# Patient Record
Sex: FEMALE | Race: WHITE | NOT HISPANIC OR LATINO | Employment: FULL TIME | URBAN - METROPOLITAN AREA
[De-identification: names, ages, dates, MRNs, and addresses within clinical notes are randomized per-mention and may not be internally consistent; named-entity substitution may affect disease eponyms.]

---

## 2017-01-02 ENCOUNTER — TRANSCRIBE ORDERS (OUTPATIENT)
Dept: LAB | Facility: MEDICAL CENTER | Age: 72
End: 2017-01-02

## 2017-01-02 ENCOUNTER — APPOINTMENT (OUTPATIENT)
Dept: LAB | Facility: MEDICAL CENTER | Age: 72
End: 2017-01-02
Payer: COMMERCIAL

## 2017-01-02 DIAGNOSIS — E78.5 HYPERLIPIDEMIA: ICD-10-CM

## 2017-01-02 DIAGNOSIS — E87.6 HYPOKALEMIA: ICD-10-CM

## 2017-01-02 DIAGNOSIS — I10 ESSENTIAL (PRIMARY) HYPERTENSION: ICD-10-CM

## 2017-01-02 LAB
ANION GAP SERPL CALCULATED.3IONS-SCNC: 10 MMOL/L (ref 4–13)
AST SERPL W P-5'-P-CCNC: 12 U/L (ref 5–45)
BUN SERPL-MCNC: 20 MG/DL (ref 5–25)
CALCIUM SERPL-MCNC: 8.7 MG/DL (ref 8.3–10.1)
CHLORIDE SERPL-SCNC: 101 MMOL/L (ref 100–108)
CHOLEST SERPL-MCNC: 154 MG/DL (ref 50–200)
CO2 SERPL-SCNC: 30 MMOL/L (ref 21–32)
CREAT SERPL-MCNC: 0.77 MG/DL (ref 0.6–1.3)
GFR SERPL CREATININE-BSD FRML MDRD: >60 ML/MIN/1.73SQ M
GLUCOSE SERPL-MCNC: 124 MG/DL (ref 65–140)
HDLC SERPL-MCNC: 51 MG/DL (ref 40–60)
LDLC SERPL CALC-MCNC: 72 MG/DL (ref 0–100)
POTASSIUM SERPL-SCNC: 3.3 MMOL/L (ref 3.5–5.3)
SODIUM SERPL-SCNC: 141 MMOL/L (ref 136–145)
TRIGL SERPL-MCNC: 155 MG/DL

## 2017-01-02 PROCEDURE — 80061 LIPID PANEL: CPT

## 2017-01-02 PROCEDURE — 80048 BASIC METABOLIC PNL TOTAL CA: CPT

## 2017-01-02 PROCEDURE — 84450 TRANSFERASE (AST) (SGOT): CPT

## 2017-01-02 PROCEDURE — 36415 COLL VENOUS BLD VENIPUNCTURE: CPT

## 2017-01-03 ENCOUNTER — GENERIC CONVERSION - ENCOUNTER (OUTPATIENT)
Dept: OTHER | Facility: OTHER | Age: 72
End: 2017-01-03

## 2017-01-03 ENCOUNTER — TRANSCRIBE ORDERS (OUTPATIENT)
Dept: SLEEP CENTER | Facility: CLINIC | Age: 72
End: 2017-01-03

## 2017-01-03 DIAGNOSIS — R06.02 WAKING AT NIGHT SHORT OF BREATH: Primary | ICD-10-CM

## 2017-01-12 ENCOUNTER — HOSPITAL ENCOUNTER (OUTPATIENT)
Dept: SLEEP CENTER | Facility: CLINIC | Age: 72
Discharge: HOME | End: 2017-01-12
Payer: COMMERCIAL

## 2017-01-12 DIAGNOSIS — G47.30 SLEEP APNEA, UNSPECIFIED TYPE: ICD-10-CM

## 2017-01-12 PROCEDURE — 95810 POLYSOM 6/> YRS 4/> PARAM: CPT

## 2017-02-10 ENCOUNTER — TRANSCRIBE ORDERS (OUTPATIENT)
Dept: SLEEP CENTER | Facility: CLINIC | Age: 72
End: 2017-02-10

## 2017-02-10 ENCOUNTER — HOSPITAL ENCOUNTER (OUTPATIENT)
Dept: SLEEP CENTER | Facility: CLINIC | Age: 72
Discharge: HOME | End: 2017-02-10
Payer: COMMERCIAL

## 2017-02-10 DIAGNOSIS — R06.02 WAKING AT NIGHT SHORT OF BREATH: ICD-10-CM

## 2017-02-10 DIAGNOSIS — G47.33 OSA (OBSTRUCTIVE SLEEP APNEA): Primary | ICD-10-CM

## 2017-02-17 ENCOUNTER — ALLSCRIPTS OFFICE VISIT (OUTPATIENT)
Dept: OTHER | Facility: OTHER | Age: 72
End: 2017-02-17

## 2017-02-23 ENCOUNTER — HOSPITAL ENCOUNTER (OUTPATIENT)
Dept: SLEEP CENTER | Facility: CLINIC | Age: 72
Discharge: HOME | End: 2017-02-23
Payer: COMMERCIAL

## 2017-02-23 DIAGNOSIS — G47.33 OSA (OBSTRUCTIVE SLEEP APNEA): ICD-10-CM

## 2017-02-23 PROCEDURE — 95811 POLYSOM 6/>YRS CPAP 4/> PARM: CPT

## 2017-03-24 ENCOUNTER — HOSPITAL ENCOUNTER (OUTPATIENT)
Dept: SLEEP CENTER | Facility: CLINIC | Age: 72
Discharge: HOME | End: 2017-03-24
Payer: COMMERCIAL

## 2017-03-24 ENCOUNTER — TRANSCRIBE ORDERS (OUTPATIENT)
Dept: SLEEP CENTER | Facility: CLINIC | Age: 72
End: 2017-03-24

## 2017-03-24 DIAGNOSIS — G47.33 OSA (OBSTRUCTIVE SLEEP APNEA): ICD-10-CM

## 2017-03-24 DIAGNOSIS — G47.33 OBSTRUCTIVE SLEEP APNEA (ADULT) (PEDIATRIC): Primary | ICD-10-CM

## 2017-06-02 ENCOUNTER — HOSPITAL ENCOUNTER (OUTPATIENT)
Dept: SLEEP CENTER | Facility: CLINIC | Age: 72
Discharge: HOME | End: 2017-06-02
Payer: COMMERCIAL

## 2017-06-02 ENCOUNTER — TRANSCRIBE ORDERS (OUTPATIENT)
Dept: SLEEP CENTER | Facility: CLINIC | Age: 72
End: 2017-06-02

## 2017-06-02 DIAGNOSIS — G47.33 OBSTRUCTIVE SLEEP APNEA (ADULT) (PEDIATRIC): Primary | ICD-10-CM

## 2017-06-02 DIAGNOSIS — G47.33 OBSTRUCTIVE SLEEP APNEA (ADULT) (PEDIATRIC): ICD-10-CM

## 2017-07-11 ENCOUNTER — TRANSCRIBE ORDERS (OUTPATIENT)
Dept: ADMINISTRATIVE | Facility: HOSPITAL | Age: 72
End: 2017-07-11

## 2017-07-11 ENCOUNTER — ALLSCRIPTS OFFICE VISIT (OUTPATIENT)
Dept: OTHER | Facility: OTHER | Age: 72
End: 2017-07-11

## 2017-07-11 ENCOUNTER — APPOINTMENT (OUTPATIENT)
Dept: LAB | Facility: MEDICAL CENTER | Age: 72
End: 2017-07-11
Payer: COMMERCIAL

## 2017-07-11 DIAGNOSIS — R06.02 SHORTNESS OF BREATH: ICD-10-CM

## 2017-07-11 DIAGNOSIS — I10 ESSENTIAL (PRIMARY) HYPERTENSION: ICD-10-CM

## 2017-07-11 LAB
ANION GAP SERPL CALCULATED.3IONS-SCNC: 8 MMOL/L (ref 4–13)
BUN SERPL-MCNC: 18 MG/DL (ref 5–25)
CALCIUM SERPL-MCNC: 9.3 MG/DL (ref 8.3–10.1)
CHLORIDE SERPL-SCNC: 100 MMOL/L (ref 100–108)
CO2 SERPL-SCNC: 27 MMOL/L (ref 21–32)
CREAT SERPL-MCNC: 0.74 MG/DL (ref 0.6–1.3)
GFR SERPL CREATININE-BSD FRML MDRD: >60 ML/MIN/1.73SQ M
GLUCOSE P FAST SERPL-MCNC: 94 MG/DL (ref 65–99)
POTASSIUM SERPL-SCNC: 3.7 MMOL/L (ref 3.5–5.3)
SODIUM SERPL-SCNC: 135 MMOL/L (ref 136–145)

## 2017-07-11 PROCEDURE — 36415 COLL VENOUS BLD VENIPUNCTURE: CPT

## 2017-07-11 PROCEDURE — 80048 BASIC METABOLIC PNL TOTAL CA: CPT

## 2017-07-15 ENCOUNTER — GENERIC CONVERSION - ENCOUNTER (OUTPATIENT)
Dept: OTHER | Facility: OTHER | Age: 72
End: 2017-07-15

## 2017-08-17 ENCOUNTER — ALLSCRIPTS OFFICE VISIT (OUTPATIENT)
Dept: OTHER | Facility: OTHER | Age: 72
End: 2017-08-17

## 2017-08-17 DIAGNOSIS — R73.01 IMPAIRED FASTING GLUCOSE: ICD-10-CM

## 2017-08-17 DIAGNOSIS — C73 MALIGNANT NEOPLASM OF THYROID GLAND (HCC): ICD-10-CM

## 2017-08-17 DIAGNOSIS — E89.0 POSTPROCEDURAL HYPOTHYROIDISM: ICD-10-CM

## 2017-12-07 ENCOUNTER — TRANSCRIBE ORDERS (OUTPATIENT)
Dept: ADMINISTRATIVE | Facility: HOSPITAL | Age: 72
End: 2017-12-07

## 2017-12-07 ENCOUNTER — APPOINTMENT (OUTPATIENT)
Dept: LAB | Facility: MEDICAL CENTER | Age: 72
End: 2017-12-07
Payer: COMMERCIAL

## 2017-12-07 DIAGNOSIS — C73 MALIGNANT NEOPLASM OF THYROID GLAND (HCC): ICD-10-CM

## 2017-12-07 DIAGNOSIS — C73 MALIGNANT NEOPLASM OF THYROID GLAND (HCC): Primary | ICD-10-CM

## 2017-12-07 LAB
T3 SERPL-MCNC: 1 NG/ML (ref 0.6–1.8)
T4 FREE SERPL-MCNC: 1.39 NG/DL (ref 0.76–1.46)

## 2017-12-07 PROCEDURE — 84439 ASSAY OF FREE THYROXINE: CPT

## 2017-12-07 PROCEDURE — 84432 ASSAY OF THYROGLOBULIN: CPT

## 2017-12-07 PROCEDURE — 36415 COLL VENOUS BLD VENIPUNCTURE: CPT

## 2017-12-07 PROCEDURE — 86800 THYROGLOBULIN ANTIBODY: CPT

## 2017-12-07 PROCEDURE — 84480 ASSAY TRIIODOTHYRONINE (T3): CPT

## 2017-12-08 LAB
THYROGLOB AB SERPL-ACNC: <1 IU/ML (ref 0–0.9)
THYROGLOB SERPL-MCNC: 6.8 NG/ML (ref 1.5–38.5)

## 2017-12-12 ENCOUNTER — GENERIC CONVERSION - ENCOUNTER (OUTPATIENT)
Dept: OTHER | Facility: OTHER | Age: 72
End: 2017-12-12

## 2017-12-12 ENCOUNTER — HOSPITAL ENCOUNTER (OUTPATIENT)
Dept: ULTRASOUND IMAGING | Facility: HOSPITAL | Age: 72
Discharge: HOME | End: 2017-12-12
Attending: SURGERY
Payer: COMMERCIAL

## 2017-12-12 DIAGNOSIS — C73 MALIGNANT NEOPLASM OF THYROID GLAND (HCC): ICD-10-CM

## 2017-12-12 PROCEDURE — 76536 US EXAM OF HEAD AND NECK: CPT

## 2017-12-15 ENCOUNTER — ALLSCRIPTS OFFICE VISIT (OUTPATIENT)
Dept: OTHER | Facility: OTHER | Age: 72
End: 2017-12-15

## 2017-12-16 NOTE — PROGRESS NOTES
Assessment  1  Thyroid cancer (193) (C73)    Plan  Thyroid cancer    · (1) BASIC METABOLIC PROFILE; Status:Active; Requested DIZ:[id number]; Perform:[facility]; YSI:46KWV1632;MLTXJPR; For:Thyroid cancer; Ordered By:David Rivera;   · (1) THYROGLOBULIN/QUANT W/ANTIBODY PANEL; Status:Active; Requestedfor:30Jql0801; Perform:[facility]; BUO:09PGX7412;OUATHDC; For:Thyroid cancer; Ordered By:David Rivera;   · CT NECK AND CHEST W CONTRAST; Status:Need Information - Financial Authorization; Requested BFB:[id number]; Perform:[facility]; KWZ:08UMC7105;COLPBIY; For:Thyroid cancer; Ordered By:David Rivera;   · Follow-up visit in 6 months Evaluation and Treatment  Follow-up  Status: Hold For -Scheduling  Requested for: 17IRF4732   Ordered; For: Thyroid cancer; Ordered By: Azucena Vasquez Performed:  Due: 77WRS9938    Discussion/Summary  Discussion Summary:   History of thyroid cancer, status post pierre-then completion thyroidectomy  Presently without clinical or radiographic evidence of malignancy  Plan on 6-month follow-up with surveillance blood work and CT scan at that time, given slight elevations in thyroid globulin levels  However, these have been stable  Counseling Documentation With Imm: The patient was counseled regarding diagnostic results,-- prognosis,-- impressions,-- risks and benefits of treatment options,-- importance of compliance with treatment  Goals and Barriers: The patient has the current Goals: Cure and surveillance  Patient's Capacity to Self-Care: Patient is able to Self-Care  Medication SE Review and Pt Understands Tx: The treatment plan was reviewed with the patient/guardian  The patient/guardian understands and agrees with the treatment plan      Chief Complaint  Chief Complaint Free Text Note Form: Pt is here for her 1 year thyroid follow-up no new complaints at this time        History of Present Illness  Diagnosis and Staging: Stage I thyroid cancer   Treatment History: right thyroid lobectomy march 2016, completion left thyroid lobectomy june 2016   Interval History: Mrs Noelle Marmolejo is a 81-year-old woman here for a surveillance visit status post pierre-then completion thyroidectomy in 2016  She is doing well with no issues to report  She follows with Dr Codi Lopez for endocrine management  She had blood work and an ultrasound done in anticipation of today's visit  Review of Systems  Complete Female ROS SurgOnc:  Constitutional: The patient denies new or recent history of general fatigue, no recent weight loss, no change in appetite  Eyes: No complaints of visual problems, no scleral icterus  ENT: no complaints of ear pain, no hoarseness, no difficulty swallowing,-- no tinnitus-- and-- no new masses in head, oral cavity, or neck  Cardiovascular: No complaints of chest pain, no palpitations, no ankle edema  Respiratory: No complaints of shortness of breath, no cough  Gastrointestinal: No complaints of jaundice, no bloody stools, no pale stools  Genitourinary: No complaints of dysuria, no hematuria, no nocturia, no frequent urination, no urethral discharge  Musculoskeletal: No complaints of weakness, paralysis, joint stiffness or arthralgias,  Integumentary: No complaints of rash, no new lesions  Neurological: No complaints of convulsions, no seizures, no dizziness  Hematologic/Lymphatic: No complaints of easy bruising  ROS Reviewed:   ROS reviewed  Active Problems  1  Acute upper respiratory infection (465 9) (J06 9)   2  Ankle pain, unspecified laterality   3  Bilateral leg edema (782 3) (R60 0)   4  Bunion, unspecified laterality   5  Depression (311) (F32 9)   6  GERD without esophagitis (530 81) (K21 9)   7  History of allergy (V15 09) (Z88 9)   8  Hyperglycemia (790 29) (R73 9)   9  Hyperlipidemia (272 4) (E78 5)   10  Hypertension (401 9) (I10)   11  Hypokalemia (276 8) (E87 6)   12   Hypothyroidism, postsurgical (244 0) (E89 0)   13  Impaired fasting glucose (790 21) (R73 01)   14  Insomnia (780 52) (G47 00)   15  Morbid obesity with BMI of 45 0-49 9, adult (278 01,V85 42) (E66 01,Z68 42)   16  Multiple thyroid nodules (241 1) (E04 2)   17  Palpitations (785 1) (R00 2)   18  Preop pulmonary/respiratory exam (V72 82) (Z01 811)   19  Reflex sympathetic dystrophy (337 20) (G90 50)   20  Restrictive lung disease (518 89) (J98 4)   21  Shortness of breath (786 05) (R06 02)   22  Shoulder Pain During Impingement Test Left   23  Thyroid cancer (193) (C73)   24  Thyroid nodule (241 0) (E04 1)    Past Medical History  1  History of Acute maxillary sinusitis, recurrence not specified (461 0) (J01 00)   2  History of Colon cancer screening (V76 51) (Z12 11)   3  History of Fracture Of Carpal Bone(S) (814 00)   4  History of acute bronchitis (V12 69) (Z87 09)   5  History of fracture of arm (V15 51) (Z87 81)   6  History of upper respiratory infection (V12 09) (Z87 09)   7  History of Pain in left foot (729 5) (M79 672)   8  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)   9  History of Screening for breast cancer (V76 10) (Z12 31)    Surgical History  1  History of Hemorrhoidectomy   · DR ALANIS   2  History of Thyroid Surgery Alex-Thyroidectomy Left Lobe   3  History of Thyroid Surgery Alex-Thyroidectomy Right Lobe  Surgical History Reviewed: The surgical history was reviewed and updated today  Family History  Mother    1  Family history of Hypertension (V17 49)  Family History    2  Family history of kidney disease (V18 69) (Z84 1)  Family History Reviewed: The family history was reviewed and updated today  Social History   · Being A Social Drinker   · Never A Smoker   · Occasional caffeine consumption  Social History Reviewed: The social history was reviewed and updated today  The social history was reviewed and is unchanged  Current Meds   1  Bystolic 5 MG Oral Tablet; Take 1 tablet daily;  Therapy: 96VUE3164 to (Evaluate:11Oct2018)  Requested for: 72CPV2287; Last Rx:03Ykn6141 Ordered   2  Furosemide 20 MG Oral Tablet; one daily as directed; Therapy: 17TVU1195 to (Evaluate:28Jbd6348)  Requested for: 88OVV9672; Last Rx:21Wwk4077 Ordered   3  Levothyroxine Sodium 175 MCG Oral Tablet; Take 1 tablet daily; Therapy: 75Pnx1454 to (Evaluate:10Sjz1715)  Requested for: 26Npq2803; Last Rx:07Tmu1014 Ordered   4  Lisinopril-Hydrochlorothiazide 20-12 5 MG Oral Tablet; take 1 tablet twice a day; Therapy: 04DRN1950 to (Evaluate:11Mar2018)  Requested for: 96Rgy1909; Last Rx:63Kwr5622 Ordered   5  Omeprazole 40 MG Oral Capsule Delayed Release; TAKE 1 CAPSULE DAILY BEFORE DINNER; Therapy: 14QYC9633 to (Evaluate:91Xmx8260)  Requested for: 87VJQ8255; Last Rx:28Nov2017 Ordered   6  Osteo Bi-Flex Adv Triple St Oral Tablet; TAKE 1 TABLET DAILY AS DIRECTED; Therapy: 63JBU0174 to Recorded   7  Potassium Chloride ER 20 MEQ Oral Tablet Extended Release; One tablet daily; Therapy: 21LMB8912 to (Evaluate:77Gys3305)  Requested for: 43DCR4586; Last Rx:11Yjh8113 Ordered   8  Pravastatin Sodium 20 MG Oral Tablet; TAKE 1 TABLET AT BEDTIME; Therapy: 17IAE0825 to (Emiliano Ban)  Requested for: 61QJE1962; Last Rx:31Dwh8322 Ordered   9  Venlafaxine HCl  MG Oral Capsule Extended Release 24 Hour; TAKE 1 CAPSULE ONCE DAILY WITH FOOD; Therapy: 34TSE1528 to (Evaluate:95Juk4684)  Requested for: 27ZLB4880; Last Rx:12Bxl6077 Ordered   10  Venlafaxine HCl ER 75 MG Oral Capsule Extended Release 24 Hour; take 1 capsule  daily; Therapy: 53NMZ3201 to (Evaluate:63Xjm1570)  Requested for: 33Vdw1609; Last  Rx:49Kwb6748; Status: ACTIVE - Renewal Denied Ordered   11  Ventolin  (90 Base) MCG/ACT Inhalation Aerosol Solution; INHALE 2 PUFFS  EVERY 4-6 HOURS AS NEEDED; Therapy: 64OPW7748 to (Evaluate:17Jun2017)  Requested for: 30JEA3653; Last  Rx:18Feb2017 Ordered   12  Verapamil HCl  MG Oral Tablet Extended Release; Take 1 tablet daily;   Therapy: 20IZF7313 to (Anne Marie Cheatham)  Requested for: 62Asm1107; Last  Rx:19Aud3395 Ordered  Medication List Reviewed: The medication list was reviewed and updated today  Allergies  1  No Known Drug Allergies  2  No Known Environmental Allergies   3  No Known Food Allergies    Vitals  Vital Signs    Recorded: 80Ecy3632 01:08PM   Temperature 98 2 F   Heart Rate 61   Respiration 20   Systolic 934   Diastolic 90   Height 5 ft 4 in   Weight 254 lb    BMI Calculated 43 6   BSA Calculated 2 17   O2 Saturation 94     Physical Exam   Constitutional: General appearance: The Patient is well-developed, well-nourished female who appears her stated age in no acute distress  She is pleasant and talkative  HEENT: Conjunctiva and lids: No swelling, erythema, or discharge  -- Sclerae are anicteric  -- External inspection of ears and nose: Normal  -- Neck is supple without adenopathy  No JVD  -- well-healed thyroidectomy scar  Chest: There are bilaterally symmetrical breath sounds  -- The lungs are clear to auscultation  Cardiac: Heart is regular rate  Abdomen: Abdomen is soft, nontender without masses  -- There is no evidence of hepatosplenomegaly  Extremities: There is no clubbing or cyanosis  -- Inspection/palpation of joints, bones, and muscles: Normal  -- There is no edema  Neuro: Grossly nonfocal  -- Gait is normal  -- The Cranial Nerves II - XII are grossly intact  -- Sensation is intact to light touch  -- Orientation to person, place and time: Normal  -- Mood and affect: Normal    Lymphatic: no evidence of cervical adenopathy bilaterally  Skin: Warm, anicteric  Results/Data  Diagnostic Studies Reviewed Surg Onc:  X-ray Review US HEAD NECK LYMPH NODE MAPPING FinalNo Documents Attached-------------------------------------------------------------------------------- NECK ULTRASOUND INDICATION: History of thyroidectomy  C73: Malignant neoplasm of thyroid gland   History taken directly from the electronic ordering system  COMPARISON: 12/13/2016  FINDINGS: Ultrasound of the thyroidectomy bed and cervical lymph node chains was performed with a high frequency linear transducer  There is no suspicion of recurrent mass in the thyroidectomy bed  Lymph nodes maintain normal morphologic contour, echogenicity and short axis dimensions of less than 0 7 cm  No evidence for microcalcification or focal nodularity  IMPRESSION: No evidence of recurrent or metastatic disease  Workstation performed: [id number] Signed by: Renuka Demarco MD 12/14/17Ordered by: Wei Lindquist Collected/Examined: 04ZYK2677 02:44PMVerification Required Stage: Final Resulted: 51SKR7939 02:41PM Last Updated: 92RNI7108 02:43PM Accession: [id number]  Lab Review (1) THYROGLOBULIN/QUANT W/ANTIBODY PANEL FinalNo Documents AttachedTW Order Number: [id number] Test Result Flag Reference Goal THYROGLOB AB <1 0 IU/mL 0 0 - 0 9 New Thyroglobulin Antibody measured by [facility] MethodologyPerformed at: [address] [place] [address] [id number] Director: Yash Muniz MD, Phone: [phone] THYROGLOBULIN-BALJIT 6 8 ng/mL 1 5 - 38 5 New According to the [facility] of Clinical Biochemistry, thereference interval for Thyroglobulin (TG) should be related toeuthyroid patients and not for patients who underwent thyroidectomy  TG reference intervals for these patients depend on the residualmass of the thyroid tissue left after surgery  Establishing apost-operative baseline is recommended  The assay limit of quantitation is 0 1 ng/mLThyroglobulin measured by Monica Sarmiento at: [address] [place] [address] [id number] Director: Yash Muniz MD, Phone: [phone] by: Wei Lindquist Collected/Examined: 92FJN4847 10:22AMVerified by: Wei Lindquist 38OVU8067 03:55PMResult Communication: No patient communication needed at this time;Stage: Final Resulted: 99YSF6969 10:22AM Last Updated: 79NGH3087 03:55PM Accession: [id number]   Health Management  History of Colon cancer screening   COLONOSCOPY; every 10 years; Next Due: 78UWV7257; Overdue  History of Screening for breast cancer   Digital Bilateral Screening Mammogram With CAD; every 2 years; Next Due: 38HTX1074; Overdue    Future Appointments    Date/Time Provider Specialty Site   02/22/2018 03:00 PM Sarah Tate, [facility] Endocrinology [facility] ENDOCRINOLOGY     End of Encounter Meds  1  Furosemide 20 MG Oral Tablet; one daily as directed; Therapy: 11AGN2989 to (Evaluate:44Mrq2764)  Requested for: 46LVY0182; Last Rx:24Mqz5259 Ordered  2  Venlafaxine HCl  MG Oral Capsule Extended Release 24 Hour; TAKE 1 CAPSULE ONCE DAILY WITH FOOD; Therapy: 28FPK7157 to (Evaluate:35Geb9417)  Requested for: 08UPY2587; Last Rx:98Dxe2733 Ordered   3  Venlafaxine HCl ER 75 MG Oral Capsule Extended Release 24 Hour; take 1 capsule daily; Therapy: 24FOC4280 to (Evaluate:42Ubt7857)  Requested for: 51Vvi1560; Last Rx:29Jun2017; Status: ACTIVE - Renewal Denied Ordered  4  Omeprazole 40 MG Oral Capsule Delayed Release; TAKE 1 CAPSULE DAILY BEFORE DINNER; Therapy: 13NYC1755 to (Evaluate:45Vju9030)  Requested for: 57MFI9386; Last Rx:64Wfr1865 Ordered  5  Osteo Bi-Flex Adv Triple St Oral Tablet; TAKE 1 TABLET DAILY AS DIRECTED; Therapy: 43KDR6379 to Recorded  6  Pravastatin Sodium 20 MG Oral Tablet; TAKE 1 TABLET AT BEDTIME; Therapy: 09EFR5750 to [phone])  Requested for: 05OVM5380; Last Rx:76Gxo8436 Ordered  7  Bystolic 5 MG Oral Tablet; Take 1 tablet daily; Therapy: 48DDV3503 to (Evaluate:11Oct2018)  Requested for: 82GIK6701; Last Rx:16Oct2017 Ordered   8  Lisinopril-Hydrochlorothiazide 20-12 5 MG Oral Tablet; take 1 tablet twice a day; Therapy: 33RFQ1329 to (Evaluate:11Mar2018)  Requested for: 94Ubo5127; Last Rx:94Mre9089 Ordered   9  Verapamil HCl  MG Oral Tablet Extended Release; Take 1 tablet daily;  Therapy: 01BST3221 to (Evaluate:75Ljs9000)  Requested for: 39CMV9528; Last Rx:39Swz6659 Ordered  10  Potassium Chloride ER 20 MEQ Oral Tablet Extended Release; One tablet daily; Therapy: 08EJA4399 to (Evaluate:13Jpc5820)  Requested for: 57VBE9521; Last  Rx:94Tqj9419 Ordered  11  Levothyroxine Sodium 175 MCG Oral Tablet; Take 1 tablet daily; Therapy: 85Blm3825 to (Evaluate:55Zvq4577)  Requested for: 17Aug2017; Last  Rx:17Aug2017 Ordered  12  Ventolin  (90 Base) MCG/ACT Inhalation Aerosol Solution; INHALE 2 PUFFS  EVERY 4-6 HOURS AS NEEDED;   Therapy: 73QWJ8273 to (Evaluate:17Jun2017)  Requested for: 47KON4098; Last  Rx:18Feb2017 Ordered    Signatures   Electronically signed by : Don Singh MD; Dec 15 2017  1:32PM EST                       (Author)

## 2017-12-26 ENCOUNTER — ALLSCRIPTS OFFICE VISIT (OUTPATIENT)
Dept: OTHER | Facility: OTHER | Age: 72
End: 2017-12-26

## 2018-01-10 NOTE — RESULT NOTES
Verified Results  ECHO COMPLETE WITH CONTRAST IF INDICATED, TTE / TRANSTHORACIC 64UHI6261 02:24PM Norma Gaston     Test Name Result Flag Reference   ECHO COMPLETE WITH CONTRAST IF INDICATED (Report)     [facility]   [facility]  [facility], [address]   [phone]     Transthoracic Echocardiogram   2D, M-mode, Doppler, and Color Doppler     Study date: 15-Mar-2016     Patient: Yumiko Espino   MR number: [id number]   Account number: [de-identified]   : 1945   Age: 78 years   Gender: Female   Status: Outpatient   Location: [facility] [place]   Height: 62 in   Weight: 243 5 lb   BP: 144/ 82 mmHg     Indications: Shortness of breath     Diagnoses: R06 02 - Shortness of breath     Sonographer: BHAVANA Ponce   Primary Physician: Hillary Beavers DO   Referring Physician: Jennifer Yarbrough MD   Group: [facility]   Interpreting Physician: Jennifer Yarbrough MD     SUMMARY     LEFT VENTRICLE:   Systolic function was normal    There were no regional wall motion abnormalities  Wall thickness was mildly to moderately increased  LEFT ATRIUM:   The atrium was mildly dilated  MITRAL VALVE:   There was mild to moderate annular calcification  There was mild regurgitation  TRICUSPID VALVE:   There was mild regurgitation  HISTORY: PRIOR HISTORY: Hypertension, hypercholesterolemia, GERD, obesity,   restrictive lung disease, depression, thyroidectomy     PROCEDURE: The study was performed in the [address] [place] [address] [facility] [place]  This   was a routine study  The transthoracic approach was used  The study included   complete 2D imaging, M-mode, complete spectral Doppler, and color Doppler  The   heart rate was 55 bpm, at the start of the study  Images were obtained from the   parasternal, apical, subcostal, and suprasternal notch acoustic windows     Echocardiographic views were limited due to poor acoustic window availability,   decreased penetration, and lung interference  This was a technically difficult   study  LEFT VENTRICLE: Size was normal  Systolic function was normal  There were no   regional wall motion abnormalities  Wall thickness was mildly to moderately   increased  DOPPLER: There was an increased relative contribution of atrial   contraction to ventricular filling  The deceleration time of the early   transmitral flow velocity was increased  RIGHT VENTRICLE: The size was normal  Systolic function was normal  Wall   thickness was normal      LEFT ATRIUM: The atrium was mildly dilated  RIGHT ATRIUM: Size was at the upper limits of normal      MITRAL VALVE: There was mild to moderate annular calcification  DOPPLER: There   was no evidence for stenosis  There was mild regurgitation  AORTIC VALVE: The valve was trileaflet  Leaflets exhibited mildly increased   thickness  DOPPLER: There was no evidence for stenosis  There was no   regurgitation  TRICUSPID VALVE: The valve structure was normal  There was normal leaflet   separation  DOPPLER: The transtricuspid velocity was within the normal range  There was no evidence for stenosis  There was mild regurgitation  PULMONIC VALVE: Leaflets exhibited normal thickness, no calcification, and   normal cuspal separation  DOPPLER: The transpulmonic velocity was within the   normal range  There was no regurgitation  PERICARDIUM: There was no pericardial effusion  The pericardium was normal in   appearance  AORTA: The root exhibited normal size  SYSTEMIC VEINS: IVC: The inferior vena cava was not well visualized       SYSTEM MEASUREMENT TABLES     2D   AV Diam: 3 1 cm   IVSd: 1 3 cm   LA Diam: 3 9 cm   LVIDd: 4 7 cm   LVIDs: 3 cm   LVPWd: 1 3 cm     PW   E': 0 1 m/s   E/E': 13 1   MV A Shabbir: 1 m/s   MV Dec Cowley: 2 1 m/s2   MV DecT: 396 9 ms   MV E Shabbir: 0 8 m/s   MV E/A Ratio: 0 8     [employer] Accredited Echocardiography Laboratory     Prepared and electronically signed by     Shanda Sanchez MD   Signed 15-Mar-2016 16:40:52

## 2018-01-11 NOTE — PROGRESS NOTES
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) (C73)   2  Hypothyroidism, postsurgical (244 0) (E89 0)   3  Hyperglycemia (790 29) (R73 9)   4  Morbid obesity with BMI of 45 0-49 9, adult (278 01,V85 42) (H55 24,I66 69)    Plan  Thyroid cancer    · Levothyroxine Sodium 175 MCG Oral Tablet; Take 1 tablet daily   Rx By: Rosy Gunn; Dispense: 90 Days ; #:1 Tablet; Refill: 1; For: Thyroid cancer; WINSTON = N; Verified Transmission to [facility]); Last Updated By: System, [employer]; 8/23/2016 10:19:04 AM   · (1) T4, FREE; Status:Active; Requested FCJ:[id number];    Perform:[facility]; NHE:15FKN5492;PQHIWVF; For:Thyroid cancer; Ordered By:Sharon Aponte;   · (1) TSH; Status:Active; Requested QWF:24GZK2412;    Perform:[facility]; GLJ:67CFD3680;KGEKAAD; For:Thyroid cancer; Ordered By:Sharon Aponte;   · Follow-up visit in 3 months Evaluation and Treatment  Follow-up  Status: Complete   Done: 28Qtr0430   Ordered; For: Thyroid cancer; Ordered By: Rosy Gunn Performed:  Due: 08XOW6023; Last Updated By: Ana Kelsey; 8/23/2016 10:21:05 AM    Papillary Thyroid Cancer:  Multifocal Encapsulated Non-Invasive Follicular variant of papillary carcinoma  Completion thyroidectomy showed no malignancy  Risk of recurrence extremely low and will not need radioactive Iodine therapy or TSH suppression  Hypothyroidism: Reduce Levothyroxine to 175mcg daily and check TSH/Free T4 in 6 weeks  Goal TSH ~1      Impaired Fasting Glucose/Obesity: Recommend lifestyle modifications and weight loss  Will refer to pre-diabetes class  F/U in 3 months with Dr Delvin Sosa     Chief Complaint  Chief Complaint Free Text Note Form: Follow Up      History of Present Illness  HPI: 78year old female here for f/u of thyroid CA, hypothyroidism, and Impaired Fasting Glucose  For the Thyroid Cancer, She has Left thyroidectomy in 2/2016 and completion thyroidectomy 6/2016  Pathology report from 2/25/2016 showed three distinct Foci of 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  Completion thyroidectomy showed no evidence of malignancy  Continues with some dysphagia and hoarse voice since surgery  For the Hypothyroidism, she is taking levothyroxine 200mcg daily  Taking daily and properly on an empty stomach  Does have fatigue  Does often feel warm, this is not new since thyroidectomy  For the impaired fasting glucose, not taking any medications  She does have obesity and is being treated medically for HTN and Hyperlipidemia  No FH of Diabetes  Review of Systems  ROS Reviewed:   ROS reviewed  Endo Adult ROS Female Established v2 Update - [facility]:   Constitutional/General: no recent weight gain, no recent weight loss, no poor energy/fatigue, no increased energy level, no insomnia/sleep problems, no fever and no feeling weak  Breasts: no nipple discharge  Heart: no high blood pressure, no chest pain/tightness, no rapid/racing heart rate and no palpitations  Genitourinary - Urinary: no frequent urination, no excess urination and no urinating during the night  Eyes: no blurred vision, no double vision, no bulging eyes, no gritty/scratchy eyes and no excessive tearing  Mouth / Throat: no hoarseness and no difficulty swallowing  Neck: no lumps, no swollen glands, no neck pain, no neck stiffness and no enlarged thyroid  Respiratory: no wheezing, no asthma and no persistent cough  Musculoskeletal: no muscle aches/pain, no joint aches/pain and no muscle weakness  Skin & Hair: no dry skin, no acne, the hair texture was not oily, no hair loss and no excessive hair growth  Gastrointestinal: no constipation, no diarrhea, no waking at night to drink and no stomach ache  Neurological: no blackouts, no weakness and no tremors  Reproductive:  frequency of period is not applicable  duration of period is not applicable   Date of last menstruation is not applicable  regular periods are not applicable  discomfort with periods is not applicable  excessive bleeding during period is not applicable  mood swings are not applicable  Endocrine: no feeling hot frequently, no feeling cold frequently, no shifts between feeling hot and cold, no cold hands or feet, no excessive sweating, no thyroid problems, no blood sugar problems, no excessive thirst, no excessive hunger, no change in shoe size, no nausea or vomiting and no shaky hands  Active Problems    1  Acute upper respiratory infection (465 9) (J06 9)   2  Ankle pain, unspecified laterality   3  Bilateral leg edema (782 3) (R60 0)   4  Bunion, unspecified laterality (727 1) (M20 10)   5  Depression (311) (F32 9)   6  GERD without esophagitis (530 81) (K21 9)   7  History of allergy (V15 09) (Z88 9)   8  Hyperglycemia (790 29) (R73 9)   9  Hyperlipidemia (272 4) (E78 5)   10  Hypertension (401 9) (I10)   11  Hypokalemia (276 8) (E87 6)   12  Hypothyroidism, postsurgical (244 0) (E89 0)   13  Insomnia (780 52) (G47 00)   14  Morbid obesity with BMI of 45 0-49 9, adult (278 01,V85 42) (E66 01,Z68 42)   15  Multiple thyroid nodules (241 1) (E04 2)   16  Palpitations (785 1) (R00 2)   17  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193) (C73)   18  Preop pulmonary/respiratory exam (V72 82) (Z01 811)   19  Reflex sympathetic dystrophy (337 20) (G90 50)   20  Restrictive lung disease (518 89) (J98 4)   21  Shortness of breath (786 05) (R06 02)   22  Shoulder Pain During Impingement Test Left   23  Thyroid cancer (193) (C73)   24  Thyroid nodule (241 0) (E04 1)    Past Medical History    1  History of Acute maxillary sinusitis, recurrence not specified (461 0) (J01 00)   2  History of Colon cancer screening (V76 51) (Z12 11)   3  History of Fracture Of Carpal Bone(S) (814 00)   4  History of acute bronchitis (V12 69) (Z87 09)   5  History of fracture of arm (V15 51) (Z87 81)   6   History of upper respiratory infection (V12 09) (Z87 09)   7  History of Pain in left foot (729 5) (M79 642)   8  History of Screening for breast cancer (V76 10) (Z12 39)  Active Problems And Past Medical History Reviewed: The active problems and past medical history were reviewed and updated today  Surgical History    1  History of Hemorrhoidectomy   2  History of Thyroid Surgery Alex-Thyroidectomy Left Lobe   3  History of Thyroid Surgery Alex-Thyroidectomy Right Lobe  Surgical History Reviewed: The surgical history was reviewed and updated today  Family History  Mother    1  Family history of Hypertension (V17 49)  Family History    2  Family history of kidney disease (V18 69) (Z84 1)  Family History Reviewed: The family history was reviewed and updated today  Social History    · Being A Social Drinker   · Never A Smoker   · Occasional caffeine consumption  Social History Reviewed: The social history was reviewed and updated today  The social history was reviewed and is unchanged  Current Meds   1  Bystolic 5 MG Oral Tablet; Take 1 tablet daily; Therapy: 77OLO6705 to (Hamzah Rendon)  Requested for: 04YAR4670; Last   Rx:31Mar2016 Ordered   2  Furosemide 20 MG Oral Tablet; One tablet every 2  days as needed for edema; Therapy: 99ONX7922 to (Rebeca Zarate)  Requested for: 30SOV1063; Last   Rx:76Fav0504 Ordered   3  Lisinopril-Hydrochlorothiazide 20-12 5 MG Oral Tablet; take 1 tablet twice a day; Therapy: 39ZRP5939 to (Evaluate:87Ysk3971)  Requested for: 05PJK0423; Last   Rx:73Xvt7426 Ordered   4  Omeprazole 40 MG Oral Capsule Delayed Release; TAKE 1 CAPSULE DAILY BEFORE   DINNER; Therapy: 64PHQ5543 to (Hamzah Rendon)  Requested for: 31JSC2907; Last   Rx:82Bal4461 Ordered   5  Osteo Bi-Flex Adv Triple St Oral Tablet; TAKE 1 TABLET DAILY AS DIRECTED; Therapy: 80YQK2444 to Recorded   6  Potassium Chloride ER 20 MEQ Oral Tablet Extended Release;  One tablet twice daily   today then one tablet daily; Therapy: 46YMJ8096 to (Rubén Gregory)  Requested for: 08WMT2061; Last   Rx:32Hud8836 Ordered   7  Pravastatin Sodium 20 MG Oral Tablet; TAKE 1 TABLET AT BEDTIME; Therapy: 42CLP6228 to ([phone])  Requested for: 18OUZ4280; Last   Rx:56Xms1853 Ordered   8  Venlafaxine HCl ER 75 MG Oral Capsule Extended Release 24 Hour; TAKE 2 CAPSULES   DAILY; Therapy: 36HNQ8586 to (Evaluate:29Jun2016)  Requested for: 67HHC7217 Recorded   9  Ventolin  (90 Base) MCG/ACT Inhalation Aerosol Solution; INHALE 2 PUFFS   EVERY 4-6 HOURS AS NEEDED; Therapy: 03SKE0558 to (Evaluate:17Ryl8621); Last Rx:73Wdq3496 Ordered   10  Verapamil HCl  MG Oral Tablet Extended Release; Take 1 tablet daily; Therapy: 88JER6629 to (Evaluate:71Ovl2285)  Requested for: 94URD3068; Last    Rx:85Rmg6310 Ordered  Medication List Reviewed: The medication list was reviewed and updated today  Allergies    1  No Known Drug Allergies    2  No Known Environmental Allergies   3  No Known Food Allergies    Vitals  Vital Signs    Recorded: 14ELO7372 27:02FQ   Systolic 266   Diastolic 624   Heart Rate 74   Height 5 ft 4 in   Weight 248 lb 0 64 oz   BMI Calculated 42 58   BSA Calculated 2 15     Physical Exam    Constitutional   General appearance: No acute distress, well appearing and well nourished  Eyes   Conjunctiva and lids: No swelling, erythema, or discharge  Pupils: Equal, round and reactive to light  The sclera are anicteric  Extraocular movements are intact  Ears, Nose, Mouth, and Throat   External inspection of ears, nose and lips: Normal     Oropharynx: Normal with no erythema, edema, exudate or lesions  Exam of Head: The head is atraumatic and normocephalic  Neck: The neck is supple  The thyroid is normal in size with no palpable nodules  Pulmonary   Auscultation of lungs: Clear to auscultation bilaterally with normal chest expansion      Cardiovascular   Auscultation of heart: Normal rate and rhythm with no murmurs, gallops or rubs  Examination of pulses: Dorsalis pedal pulses are +2 and equal bilaterally  Examination of carotids: No bruit    Abdomen   Abdomen: Abdomen is soft, non-tender with normal bowel sounds  Lymphatic   Palpation of lymph nodes: No supraclavicular or suboccipital lymphadenopathy  Musculoskeletal   Inspection/palpation of joints, bones, and muscles: Muscle bulk and tone is normal     Skin   Skin and subcutaneous tissue: Normal skin temperature and color  Neurologic   Reflexes: 2+ and symmetric  Motor Strength: Strength is 5/5 bilaterally  Psychiatric   Orientation to person, place and time: Normal     Mood and affect: Affect and attention span are normal     Diabetic Foot Exam   Patitent was examined with shoes and socks off today  Results/Data  Diagnostic Studies Reviewed:   Diagnostic Review Reviewed pathology reports and clarified with pathologist  (1) T3 TOTAL 12Aug2016 12:31P Wallace [place] Order Number: [id number]     Test Name Result Flag Reference   T3 1 3 ng/mL  0 6-1 8     (1) T4, FREE 02Soa1508 12:31PM Asher Baires    Order Number: [id number]     Test Name Result Flag Reference   T4,FREE 1 48 ng/dL H 0 76-1 46     (1) TSH 12Aug2016 12:31P Wallace [place] Order Number: [id number]     Test Name Result Flag Reference   TSH 0 070 uIU/mL L 0 358-3 740   Patients undergoing fluorescein dye angiography may retain small amounts of fluorescein in the body for 48-72 hours post procedure  Samples containing fluorescein can produce falsely depressed TSH values  If the patient had this procedure,a specimen should be resubmitted post fluorescein clearance            The recommended reference ranges for TSH during pregnancy are as follows:  First trimester 0 1 to 2 5 uIU/mL  Second trimester  0 2 to 3 0 uIU/mL  Third trimester 0 3 to 3 0 uIU/m     (1) THYROGLOBULIN/QUANT W/ANTIBODY PANEL 54Mhc3720 12:31PM Leo Blake Order Number: [id number]     Test Name Result Flag Reference   THYROGLOB AB <1 0 IU/mL  0 0 - 0 9   Thyroglobulin Antibody measured by [facility] Methodology    Performed at:  [facility] [employer] [address]  [id number]  : Sharif Grant MD, Phone:  [phone]   THYROGLOBULIN-BALJIT 2 5 ng/mL  1 5 - 38 5   According to the [facility] of Clinical Biochemistry, the  reference interval for Thyroglobulin (TG) should be related to  euthyroid patients and not for patients who underwent thyroidectomy  TG reference intervals for these patients depend on the residual  mass of the thyroid tissue left after surgery  Establishing a  post-operative baseline is recommended  The assay limit of quantitation is 0 1 ng/mL  Thyroglobulin measured by [facility] [facility] Immunometric Assay    Performed at:  [address]  [id number]  : Sharif Grant MD, Phone:  [phone]     (1) GLUCOSE,  FASTING 38Xyf7300 12:31PM Ida Mateus     Test Name Result Flag Reference   GLUCOSE FASTING 117 mg/dL H 65-99     (1) HEMOGLOBIN A1C 43Zzy9442 12:31PM Ida Mateus     Test Name Result Flag Reference   HEMOGLOBIN A1C 6 1 %  4 2-6 3   EST  AVG   GLUCOSE 128 mg/dl       (1) LDL,DIRECT 34YKH7366 10:45AM tic Order Number: [id number]  TW Order Number: [id number] Order Number: [id number] Order Number: [id number]     Test Name Result Flag Reference   LDL, DIRECT 111 mg/dl H 0-100   LDL Cholesterol:        Optimal          <100 mg/dl        Near Optimal     100-129 mg/dl        Above Optimal          Borderline High   130-159 mg/dl          High              160-189 mg/dl          Very High        >189 mg/dl     (1) BASIC METABOLIC PROFILE 93GRQ3802 10:45AM tic Order Number: [id number]  TW Order Number: [id number] Order Number: [id number] Order Number: [id number] Test Name Result Flag Reference   GLUCOSE,RANDM 125 mg/dL     If the patient is fasting, the ADA then defines impaired fasting glucose as > 100 mg/dL and diabetes as > or equal to 123 mg/dL  SODIUM 141 mmol/L  136-145   POTASSIUM 3 6 mmol/L  3 5-5 3   CHLORIDE 103 mmol/L  100-108   CARBON DIOXIDE 29 mmol/L  21-32   ANION GAP (CALC) 9 mmol/L  4-13   BLOOD UREA NITROGEN 16 mg/dL  5-25   CREATININE 0 82 mg/dL  0 60-1 30   Standardized to IDMS reference method   CALCIUM 8 8 mg/dL  8 3-10 1   eGFR Non-African American      >60 0 ml/min/1 73sq [facility] Disease Education Program recommendations are as follows:  GFR calculation is accurate only with a steady state creatinine  Chronic Kidney disease less than 60 ml/min/1 73 sq  meters  Kidney failure less than 15 ml/min/1 73 sq  meters  Health Management  History of Colon cancer screening   COLONOSCOPY; every 10 years; Next Due: 95IKO5700; Overdue  History of Screening for breast cancer   Digital Bilateral Screening Mammogram With CAD; every 2 years; Next Due: 75XEK0881; Overdue    Future Appointments    Date/Time Provider Specialty Site   10/04/2016 08:00 AM KARL Castellanos  Cardiology  CARDIOLOGY  [facility]   12/01/2016 09:20 AM KARL Neff  Endocrinology [facility] ENDOCRINOLOGY   12/16/2016 01:15 PM Roberta Rodriguez MD Surgical Oncology [facility]     Signatures   Electronically signed by : KRYSTAL Ro;  Aug 24 2016  1:04PM EST                       (Author)    Electronically signed by : KARL Mcnulty ; Aug 24 2016  5:19PM EST                       (Author)

## 2018-01-11 NOTE — RESULT NOTES
Verified Results  (1) AST (SGOT) 61TUP2362 10:45AM [employer] Order Number: [id number]  TW Order Number: [id number] Order Number: [id number] Order Number: [id number]     Test Name Result Flag Reference   AST(SGOT) 15 U/L  5-45     (1) LDL,DIRECT 91VFP0010 10:45AM [employer] Order Number: [id number]  TW Order Number: [id number] Order Number: [id number] Order Number: [id number]     Test Name Result Flag Reference   LDL, DIRECT 111 mg/dl H 0-100   LDL Cholesterol:        Optimal          <100 mg/dl        Near Optimal     100-129 mg/dl        Above Optimal          Borderline High   130-159 mg/dl          High              160-189 mg/dl          Very High        >189 mg/dl     (1) BASIC METABOLIC PROFILE 11HHC8060 10:45AM [employer] Order Number: [id number]  TW Order Number: [id number] Order Number: [id number] Order Number: [id number]     Test Name Result Flag Reference   GLUCOSE,RANDM 125 mg/dL     If the patient is fasting, the ADA then defines impaired fasting glucose as > 100 mg/dL and diabetes as > or equal to 123 mg/dL  SODIUM 141 mmol/L  136-145   POTASSIUM 3 6 mmol/L  3 5-5 3   CHLORIDE 103 mmol/L  100-108   CARBON DIOXIDE 29 mmol/L  21-32   ANION GAP (CALC) 9 mmol/L  4-13   BLOOD UREA NITROGEN 16 mg/dL  5-25   CREATININE 0 82 mg/dL  0 60-1 30   Standardized to IDMS reference method   CALCIUM 8 8 mg/dL  8 3-10 1   eGFR Non-African American      >60 0 ml/min/1 73sq m   [facility] Disease Education Program recommendations are as follows:  GFR calculation is accurate only with a steady state creatinine  Chronic Kidney disease less than 60 ml/min/1 73 sq  meters  Kidney failure less than 15 ml/min/1 73 sq  meters         Plan  Bilateral leg edema    · From  Furosemide 20 MG Oral Tablet One tablet every 3 days as needed for  edema To Furosemide 20 MG Oral Tablet One tablet every 2  days as needed for edema

## 2018-01-12 VITALS
TEMPERATURE: 99.8 F | HEIGHT: 64 IN | SYSTOLIC BLOOD PRESSURE: 122 MMHG | WEIGHT: 240.25 LBS | DIASTOLIC BLOOD PRESSURE: 82 MMHG | BODY MASS INDEX: 41.02 KG/M2

## 2018-01-12 NOTE — RESULT NOTES
Verified Results  (1) BASIC METABOLIC PROFILE 28TYX9335 12:05PM Santosh Alvarez Order Number: [id number]     Test Name Result Flag Reference   GLUCOSE,RANDM 124 mg/dL     If the patient is fasting, the ADA then defines impaired fasting glucose as > 100 mg/dL and diabetes as > or equal to 123 mg/dL  SODIUM 141 mmol/L  136-145   POTASSIUM 3 3 mmol/L L 3 5-5 3   CHLORIDE 101 mmol/L  100-108   CARBON DIOXIDE 30 mmol/L  21-32   ANION GAP (CALC) 10 mmol/L  4-13   BLOOD UREA NITROGEN 20 mg/dL  5-25   CREATININE 0 77 mg/dL  0 60-1 30   Standardized to IDMS reference method   CALCIUM 8 7 mg/dL  8 3-10 1   eGFR Non-African American      >60 0 ml/min/1 73sq m   - Patient Instructions: This is a fasting blood test  Water, black tea or black coffee only after 9:00pm the night before test Drink 2 glasses of water the morning of test   National Kidney Disease Education Program recommendations are as follows:  GFR calculation is accurate only with a steady state creatinine  Chronic Kidney disease less than 60 ml/min/1 73 sq  meters  Kidney failure less than 15 ml/min/1 73 sq  meters  (1) AST (SGOT) 64JJX1921 12:05PM Santosh Alvarez Order Number: [id number]     Test Name Result Flag Reference   AST(SGOT) 12 U/L  5-45   - Patient Instructions: This is a fasting blood test  Water, black tea or black coffee only after 9:00pm the night before test Drink 2 glasses of water the morning of test      (1) LIPID PANEL FASTING W DIRECT LDL REFLEX 02Jan2017 12:05PM Santosh Alvarez Order Number: [id number]     Test Name Result Flag Reference   CHOLESTEROL 154 mg/dL     LDL CHOLESTEROL CALCULATED 72 mg/dL  0-100   - Patient Instructions: This is a fasting blood test  Water, black tea or black coffee only after 9:00pm the night before test   Drink 2 glasses of water the morning of test     - Patient Instructions:  This is a fasting blood test  Water, black tea or black coffee only after 9:00pm the night before test Drink 2 glasses of water the morning of test   Triglyceride:         Normal              <150 mg/dl       Borderline High    150-199 mg/dl       High               200-499 mg/dl       Very High          >499 mg/dl  Cholesterol:         Desirable        <200 mg/dl      Borderline High  200-239 mg/dl      High             >239 mg/dl  HDL Cholesterol:        High    >59 mg/dL      Low     <41 mg/dL  LDL Cholesterol:        Optimal          <100 mg/dl        Near Optimal     100-129 mg/dl        Above Optimal          Borderline High   130-159 mg/dl          High              160-189 mg/dl          Very High        >189 mg/dl  LDL CALCULATED:    This screening LDL is a calculated result  It does not have the accuracy of the Direct Measured LDL in the monitoring of patients with hyperlipidemia and/or statin therapy  Direct Measure LDL (AQF404) must be ordered separately in these patients  TRIGLYCERIDES 155 mg/dL H <=150   Specimen collection should occur prior to N-Acetylcysteine or Metamizole administration due to the potential for falsely depressed results  HDL,DIRECT 51 mg/dL  40-60   Specimen collection should occur prior to Metamizole administration due to the potential for falsely depressed results

## 2018-01-13 VITALS
SYSTOLIC BLOOD PRESSURE: 140 MMHG | HEIGHT: 64 IN | BODY MASS INDEX: 41.83 KG/M2 | WEIGHT: 245 LBS | HEART RATE: 76 BPM | DIASTOLIC BLOOD PRESSURE: 82 MMHG | RESPIRATION RATE: 18 BRPM

## 2018-01-13 NOTE — RESULT NOTES
Message   Spoke with patient  Results given  3 things need to be done  #1 set up appointment with interventional radiology for biopsy/fine needle aspiration of right thyroid nodules  #2 fill out driving placard for shortness of breath  #3 set up pulmonary function tests with diffusion capacity at the hospital   Thank you  Call patient when it is completed     Verified Results  (1) CBC/PLT/DIFF 22Jan2016 11:55AM Francisca Siemens Order Number: [id number]     Order Number: [id number]     Test Name Result Flag Reference   WBC COUNT 7 69 Thousand/uL  4 31-10 16   RBC COUNT 4 87 Million/uL  3 81-5 12   HEMOGLOBIN 15 5 g/dL H 11 5-15 4   HEMATOCRIT 44 1 %  34 8-46  1   MCV 90 6 fL  82 0-98 0   MCH 31 8 pg  26 8-34 3   MCHC 35 1 g/dL  31 4-37 4   RDW 13 5 %  11 6-15 1   MPV 10 2 fL  8 9-12 7   PLATELET COUNT 160 Thousands/uL  149-390   nRBC AUTOMATED 0 /100 WBCs     NEUTROPHILS RELATIVE PERCENT 63 %  43-75   LYMPHOCYTES RELATIVE PERCENT 27 %  14-44   MONOCYTES RELATIVE PERCENT 8 %  4-12   EOSINOPHILS RELATIVE PERCENT 2 %  0-6   BASOPHILS RELATIVE PERCENT 0 %  0-1   NEUTROPHILS ABSOLUTE COUNT 4 79 Thousands/µL  1 85-7 62   LYMPHOCYTES ABSOLUTE COUNT 2 11 Thousands/µL  0 60-4 47   MONOCYTES ABSOLUTE COUNT 0 59 Thousand/µL  0 17-1 22   EOSINOPHILS ABSOLUTE COUNT 0 13 Thousand/µL  0 00-0 61   BASOPHILS ABSOLUTE COUNT 0 03 Thousands/µL  0 00-0 10     (1) COMPREHENSIVE METABOLIC PANEL 85MZP6170 82:43II Andrea Garayy    Order Number: [id number]      National Kidney Disease Education Program recommendations are as follows:  GFR calculation is accurate only with a steady state creatinine  Chronic Kidney disease less than 60 ml/min/1 73 sq  meters  Kidney failure less than 15 ml/min/1 73 sq  meters       Test Name Result Flag Reference   GLUCOSE,RANDM 102 mg/dL     SODIUM 140 mmol/L  136-145   POTASSIUM 3 7 mmol/L  3 5-5 3   CHLORIDE 100 mmol/L  100-108   CARBON DIOXIDE 29 mmol/L  21-32   ANION GAP (CALC) 11 mmol/L  4-13   BLOOD UREA NITROGEN 15 mg/dL  5-25   CREATININE 0 70 mg/dL  0 60-1 30   Standardized to IDMS reference method   CALCIUM 8 7 mg/dL  8 3-10 1   BILI, TOTAL 0 81 mg/dL  0 20-1 00   ALK PHOSPHATAS 70 U/L     ALT (SGPT) 22 U/L  12-78   AST(SGOT) 16 U/L  5-45   ALBUMIN 4 2 g/dL  3 5-5 0   TOTAL PROTEIN 7 5 g/dL  6 4-8 2   eGFR Non-African American      >60 0 ml/min/1 73sq m     (1) LIPID PANEL FASTING W DIRECT LDL REFLEX 22Jan2016 11:55AM Mahala Keen Order Number: [id number]      Triglyceride:         Normal              <150 mg/dl       Borderline High    150-199 mg/dl       High               200-499 mg/dl       Very High          >499 mg/dl  Cholesterol:         Desirable        <200 mg/dl      Borderline High  200-239 mg/dl      High             >239 mg/dl  HDL Cholesterol:        High    >59 mg/dL      Low     <41 mg/dL  LDL Cholesterol:        Optimal          <100 mg/dl         Near Optimal     100-129 mg/dl        Above Optimal          Borderline High   130-159 mg/dl          High              160-189 mg/dl          Very High        >189 mg/dl  LDL CALCULATED:    This screening LDL is a calculated result  It does not have the accuracy of the Direct Measured LDL in the monitoring of patients with hyperlipidemia and/or statin therapy  Direct Measure LDL (WMY748) must be ordered separately in these patients  Test Name Result Flag Reference   CHOLESTEROL 223 mg/dL H    LDL CHOLESTEROL CALCULATED 126 mg/dL H 0-100   TRIGLYCERIDES 236 mg/dL H <=150   HDL,DIRECT 50 mg/dL  40-60     (1) TSH WITH FT4 REFLEX 22Jan2016 11:55AM Mahala Keen Order Number: [id number]    Patients undergoing fluorescein dye angiography may retain small amounts of fluorescein in the body for 48-72 hours post procedure  Samples containing fluorescein can produce falsely depressed TSH values  If the patient had this procedure,a specimen should be resubmitted post fluorescein clearance          The recommended reference ranges for TSH during pregnancy are as follows:  First trimester 0 1 to 2 5 uIU/mL  Second trimester  0 2 to 3 0 uIU/mL  Third trimester 0 3 to 3 0 uIU/m     Test Name Result Flag Reference   TSH 1 750 uIU/mL  0 358-3 740     US THYROID 22Jan2016 11:33AM Emma Safer     Test Name Result Flag Reference   US THYROID (Report)     THYROID ULTRASOUND     INDICATION: Thyroid nodules on prior CT     COMPARISON: CT chest dated January 13, 2016     TECHNIQUE:  Ultrasound of the thyroid was performed with a high frequency linear transducer in transverse and sagittal planes including volumetric imaging sweeps as well as traditional still imaging technique  FINDINGS:   Right gland: 5 5 x 2 5 x 2 7 cm  Volume = 18 mL   Parenchymal echotexture is diffusely heterogeneous  Upper pole heterogeneous peripherally vascular isoechoic nodule measuring 2 3 x 2 1 x 1 2 cm  Interpolar heterogeneous isoechoic nodule with hypoechoic vascular rim measuring 1 7 x 1 6 x 1 3 cm  Interpolar heterogeneous hypoechoic peripherally vascular nodule posteriorly measuring 1 1 x 0 9 x 0 7 cm  Lower pole heterogeneous isoechoic peripherally vascular nodule measuring 1 4 x 1 4 x 1 4 cm  Left gland: Diminutive measuring only 2 2 x 1 0 x 1 0 cm  Parenchymal echotexture is heterogeneous  No dominant nodules  Isthmus: 0 4 cm in AP dimension  No dominant nodules  Ovoid hypoechoic structure located caudal to the left thyroid lobe measuring 1 9 x 1 0 x 0 7 cm appears to be a lymph node with a partially fatty hilum  IMPRESSION:   Enlarged heterogeneous right thyroid lobe with at least 4 nodules as detailed above  The right upper pole 2 3 cm nodule and right interpolar 1 7 cm nodule meet published criteria* for biopsy attempt which is therefore recommended  Diminutive left thyroid lobe with no discrete nodules seen       Ovoid hypoechoic reniform structure caudal to the left thyroid lobe may be a prominent but normal-sized lymph node  *(According to the society of Radiologists In [address] published in Radiology in December 2005 [phone]) ultrasound guided FNA should be considered for nodules with microcalcifications greater than or equal to 1 cm,    predominantly or completely solid nodules or nodules with coarse calcifications greater than or equal to 1 5 cm, and mixed cystic and solid nodules greater than or equal to 2 cm  Substantial growth from the prior ultrasound may warrant ultrasound guided    FNA as well )     Signed by:    Oscar Scott DO   1/22/16

## 2018-01-13 NOTE — MISCELLANEOUS
Message  Post op - no new complaints  Confirmed post op appointment  Active Problems    1  Ankle pain, unspecified laterality   2  Bilateral leg edema (782 3) (R60 0)   3  Bunion, unspecified laterality (727 1) (M20 10)   4  Depression (311) (F32 9)   5  GERD without esophagitis (530 81) (K21 9)   6  History of allergy (V15 09) (Z88 9)   7  Hyperlipidemia (272 4) (E78 5)   8  Hypertension (401 9) (I10)   9  Hypokalemia (276 8) (E87 6)   10  Insomnia (780 52) (G47 00)   11  Morbid obesity with BMI of 45 0-49 9, adult (V85 42) (Z68 42)   12  Multiple thyroid nodules (241 1) (E04 2)   13  Palpitations (785 1) (R00 2)   14  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193) (C73)   15  Preop pulmonary/respiratory exam (V72 82) (Z01 811)   16  Reflex sympathetic dystrophy (337 20) (G90 50)   17  Restrictive lung disease (518 89) (J98 4)   18  Shortness of breath (786 05) (R06 02)   19  Shoulder Pain During Impingement Test Left   20  Thyroid cancer (193) (C73)   21  Thyroid nodule (241 0) (E04 1)    Current Meds   1  Bystolic 5 MG Oral Tablet; Take 1 tablet daily; Therapy: 64KCI1883 to (Evangelista Garay)  Requested for: 13DXM8160; Last   Rx:31Mar2016 Ordered   2  Furosemide 20 MG Oral Tablet; One tablet every 2  days as needed for edema; Therapy: 66EEB5323 to (Particia Nails)  Requested for: 29MPX6820; Last   Rx:51Yvt2967 Ordered   3  Levothyroxine Sodium 200 MCG Oral Tablet (Synthroid); TAKE 1 TABLET DAILY AS   DIRECTED; Therapy: 37TWC5599 to (Evaluate:16Ial9695); Last Rx:05Wix1636 Ordered   4  Lisinopril-Hydrochlorothiazide 20-12 5 MG Oral Tablet; take 1 tablet twice a day; Therapy: 19KOB5688 to (Evaluate:10Keh6080)  Requested for: 72PDS7172; Last   Rx:37Xop0094 Ordered   5  Omeprazole 40 MG Oral Capsule Delayed Release; TAKE 1 CAPSULE DAILY BEFORE   DINNER; Therapy: 38QIE0639 to (Ihsan Overton)  Requested for: 37GVL5464; Last   Rx:75Bcd0223 Ordered   6   Osteo Bi-Flex Adv Triple St Oral Tablet; TAKE 1 TABLET DAILY AS DIRECTED; Therapy: 36HRP6311 to Recorded   7  Potassium Chloride ER 20 MEQ Oral Tablet Extended Release; One tablet twice daily   today then one tablet daily; Therapy: 20HKS7381 to (Adriana Martinez)  Requested for: 20BBR6393; Last   Rx:01End5407 Ordered   8  Pravastatin Sodium 20 MG Oral Tablet; TAKE 1 TABLET AT BEDTIME; Therapy: 53KTT2265 to ([phone])  Requested for: 28DTE3048; Last   Rx:94Bjn2209 Ordered   9  Venlafaxine HCl ER 75 MG Oral Capsule Extended Release 24 Hour; TAKE 2   CAPSULES DAILY; Therapy: 00QDQ1800 to (Evaluate:29Jun2016)  Requested for: 74MXG9510 Recorded   10  Ventolin  (90 Base) MCG/ACT Inhalation Aerosol Solution; INHALE 2 PUFFS    EVERY 4-6 HOURS AS NEEDED; Therapy: 43JMS6769 to (Evaluate:03Jze6256); Last Rx:07Zqm6261 Ordered   11  Verapamil HCl  MG Oral Tablet Extended Release; Take 1 tablet daily; Therapy: 79NJU6395 to (Evaluate:34Wqu1971)  Requested for: 32CAE1470; Last    Rx:36Bde0345 Ordered    Allergies    1  No Known Drug Allergies    2  No Known Environmental Allergies   3   No Known Food Allergies    Signatures   Electronically signed by : Ulla Nageotte, ; [place] 10 2016  2:34PM EST                       (Author)

## 2018-01-14 NOTE — RESULT NOTES
Verified Results  (1) BASIC METABOLIC PROFILE 14OZT9728 02:11PM Emmie Weathers Order Number: [id number]     Test Name Result Flag Reference   SODIUM 135 mmol/L L 136-145   POTASSIUM 3 7 mmol/L  3 5-5 3   CHLORIDE 100 mmol/L  100-108   CARBON DIOXIDE 27 mmol/L  21-32   ANION GAP (CALC) 8 mmol/L  4-13   BLOOD UREA NITROGEN 18 mg/dL  5-25   CREATININE 0 74 mg/dL  0 60-1 30   Standardized to IDMS reference method   CALCIUM 9 3 mg/dL  8 3-10 1   eGFR Non-African American      >60 0 ml/min/1 73sq m   [facility] [employer] Disease Education Program recommendations are as follows:  GFR calculation is accurate only with a steady state creatinine  Chronic Kidney disease less than 60 ml/min/1 73 sq  meters  Kidney failure less than 15 ml/min/1 73 sq  meters     GLUCOSE FASTING 94 mg/dL  65-99

## 2018-01-15 NOTE — RESULT NOTES
Message   Call patient  Thyroid biopsies show atypical cells of undetermined significance  Referral to Dr Bruce Wallis surgical oncology for further evaluation and treatment     Verified Results  (1) FINE NEEDLE ASPIRATION 05XZC5167 11:25AM Tereso zapien     Test Name Result Flag Reference   LAB AP CASE REPORT (Report)     Non-gynecologic Cytology             Case: [id number]                  Authorizing Provider: Justin Rivera DO       Collected:      02/05/2016 1125        Ordering Location:   [facility]    Received:      02/05/2016 [address] [place] [address] Ultrasound                              Pathologist:      Jamaica Marie                              Specimens:  A) - Thyroid, Right, upper pole                                    B) - Thyroid, Right, upper pole                                    C) - Thyroid, Right, mid pole                                     D) - Thyroid, Right, mid pole                                     E) - Thyroid, Right, lower pole                                    F) - Thyroid, Right, lower pole   LAB AP CYTO FINAL DIAGNOSIS (Report)     A - B  Thyroid, right, upper pole (ThinPrep and smear preparations): Atypia of undetermined significance (Mary D Category III) -   See comment  Satisfactory for evaluation  Comment:  The fine needle aspiration biopsies obtained from the three targeted   nodules demonstrate a spectrum of cellularity,  from lower cellularity for the right lower pole nodule (E - F) to   hypercellular aspirates for right mid pole nodule (C - D)  Colloid is scant in sampling from the right upper pole nodule (A - B) and   right lower pole nodule, and ample in the right  mid pole aspirate   Aspirates from each of the nodules demonstrate   follicular cells groups, few with hurthle (oncocytic)  cell changes, in mixed micro-macrofollicular arrangements, but also show   scattered follicular cell groups with atypia   in the form of increased crowding, with nuclear overlapping, fine   chromatin pattern with eccentric punctate nucleoli and  nuclear grooves  Nuclear pseudoinclusions are not identified  In total,   the best diagnostic category for biopsies from each  of these nodules is atypia of undetermined significance  As reported in   the [facility] for Reporting Thyroid  Cytopathology*, this diagnostic category has demonstrated anywhere from 5%   - 15% risk of malignancy being found in  subsequent resections  The manual reports that the usual management   following this diagnosis is repeating the FNA,  no sooner than 3 months time (sooner if sudden change in size occurs)  Ultimately, clinical/imaging correlation for this  patient is needed in arriving at the actual management plan  *The [place] System for Reporting Thyroid Cytopathology, Cate Cheung ;   Pamella Carballo [facility] ) 2010  (1st Ed )  C - D  Thyroid, right, mid pole (ThinPrep and smear preparations): Atypia of undetermined significance ([place] Category III) -   See comment for A  Satisfactory for evaluation  E - F  Thyroid, right, lower pole (ThinPrep and smear preparations): Atypia of undetermined significance ([place] Category III) -   See comment for A  Satisfactory for evaluation  LAB AP INTRAOPERATIVE CONSULTATION      Thyroid, right upper pole, FNAB on-site evaluation: Adequate    Thyroid, right mid pole, FNAB on-site evaluation: Adequate    Thyroid, right lower pole, FNAB on-site evaluation: Adequate  Dr Maryse Solis  LAB AP GROSS DESCRIPTION (Report)     A  Thyroid, Right, upper pole: 20ml  Slightly bloody, received in CytoLyt    B  Thyroid, Right, upper pole: Received 4 slides ( 2 diff quik / 2 alcohol   fixed )    C  Thyroid, Right, mid pole: 20ml  Slightly bloody, received in CytoLyt    D  Thyroid, Right, mid pole: 4 slides received ( 2 diff quik / 2 alcohol   fixed )    E  Thyroid, Right, lower pole: 20ml  Bloody, received in CytoLyt    F  Thyroid, Right, lower pole: Received 8 slides ( 4 diff quik / 4 alcohol   fixed )   LAB AP NONGYN ADDITIONAL INFORMATION (Report)     Hologic's FDA approved ,  and ThinPrep Imaging System are   utilized with strict adherence to the 's instruction manual to   prepare gynecologic and non-gynecologic cytology specimens for the   production of ThinPrep slides as well as for gynecologic ThinPrep imaging  These processes have been validated by our laboratory and/or by the     These tests were developed and their performance characteristics   determined by [facility] [address] [place] [address] or [facility]  They may not be cleared or approved by the [place]  Food and   Drug Administration  The FDA has determined that such clearance or   approval is not necessary  These tests are used for clinical purposes  They should not be regarded as investigational or for research  This   laboratory has been approved by [facility], designated as a high-complexity   laboratory and is qualified to perform these tests  LAB AP CLINICAL INFORMATION (Report)     Size: Right upper pole, 2 3X2 1X1 2CM  Margins: SMOOTH Echogenicity: SOLID Microcalcs: ABSENT Flow: INTRANODULAR/PERIPHERAL Size change: NOT GIVEN Suspicion level: INTERMEDIATE Hx of Hashimoto's Thyroiditis: NO  Size: Right mid pole, 1 7X 6X1 3CM  Margins: SMOOTH Echogenicity: SOLID Microcalcs: ABSENT Flow: INTRANODULAR/PERIPHERAL Size change: NOT GIVEN Suspicion level: INTERMEDIATE Hx of Hashimoto's Thyroiditis: NO  Size: Right lower pole, 1 5X1  9X1 9CM   Margins: SMOOTH Echogenicity: SOLID Microcalcs: ABSENT Flow: INTRANODULAR/PERIPHERAL Size change: NOT GIVEN Suspicion level: INTERMEDIATE Hx of Hashimoto's Thyroiditis: NO

## 2018-01-15 NOTE — RESULT NOTES
Message   tsh ok, continue levothyroxine at current dose , fasting glucose higher - 131 if next one is above 125 then that would meet criteria for diabetes - need to focus on dietary and lifetyle modifications and weight loss- does she want to see nutritionist      Verified Results  (1) THYROGLOBULIN/QUANT W/ANTIBODY PANEL [id number] 10:19AM [employer]     Test Name Result Flag Reference   THYROGLOB AB <1 0 IU/mL  0 0 - 0 9   Thyroglobulin Antibody measured by [facility] Methodology  Performed at:  [address] [place] [address]  [id number]  : Florina Fox MD, Phone:  [phone]   THYROGLOBULIN-BALJIT 6 7 ng/mL  1 5 - 38 5   According to the [facility] of Clinical Biochemistry, the  reference interval for Thyroglobulin (TG) should be related to  euthyroid patients and not for patients who underwent thyroidectomy  TG reference intervals for these patients depend on the residual  mass of the thyroid tissue left after surgery  Establishing a  post-operative baseline is recommended  The assay limit of quantitation is 0 1 ng/mL  Thyroglobulin measured by [facility] Immunometric Assay  Performed at:  [address]  [id number]  : Florina Fox MD, Phone:  [phone]     (1) [place],  FASTING 29HKN8412 10:15AM Bárbara Fields Order Number: [id number]     Test Name Result Flag Reference   GLUCOSE FASTING 131 mg/dL H 65-99   - Patient Instructions: This is fasting bloodwork, only water, black tea, black coffee after 9pm the night before test   Please drink two glasses of water morning of bloodwork  - Patient Instructions: This is fasting bloodwork, only water, black tea, black coffee after 9pm the night before test Please drink two glasses of water morning of bloodwork       (1) HEMOGLOBIN A1C 18RNI2717 10:15AM [employer]    Order Number: [id number]     Test Name Result Flag Reference HEMOGLOBIN A1C 6 1 %  4 2-6 3   EST  AVG   GLUCOSE 128 mg/dl

## 2018-01-16 NOTE — PROCEDURES
Procedures by Veronica Marmolejo MD at 5/4/2016  12:21 PM      Author:  Veronica Marmolejo MD Service:  Interventional Radiology  Author Type:  Physician     Filed:  5/4/2016 12:23 PM Date of Service:  5/4/2016 12:21 PM Status:  Signed     :  Veronica Marmolejo MD (Physician)            Procedures  INTERVENTIONAL RADIOLOGY PROCEDURE NOTE    Date: 05/04/16    Procedures:     1  US guided left cervical chain lymph node biopsy     Preoperative diagnosis: Thyroid cancer post right thyroidectomy  Postoperative diagnosis: Same    Surgeon: Veronica Marmolejo MD     Assistant: MARTHA Palm    Blood loss: Minimal    Specimens: 6 x 25 gauge FNA from the lymph node    Findings: follicular thyroid tissue only  Complications: None    Anesthesia: 1% local lidocaine     Plan:  Pending final pathology evaluation                  Received for:Provider  EPIC   May  4 2016 12:23PM [facility] Standard Time

## 2018-01-16 NOTE — PROGRESS NOTES
Assessment    1  Thyroid cancer (193) (C73)   2  Hypothyroidism, postsurgical (244 0) (E89 0)   3  Impaired fasting glucose (790 21) (R73 01)    Plan  GERD without esophagitis    · Omeprazole 40 MG Oral Capsule Delayed Release; TAKE 1 CAPSULE DAILY  BEFORE DINNER   Rx By: Jessy Che; Dispense: 90 Days ; #:90 Capsule Delayed Release; Refill: 1; For: GERD without esophagitis; WINSTON = N; Verified Transmission to [facility]); Last Updated By: System, [employer]; 8/17/2017 3:44:19 PM  Hypothyroidism, postsurgical    · Levothyroxine Sodium 175 MCG Oral Tablet; Take 1 tablet daily   Rx By: Jessy Che; Dispense: 90 Days ; #:1 X 90 Tablet Bottle; Refill: 3; For: Hypothyroidism, postsurgical; WINSTON = N; Verified Transmission to [facility]); Last Updated By: System, [employer]; 8/17/2017 3:44:19 PM   · (1) T4, FREE; Status:Active; Requested for:17Aug2017;    Perform:[facility]; Due:17Aug2018; Ordered;  For:Hypothyroidism, postsurgical; Ordered By:Sharon Aponte;   · (1) TSH; Status:Active; Requested for:68Xby3973;    Perform:[facility]; Due:17Aug2018; Ordered;  For:Hypothyroidism, postsurgical; Ordered By:Sharon Aponte;  Impaired fasting glucose    · (1) GLUCOSE,  FASTING; Status:Active; Requested for:17Aug2017;    Perform:[facility]; Due:49Gyx1518; Ordered; For:Impaired fasting glucose; Ordered By:Sharon Aponte;   · (1) HEMOGLOBIN A1C; Status:Active; Requested for:17Aug2017;    Perform:[facility]; Due:02Nae1016; Ordered; For:Impaired fasting glucose; Ordered By:Sharon Aponte;   · Follow-up visit in 6 months Evaluation and Treatment  Follow-up  Status: Complete   Done: 13IQJ1643   Ordered;  For: Impaired fasting glucose; Ordered By: Jessy Che Performed:  Due: 89Jsb1347; Last Updated By: Raisa Aranda; 8/17/2017 3:45:20 PM  Thyroid cancer    · (1) THYROGLOBULIN/QUANT W/ANTIBODY PANEL; Status:Active; Requested  for:17Aug2017;    Perform:[facility]; Due:17Aug2018; Ordered; For:Thyroid cancer; Ordered By:Sharon Aponte; Thyroid Cancer: Monitor thyroglobulin Panel  She has order for ultrasound with lymph node mapping to be done in december through surgeon  Hypothyroidism: Continue levothyroxine  Check TSH/Free T4  Impaired Fasting Glucose/Obesity: Suggest lifestyle modification and weight loss  Insurance will not cover medical nutrition therapy but she will look into pre-diabetes class if it will fit into her work scheduled  Follow up in 6 months     Chief Complaint  Chief Complaint Free Text Note Form: follow up      History of Present Illness  HPI: 73year old female here for f/u of thyroid CA, hypothyroidism, and Impaired Fasting Glucose  For the Thyroid Cancer, She has Left thyroidectomy in 2/2016 and completion thyroidectomy 6/2016  Pathology report from 2/25/2016 showed three distinct Foci of 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  Completion thyroidectomy showed no evidence of malignancy  She was not treated with radioactive iodine therapy  For the Hypothyroidism, she is taking levothyroxine 175 mcg daily  Taking daily and properly on an empty stomach  Does have fatigue  Does often feel warm, this is not new since thyroidectomy  For the impaired fasting glucose, not taking any medications  She does have obesity and is being treated medically for HTN and Hyperlipidemia  No FH of Diabetes  Review of Systems  ROS Reviewed:   ROS reviewed  Endo Adult ROS Female Established v2 Update - [facility]:   Constitutional/General: no recent weight gain, no recent weight loss, no poor energy/fatigue, no increased energy level, no insomnia/sleep problems, no fever and no feeling weak  Breasts: no nipple discharge  Heart: high blood pressure, but no chest pain/tightness, no rapid/racing heart rate and no palpitations     Genitourinary - Urinary: no frequent urination, no excess urination and no urinating during the night  Eyes: no blurred vision, no double vision, no bulging eyes, no gritty/scratchy eyes and no excessive tearing  Mouth / Throat: no hoarseness and no difficulty swallowing  Neck: no lumps, no swollen glands, no neck pain, no neck stiffness and no enlarged thyroid  Respiratory: wheezing, asthma and no persistent cough  Musculoskeletal: no muscle aches/pain, no joint aches/pain and no muscle weakness  Skin & Hair: no dry skin, no acne, the hair texture was not oily, no hair loss and no excessive hair growth  Gastrointestinal: no constipation, no diarrhea, no waking at night to drink and no stomach ache  Neurological: no blackouts, no weakness and no tremors  Reproductive: frequency of period is unknown, duration of period is unknown, date of last menstruation is unknown, periods are not regular, no discomfort with periods, no excessive bleeding with periods and no mood swings  Endocrine: no feeling hot frequently, no feeling cold frequently, no shifts between feeling hot and cold, no cold hands or feet, no excessive sweating, no thyroid problems, no blood sugar problems, no excessive thirst, no excessive hunger, no change in shoe size, no nausea or vomiting and no shaky hands  Active Problems    1  Acute upper respiratory infection (465 9) (J06 9)   2  Ankle pain, unspecified laterality   3  Bilateral leg edema (782 3) (R60 0)   4  Bunion, unspecified laterality   5  Depression (311) (F32 9)   6  GERD without esophagitis (530 81) (K21 9)   7  History of allergy (V15 09) (Z88 9)   8  Hyperglycemia (790 29) (R73 9)   9  Hyperlipidemia (272 4) (E78 5)   10  Hypertension (401 9) (I10)   11  Hypokalemia (276 8) (E87 6)   12  Hypothyroidism, postsurgical (244 0) (E89 0)   13  Impaired fasting glucose (790 21) (R73 01)   14  Insomnia (780 52) (G47 00)   15   Morbid obesity with BMI of 45 0-49 9, adult (278 01,V85 42) (W66 94,X72 86)   16  Multiple thyroid nodules (241 1) (E04 2)   17  Palpitations (785 1) (R00 2)   18  Preop pulmonary/respiratory exam (V72 82) (Z01 811)   19  Reflex sympathetic dystrophy (337 20) (G90 50)   20  Restrictive lung disease (518 89) (J98 4)   21  Shortness of breath (786 05) (R06 02)   22  Shoulder Pain During Impingement Test Left   23  Thyroid cancer (193) (C73)   24  Thyroid nodule (241 0) (E04 1)    Past Medical History    1  History of Acute maxillary sinusitis, recurrence not specified (461 0) (J01 00)   2  History of Colon cancer screening (V76 51) (Z12 11)   3  History of Fracture Of Carpal Bone(S) (814 00)   4  History of acute bronchitis (V12 69) (Z87 09)   5  History of fracture of arm (V15 51) (Z87 81)   6  History of upper respiratory infection (V12 09) (Z87 09)   7  History of Pain in left foot (729 5) (M79 672)   8  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)   9  History of Screening for breast cancer (V76 10) (Z12 31)  Active Problems And Past Medical History Reviewed: The active problems and past medical history were reviewed and updated today  Surgical History    1  History of Hemorrhoidectomy   2  History of Thyroid Surgery Alex-Thyroidectomy Left Lobe   3  History of Thyroid Surgery Alex-Thyroidectomy Right Lobe  Surgical History Reviewed: The surgical history was reviewed and updated today  Family History  Mother    1  Family history of Hypertension (V17 49)  Family History    2  Family history of kidney disease (V18 69) (Z84 1)  Family History Reviewed: The family history was reviewed and updated today  Social History    · Being A Social Drinker   · Never A Smoker   · Occasional caffeine consumption    Current Meds   1  Bystolic 5 MG Oral Tablet; TAKE 1 TABLET DAILY; Therapy: 72INF4099 to (Evaluate:19Rnd3851)  Requested for: 73TMS0385; Last   Rx:32Lmq2086 Ordered   2  Furosemide 20 MG Oral Tablet; one daily as directed;    Therapy: 20DZE5644 to (Evaluate:00Cid9401)  Requested for: 47YYD2745; Last   Rx:75Faw4419 Ordered   3  Levothyroxine Sodium 175 MCG Oral Tablet; Take 1 tablet daily; Therapy: 41Iux5468 to (Evaluate:15Bua5632)  Requested for: 24MMF5473; Last   Rx:25Lvc3243 Ordered   4  Lisinopril-Hydrochlorothiazide 20-12 5 MG Oral Tablet; take 1 tablet twice a day; Therapy: 68TYG8893 to (Lucía Santamaria)  Requested for: 52OGQ8583; Last   Rx:15Mkc1362 Ordered   5  Omeprazole 40 MG Oral Capsule Delayed Release; TAKE 1 CAPSULE DAILY BEFORE   DINNER; Therapy: 08BJI4821 to (Evaluate:12Feb2018)  Requested for: 29MCP3241; Last   Rx:18Feb2017 Ordered   6  Osteo Bi-Flex Adv Triple St Oral Tablet; TAKE 1 TABLET DAILY AS DIRECTED; Therapy: 00GXT9159 to Recorded   7  Potassium Chloride ER 20 MEQ Oral Tablet Extended Release; One tablet daily; Therapy: 01ENT7741 to (Evaluate:84Iya2758)  Requested for: 20OLG2060; Last   Rx:22Onq2888 Ordered   8  Pravastatin Sodium 20 MG Oral Tablet; TAKE 1 TABLET AT BEDTIME; Therapy: 58YGJ5321 to (Lucía Santamaria)  Requested for: 97HWW8688; Last   Rx:26Etp8716 Ordered   9  Venlafaxine HCl  MG Oral Capsule Extended Release 24 Hour; TAKE 1 CAPSULE   ONCE DAILY WITH FOOD; Therapy: 36EFA0150 to (Evaluate:37Twx8743)  Requested for: 39AGG7076; Last   Rx:06Gqv3723 Ordered   10  Venlafaxine HCl ER 75 MG Oral Capsule Extended Release 24 Hour; take 1 capsule    daily; Therapy: 93PMS6111 to (Evaluate:74Zgt1996)  Requested for: 44COM8114; Last    Rx:29Jun2017 Ordered   11  Ventolin  (90 Base) MCG/ACT Inhalation Aerosol Solution; INHALE 2 PUFFS    EVERY 4-6 HOURS AS NEEDED; Therapy: 27BVQ2010 to (Evaluate:17Jun2017)  Requested for: 81IIH3050; Last    Rx:18Feb2017 Ordered   12  Verapamil HCl  MG Oral Tablet Extended Release; Take 1 tablet daily; Therapy: 63IFQ0834 to (Evaluate:17Tzp6275)  Requested for: 43VTT2299; Last    Rx:43Wen9354 Ordered  Medication List Reviewed:    The medication list was reviewed and updated today  Allergies    1  No Known Drug Allergies    2  No Known Environmental Allergies   3  No Known Food Allergies    Vitals  Vital Signs    Recorded: 41Hio8714 02:54PM   Heart Rate 52   Systolic 152   Diastolic 90   Height 5 ft 4 in   Weight 245 lb 3 04 oz   BMI Calculated 42 09   BSA Calculated 2 15     Physical Exam    Constitutional   General appearance: No acute distress, well appearing and well nourished  Eyes   Conjunctiva and lids: No swelling, erythema, or discharge  Pupils: Equal, round and reactive to light  The sclera are anicteric  Extraocular movements are intact  Ears, Nose, Mouth, and Throat   External inspection of ears, nose and lips: Normal     Oropharynx: Normal with no erythema, edema, exudate or lesions  Exam of Head: The head is atraumatic and normocephalic  Neck: The neck is supple  The thyroid is normal in size with no palpable nodules  Pulmonary   Auscultation of lungs: Clear to auscultation bilaterally with normal chest expansion  Cardiovascular   Auscultation of heart: Normal rate and rhythm with no murmurs, gallops or rubs  Examination of pulses: Dorsalis pedal pulses are +2 and equal bilaterally  Examination of carotids: No bruit    Abdomen   Abdomen: Abdomen is soft, non-tender with normal bowel sounds  Lymphatic   Palpation of lymph nodes: No supraclavicular or suboccipital lymphadenopathy  Musculoskeletal   Inspection/palpation of joints, bones, and muscles: Muscle bulk and tone is normal     Skin   Skin and subcutaneous tissue: Normal skin temperature and color  Neurologic   Reflexes: 2+ and symmetric  Motor Strength: Strength is 5/5 bilaterally      Psychiatric   Orientation to person, place and time: Normal     Mood and affect: Affect and attention span are normal        Results/Data  (1) BASIC METABOLIC PROFILE 84KZZ1152 02:11PM Ashley Ibanez Order Number: [id number]     Test Name Result Flag Reference   SODIUM 135 mmol/L L 136-145   POTASSIUM 3 7 mmol/L  3 5-5 3   CHLORIDE 100 mmol/L  100-108   CARBON DIOXIDE 27 mmol/L  21-32   ANION GAP (CALC) 8 mmol/L  4-13   BLOOD UREA NITROGEN 18 mg/dL  5-25   CREATININE 0 74 mg/dL  0 60-1 30   Standardized to IDMS reference method   CALCIUM 9 3 mg/dL  8 3-10 1   eGFR Non-African American      >60 0 ml/min/1 73sq m   [facility] Disease Education Program recommendations are as follows:  GFR calculation is accurate only with a steady state creatinine  Chronic Kidney disease less than 60 ml/min/1 73 sq  meters  Kidney failure less than 15 ml/min/1 73 sq  meters  GLUCOSE FASTING 94 mg/dL  65-99     (1) BASIC METABOLIC PROFILE 26FUG9091 12:05PM Ruth Mustard [employer] Order Number: [id number]     Test Name Result Flag Reference   GLUCOSE,RANDM 124 mg/dL     If the patient is fasting, the ADA then defines impaired fasting glucose as > 100 mg/dL and diabetes as > or equal to 123 mg/dL  SODIUM 141 mmol/L  136-145   POTASSIUM 3 3 mmol/L L 3 5-5 3   CHLORIDE 101 mmol/L  100-108   CARBON DIOXIDE 30 mmol/L  21-32   ANION GAP (CALC) 10 mmol/L  4-13   BLOOD UREA NITROGEN 20 mg/dL  5-25   CREATININE 0 77 mg/dL  0 60-1 30   Standardized to IDMS reference method   CALCIUM 8 7 mg/dL  8 3-10 1   eGFR Non-African American      >60 0 ml/min/1 73sq m   - Patient Instructions: This is a fasting blood test  Water, black tea or black coffee only after 9:00pm the night before test Drink 2 glasses of water the morning of test   National Kidney Disease Education Program recommendations are as follows:  GFR calculation is accurate only with a steady state creatinine  Chronic Kidney disease less than 60 ml/min/1 73 sq  meters  Kidney failure less than 15 ml/min/1 73 sq  meters  (1) AST (SGOT) 12CBY6062 12:05PM Ruth Mustard [employer] Order Number: [id number]     Test Name Result Flag Reference   AST(SGOT) 12 U/L  5-45   - Patient Instructions:  This is a fasting blood test  Water, black tea or black coffee only after 9:00pm the night before test Drink 2 glasses of water the morning of test      (1) LIPID PANEL FASTING W DIRECT LDL REFLEX 02Jan2017 12:05PM Aidee Reyes Order Number: [id number]     Test Name Result Flag Reference   CHOLESTEROL 154 mg/dL     LDL CHOLESTEROL CALCULATED 72 mg/dL  0-100   - Patient Instructions: This is a fasting blood test  Water, black tea or black coffee only after 9:00pm the night before test   Drink 2 glasses of water the morning of test     - Patient Instructions: This is a fasting blood test  Water, black tea or black coffee only after 9:00pm the night before test Drink 2 glasses of water the morning of test   Triglyceride:         Normal              <150 mg/dl       Borderline High    150-199 mg/dl       High               200-499 mg/dl       Very High          >499 mg/dl  Cholesterol:         Desirable        <200 mg/dl      Borderline High  200-239 mg/dl      High             >239 mg/dl  HDL Cholesterol:        High    >59 mg/dL      Low     <41 mg/dL  LDL Cholesterol:        Optimal          <100 mg/dl        Near Optimal     100-129 mg/dl        Above Optimal          Borderline High   130-159 mg/dl          High              160-189 mg/dl          Very High        >189 mg/dl  LDL CALCULATED:    This screening LDL is a calculated result  It does not have the accuracy of the Direct Measured LDL in the monitoring of patients with hyperlipidemia and/or statin therapy  Direct Measure LDL (SAW582) must be ordered separately in these patients  TRIGLYCERIDES 155 mg/dL H <=150   Specimen collection should occur prior to N-Acetylcysteine or Metamizole administration due to the potential for falsely depressed results  HDL,DIRECT 51 mg/dL  40-60   Specimen collection should occur prior to Metamizole administration due to the potential for falsely depressed results       US HEAD NECK LYMPH NODE MAPPING 89WIV8225 12:31PM Vero John Order Number: [id number]   Performing Comments: EVALUATE THYROID BED AND BILATERAL CERVICAL CHAINS FOR CANCER SURVEILLANCE   Laverne Oconnell   - Patient Instructions: To schedule this appointment, please contact Central Scheduling at [phone]  Test Name Result Flag Reference   US HEAD NECK LYMPH NODE [address] (Report)     NECK ULTRASOUND     INDICATION:  History of papillary carcinoma  Left-sided inferior lymph node biopsy of 5/4/2016 was benign  COMPARISON: 4/26/2016     FINDINGS:     Ultrasound of the thyroidectomy bed and cervical lymph node chains was performed with a high frequency linear transducer  There is no suspicion of recurrent mass in the thyroidectomy bed  Lymph nodes maintain normal morphologic contour, echogenicity and short axis dimensions of less than 0 7 cm  No evidence for microcalcification or focal nodularity  IMPRESSION:     No evidence of recurrent or metastatic disease  Workstation performed: [id number]     Signed by: Sayra Mcpherson MD   12/13/16     (1) [facility] PANEL 51MWR0145 10:19AM Quentin Solorio     Test Name Result Flag Reference   THYROGLOB AB <1 0 IU/mL  0 0 - 0 9   Thyroglobulin Antibody measured by [facility] Methodology  Performed at:  [address]  [id number]  : Loni Ashton MD, Phone:  [phone]   THYROGLOBULIN-BALJIT 6 7 ng/mL  1 5 - 38 5   According to the [facility] of Clinical Biochemistry, the  reference interval for Thyroglobulin (TG) should be related to  euthyroid patients and not for patients who underwent thyroidectomy  TG reference intervals for these patients depend on the residual  mass of the thyroid tissue left after surgery  Establishing a  post-operative baseline is recommended    The assay limit of quantitation is 0 1 ng/mL  Thyroglobulin measured by Gregor Lisa Immunometric Assay  Performed at:  [address] [place] [address] [address], [place]  [id number]  : Jessica Daniel MD, Phone:  [phone]     (1) T3 TOTAL 32UIW4999 10:15AM [employer] Order Number: [id number]     Test Name Result Flag Reference   T3 1 2 ng/mL  0 6-1 8     (1) T4, FREE 38CIV9612 10:15AM [employer] Order Number: [id number]     Test Name Result Flag Reference   T4,FREE 1 33 ng/dL  0 76-1 46   - Patient Instructions: This is fasting bloodwork, only water, black tea, black coffee after 9pm the night before test Please drink two glasses of water morning of bloodwork  (1) TSH 26UCZ6533 10:15AM [employer] Order Number: [id number]     Test Name Result Flag Reference   TSH 1 460 uIU/mL  0 358-3 740   - Patient Instructions: This is fasting bloodwork, only water, black tea, black coffee after 9pm the night before test Please drink two glasses of water morning of bloodwork  Patients undergoing fluorescein dye angiography may retain small amounts of fluorescein in the body for 48-72 hours post procedure  Samples containing fluorescein can produce falsely depressed TSH values  If the patient had this procedure,a specimen should be resubmitted post fluorescein clearance  The recommended reference ranges for TSH during pregnancy are as follows:  First trimester 0 1 to 2 5 uIU/mL  Second trimester  0 2 to 3 0 uIU/mL  Third trimester 0 3 to 3 0 uIU/m     (1) GLUCOSE,  FASTING 47XBH0405 10:15AM Janice Gillette Order Number: [id number]     Test Name Result Flag Reference   GLUCOSE FASTING 131 mg/dL H 65-99   - Patient Instructions: This is fasting bloodwork, only water, black tea, black coffee after 9pm the night before test   Please drink two glasses of water morning of bloodwork  - Patient Instructions: This is fasting bloodwork, only water, black tea, black coffee after 9pm the night before test Please drink two glasses of water morning of bloodwork       (1) HEMOGLOBIN A1C 63IDP2557 10: 15AM Desirae Andrade Order Number: [id number]     Test Name Result Flag Reference   HEMOGLOBIN A1C 6 1 %  4 2-6 3   EST  AVG  GLUCOSE 128 mg/dl       Health Management  History of Colon cancer screening   COLONOSCOPY; every 10 years; Next Due: 33ADB3985; Overdue  History of Screening for breast cancer   Digital Bilateral Screening Mammogram With CAD; every 2 years; Next Due: 13BSN2405; Overdue    Future Appointments    Date/Time Provider Specialty Site   12/15/2017 01:00 PM Ab Velez MD Surgical Oncology [facility]   02/22/2018 02:40 PM KARL Molina  Endocrinology [facility] ENDOCRINOLOGY     Signatures   Electronically signed by : Estuardo Jaime [facility];  Aug 22 2017  9:15PM EST                       (Author)    Electronically signed by : KARL Robledo ; Aug 23 2017  5:28PM EST

## 2018-01-16 NOTE — MISCELLANEOUS
Message  Cardiac clearance obtained for surgery 2/25/16  No medical clearance necessary  Active Problems    1  Ankle pain, unspecified laterality   2  Bunion, unspecified laterality (727 1) (M20 10)   3  Depression (311) (F32 9)   4  Esophageal reflux (530 81) (K21 9)   5  History of allergy (V15 09) (Z88 9)   6  Hyperlipidemia (272 4) (E78 5)   7  Hypertension (401 9) (I10)   8  Insomnia (780 52) (G47 00)   9  Morbid obesity with BMI of 45 0-49 9, adult (278 01,V85 42) (E66 01,Z68 42)   10  Multiple thyroid nodules (241 1) (E04 2)   11  Palpitations (785 1) (R00 2)   12  Preop pulmonary/respiratory exam (V72 82) (Z01 811)   13  Reflex sympathetic dystrophy (337 20) (G90 50)   14  Restrictive lung disease (518 89) (J98 4)   15  Shortness of breath (786 05) (R06 02)   16  Shoulder Pain During Impingement Test Left   17  Thyroid nodule (241 0) (E04 1)    Current Meds   1  Atenolol 50 MG Oral Tablet; TAKE 1 TABLET DAILY; Therapy: 45RZU2825 to (Evaluate:94Jde4935) Recorded   2  Lisinopril-Hydrochlorothiazide 20-12 5 MG Oral Tablet; take 1 tablet twice a day; Therapy: 17RLR0773 to (Evaluate:41Nlr1913)  Requested for: 97UOQ4392; Last   Rx:17Tcj3769 Ordered   3  Metoprolol Succinate ER 50 MG Oral Tablet Extended Release 24 Hour; TAKE 1 TABLET   ONCE DAILY; Therapy: 35GGD5146 to (Evaluate:86Lpi1954)  Requested for: 85XUG3063; Last   Rx:54Mxi5339 Ordered   4  Omeprazole 40 MG Oral Capsule Delayed Release; TAKE 1 CAPSULE DAILY BEFORE   DINNER; Therapy: 75ZNF3720 to (Evaluate:50Ekk5856); Last Rx:79Nen1381 Ordered   5  Osteo Bi-Flex Adv Triple St Oral Tablet; TAKE 1 TABLET DAILY AS DIRECTED; Therapy: 30DJI6114 to Recorded   6  Venlafaxine HCl ER 75 MG Oral Capsule Extended Release 24 Hour; TAKE 3   CAPSULES DAILY; Therapy: 14IXX5486 to (Evaluate:19Ptm6504)  Requested for: 17BFG0197; Last   Rx:07Ixk5000 Ordered   7   Ventolin  (90 Base) MCG/ACT Inhalation Aerosol Solution; INHALE 2 PUFFS   EVERY 4 HOURS AS NEEDED; Therapy: 06BVS1678 to (Last [place] Jose)  Requested for: 48Bgo8850 Ordered   8  Verapamil HCl  MG Oral Tablet Extended Release; Take 1 tablet daily; Therapy: 11QQJ1654 to (Evaluate:02Jrt5356)  Requested for: 89YLA1708; Last   Rx:20Ajb6228 Ordered    Allergies    1  No Known Drug Allergies    2  No Known Environmental Allergies   3   No Known Food Allergies    Signatures   Electronically signed by : Steven Gay, ; Feb 24 2016 11:03AM EST                       (Author)

## 2018-01-17 NOTE — RESULT NOTES
Message   Thyroid labs are normal  Continue current dose of thyroid medication     Verified Results  (1) TSH 04Oct2016 11:01AM [place] Lace Order Number: [id number]     Test Name Result Flag Reference   TSH 0 520 uIU/mL  0 358-3 740   - Patient Instructions: This bloodwork is non-fasting  Please drink two glasses of water morning of bloodwork  - Patient Instructions: This bloodwork is non-fasting  Please drink two glasses of water morning of bloodwork  Patients undergoing fluorescein dye angiography may retain small amounts of fluorescein in the body for 48-72 hours post procedure  Samples containing fluorescein can produce falsely depressed TSH values  If the patient had this procedure,a specimen should be resubmitted post fluorescein clearance  The recommended reference ranges for TSH during pregnancy are as follows:  First trimester 0 1 to 2 5 uIU/mL  Second trimester  0 2 to 3 0 uIU/mL  Third trimester 0 3 to 3 0 uIU/m     (1) T4, FREE 04Oct2016 11:01AM [place] Lace Order Number: [id number]     Test Name Result Flag Reference   T4,FREE 1 39 ng/dL  0 76-1 46   - Patient Instructions: This bloodwork is non-fasting  Please drink two glasses of water morning of bloodwork

## 2018-01-17 NOTE — MISCELLANEOUS
Message   Recorded as Task   Date: 05/10/2016 02:51 PM, Created By: Jessika Wheat   Task Name: Medical Complaint Callback   Assigned To: Amy Landeros   Regarding Patient: Texie Baumgarten, Status: Active   Comment:    Amy Landeros - 10 May 2016 2:51 PM     TASK CREATED  Caller: Self; Medical Complaint; [phone] (Home)  Patient is following up regarding addition of pravastatin 20mg pm at last ov 3/31/16  She has no complaints of leg cramping but has noticed in past 2 weeks swelling in feet and ankles up legs with pain  She states she has sob on exertion which is no different than before  No palpitations or chest pain  BP today with Dr Vikram Robin 136/82  She has had recent bw by Dr Jamie Xie BMP and CBC in preparation for thyroid surgery June 6  She is due for fasting AST and LDL, she was planning on going Mon  She is concerned about the swelling and pending surgery  Please advise   Lucas Carmen - 10 May 2016 5:27 PM     TASK EDITED  called patient   not due to pravastatin  Dedra Naik started lasix 20 mg q3 days prn   also added kcl 20 bid today then 20 daily due to low k yday  Dedra Naik also ordered BMP for 2 weeks  Dedra Naik PLEASE PRINT SLIP FOR BMP and SEND TO PT    Lab slip mailed      Active Problems    1  Ankle pain, unspecified laterality   2  Bilateral leg edema (782 3) (R60 0)   3  Bunion, unspecified laterality (727 1) (M20 10)   4  Depression (311) (F32 9)   5  GERD without esophagitis (530 81) (K21 9)   6  History of allergy (V15 09) (Z88 9)   7  Hyperlipidemia (272 4) (E78 5)   8  Hypertension (401 9) (I10)   9  Hypokalemia (276 8) (E87 6)   10  Insomnia (780 52) (G47 00)   11  Morbid obesity with BMI of 45 0-49 9, adult (V85 42) (Z68 42)   12  Multiple thyroid nodules (241 1) (E04 2)   13  Palpitations (785 1) (R00 2)   14  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193) (C73)   15  Preop pulmonary/respiratory exam (V72 82) (Z01 811)   16  Reflex sympathetic dystrophy (337 20) (G90 50)   17   Restrictive lung disease (717 49) (J98 4)   18  Shortness of breath (786 05) (R06 02)   19  Shoulder Pain During Impingement Test Left   20  Thyroid nodule (241 0) (E04 1)    Current Meds   1  Bystolic 5 MG Oral Tablet; Take 1 tablet daily; Therapy: 91BRJ7241 to (Karyle Setter)  Requested for: 50PAC5707; Last   Rx:31Mar2016 Ordered   2  Furosemide 20 MG Oral Tablet; One tablet every 3 days as needed for edema; Therapy: 50YGB8556 to (Gemma Catherine)  Requested for: 99QGG6651; Last   Rx:10May2016 Ordered   3  Lisinopril-Hydrochlorothiazide 20-12 5 MG Oral Tablet; take 1 tablet twice a day; Therapy: 09ZWD6510 to (Evaluate:88Evi4155)  Requested for: 23OPW0809; Last   Rx:74Qky8009 Ordered   4  Omeprazole 40 MG Oral Capsule Delayed Release; TAKE 1 CAPSULE DAILY BEFORE   DINNER; Therapy: 46HBU0571 to [phone])  Requested for: 39CGE0744; Last   Rx:10May2016 Ordered   5  Osteo Bi-Flex Adv Triple St Oral Tablet; TAKE 1 TABLET DAILY AS DIRECTED; Therapy: 23ASF1934 to Recorded   6  Potassium Chloride ER 20 MEQ Oral Tablet Extended Release; One tablet twice daily   today then one tablet daily; Therapy: 23ZIR7842 to (Gemma Parlin)  Requested for: 80CMB0915; Last   Rx:10May2016 Ordered   7  Pravastatin Sodium 20 MG Oral Tablet; TAKE 1 TABLET AT BEDTIME; [facility]9446 [facility]  Requested for: 45MLL0013; Last   Rx:31Mar2016 Ordered   8  Venlafaxine HCl ER 75 MG Oral Capsule Extended Release 24 Hour; TAKE 2   CAPSULES DAILY; Therapy: 86GBX3662 to (Evaluate:29Jun2016)  Requested for: 21FWD8655 Recorded   9  Ventolin  (90 Base) MCG/ACT Inhalation Aerosol Solution; INHALE 2 PUFFS   EVERY 4-6 HOURS AS NEEDED; Therapy: 89JLP4092 to (Evaluate:20Qdm9912); Last Rx:10May2016 Ordered   10  Verapamil HCl  MG Oral Tablet Extended Release; Take 1 tablet daily; Therapy: 81CYA4563 to (Evaluate:63Fry7598)  Requested for: 62SDO0493; Last    Rx:39Szl2714 Ordered    Allergies    1   No Known Drug Allergies 2  No Known Environmental Allergies   3   No Known Food Allergies    Signatures   Electronically signed by : Fanny Mayer, ; May 11 2016  9:24AM EST                       (Administrative)

## 2018-01-18 NOTE — RESULT NOTES
Message   Call patient  CAT scan of the chest normal     Verified Results  CT CHEST W CONTRAST [id number] 09:06AM Rafael Pan     Test Name Result Flag Reference   CT CHEST W CONTRAST (Report)     Nodules in the right lobe of the thyroid gland for which thyroid ultrasound is recommended  ##sigslh##sigslh       CT CHEST WITH IV CONTRAST     INDICATION: Shortness of breath     COMPARISON: Chest x-ray on 12/3/2015     TECHNIQUE: CT examination of the chest was performed  85 mL of Omnipaque 350 was injected intravenously  Axial, sagittal and coronal reformatted images were submitted for interpretation  Coronal thick section MIP (maximal intensity projection) images    were also created  Examination was performed utilizing techniques to minimize radiation dose, including the use of dose reduction software  FINDINGS:     LUNGS:  There is no tracheal or endobronchial lesion  Scarring or atelectasis in the lingula  PLEURA: Unremarkable  HEART/GREAT VESSELS: Unremarkable for patient's age  Normal caliber thoracic aorta with mild atherosclerotic changes  No obvious filling defects within the pulmonary vessels  MEDIASTINUM AND BECCA: Unremarkable  CHEST WALL AND LOWER NECK: Nodules in the right lobe of the thyroid gland which appears enlarged  The left lobe is small or absent  VISUALIZED STRUCTURES IN THE UPPER ABDOMEN: Small hiatal hernia  OSSEOUS STRUCTURES: No acute fracture  No destructive osseous lesion  1  Scarring or atelectasis in the lingula, otherwise unremarkable study

## 2018-01-22 VITALS
HEART RATE: 61 BPM | SYSTOLIC BLOOD PRESSURE: 138 MMHG | BODY MASS INDEX: 43.36 KG/M2 | RESPIRATION RATE: 20 BRPM | HEIGHT: 64 IN | OXYGEN SATURATION: 94 % | WEIGHT: 254 LBS | DIASTOLIC BLOOD PRESSURE: 90 MMHG | TEMPERATURE: 98.2 F

## 2018-01-22 VITALS
HEART RATE: 52 BPM | DIASTOLIC BLOOD PRESSURE: 90 MMHG | SYSTOLIC BLOOD PRESSURE: 132 MMHG | WEIGHT: 245.19 LBS | HEIGHT: 64 IN | BODY MASS INDEX: 41.86 KG/M2

## 2018-01-23 VITALS
TEMPERATURE: 98.7 F | HEIGHT: 64 IN | WEIGHT: 249 LBS | DIASTOLIC BLOOD PRESSURE: 86 MMHG | SYSTOLIC BLOOD PRESSURE: 124 MMHG | BODY MASS INDEX: 42.51 KG/M2

## 2018-01-23 NOTE — MISCELLANEOUS
Message  Return to work or school:   Bairon Avelar is under my professional care   She was seen in my office on 12/26/17   She is able to return to work on  1/2/18            Signatures   Electronically signed by : KARL Hare ; Dec 26 2017  3:32PM EST                       (Author)    Electronically signed by : Ash Boles, ; Dec 26 2017  3:37PM EST                       (Author)

## 2018-02-14 ENCOUNTER — OFFICE VISIT (OUTPATIENT)
Dept: FAMILY MEDICINE CLINIC | Facility: CLINIC | Age: 73
End: 2018-02-14
Payer: COMMERCIAL

## 2018-02-14 VITALS
HEIGHT: 62 IN | WEIGHT: 244 LBS | SYSTOLIC BLOOD PRESSURE: 142 MMHG | DIASTOLIC BLOOD PRESSURE: 82 MMHG | TEMPERATURE: 99.4 F | RESPIRATION RATE: 20 BRPM | BODY MASS INDEX: 44.9 KG/M2 | HEART RATE: 60 BPM

## 2018-02-14 DIAGNOSIS — J01.10 ACUTE NON-RECURRENT FRONTAL SINUSITIS: Primary | ICD-10-CM

## 2018-02-14 PROCEDURE — 99213 OFFICE O/P EST LOW 20 MIN: CPT | Performed by: FAMILY MEDICINE

## 2018-02-14 RX ORDER — LEVOTHYROXINE SODIUM 175 UG/1
1 TABLET ORAL DAILY
COMMUNITY
Start: 2016-08-23 | End: 2018-07-19 | Stop reason: SDUPTHER

## 2018-02-14 RX ORDER — POTASSIUM CHLORIDE 1500 MG/1
1 TABLET, FILM COATED, EXTENDED RELEASE ORAL DAILY
COMMUNITY
Start: 2016-05-10 | End: 2018-06-15

## 2018-02-14 RX ORDER — GUAIFENESIN AND CODEINE PHOSPHATE 100; 10 MG/5ML; MG/5ML
5 SOLUTION ORAL 3 TIMES DAILY PRN
Qty: 120 ML | Refills: 0 | Status: SHIPPED | OUTPATIENT
Start: 2018-02-14 | End: 2018-06-15

## 2018-02-14 RX ORDER — ALBUTEROL SULFATE 90 UG/1
2 AEROSOL, METERED RESPIRATORY (INHALATION)
COMMUNITY
Start: 2016-05-10 | End: 2018-08-20

## 2018-02-14 RX ORDER — LEVOFLOXACIN 500 MG/1
500 TABLET, FILM COATED ORAL EVERY 24 HOURS
Qty: 10 TABLET | Refills: 0 | Status: SHIPPED | OUTPATIENT
Start: 2018-02-14 | End: 2018-02-24

## 2018-02-14 NOTE — PROGRESS NOTES
Assessment/Plan:    No problem-specific Assessment & Plan notes found for this encounter  Diagnoses and all orders for this visit:    Acute non-recurrent frontal sinusitis  -     levofloxacin (LEVAQUIN) 500 mg tablet; Take 1 tablet (500 mg total) by mouth every 24 hours for 10 days  -     guaifenesin-codeine (GUAIFENESIN AC) 100-10 MG/5ML liquid; Take 5 mL by mouth 3 (three) times a day as needed for cough    Other orders  -     albuterol (VENTOLIN HFA) 90 mcg/act inhaler; Inhale 2 puffs  -     levothyroxine 175 mcg tablet; Take 1 tablet by mouth daily  -     Misc Natural Products (OSTEO BI-FLEX ADV TRIPLE ST PO); Take 1 tablet by mouth daily  -     Potassium Chloride ER 20 MEQ TBCR; Take 1 tablet by mouth daily          Subjective:      Patient ID: Radhika Benavides is a 81 y o  female  Patient with postnasal drip and frontal sinus pain and sore throat and some chest discomfort associated with this over the past 4 days  Patient also with cough with occasional sputum production  The patient with occasional rhinorrhea  The no vomiting or diarrhea  Appetite is decreased  Patient is having sweats  No other significant body aches or fatigue  Patient is use Robitussin DM without significant improvement      Cough   Associated symptoms include postnasal drip, rhinorrhea and a sore throat  Sore Throat    Associated symptoms include congestion and coughing  The following portions of the patient's history were reviewed and updated as appropriate: allergies, current medications, past family history, past medical history, past social history, past surgical history and problem list     Review of Systems   Constitutional: Negative  HENT: Positive for congestion, postnasal drip, rhinorrhea, sinus pain, sinus pressure and sore throat  Eyes: Negative  Respiratory: Positive for cough  Cardiovascular: Negative  Gastrointestinal: Negative  Endocrine: Negative  Genitourinary: Negative  Musculoskeletal: Negative  Skin: Negative  Allergic/Immunologic: Negative  Neurological: Negative  Hematological: Negative  Psychiatric/Behavioral: Negative  Objective:    Vitals:    02/14/18 1457   BP: 142/82   Pulse: 60   Resp: 20   Temp: 99 4 °F (37 4 °C)        Physical Exam   Constitutional: She is oriented to person, place, and time  She appears well-developed and well-nourished  No distress  HENT:   Head: Normocephalic  Right Ear: External ear normal    Left Ear: External ear normal    Mouth/Throat: Oropharyngeal exudate present  Eyes: EOM are normal  Pupils are equal, round, and reactive to light  Right eye exhibits no discharge  Left eye exhibits no discharge  No scleral icterus  Neck: Normal range of motion  Neck supple  No thyromegaly present  Cardiovascular: Normal rate, regular rhythm, normal heart sounds and intact distal pulses  Exam reveals no gallop and no friction rub  No murmur heard  Pulmonary/Chest: Effort normal  No respiratory distress  She has wheezes  She has no rales  She exhibits no tenderness  Abdominal: Soft  Bowel sounds are normal  She exhibits no distension  There is no tenderness  There is no rebound and no guarding  Musculoskeletal: Normal range of motion  She exhibits no edema or tenderness  Lymphadenopathy:     She has no cervical adenopathy  Neurological: She is oriented to person, place, and time  No cranial nerve deficit  She exhibits normal muscle tone  Coordination normal    Skin: Skin is warm and dry  No rash noted  She is not diaphoretic  No erythema  No pallor  Psychiatric: She has a normal mood and affect  Her behavior is normal  Judgment and thought content normal    Nursing note and vitals reviewed

## 2018-02-14 NOTE — LETTER
February 14, 2018     Patient: Leta Gutierrez   YOB: 1945   Date of Visit: 2/14/2018       To Whom it May Concern:    Cele Odonnell is under my professional care  She was seen in my office on 2/14/2018  She may return to work on 02/19/2018  If you have any questions or concerns, please don't hesitate to call           Sincerely,          Rosy Amin,         CC: No Recipients

## 2018-02-28 DIAGNOSIS — I10 ESSENTIAL HYPERTENSION: Primary | ICD-10-CM

## 2018-02-28 RX ORDER — LISINOPRIL AND HYDROCHLOROTHIAZIDE 20; 12.5 MG/1; MG/1
TABLET ORAL
Qty: 180 TABLET | Refills: 0 | Status: SHIPPED | OUTPATIENT
Start: 2018-02-28 | End: 2018-05-18 | Stop reason: SDUPTHER

## 2018-03-19 DIAGNOSIS — E78.49 OTHER HYPERLIPIDEMIA: Primary | ICD-10-CM

## 2018-03-21 RX ORDER — PRAVASTATIN SODIUM 20 MG
20 TABLET ORAL DAILY
Qty: 90 TABLET | Refills: 2 | Status: SHIPPED | OUTPATIENT
Start: 2018-03-21 | End: 2018-07-12 | Stop reason: SDUPTHER

## 2018-04-11 DIAGNOSIS — I10 ESSENTIAL HYPERTENSION: Primary | ICD-10-CM

## 2018-04-11 RX ORDER — VERAPAMIL HYDROCHLORIDE 240 MG/1
TABLET, FILM COATED, EXTENDED RELEASE ORAL
Qty: 90 TABLET | Refills: 0 | Status: SHIPPED | OUTPATIENT
Start: 2018-04-11 | End: 2018-06-29 | Stop reason: SDUPTHER

## 2018-05-04 DIAGNOSIS — R60.0 LOCALIZED EDEMA: ICD-10-CM

## 2018-05-04 DIAGNOSIS — R60.9 EDEMA, UNSPECIFIED TYPE: Primary | ICD-10-CM

## 2018-05-04 DIAGNOSIS — F32.A DEPRESSION, UNSPECIFIED DEPRESSION TYPE: Primary | ICD-10-CM

## 2018-05-04 RX ORDER — VENLAFAXINE 75 MG/1
75 TABLET ORAL DAILY
Qty: 90 TABLET | Refills: 0 | Status: SHIPPED | OUTPATIENT
Start: 2018-05-04 | End: 2018-05-09 | Stop reason: SDUPTHER

## 2018-05-05 RX ORDER — FUROSEMIDE 20 MG/1
20 TABLET ORAL EVERY OTHER DAY
Qty: 45 TABLET | Refills: 1 | Status: SHIPPED | OUTPATIENT
Start: 2018-05-05 | End: 2018-07-12 | Stop reason: SDUPTHER

## 2018-05-08 NOTE — TELEPHONE ENCOUNTER
GOT A CALL FROM [facility]  [phone]  THEY ARE QUESTIONING THE DIRECTIONS ON PT'S Adrián Powers  QUANTITY IS #90, BUT IT IS FOR 2/DAY?   REFERENCE #  [id number]

## 2018-05-09 DIAGNOSIS — F32.A DEPRESSION, UNSPECIFIED DEPRESSION TYPE: ICD-10-CM

## 2018-05-09 RX ORDER — VENLAFAXINE 75 MG/1
75 TABLET ORAL DAILY
Qty: 90 TABLET | Refills: 0 | Status: SHIPPED | OUTPATIENT
Start: 2018-05-09 | End: 2018-07-23 | Stop reason: SDUPTHER

## 2018-05-18 DIAGNOSIS — I10 ESSENTIAL HYPERTENSION: ICD-10-CM

## 2018-05-18 RX ORDER — LISINOPRIL AND HYDROCHLOROTHIAZIDE 20; 12.5 MG/1; MG/1
TABLET ORAL
Qty: 180 TABLET | Refills: 0 | Status: SHIPPED | OUTPATIENT
Start: 2018-05-18 | End: 2018-07-12 | Stop reason: SDUPTHER

## 2018-06-01 ENCOUNTER — APPOINTMENT (OUTPATIENT)
Dept: LAB | Facility: MEDICAL CENTER | Age: 73
End: 2018-06-01
Payer: COMMERCIAL

## 2018-06-01 DIAGNOSIS — C73 MALIGNANT NEOPLASM OF THYROID GLAND (HCC): ICD-10-CM

## 2018-06-01 LAB
ANION GAP SERPL CALCULATED.3IONS-SCNC: 8 MMOL/L (ref 4–13)
BUN SERPL-MCNC: 14 MG/DL (ref 5–25)
CALCIUM SERPL-MCNC: 9.5 MG/DL (ref 8.3–10.1)
CHLORIDE SERPL-SCNC: 102 MMOL/L (ref 100–108)
CO2 SERPL-SCNC: 30 MMOL/L (ref 21–32)
CREAT SERPL-MCNC: 0.82 MG/DL (ref 0.6–1.3)
GFR SERPL CREATININE-BSD FRML MDRD: 73 ML/MIN/1.73SQ M
GLUCOSE P FAST SERPL-MCNC: 133 MG/DL (ref 65–99)
POTASSIUM SERPL-SCNC: 4 MMOL/L (ref 3.5–5.3)
SODIUM SERPL-SCNC: 140 MMOL/L (ref 136–145)

## 2018-06-01 PROCEDURE — 80048 BASIC METABOLIC PNL TOTAL CA: CPT

## 2018-06-01 PROCEDURE — 86800 THYROGLOBULIN ANTIBODY: CPT

## 2018-06-01 PROCEDURE — 84432 ASSAY OF THYROGLOBULIN: CPT

## 2018-06-01 PROCEDURE — 36415 COLL VENOUS BLD VENIPUNCTURE: CPT

## 2018-06-02 LAB
THYROGLOB AB SERPL-ACNC: <1 IU/ML (ref 0–0.9)
THYROGLOB SERPL-MCNC: 11.5 NG/ML (ref 1.5–38.5)

## 2018-06-07 ENCOUNTER — HOSPITAL ENCOUNTER (OUTPATIENT)
Dept: CT IMAGING | Facility: HOSPITAL | Age: 73
Discharge: HOME | End: 2018-06-07
Attending: SURGERY
Payer: COMMERCIAL

## 2018-06-07 DIAGNOSIS — C73 MALIGNANT NEOPLASM OF THYROID GLAND (HCC): ICD-10-CM

## 2018-06-07 PROCEDURE — 70491 CT SOFT TISSUE NECK W/DYE: CPT

## 2018-06-07 PROCEDURE — 71260 CT THORAX DX C+: CPT

## 2018-06-07 RX ADMIN — IOHEXOL 100 ML: 350 INJECTION, SOLUTION INTRAVENOUS at 13:30

## 2018-06-15 ENCOUNTER — OFFICE VISIT (OUTPATIENT)
Dept: SLEEP CENTER | Facility: CLINIC | Age: 73
End: 2018-06-15
Payer: COMMERCIAL

## 2018-06-15 ENCOUNTER — OFFICE VISIT (OUTPATIENT)
Dept: SURGICAL ONCOLOGY | Facility: CLINIC | Age: 73
End: 2018-06-15
Payer: COMMERCIAL

## 2018-06-15 VITALS
BODY MASS INDEX: 46.22 KG/M2 | HEIGHT: 62 IN | HEART RATE: 72 BPM | WEIGHT: 251.2 LBS | DIASTOLIC BLOOD PRESSURE: 82 MMHG | SYSTOLIC BLOOD PRESSURE: 140 MMHG

## 2018-06-15 VITALS
RESPIRATION RATE: 18 BRPM | WEIGHT: 252 LBS | HEART RATE: 60 BPM | TEMPERATURE: 98.6 F | HEIGHT: 62 IN | SYSTOLIC BLOOD PRESSURE: 130 MMHG | DIASTOLIC BLOOD PRESSURE: 70 MMHG | BODY MASS INDEX: 46.38 KG/M2

## 2018-06-15 DIAGNOSIS — G47.10 HYPERSOMNIA: ICD-10-CM

## 2018-06-15 DIAGNOSIS — I10 ESSENTIAL HYPERTENSION: ICD-10-CM

## 2018-06-15 DIAGNOSIS — J98.4 RESTRICTIVE LUNG DISEASE: ICD-10-CM

## 2018-06-15 DIAGNOSIS — E66.01 MORBID OBESITY WITH BMI OF 40.0-44.9, ADULT (HCC): ICD-10-CM

## 2018-06-15 DIAGNOSIS — R93.89 ABNORMAL CT SCAN, NECK: ICD-10-CM

## 2018-06-15 DIAGNOSIS — R93.89 ABNORMAL CT SCAN, CHEST: ICD-10-CM

## 2018-06-15 DIAGNOSIS — E89.0 HYPOTHYROIDISM, POSTSURGICAL: ICD-10-CM

## 2018-06-15 DIAGNOSIS — G47.33 OBSTRUCTIVE SLEEP APNEA: Primary | ICD-10-CM

## 2018-06-15 DIAGNOSIS — C73 MALIGNANT NEOPLASM OF THYROID GLAND (HCC): ICD-10-CM

## 2018-06-15 DIAGNOSIS — C73 PAPILLARY THYROID CARCINOMA (HCC): Primary | ICD-10-CM

## 2018-06-15 PROCEDURE — 99214 OFFICE O/P EST MOD 30 MIN: CPT | Performed by: INTERNAL MEDICINE

## 2018-06-15 PROCEDURE — 99213 OFFICE O/P EST LOW 20 MIN: CPT | Performed by: NURSE PRACTITIONER

## 2018-06-15 NOTE — PROGRESS NOTES
Follow-Up Note - [facility]   Lawyer Apolonia Collazo  81 y o  female  DAKOTA:[birth year]  MIS:[id number]    CC: I saw this patient for follow-up in clinic today for her Sleep Disordered Breathing, Coexisting Sleep and Medical Problems  PFSH, Problem List, Medications & Allergies were reviewed in EMR  Interval changes: [none reported ]   She  has a past medical history of Asthma; Breathing difficulty; Bronchitis; Depression; Hyperlipidemia; Hypertension; Shortness of breath; Thyroid nodule; Thyroid nodule; and Thyroid nodule  She has a current medication list which includes the following prescription(s): albuterol, albuterol, cetirizine, furosemide, levothyroxine, lisinopril-hydrochlorothiazide, misc natural products, nebivolol, ondansetron, potassium chloride, pravastatin, tramadol, venlafaxine, and verapamil  ROS: Reviewed (see attached)  She feels her asthma is controlled and is no longer experiencing shortness of breath  She reports occasional wheezing  She feels mood is stable on current medication  She is suspects weight gain due to her thyroid status  HPI:  With respect to sleep disordered breathing, compliance data download shows:  using PAP > 4 hours per night 100% of the time  THO (estimated) 9 4/hourand at [90th percentile] pressure of 8cm H2O  and  is benefitting from use  Sotero Hester reports  · sleeping better   · no  difficulty tolerating PAP;   · some adverse effects; dry mouth she feels this is caused by her jaw or falling forward due to TMJ dysfunction  Sleep Routine:  She reports getting 8-10 hours sleep ; she  has no difficulty initiating or maintaining sleep   She awakens spontaneously feeling refreshed  She reports occasional excessive drowsiness and regularly lay down for 20 min in the afternoons  Kathy Lazaro rated herself at Total score: 6 /24 on the [place] sleepiness scale ]      EXAM: Patient is alert, orientated, cooperative [and in no distress]    Mental state [appears normal]  There are [no] facial pressure marks or rashes  /82   Pulse 72   Ht 5' 2" (1 575 m)   Wt 114 kg (251 lb 3 2 oz)   LMP  (LMP Unknown)   BMI 45 95 kg/m²   Neck Circumference: 37 5 (cm) cm  Wt Readings from Last 3 Encounters:   06/15/18 114 kg (251 lb 3 2 oz)   06/15/18 114 kg (252 lb)   02/14/18 111 kg (244 lb)     Apart from weight gain, Physical findings are essentially unchanged as documented in the initial encounter  IMPRESSION:   1  Obstructive sleep apnea  Sleep F/U  - established patient   2  Hypersomnia     3  Morbid obesity with BMI of 40 0-44 9, adult ([facility] [address] )     4  Hypothyroidism, postsurgical     5  Essential hypertension     6  Restrictive lung disease         PLAN:  1  Treatment with  PAP is medically necessary and Latoya Points is agreable to continue use  2  Pressure setting:  Continue at 9-12 cm H2O     3  Instruction on care of equipment and strategies to improve comfort with use of PAP were discussed [:controlling mucosal dryness, nasal symptoms and Mask leaks]  4  Care coordinated with DME provider and prescription to replace supplies as needed was provided  5  Need for compliance with therapy and weight reduction were emphasized  6  With your consent, follow-up is advised in [1 Derinda Rolls [or sooner if needed] [to monitor progress, compliance and to adjust therapy]  Thank you for allowing me to participate in the care of this patient      Sincerely,    Authenticated electronically by Murtaza Lance MD on 53/00/03   Board Certified Specialist

## 2018-06-15 NOTE — PROGRESS NOTES
Surgical Oncology Follow Up       [facility] SURGICAL ONCOLOGY [place]  [address]  1945  [id number]  [facility] SURGICAL ONCOLOGY [place]  [address] [place] [address]    Chief Complaint   Patient presents with    Thyroid Problem     Patient is here for a 6 month follow up       Assessment/Plan:  1  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[address] )  - US thyroid; Future  - T4, free; Future  - T3; Future  - TSH, 3rd generation; Future  - Ambulatory referral to Endocrinology; Future  - 6 mo f/u visit, this may be altered after imaging complete    2  Abnormal CT scan, neck  - US thyroid; Future    3  Abnormal CT scan, chest  - XR humerus left; Future    Discussion/Summary:  Patient is a 81-year-old female who presents today for a six-month follow-up visit for papillary thyroid cancer diagnosed in 2016  She ultimately underwent a completion thyroidectomy by Dr Jian Bynum  She did not receive JOYNER  She has had a slowly rising thyroglobulin level with an normal neck u/s  Therefore, Dr Jian Bynum ordered a CT neck and CT chest prior to today's visit  Her most recently thyroglobulin level from 6/2018 is 11 5, increased from 6 8 in December 2017  Noted on CT of the neck is a small amount of thyroid tissue present in the thyroid bed (no mass or adenopathy noted)  CT chest revealed no evidence of metastatic disease  Noted was a sclerotic lesion of the left humeral neck and a x-ray was recommended  The patient states that she did fall approximately 5 years ago on her left arm  I will order x-rays to be completed to follow-up on this finding  Pt with no arm complaints  I will also order an ultrasound of the neck to see if any thyroid tissue is identified on ultrasound  We discussed observation versus biopsy if tissue was noted on ultrasound    I will call the patient with the results and we will finalize plans at that time   She has not had a recent TSH level checked therefore I will also order thyroid labs today  She has not recently followed up with Endocrinology and we will refer her back to reestablish care  We will tentatively plan to see her back in 6 months however I will call her with the results of her imaging studies and we will finalize plans at that time  She is in agreement with this plan  All of her questions were answered  History of Present Illness:        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[facility] [address] )    2/5/2016 Biopsy     Right thyroid biopsy    [place] [facility]           2/25/2016 Surgery     Right pierre thyroidectomy (Dr Chantal Vivas)    Thyroid, right lobe (11 gram), lobectomy:  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, 3 distinct foci, measuring 0 5, 1 5 and 1 7 in greatest dimensions  All confined to the thyroid    Stage I - T1b pNX         5/4/2016 Biopsy     Left anterior lymph node biopsy    A -B -C   Lymph Node, left anterior jugular chain inferior (Thin-prep, smears and cell block preparations):  Specimen consists of unremarkable-appearing follicular cells in small groups and flat sheets associated with scant colloid  6/7/2016 Surgery     Completion thyroidectomy     Left thyroid remnant, left completion thyroidectomy:             - Follicular adenoma with oncocytic features   - No dysplasia or malignancy is identified              -Interval History:  Patient presents today for six-month follow-up visit for papillary thyroid cancer diagnosed in 2016  Her thyroglobulin level is 11 5  She had a CT of the neck and chest performed prior to today's visit  There is a small amount of thyroid tissue noted in the thyroid bed and there is also a sclerotic lesion of the left humerus noted  She has no new complaints today  She denies difficulty swallowing or voice changes  She denies left arm pain, stating that she did fall on that arm approximately 5 years ago    She denies headaches, back pain, bone pain, cough, shortness breath, abdominal pain  She has not followed up with Endocrinology recently  Review of Systems:  Review of Systems   Constitutional: Negative for activity change, appetite change, chills, fatigue, fever and unexpected weight change  HENT: Negative for trouble swallowing  Eyes: Negative for pain, redness and visual disturbance  Respiratory: Negative for cough, shortness of breath and wheezing  Cardiovascular: Negative for chest pain, palpitations and leg swelling  Gastrointestinal: Negative for abdominal pain, constipation, diarrhea, nausea and vomiting  Endocrine: Negative for cold intolerance and heat intolerance  Musculoskeletal: Negative for arthralgias, back pain, gait problem and myalgias  Skin: Negative for color change and rash  Neurological: Negative for dizziness, syncope, light-headedness, numbness and headaches  Hematological: Negative for adenopathy  Psychiatric/Behavioral: Negative for agitation and confusion  All other systems reviewed and are negative        Patient Active Problem List   Diagnosis    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HCC)    GERD without esophagitis    Hyperlipidemia    Hypertension    Hypothyroidism, postsurgical    Impaired fasting glucose    Insomnia    Restrictive lung disease    Thyroid cancer ([facility] [address] )    Acute non-recurrent frontal sinusitis     Past Medical History:   Diagnosis Date    Asthma     Breathing difficulty     Bronchitis     Depression     Hyperlipidemia     Hypertension     Shortness of breath     Thyroid nodule     Thyroid nodule     Thyroid nodule      Past Surgical History:   Procedure Laterality Date    HEMORRHOID SURGERY      NJ THYROID LOBECTOMY,UNILAT Right 2/25/2016    Procedure: HEMITHYROIDECTOMY;  Surgeon: Sole Kaiser MD;  Location: [facility];  Service: Surgical Oncology    NJ THYROIDECTOMY POST PREV THYR SURG Left 6/6/2016    Procedure:  Claudette Kunz THYROIDECTOMY, FLEXIBLE LARYNGOSCOPY;  Surgeon: David Carrero MD;  Location: [facility];  Service: Surgical Oncology    THYROIDECTOMY, PARTIAL Left      Family History   Problem Relation Age of Onset    Adopted: Yes     Social History     Social History    Marital status: /Civil Union     Spouse name: N/A    Number of children: N/A    Years of education: N/A     Occupational History    Not on file  Social History Main Topics    Smoking status: Former Smoker    Smokeless tobacco: Not on file      Comment: quit 40 yrs ago    Alcohol use No      Comment: occasional glass of wine    Drug use: No    Sexual activity: Not on file     Other Topics Concern    Not on file     Social History Narrative    No narrative on file       Current Outpatient Prescriptions:     albuterol (PROVENTIL HFA,VENTOLIN HFA) 90 mcg/act inhaler, Inhale 2 puffs every 6 (six) hours as needed for wheezing , Disp: , Rfl:     albuterol (VENTOLIN HFA) 90 mcg/act inhaler, Inhale 2 puffs, Disp: , Rfl:     cetirizine (ZyrTEC) 10 mg tablet, Take 10 mg by mouth daily  , Disp: , Rfl:     furosemide (LASIX) 20 mg tablet, Take 1 tablet (20 mg total) by mouth every other day, Disp: 45 tablet, Rfl: 1    levothyroxine 175 mcg tablet, Take 1 tablet by mouth daily, Disp: , Rfl:     lisinopril-hydrochlorothiazide (PRINZIDE,ZESTORETIC) 20-12 5 MG per tablet, TAKE 1 TABLET TWICE A DAY, Disp: 180 tablet, Rfl: 0    Misc Natural Products (OSTEO BI-FLEX TRIPLE STRENGTH PO), Take 1 tablet by mouth 2 (two) times a day   , Disp: , Rfl:     nebivolol (BYSTOLIC) 5 mg tablet, Take 5 mg by mouth daily  , Disp: , Rfl:     ondansetron (ZOFRAN) 4 mg tablet, Take 1 tablet (4 mg total) by mouth every 8 (eight) hours as needed for nausea or vomiting , Disp: 10 tablet, Rfl: 0    potassium chloride (KLOR-CON) 20 mEq packet, Take 20 mEq by mouth daily  , Disp: , Rfl:     pravastatin (PRAVACHOL) 20 mg tablet, Take 1 tablet (20 mg total) by mouth daily, Disp: 90 tablet, Rfl: 2    traMADol (ULTRAM) 50 mg tablet, Take 1 tablet (50 mg total) by mouth every 6 (six) hours as needed for moderate pain , Disp: 15 tablet, Rfl: 0    venlafaxine (EFFEXOR) 75 mg tablet, Take 1 tablet (75 mg total) by mouth daily, Disp: 90 tablet, Rfl: 0    verapamil (CALAN-SR) 240 mg CR tablet, TAKE 1 TABLET DAILY, Disp: 90 tablet, Rfl: 0  Allergies   Allergen Reactions    Other Rash     Adhesive tape     Vitals:    06/15/18 1115   BP: 130/70   Pulse: 60   Resp: 18   Temp: 98 6 °F (37 °C)       Physical Exam   Constitutional: She is oriented to person, place, and time  Vital signs are normal  She appears well-developed and well-nourished  No distress  HENT:   Head: Normocephalic and atraumatic  Neck: Normal range of motion  Well-healed neck incision  No palpable nodularities  No cervical lymphadenopathy noted  Cardiovascular: Normal rate, regular rhythm and normal heart sounds  Pulmonary/Chest: Effort normal and breath sounds normal    Abdominal: Soft  Normal appearance  She exhibits no mass  There is no hepatosplenomegaly  There is no tenderness  Musculoskeletal: Normal range of motion  Lymphadenopathy:     She has no axillary adenopathy  Right: No supraclavicular adenopathy present  Left: No supraclavicular adenopathy present  Neurological: She is alert and oriented to person, place, and time  Skin: Skin is warm, dry and intact  No rash noted  She is not diaphoretic  Psychiatric: She has a normal mood and affect  Her speech is normal    Vitals reviewed  Results:    Labs    Component      Latest Ref Rng & Units 8/12/2016 12/9/2016 12/7/2017 6/1/2018   Thyroglobulin-BALJIT      1 5 - 38 5 ng/mL 2 5 6 7 6 8 11 5         Imaging  Ct Soft Tissue Neck W Contrast    Result Date: 6/8/2018  Narrative: CT NECK WITH CONTRAST INDICATION:   C73: Malignant neoplasm of thyroid gland  COMPARISON:  Previous ultrasound imaging  No previous CT   TECHNIQUE:  Contiguous 2 5 mm images were obtained through the neck after administration of intravenous contrast  Radiation dose length product (DLP) for this visit:  278 mGy-cm   This examination, like all CT scans performed in the [facility], was performed utilizing techniques to minimize radiation dose exposure, including the use of iterative reconstruction and automated exposure control  IV Contrast:  100 mL of iohexol (OMNIPAQUE) IMAGE QUALITY:  Diagnostic  FINDINGS: VISUALIZED BRAIN PARENCHYMA:  No acute intracranial pathology of the visualized brain parenchyma  VISUALIZED ORBITS AND PARANASAL SINUSES:  Normal  NASAL CAVITY AND NASOPHARYNX:  Normal  SUPRAHYOID NECK:  Normal oral cavity, tongue base, tonsillar fossa and epiglottis  INFRAHYOID NECK:  Aryepiglottic folds and piriform sinuses are normal   Normal glottis and subglottic airway  THYROID GLAND:  There is only a small amount of thyroid tissue present within the thyroid bed  No mass  PAROTID AND SUBMANDIBULAR GLANDS:  Normal  LYMPH NODES:  There are small scattered nodes with no enlarged adenopathy by CT criteria  VASCULAR STRUCTURES:  Normal enhancement of the cervical vasculature  THORACIC INLET:  Lung apices and upper mediastinum are unremarkable  BONY STRUCTURES: No acute osseous abnormality  Mild facet degenerative change  Impression: There is little thyroid tissue present within the thyroid bed  No discrete mass or surrounding adenopathy  Small scattered nodes, not enlarged by CT criteria  Workstation performed: [id number]     Ct Chest W Contrast    Result Date: 6/11/2018  Narrative: CT CHEST WITH IV CONTRAST INDICATION:   C73: Malignant neoplasm of thyroid gland  68-year-old woman with history of thyroid carcinoma status post complete thyroidectomy June 2016  Thyroglobulin level 11 5 COMPARISON:    Chest CT 1/13/2016 TECHNIQUE: CT examination of the chest was performed   Axial, sagittal, and coronal 2D reformatted images were created from the source data and submitted for interpretation  Radiation dose length product (DLP) for this visit:  850 mGy-cm   This examination, like all CT scans performed in the [facility], was performed utilizing techniques to minimize radiation dose exposure, including the use of iterative reconstruction and automated exposure control  IV Contrast:  100 mL of iohexol (OMNIPAQUE) FINDINGS: LUNGS:  Scattered pulmonary micronodules measuring up to 3 mm in the left upper lobe are unchanged (series 604 image 111)  No new or suspicious pulmonary nodules  Minimal left lower lobe linear atelectasis/scarring is unchanged  There is no tracheal or endobronchial lesion  PLEURA:  Unremarkable  HEART/GREAT VESSELS:  Normal heart size  Coronary artery disease is present  Mitral valvular calcifications  Aortic atherosclerosis without aneurysm    MEDIASTINUM AND BECCA:  Unremarkable  CHEST WALL AND LOWER NECK:   Thyroidectomy bed is incompletely evaluated on this chest CT  Please see separately reported neck CT  Corky Neftali STRUCTURES IN THE UPPER ABDOMEN:  Hepatic steatosis is moderate    OSSEOUS STRUCTURES:  Predominantly sclerotic lesion left humeral neck is incompletely evaluated on the current study  Impression: 1  No evidence of metastatic disease within the chest  2   Moderate hepatic steatosis  3   Predominantly sclerotic lesion left humeral neck is suboptimally evaluated on this CT  Recommend dedicated left humeral radiographs  Workstation performed: [id number]       I reviewed the above imaging data  Advance Care Planning/Advance Directives:  Discussed disease status, cancer treatment plans and/or cancer treatment goals with the patient

## 2018-06-15 NOTE — PROGRESS NOTES
Review of Systems      Genitourinary none   Cardiology none   Gastrointestinal none   Neurology none   Constitutional none   Integumentary none   Psychiatry none   Musculoskeletal none   Pulmonary wheezing   ENT none   Endocrine none   Hematological none

## 2018-06-19 ENCOUNTER — HOSPITAL ENCOUNTER (OUTPATIENT)
Dept: ULTRASOUND IMAGING | Facility: HOSPITAL | Age: 73
Discharge: HOME | End: 2018-06-19
Payer: COMMERCIAL

## 2018-06-19 ENCOUNTER — HOSPITAL ENCOUNTER (OUTPATIENT)
Dept: RADIOLOGY | Facility: HOSPITAL | Age: 73
Discharge: HOME | End: 2018-06-19
Payer: COMMERCIAL

## 2018-06-19 ENCOUNTER — APPOINTMENT (OUTPATIENT)
Dept: LAB | Facility: HOSPITAL | Age: 73
End: 2018-06-19
Payer: COMMERCIAL

## 2018-06-19 DIAGNOSIS — C73 PAPILLARY THYROID CARCINOMA (HCC): ICD-10-CM

## 2018-06-19 DIAGNOSIS — R93.89 ABNORMAL CT SCAN, CHEST: ICD-10-CM

## 2018-06-19 DIAGNOSIS — R93.89 ABNORMAL CT SCAN, NECK: ICD-10-CM

## 2018-06-19 LAB
T3 SERPL-MCNC: 1 NG/ML (ref 0.6–1.8)
T4 FREE SERPL-MCNC: 1.25 NG/DL (ref 0.76–1.46)
TSH SERPL DL<=0.05 MIU/L-ACNC: 1.01 UIU/ML (ref 0.36–3.74)

## 2018-06-19 PROCEDURE — 76536 US EXAM OF HEAD AND NECK: CPT

## 2018-06-19 PROCEDURE — 84443 ASSAY THYROID STIM HORMONE: CPT

## 2018-06-19 PROCEDURE — 73060 X-RAY EXAM OF HUMERUS: CPT

## 2018-06-19 PROCEDURE — 84439 ASSAY OF FREE THYROXINE: CPT

## 2018-06-19 PROCEDURE — 36415 COLL VENOUS BLD VENIPUNCTURE: CPT

## 2018-06-19 PROCEDURE — 84480 ASSAY TRIIODOTHYRONINE (T3): CPT

## 2018-06-25 ENCOUNTER — TELEPHONE (OUTPATIENT)
Dept: SURGICAL ONCOLOGY | Facility: CLINIC | Age: 73
End: 2018-06-25

## 2018-06-25 DIAGNOSIS — M87.9 BONE INFARCT (HCC): Primary | ICD-10-CM

## 2018-06-25 NOTE — TELEPHONE ENCOUNTER
Spoke with patient to review recent imaging and labs  Labs WNL, thyroid u/s: suspected residual soft tissue on left  Also reviewed patient's case with Dr Harjinder Quinones  Will await Endocrinology input- pt has appt 7/19   ? JOYNER treatment  Patient does not wish to undergo further thyroid biopsies at this time  I will call patient in one month to discuss plan at that time  I will also refer pt to ortho- bone infarct proximal left humerus  She does have a history of trauma to left arm  Our office will contact her to schedule appt

## 2018-06-26 ENCOUNTER — TELEPHONE (OUTPATIENT)
Dept: SURGICAL ONCOLOGY | Facility: CLINIC | Age: 73
End: 2018-06-26

## 2018-06-29 DIAGNOSIS — I10 ESSENTIAL HYPERTENSION: ICD-10-CM

## 2018-06-29 RX ORDER — VERAPAMIL HYDROCHLORIDE 240 MG/1
TABLET, FILM COATED, EXTENDED RELEASE ORAL
Qty: 90 TABLET | Refills: 0 | Status: SHIPPED | OUTPATIENT
Start: 2018-06-29 | End: 2018-07-12 | Stop reason: SDUPTHER

## 2018-07-03 ENCOUNTER — APPOINTMENT (OUTPATIENT)
Dept: RADIOLOGY | Facility: CLINIC | Age: 73
End: 2018-07-03
Payer: COMMERCIAL

## 2018-07-03 ENCOUNTER — OFFICE VISIT (OUTPATIENT)
Dept: OBGYN CLINIC | Facility: MEDICAL CENTER | Age: 73
End: 2018-07-03
Payer: COMMERCIAL

## 2018-07-03 VITALS
BODY MASS INDEX: 45.08 KG/M2 | WEIGHT: 245 LBS | DIASTOLIC BLOOD PRESSURE: 112 MMHG | SYSTOLIC BLOOD PRESSURE: 176 MMHG | HEART RATE: 54 BPM | HEIGHT: 62 IN

## 2018-07-03 DIAGNOSIS — M87.9 BONE INFARCT (HCC): ICD-10-CM

## 2018-07-03 DIAGNOSIS — M75.51 ACUTE SHOULDER BURSITIS, RIGHT: ICD-10-CM

## 2018-07-03 DIAGNOSIS — M89.9 HUMERUS LESION, LEFT: Primary | ICD-10-CM

## 2018-07-03 PROCEDURE — 99204 OFFICE O/P NEW MOD 45 MIN: CPT | Performed by: ORTHOPAEDIC SURGERY

## 2018-07-03 PROCEDURE — 73030 X-RAY EXAM OF SHOULDER: CPT

## 2018-07-03 NOTE — PROGRESS NOTES
Orthopaedic Surgery - Office Note  Alvarado Ignacio (28 y o  female)   : 1945   MRN: [id number]  Encounter Date: 7/3/2018    Chief Complaint   Patient presents with    Left Shoulder - Pain       Assessment / Plan  Left proximal humeral lesion,   Right shoulder bursitis    · For her right shoulder will start physical therapy for range of motion, strengthening and modalities  We did discuss possible injection in the future if no progression with physical therapy or possibly MRI in the future  · Continue with ice and analgesics as needed for the right shoulder  · For the left proximal humeral lesion we will obtain an MRI study with and without contrast for further evaluation  · Serum creatinine was ordered due to IV contrast   · Will also obtain at whole-body bone scan due to possibility metastasis from the thyroid  Return for Follow-up following MRI of the left shoulder and bone scan studies       History of Present Illness  Alvarado Ignacio is a 81 y o  female who presents for evaluation of left shoulder for lesion found incidentally on CT of the chest while looking for metastasis to lymph nodes from thyroid cancer  Patient at this time is denying any pain in the left shoulder at this time  She does have right shoulder pain that is been ongoing issue for years when asked where the pain is she points to the lateral aspect of the subacromial space and says that radiates down the deltoid along with some pain in the area of the biceps tendon  She states that she has had at injection in her right shoulder in the past which helped with the discomfort  She is treated for thyroid cancer in 2016 she had thyroidectomy and recently her blood work shows that the cancer markers have been going up so she had further evaluation to look at her neck and chest with since the only showed this lesion in her shoulder    The CT scans were negative for he lesions in the neck chest but she has not had a bone scan or any further workup to evaluate for metastasis anywhere else in her body  Review of Systems  Pertinent items are noted in HPI  All other systems were reviewed and are negative  Physical Exam  BP (!) 176/112   Pulse (!) 54   Ht 5' 2" (1 575 m)   Wt 111 kg (245 lb)   LMP  (LMP Unknown)   BMI 44 81 kg/m²   Cons: Appears well  No apparent distress  Psych: Alert  Oriented x3  Mood and affect normal   Eyes: PERRLA, EOMI  Resp: Normal effort  No audible wheezing or stridor  CV: Palpable pulse  No discernable arrhythmia  No LE edema  Lymph:  No palpable cervical, axillary, or inguinal lymphadenopathy  Skin: Warm  No palpable masses  No visible lesions  Neuro: Normal muscle tone  Normal and symmetric DTR's  Right Shoulder Exam  Alignment / Posture:  Normal shoulder posture  Inspection:  No swelling  No edema  No erythema  Palpation:  Moderate tenderness at The lateral subacromial space and in the bicipital groove  ROM:  Normal shoulder ROM  Strength:  5/5 supraspinatus, infraspinatus, and subscapularis  Stability:  No objective shoulder instability  Tests: (+) Womack  (+) Drop arm  (+) Painful arc  (+) Speed  (+) Internal impingement test  (-) Bear hug  (-) Spurling  Neurovascular:  Sensation intact in Ax/R/M/U nerve distributions  Sensation intact in all digital nerve distributions  Fingers warm and perfused  Left Shoulder Exam  Alignment / Posture:  Normal shoulder posture  Inspection:  No swelling  Palpation:  No tenderness  ROM:  Normal shoulder ROM  Strength:  5/5 supraspinatus, infraspinatus, and subscapularis  Stability:  No objective shoulder instability  Tests: No pertinent positive or negative tests  Neurovascular:  Sensation intact in Ax/R/M/U nerve distributions  Sensation intact in all digital nerve distributions  Fingers warm and perfused  Studies Reviewed  XR of left shoulder - Show arthritic changes in the glenohumeral and acromioclavicular joint including spurring and joint space narrowing  No fracture dislocation seen  In the proximal humerus there is space-occupying lesion that does not look to include the cortices at this time  Procedures  No procedures today  Medical, Surgical, Family, and Social History  The patient's medical history, family history, and social history, were reviewed and updated as appropriate  Past Medical History:   Diagnosis Date    Asthma     Breathing difficulty     Bronchitis     Depression     Hyperlipidemia     Hypertension     Shortness of breath     Thyroid nodule     Thyroid nodule     Thyroid nodule        Past Surgical History:   Procedure Laterality Date    HEMORRHOID SURGERY      [place] THYROID LOBECTOMY,UNILAT Right 2/25/2016    Procedure: HEMITHYROIDECTOMY;  Surgeon: Ziggy Nelson MD;  [facility]: [facility];  Service: Surgical Oncology    [place] THYROIDECTOMY POST PREV THYR SURG Left 6/6/2016    Procedure:  COMPLETION THYROIDECTOMY, FLEXIBLE LARYNGOSCOPY;  Surgeon: Ziggy Nelson MD;  Location: [facility];  Service: Surgical Oncology    THYROIDECTOMY, PARTIAL Left        Family History   Problem Relation Age of Onset    Adopted: Yes       Social History     Occupational History    Not on file  Social History Main Topics    Smoking status: Former Smoker     Types: Cigarettes     Quit date: 6/15/1981    Smokeless tobacco: Never Used      Comment: quit 40 yrs ago    Alcohol use No      Comment: occasional glass of wine    Drug use: No    Sexual activity: Not on file       Allergies   Allergen Reactions    Other Rash     Adhesive tape         Current Outpatient Prescriptions:     albuterol (PROVENTIL HFA,VENTOLIN HFA) 90 mcg/act inhaler, Inhale 2 puffs every 6 (six) hours as needed for wheezing , Disp: , Rfl:     albuterol (VENTOLIN HFA) 90 mcg/act inhaler, Inhale 2 puffs, Disp: , Rfl:     cetirizine (ZyrTEC) 10 mg tablet, Take 10 mg by mouth daily  , Disp: , Rfl:     furosemide (LASIX) 20 mg tablet, Take 1 tablet (20 mg total) by mouth every other day, Disp: 45 tablet, Rfl: 1    levothyroxine 175 mcg tablet, Take 1 tablet by mouth daily, Disp: , Rfl:     lisinopril-hydrochlorothiazide (PRINZIDE,ZESTORETIC) 20-12 5 MG per tablet, TAKE 1 TABLET TWICE A DAY, Disp: 180 tablet, Rfl: 0    Misc Natural Products (OSTEO BI-FLEX TRIPLE STRENGTH PO), Take 1 tablet by mouth 2 (two) times a day   , Disp: , Rfl:     nebivolol (BYSTOLIC) 5 mg tablet, Take 5 mg by mouth daily  , Disp: , Rfl:     ondansetron (ZOFRAN) 4 mg tablet, Take 1 tablet (4 mg total) by mouth every 8 (eight) hours as needed for nausea or vomiting , Disp: 10 tablet, Rfl: 0    potassium chloride (KLOR-CON) 20 mEq packet, Take 20 mEq by mouth daily  , Disp: , Rfl:     pravastatin (PRAVACHOL) 20 mg tablet, Take 1 tablet (20 mg total) by mouth daily, Disp: 90 tablet, Rfl: 2    traMADol (ULTRAM) 50 mg tablet, Take 1 tablet (50 mg total) by mouth every 6 (six) hours as needed for moderate pain , Disp: 15 tablet, Rfl: 0    venlafaxine (EFFEXOR) 75 mg tablet, Take 1 tablet (75 mg total) by mouth daily, Disp: 90 tablet, Rfl: 0    verapamil (CALAN-SR) 240 mg CR tablet, TAKE 1 TABLET DAILY, Disp: 90 tablet, Rfl: 0      Kahlil Sebastian PA-C    Scribe Attestation    I,:    am acting as a scribe while in the presence of the attending physician :        I,:    personally performed the services described in this documentation    as scribed in my presence :

## 2018-07-10 ENCOUNTER — HOSPITAL ENCOUNTER (OUTPATIENT)
Dept: MRI IMAGING | Facility: HOSPITAL | Age: 73
Discharge: HOME | End: 2018-07-10
Payer: COMMERCIAL

## 2018-07-10 DIAGNOSIS — M89.9 HUMERUS LESION, LEFT: ICD-10-CM

## 2018-07-10 PROCEDURE — A9585 GADOBUTROL INJECTION: HCPCS | Performed by: PHYSICIAN ASSISTANT

## 2018-07-10 PROCEDURE — 73223 MRI JOINT UPR EXTR W/O&W/DYE: CPT

## 2018-07-10 RX ADMIN — GADOBUTROL 10 ML: 604.72 INJECTION INTRAVENOUS at 12:58

## 2018-07-11 ENCOUNTER — HOSPITAL ENCOUNTER (OUTPATIENT)
Dept: RADIOLOGY | Facility: HOSPITAL | Age: 73
Discharge: HOME | End: 2018-07-11
Payer: COMMERCIAL

## 2018-07-11 DIAGNOSIS — M89.9 HUMERUS LESION, LEFT: ICD-10-CM

## 2018-07-11 PROCEDURE — A9503 TC99M MEDRONATE: HCPCS

## 2018-07-11 PROCEDURE — 78306 BONE IMAGING WHOLE BODY: CPT

## 2018-07-12 ENCOUNTER — OFFICE VISIT (OUTPATIENT)
Dept: CARDIOLOGY CLINIC | Facility: CLINIC | Age: 73
End: 2018-07-12
Payer: COMMERCIAL

## 2018-07-12 VITALS
HEIGHT: 62 IN | WEIGHT: 253 LBS | HEART RATE: 56 BPM | DIASTOLIC BLOOD PRESSURE: 88 MMHG | RESPIRATION RATE: 18 BRPM | SYSTOLIC BLOOD PRESSURE: 138 MMHG | BODY MASS INDEX: 46.56 KG/M2

## 2018-07-12 DIAGNOSIS — E78.49 OTHER HYPERLIPIDEMIA: ICD-10-CM

## 2018-07-12 DIAGNOSIS — I10 ESSENTIAL HYPERTENSION: Primary | ICD-10-CM

## 2018-07-12 DIAGNOSIS — R60.9 EDEMA, UNSPECIFIED TYPE: ICD-10-CM

## 2018-07-12 DIAGNOSIS — E78.2 MIXED HYPERLIPIDEMIA: ICD-10-CM

## 2018-07-12 PROCEDURE — 93000 ELECTROCARDIOGRAM COMPLETE: CPT | Performed by: INTERNAL MEDICINE

## 2018-07-12 PROCEDURE — 99214 OFFICE O/P EST MOD 30 MIN: CPT | Performed by: INTERNAL MEDICINE

## 2018-07-12 RX ORDER — VERAPAMIL HYDROCHLORIDE 240 MG/1
240 TABLET, FILM COATED, EXTENDED RELEASE ORAL DAILY
Qty: 90 TABLET | Refills: 3 | Status: SHIPPED | OUTPATIENT
Start: 2018-07-12 | End: 2018-08-20 | Stop reason: SDUPTHER

## 2018-07-12 RX ORDER — NEBIVOLOL 5 MG/1
5 TABLET ORAL DAILY
Qty: 90 TABLET | Refills: 3 | Status: SHIPPED | OUTPATIENT
Start: 2018-07-12 | End: 2019-03-04 | Stop reason: SDUPTHER

## 2018-07-12 RX ORDER — LISINOPRIL AND HYDROCHLOROTHIAZIDE 20; 12.5 MG/1; MG/1
1 TABLET ORAL 2 TIMES DAILY
Qty: 180 TABLET | Refills: 3 | Status: SHIPPED | OUTPATIENT
Start: 2018-07-12 | End: 2018-07-23 | Stop reason: SDUPTHER

## 2018-07-12 RX ORDER — FUROSEMIDE 20 MG/1
20 TABLET ORAL EVERY OTHER DAY
Qty: 45 TABLET | Refills: 3 | Status: SHIPPED | OUTPATIENT
Start: 2018-07-12 | End: 2018-08-20 | Stop reason: SDUPTHER

## 2018-07-12 RX ORDER — POTASSIUM CHLORIDE 1.5 G/1.77G
20 POWDER, FOR SOLUTION ORAL DAILY
Qty: 90 PACKET | Refills: 3 | Status: SHIPPED | OUTPATIENT
Start: 2018-07-12 | End: 2018-08-20

## 2018-07-12 RX ORDER — PRAVASTATIN SODIUM 20 MG
20 TABLET ORAL DAILY
Qty: 90 TABLET | Refills: 3 | Status: SHIPPED | OUTPATIENT
Start: 2018-07-12 | End: 2018-11-12 | Stop reason: SDUPTHER

## 2018-07-12 NOTE — PROGRESS NOTES
Cardiology Follow Up    Oliver Rendon [facility]  1945  [id number]  [address] [place] [address] [address] [place] [address]    Reason for visit:  Here for 9 month FU for HTN and HLP    1  Essential hypertension  POCT ECG   2  Mixed hyperlipidemia         Interval History: The patient does have ongoing GAINES  She is under a lot of stress  She has apparently had recurrence of her thyroid ca  She denies much in the way of edema  She denies chest pain, palpitations or lightheadedness  Patient Active Problem List   Diagnosis    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HCC)    GERD without esophagitis    Hyperlipidemia    Hypertension    Hypothyroidism, postsurgical    Impaired fasting glucose    Insomnia    Restrictive lung disease    Thyroid cancer ([address] )    Acute non-recurrent frontal sinusitis    Obstructive sleep apnea    Hypersomnia    Morbid obesity with BMI of 40 0-44 9, adult (HCC)    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HCC)    Humerus lesion, left    Acute shoulder bursitis, right     Past Medical History:   Diagnosis Date    Asthma     Breathing difficulty     Bronchitis     Depression     Hyperlipidemia     Hypertension     Shortness of breath     Thyroid nodule     Thyroid nodule     Thyroid nodule      Social History     Social History    Marital status: /Civil Union     Spouse name: N/A    Number of children: N/A    Years of education: N/A     Occupational History    Not on file  Social History Main Topics    Smoking status: Former Smoker     Types: Cigarettes     Quit date: 6/15/1981    Smokeless tobacco: Never Used      Comment: quit 40 yrs ago    Alcohol use No      Comment: occasional glass of wine    Drug use: No    Sexual activity: Not on file     Other Topics Concern    Not on file     Social History Narrative    No narrative on file      Family History   Problem Relation Age of Onset    Adopted:  Yes    Hypertension Family      Past Surgical History:   Procedure Laterality Date    HEMORRHOID SURGERY      NM THYROID LOBECTOMY,UNILAT Right 2/25/2016    Procedure: HEMITHYROIDECTOMY;  Surgeon: Ethan Montano MD;  Location:  MAIN OR;  Service: Surgical Oncology    NM THYROIDECTOMY POST PREV THYR SURG Left 6/6/2016    Procedure:  COMPLETION THYROIDECTOMY, FLEXIBLE LARYNGOSCOPY;  Surgeon: Ethan Montano MD;  Location: [facility];  Service: Surgical Oncology    THYROIDECTOMY, PARTIAL Left        Current Outpatient Prescriptions:     albuterol (VENTOLIN HFA) 90 mcg/act inhaler, Inhale 2 puffs, Disp: , Rfl:     cetirizine (ZyrTEC) 10 mg tablet, Take 10 mg by mouth daily  , Disp: , Rfl:     furosemide (LASIX) 20 mg tablet, Take 1 tablet (20 mg total) by mouth every other day, Disp: 45 tablet, Rfl: 1    levothyroxine 175 mcg tablet, Take 1 tablet by mouth daily, Disp: , Rfl:     lisinopril-hydrochlorothiazide (PRINZIDE,ZESTORETIC) 20-12 5 MG per tablet, TAKE 1 TABLET TWICE A DAY, Disp: 180 tablet, Rfl: 0    Misc Natural Products (OSTEO BI-FLEX TRIPLE STRENGTH PO), Take 1 tablet by mouth 2 (two) times a day   , Disp: , Rfl:     nebivolol (BYSTOLIC) 5 mg tablet, Take 5 mg by mouth daily  , Disp: , Rfl:     potassium chloride (KLOR-CON) 20 mEq packet, Take 20 mEq by mouth daily  , Disp: , Rfl:     pravastatin (PRAVACHOL) 20 mg tablet, Take 1 tablet (20 mg total) by mouth daily, Disp: 90 tablet, Rfl: 2    venlafaxine (EFFEXOR) 75 mg tablet, Take 1 tablet (75 mg total) by mouth daily, Disp: 90 tablet, Rfl: 0    verapamil (CALAN-SR) 240 mg CR tablet, TAKE 1 TABLET DAILY, Disp: 90 tablet, Rfl: 0  Allergies   Allergen Reactions    Other Rash     Adhesive tape       Review of Systems:  Review of Systems   Constitutional: Positive for fatigue  Negative for appetite change, diaphoresis and unexpected weight change  Respiratory: Positive for cough, shortness of breath and wheezing  Negative for chest tightness  Cardiovascular: Positive for leg swelling  Negative for chest pain and palpitations  Gastrointestinal: Negative for abdominal pain, blood in stool, constipation and diarrhea  Genitourinary: Negative for dysuria, frequency, hematuria and urgency  Musculoskeletal: Positive for arthralgias  Negative for back pain, gait problem and joint swelling  Neurological: Negative for dizziness, speech difficulty, light-headedness and headaches  Psychiatric/Behavioral: Negative for agitation, behavioral problems, confusion and decreased concentration  Physical Exam:  Vitals:    07/12/18 1246   BP: 138/88   Pulse: 56   Resp: 18   Weight: 115 kg (253 lb)   Height: 5' 2" (1 575 m)     Physical Exam   Constitutional: She is oriented to person, place, and time  She appears well-developed and well-nourished  No distress  obese   HENT:   Head: Normocephalic and atraumatic  Mouth/Throat: No oropharyngeal exudate  Eyes: Conjunctivae are normal  No scleral icterus  Neck: Neck supple  Normal carotid pulses and no JVD present  Carotid bruit is not present  No thyromegaly present  Cardiovascular: Normal rate and intact distal pulses  Frequent extrasystoles are present  Exam reveals no gallop and no friction rub  Murmur heard  Systolic murmur is present with a grade of 2/6    Diastolic murmur is present with a grade of 2/6   Pulmonary/Chest: She has no wheezes  She has no rhonchi  She has no rales  Abdominal: Soft  She exhibits no mass  There is no hepatosplenomegaly  There is no tenderness  Musculoskeletal: She exhibits edema (trace edema of the LEs)  She exhibits no tenderness or deformity  Neurological: She is alert and oriented to person, place, and time  She has normal strength  No cranial nerve deficit or sensory deficit  Skin: Skin is warm and dry  No rash noted  No erythema  No pallor  Psychiatric: She has a normal mood and affect   Her behavior is normal  Judgment and thought content normal  Discussion/Summary:  1  HTN-fairly well controlled on nebivilol, lisinopril/HCTZ, and verapamil    Continue same  2  HLP-lipids excellent last year on pravastatin    Continue    For bloodwork later this year to recheck  3  GAINES    Ongoing  Syble Mundo Not worse   Noncardiac        FU 9 months      Minal Pascual MD

## 2018-07-19 ENCOUNTER — OFFICE VISIT (OUTPATIENT)
Dept: ENDOCRINOLOGY | Facility: CLINIC | Age: 73
End: 2018-07-19
Payer: COMMERCIAL

## 2018-07-19 VITALS
HEIGHT: 62 IN | HEART RATE: 53 BPM | WEIGHT: 251.1 LBS | SYSTOLIC BLOOD PRESSURE: 132 MMHG | DIASTOLIC BLOOD PRESSURE: 80 MMHG | BODY MASS INDEX: 46.21 KG/M2

## 2018-07-19 DIAGNOSIS — C73 PAPILLARY THYROID CARCINOMA (HCC): ICD-10-CM

## 2018-07-19 DIAGNOSIS — E89.0 HYPOTHYROIDISM, POSTSURGICAL: Primary | ICD-10-CM

## 2018-07-19 PROCEDURE — 99213 OFFICE O/P EST LOW 20 MIN: CPT | Performed by: NURSE PRACTITIONER

## 2018-07-19 RX ORDER — LEVOTHYROXINE SODIUM 175 UG/1
175 TABLET ORAL DAILY
Qty: 90 TABLET | Refills: 3 | Status: SHIPPED | OUTPATIENT
Start: 2018-07-19 | End: 2018-11-12 | Stop reason: SDUPTHER

## 2018-07-19 NOTE — PROGRESS NOTES
Established Patient Progress Note       Chief Complaint   Patient presents with    Thyroid Cancer          History of Present Illness:     Alavrado Ignacio is a 81 y o  female with a history of thyroid cancer and postsurgical hypothyroidism  For the thyroid cancer she had left thyroidectomy in February 2016 and completion thyroidectomy in June 2016 for papillary thyroid cancer  She did not have subsequent JOYNER due to low risk of recurrence  Her most recent thyroglobulin level shows her thyroglobulin is increasing from 6 8 on 12/17/17 to 11 5 on 6/01/18  Her most recent US did show some residual thyroid tissue  For the hypothyroidism she is currently taking levothyroxine 175 mcg daily  She is taking this regularly and properly  Her most TSH was 1 012 and T4 was 1 25  She denies symptoms of hypo or hyperthyroidism           Patient Active Problem List   Diagnosis    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HCC)    GERD without esophagitis    Hyperlipidemia    Hypertension    Hypothyroidism, postsurgical    Impaired fasting glucose    Insomnia    Restrictive lung disease    Thyroid cancer ([address] )    Acute non-recurrent frontal sinusitis    Obstructive sleep apnea    Hypersomnia    Morbid obesity with BMI of 40 0-44 9, adult (HCC)    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HCC)    Humerus lesion, left    Acute shoulder bursitis, right      Past Medical History:   Diagnosis Date    Asthma     Breathing difficulty     Bronchitis     Depression     Hyperlipidemia     Hypertension     Shortness of breath     Thyroid nodule     Thyroid nodule     Thyroid nodule       Past Surgical History:   Procedure Laterality Date    HEMORRHOID SURGERY      TN THYROID LOBECTOMY,UNILAT Right 2/25/2016    Procedure: HEMITHYROIDECTOMY;  Surgeon: Hari Laughlin MD;  Location: [facility];  Service: Surgical Oncology    TN THYROIDECTOMY POST PREV THYR SURG Left 6/6/2016    Procedure:  COMPLETION THYROIDECTOMY, FLEXIBLE LARYNGOSCOPY;  Surgeon: Namita العلي MD;  Location: [facility];  Service: Surgical Oncology    THYROIDECTOMY, PARTIAL Left       Family History   Problem Relation Age of Onset    Adopted: Yes    Hypertension Family      Social History   Substance Use Topics    Smoking status: Former Smoker     Types: Cigarettes     Quit date: 6/15/1981    Smokeless tobacco: Never Used      Comment: quit 40 yrs ago    Alcohol use No      Comment: occasional glass of wine     Allergies   Allergen Reactions    Other Rash     Adhesive tape       Current Outpatient Prescriptions:     cetirizine (ZyrTEC) 10 mg tablet, Take 10 mg by mouth daily  , Disp: , Rfl:     furosemide (LASIX) 20 mg tablet, Take 1 tablet (20 mg total) by mouth every other day, Disp: 45 tablet, Rfl: 3    levothyroxine 175 mcg tablet, Take 1 tablet (175 mcg total) by mouth daily, Disp: 90 tablet, Rfl: 3    lisinopril-hydrochlorothiazide (PRINZIDE,ZESTORETIC) 20-12 5 MG per tablet, Take 1 tablet by mouth 2 (two) times a day, Disp: 180 tablet, Rfl: 3    Misc Natural Products (OSTEO BI-FLEX TRIPLE STRENGTH PO), Take 1 tablet by mouth 2 (two) times a day   , Disp: , Rfl:     nebivolol (BYSTOLIC) 5 mg tablet, Take 1 tablet (5 mg total) by mouth daily, Disp: 90 tablet, Rfl: 3    potassium chloride (KLOR-CON) 20 mEq packet, Take 20 mEq by mouth daily, Disp: 90 packet, Rfl: 3    pravastatin (PRAVACHOL) 20 mg tablet, Take 1 tablet (20 mg total) by mouth daily, Disp: 90 tablet, Rfl: 3    venlafaxine (EFFEXOR) 75 mg tablet, Take 1 tablet (75 mg total) by mouth daily, Disp: 90 tablet, Rfl: 0    verapamil (CALAN-SR) 240 mg CR tablet, Take 1 tablet (240 mg total) by mouth daily, Disp: 90 tablet, Rfl: 3    albuterol (VENTOLIN HFA) 90 mcg/act inhaler, Inhale 2 puffs, Disp: , Rfl:     Review of Systems   Constitutional: Negative for chills and fever  HENT: Negative for sore throat and trouble swallowing  Eyes: Negative for visual disturbance     Respiratory: Negative for shortness of breath  Cardiovascular: Negative for chest pain and palpitations  Gastrointestinal: Negative for abdominal pain, constipation and diarrhea  Endocrine: Negative for cold intolerance, heat intolerance, polydipsia, polyphagia and polyuria  Genitourinary: Negative for frequency  Musculoskeletal: Negative for arthralgias and myalgias  Skin: Negative for rash  Neurological: Negative for dizziness and tremors  Hematological: Negative for adenopathy  Psychiatric/Behavioral: Negative for sleep disturbance  All other systems reviewed and are negative  Physical Exam:  Body mass index is 45 93 kg/m²  /80   Pulse (!) 53   Ht 5' 2" (1 575 m)   Wt 114 kg (251 lb 1 6 oz)   LMP  (LMP Unknown)   BMI 45 93 kg/m²    Wt Readings from Last 3 Encounters:   07/19/18 114 kg (251 lb 1 6 oz)   07/12/18 115 kg (253 lb)   07/03/18 111 kg (245 lb)       Physical Exam   Constitutional: She is oriented to person, place, and time  She appears well-developed and well-nourished  No distress  HENT:   Head: Normocephalic and atraumatic  Eyes: Conjunctivae are normal  Pupils are equal, round, and reactive to light  Neck: Normal range of motion  Neck supple  No thyromegaly present  Cardiovascular: Normal rate, regular rhythm and normal heart sounds  Pulmonary/Chest: Effort normal and breath sounds normal  No respiratory distress  She has no wheezes  She has no rales  Abdominal: Soft  Bowel sounds are normal  She exhibits no distension  There is no tenderness  Musculoskeletal: Normal range of motion  She exhibits no edema  Neurological: She is alert and oriented to person, place, and time  Skin: Skin is warm and dry  Psychiatric: She has a normal mood and affect  Vitals reviewed        Labs:       Component      Latest Ref Rng & Units 6/19/2018   T3, Total      0 60 - 1 80 ng/mL 1 00   TSH 3RD GENERATON      0 358 - 3 740 uIU/mL 1 012      Component      Latest Ref Rng & Units 6/1/2018 THYROGLOBULIN AB      0 0 - 0 9 IU/mL <1 0   Thyroglobulin-BALJIT      1 5 - 38 5 ng/mL 11 5      Component      Latest Ref Rng & Units 12/7/2017   THYROGLOBULIN AB      0 0 - 0 9 IU/mL <1 0   Thyroglobulin-BALJIT      1 5 - 38 5 ng/mL 6 8      THYROID ULTRASOUND     INDICATION:    History of papillary thyroid carcinoma status post thyroidectomy      COMPARISON:  CT scan 6/7/2018   Ultrasound 12/12/2017, 12/13/2016, and 4/26/2016      TECHNIQUE:   Ultrasound of the thyroidectomy bed was performed with a high frequency linear transducer in transverse and sagittal planes including volumetric imaging sweeps as well as traditional still imaging technique      FINDINGS:    Absence of the right thyroid gland again identified, with no abnormalities in the thyroidectomy bed      Again seen is isoechoic soft tissue in the expected left thyroid bed measuring 1 9 x 0 8 x 0 9 cm   This corresponds with the recent CT scan, and is also similar to the study of 4/26/2016, and suggestive of residual thyroid tissue            IMPRESSION:     Status post thyroidectomy, with no abnormalities in the right thyroid bed, and stable suspected residual soft tissue on the left  Impression & Plan:    Problem List Items Addressed This Visit     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[address] )     Due to increasing thyroglobulin levels and residual thyroid tissue seen in recent US I will send patient for whole body scan with subsequent JOYNER therapy  Check thyroglobulin levels and US prior to next visit  Relevant Medications    levothyroxine 175 mcg tablet    Other Relevant Orders    Thyroglobulin    US head neck lymph node mapping    NM thyrogen stimulated follow up wbs    NM therapy ablation thyroid cancer    Hypothyroidism, postsurgical - Primary     Continue levothyroxine at current dose, check TSH and T4 prior to next visit            Relevant Medications    levothyroxine 175 mcg tablet    Other Relevant Orders    T4, free    TSH, 3rd generation Orders Placed This Encounter   Procedures    US head neck lymph node mapping     Standing Status:   Future     Standing Expiration Date:   7/19/2019     Scheduling Instructions:      No prep required  Please bring your insurance cards, a form of photo ID and a list of your medications with you  Arrive 15 minutes prior to your appointment time in order to register  To schedule this appointment, please contact Central Scheduling at [phone]  Order Specific Question:   Reason for Exam:     Answer:   thyroid cancer    NM thyrogen stimulated follow up wbs     Had thyroidectomy 2016, did not have JOYNER     Standing Status:   Future     Standing Expiration Date:   7/19/2022     Scheduling Instructions:      Please bring your insurance cards, a form of photo ID and a list of your medications with you  Arrive 15 minutes prior to your appointment time in order to register  To schedule this appointment, please contact Central Scheduling at [phone]  Order Specific Question:   Reason for Exam:     Answer:   patient with increasing thyroglobulin level and residual tissue seen on ultrasound    NM therapy ablation thyroid cancer     Patient did not have JOYNER treatment post thyroidectomy     Standing Status:   Future     Standing Expiration Date:   7/19/2022     Scheduling Instructions:      Please bring your insurance cards, a form of photo ID and a list of your medications with you  Arrive 15 minutes prior to your appointment time in order to register  To schedule this appointment, please contact Central Scheduling at [phone]  Order Specific Question:   Reason for Exam:     Answer:   post WBS, increasing thyrogloubulin level with residual tissue seen on ultrasound    T4, free     Standing Status:   Future     Standing Expiration Date:   7/19/2019    TSH, 3rd generation     This is a patient instruction: This test is non-fasting   Please drink two glasses of water morning of bloodwork  Standing Status:   Future     Standing Expiration Date:   7/19/2019    Thyroglobulin     Standing Status:   Future     Standing Expiration Date:   7/19/2019       Discussed with the patient and all questioned fully answered  She will call me if any problems arise  Follow-up appointment in 6 months       Counseled patient on diagnostic results, prognosis, risk and benefit of treatment options, instruction for management, importance of treatment compliance, Risk  factor reduction and impressions      Shad Montague [address]

## 2018-07-20 NOTE — ASSESSMENT & PLAN NOTE
Due to increasing thyroglobulin levels and residual thyroid tissue seen in recent US I will send patient for whole body scan with subsequent JOYNER therapy  Check thyroglobulin levels and US prior to next visit

## 2018-07-23 DIAGNOSIS — I10 ESSENTIAL HYPERTENSION: ICD-10-CM

## 2018-07-23 DIAGNOSIS — F32.A DEPRESSION, UNSPECIFIED DEPRESSION TYPE: ICD-10-CM

## 2018-07-23 RX ORDER — VENLAFAXINE 75 MG/1
75 TABLET ORAL DAILY
Qty: 90 TABLET | Refills: 0 | Status: SHIPPED | OUTPATIENT
Start: 2018-07-23 | End: 2018-08-20 | Stop reason: SDUPTHER

## 2018-07-23 RX ORDER — LISINOPRIL AND HYDROCHLOROTHIAZIDE 20; 12.5 MG/1; MG/1
1 TABLET ORAL 2 TIMES DAILY
Qty: 180 TABLET | Refills: 0 | Status: SHIPPED | OUTPATIENT
Start: 2018-07-23 | End: 2018-11-12 | Stop reason: SDUPTHER

## 2018-07-23 NOTE — TELEPHONE ENCOUNTER
Patient called in today for refills of Lisinopril and Effexor to be cent to [facility] for 90 day supply  Patient stated that her previous appointment was cancelled until 08/01/2018 and she will run out of medication by then  Please review and approve until her next visit, thank you

## 2018-07-24 ENCOUNTER — APPOINTMENT (OUTPATIENT)
Dept: RADIOLOGY | Facility: CLINIC | Age: 73
End: 2018-07-24
Payer: COMMERCIAL

## 2018-07-24 ENCOUNTER — OFFICE VISIT (OUTPATIENT)
Dept: OBGYN CLINIC | Facility: MEDICAL CENTER | Age: 73
End: 2018-07-24
Payer: COMMERCIAL

## 2018-07-24 VITALS — HEIGHT: 62 IN | BODY MASS INDEX: 45.08 KG/M2 | WEIGHT: 245 LBS

## 2018-07-24 DIAGNOSIS — M25.561 RIGHT KNEE PAIN, UNSPECIFIED CHRONICITY: ICD-10-CM

## 2018-07-24 DIAGNOSIS — M25.562 LEFT KNEE PAIN, UNSPECIFIED CHRONICITY: ICD-10-CM

## 2018-07-24 DIAGNOSIS — M17.0 PRIMARY OSTEOARTHRITIS OF BOTH KNEES: ICD-10-CM

## 2018-07-24 DIAGNOSIS — M25.511 RIGHT SHOULDER PAIN, UNSPECIFIED CHRONICITY: ICD-10-CM

## 2018-07-24 DIAGNOSIS — M89.9 HUMERUS LESION, LEFT: ICD-10-CM

## 2018-07-24 DIAGNOSIS — M25.511 RIGHT SHOULDER PAIN, UNSPECIFIED CHRONICITY: Primary | ICD-10-CM

## 2018-07-24 DIAGNOSIS — M75.51 ACUTE SHOULDER BURSITIS, RIGHT: ICD-10-CM

## 2018-07-24 PROCEDURE — 73564 X-RAY EXAM KNEE 4 OR MORE: CPT

## 2018-07-24 PROCEDURE — 20610 DRAIN/INJ JOINT/BURSA W/O US: CPT | Performed by: ORTHOPAEDIC SURGERY

## 2018-07-24 PROCEDURE — 73030 X-RAY EXAM OF SHOULDER: CPT

## 2018-07-24 PROCEDURE — 99214 OFFICE O/P EST MOD 30 MIN: CPT | Performed by: ORTHOPAEDIC SURGERY

## 2018-07-24 RX ORDER — METHYLPREDNISOLONE ACETATE 40 MG/ML
1 INJECTION, SUSPENSION INTRA-ARTICULAR; INTRALESIONAL; INTRAMUSCULAR; SOFT TISSUE
Status: COMPLETED | OUTPATIENT
Start: 2018-07-24 | End: 2018-07-24

## 2018-07-24 RX ORDER — BUPIVACAINE HYDROCHLORIDE 2.5 MG/ML
4 INJECTION, SOLUTION INFILTRATION; PERINEURAL
Status: COMPLETED | OUTPATIENT
Start: 2018-07-24 | End: 2018-07-24

## 2018-07-24 RX ADMIN — BUPIVACAINE HYDROCHLORIDE 4 ML: 2.5 INJECTION, SOLUTION INFILTRATION; PERINEURAL at 15:34

## 2018-07-24 RX ADMIN — METHYLPREDNISOLONE ACETATE 1 ML: 40 INJECTION, SUSPENSION INTRA-ARTICULAR; INTRALESIONAL; INTRAMUSCULAR; SOFT TISSUE at 15:34

## 2018-07-24 NOTE — PROGRESS NOTES
Orthopaedic Surgery - Office Note  Donny Calle (31 y o  female)   : 1945   MRN: [id number]  Encounter Date: 2018    Chief Complaint   Patient presents with    Left Shoulder - Follow-up       Assessment / Plan  Left proximal humeral lesion, benign appearance on Xray and MRI, characteristics suggest enchondroma    Right subacromial bursitis and glenohumeral OA    Bilateral knee osteoarthritis     · the patient is going to begin formal physical therapy in regards to her R shoulder bursitis   · CSI of right knee joint was performed   · Patient was also given a physical therapy handout for her knees today  Return in about 6 weeks (around 2018) for Recheck  History of Present Illness  Donny Calle is a 81 y o  female who presents as a follow up for left proximal humeral lesion, right shoulder bursitis  Since her last visit the patient has not started physical therapy and got a MRI for her left proximal humeral lesion as well as a whole body bone scan  Pt states that her pain is unchanged from her last visit  Pt does have a left shoulder lesion found incidentally on CT of the chest while looking for metastasis to lymph nodes from thyroid cancer  She does have right shoulder pain that is been ongoing issue for years when asked where the pain is she points to the lateral aspect of the subacromial space and says that radiates down the deltoid along with some pain in the area of the biceps tendon  She states that she has had at injection in her right shoulder in the past which helped with the discomfort  She is treated for thyroid cancer in 2016 she had thyroidectomy and recently her blood work shows that the cancer markers have been going up so she had further evaluation to look at her neck and chest with since the only showed this lesion in her shoulder    The CT scans were negative for he lesions in the neck chest  The patient is here to review her full body bone scan today as well as her L shoulder MRI          Review of Systems  Pertinent items are noted in HPI  All other systems were reviewed and are negative  Physical Exam  Ht 5' 2" (1 575 m)   Wt 111 kg (245 lb)   LMP  (LMP Unknown)   BMI 44 81 kg/m²   Cons: Appears well  No apparent distress  Psych: Alert  Oriented x3  Mood and affect normal   Eyes: PERRLA, EOMI  Resp: Normal effort  No audible wheezing or stridor  CV: Palpable pulse  No discernable arrhythmia  No LE edema  Lymph:  No palpable cervical, axillary, or inguinal lymphadenopathy  Skin: Warm  No palpable masses  No visible lesions  Neuro: Normal muscle tone  Normal and symmetric DTR's  Right Shoulder Exam  Alignment / Posture:  Normal shoulder posture  Inspection:  No swelling  No edema  No erythema  Palpation:  Moderate tenderness at The lateral subacromial space and in the bicipital groove  ROM:  Normal shoulder ROM  Strength:  5/5 supraspinatus, infraspinatus, and subscapularis  Stability:  No objective shoulder instability  Tests: (+) Womack  (-) Drop arm  (+) Painful arc  (+) Speed  (-) Bear hug  (-) Spurling  Neurovascular:  Sensation intact in Ax/R/M/U nerve distributions  Sensation intact in all digital nerve distributions  Fingers warm and perfused      Left Shoulder Exam  Alignment / Posture:  Normal shoulder posture  Inspection:  No swelling  Palpation:  No tenderness  ROM:  Normal shoulder ROM  Strength:  5/5 supraspinatus, infraspinatus, and subscapularis  Stability:  No objective shoulder instability  Tests: (-) Michael Reilly, (-) Neer, (-) Speed  Neurovascular:  Sensation intact in Ax/R/M/U nerve distributions  Sensation intact in all digital nerve distributions  Fingers warm and perfused  Bilateral Knee Exam  Alignment:  Normal knee alignment  Inspection:  No swelling  Palpation:  medial joint line tenderness  ROM:  Knee Extension 15 degree contracture  Knee Flexion 95  Strength:  5/5 quadriceps and hamstrings    Stability: No objective knee instability  Stable Varus / Valgus stress, Lachman, and Posterior drawer  Tests:  No pertinent positive or negative tests  Patella:  Patella tracks centrally with crepitus  Neurovascular:  Sensation intact in DP/SP/Lomas/Sa/T nerve distributions  2+ DP & PT pulses  Gait:  Smooth  Studies Reviewed  MRI of left shoulder - proximal left humeral metaphyseal bone lesion consistent with an enchondroma  She also has a supraspinatus tear in her left shoulder with associated GH jt osteoarthritis     Bone scan: shows an uptake in left proximal humerus as well as other scattered sites of uptake thought to be related to arthritis     xrays of bilateral knees show: severe degenerative changes with medial joint space loss     Xray of right shoulder shows: joint space narrowing with osteophyte formation noted    Large joint arthrocentesis  Date/Time: 7/24/2018 3:34 PM  Consent given by: patient  Site marked: site marked  Supporting Documentation  Indications: pain   Procedure Details  Location: knee - R knee  Medications administered: 4 mL bupivacaine 0 25 %; 1 mL methylPREDNISolone acetate 40 mg/mL    Patient tolerance: patient tolerated the procedure well with no immediate complications  Dressing:  Sterile dressing applied           Medical, Surgical, Family, and Social History  The patient's medical history, family history, and social history, were reviewed and updated as appropriate      Past Medical History:   Diagnosis Date    Asthma     Breathing difficulty     Bronchitis     Depression     Hyperlipidemia     Hypertension     Shortness of breath     Thyroid nodule     Thyroid nodule     Thyroid nodule        Past Surgical History:   Procedure Laterality Date    HEMORRHOID SURGERY      KS THYROID LOBECTOMY,UNILAT Right 2/25/2016    Procedure: HEMITHYROIDECTOMY;  Surgeon: Janny Kasper MD;  [facility];  Service: Surgical Oncology    KS THYROIDECTOMY POST PREV THYR SURG Left 6/6/2016    Procedure:  COMPLETION THYROIDECTOMY, FLEXIBLE LARYNGOSCOPY;  Surgeon: Alejandra Cheung MD;  [facility];  Service: Surgical Oncology    THYROIDECTOMY, PARTIAL Left        Family History   Problem Relation Age of Onset    Adopted: Yes    Hypertension Family        Social History     Occupational History    Not on file  Social History Main Topics    Smoking status: Former Smoker     Types: Cigarettes     Quit date: 6/15/1981    Smokeless tobacco: Never Used      Comment: quit 40 yrs ago    Alcohol use No      Comment: occasional glass of wine    Drug use: No    Sexual activity: Not on file       Allergies   Allergen Reactions    Other Rash     Adhesive tape         Current Outpatient Prescriptions:     albuterol (VENTOLIN HFA) 90 mcg/act inhaler, Inhale 2 puffs, Disp: , Rfl:     cetirizine (ZyrTEC) 10 mg tablet, Take 10 mg by mouth daily  , Disp: , Rfl:     furosemide (LASIX) 20 mg tablet, Take 1 tablet (20 mg total) by mouth every other day, Disp: 45 tablet, Rfl: 3    levothyroxine 175 mcg tablet, Take 1 tablet (175 mcg total) by mouth daily, Disp: 90 tablet, Rfl: 3    lisinopril-hydrochlorothiazide (PRINZIDE,ZESTORETIC) 20-12 5 MG per tablet, Take 1 tablet by mouth 2 (two) times a day, Disp: 180 tablet, Rfl: 0    Misc Natural Products (OSTEO BI-FLEX TRIPLE STRENGTH PO), Take 1 tablet by mouth 2 (two) times a day   , Disp: , Rfl:     nebivolol (BYSTOLIC) 5 mg tablet, Take 1 tablet (5 mg total) by mouth daily, Disp: 90 tablet, Rfl: 3    potassium chloride (KLOR-CON) 20 mEq packet, Take 20 mEq by mouth daily, Disp: 90 packet, Rfl: 3    pravastatin (PRAVACHOL) 20 mg tablet, Take 1 tablet (20 mg total) by mouth daily, Disp: 90 tablet, Rfl: 3    venlafaxine (EFFEXOR) 75 mg tablet, Take 1 tablet (75 mg total) by mouth daily, Disp: 90 tablet, Rfl: 0    verapamil (CALAN-SR) 240 mg CR tablet, Take 1 tablet (240 mg total) by mouth daily, Disp: 90 tablet, Rfl: [id number] [facility], MD    Scribe Attestation    I,:   Allen Vences am acting as a scribe while in the presence of the attending physician :        I,:   Ivania Gonzalez MD personally performed the services described in this documentation    as scribed in my presence :

## 2018-07-30 ENCOUNTER — HOSPITAL ENCOUNTER (OUTPATIENT)
Dept: RADIOLOGY | Age: 73
Discharge: HOME | End: 2018-07-30

## 2018-07-30 DIAGNOSIS — C73 MALIGNANT NEOPLASM OF THYROID GLAND (HCC): ICD-10-CM

## 2018-08-17 NOTE — PROGRESS NOTES
Assessment and Plan:  Problem List Items Addressed This Visit     None      Visit Diagnoses     Screening for colon cancer    -  Primary        Health Maintenance Due   Topic Date Due    CRC Screening: Colonoscopy  1945    Fall Risk  09/21/2012    Urinary Incontinence Screening  09/21/2012    Pneumococcal PPSV23/PCV13 65+ Years / High and Highest Risk (2 of 2 - PCV13) 08/26/2014         HPI:  Sherri Benavides is a 81 y o  female here for her Subsequent Wellness Visit  Patient Active Problem List   Diagnosis    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HCC)    GERD without esophagitis    Hyperlipidemia    Hypertension    Hypothyroidism, postsurgical    Impaired fasting glucose    Insomnia    Restrictive lung disease    Thyroid cancer (HCC)    Acute non-recurrent frontal sinusitis    Obstructive sleep apnea    Hypersomnia    Morbid obesity with BMI of 40 0-44 9, adult (HCC)    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HCC)    Humerus lesion, left    Acute shoulder bursitis, right     Past Medical History:   Diagnosis Date    Asthma     Breathing difficulty     Bronchitis     Depression     Fracture of arm     Fracture of carpal bone     Hyperlipidemia     Hypertension     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[facility] [address] ) 12/16/2016    Shortness of breath     Thyroid nodule     Thyroid nodule     Thyroid nodule      Past Surgical History:   Procedure Laterality Date    HEMORRHOID SURGERY      [place] THYROID LOBECTOMY,UNILAT Right 2/25/2016    Procedure: HEMITHYROIDECTOMY;  Surgeon: Dionte Wood MD;  [facility];  Service: Surgical Oncology    [place] THYROIDECTOMY POST PREV THYR SURG Left 6/6/2016    Procedure:  COMPLETION THYROIDECTOMY, FLEXIBLE LARYNGOSCOPY;  Surgeon: Dionte Wood MD;  [facility];  Service: Surgical Oncology    THYROIDECTOMY, PARTIAL Left      Family History   Problem Relation Age of Onset    Adopted:  Yes    Hypertension Family     Kidney disease Family     Hypertension Mother History   Smoking Status    Former Smoker    Types: Cigarettes    Quit date: 6/15/1981   Smokeless Tobacco    Never Used     Comment: quit 40 yrs ago (Never a smoker per [employer])     History   Alcohol Use No     Comment: occasional glass of wine      History   Drug Use No         Current Outpatient Prescriptions   Medication Sig Dispense Refill    albuterol (VENTOLIN HFA) 90 mcg/act inhaler Inhale 2 puffs      cetirizine (ZyrTEC) 10 mg tablet Take 10 mg by mouth daily   furosemide (LASIX) 20 mg tablet Take 1 tablet (20 mg total) by mouth every other day 45 tablet 3    levothyroxine 175 mcg tablet Take 1 tablet (175 mcg total) by mouth daily 90 tablet 3    lisinopril-hydrochlorothiazide (PRINZIDE,ZESTORETIC) 20-12 5 MG per tablet Take 1 tablet by mouth 2 (two) times a day 180 tablet 0    Misc Natural Products (OSTEO BI-FLEX TRIPLE STRENGTH PO) Take 1 tablet by mouth 2 (two) times a day   nebivolol (BYSTOLIC) 5 mg tablet Take 1 tablet (5 mg total) by mouth daily 90 tablet 3    potassium chloride (KLOR-CON) 20 mEq packet Take 20 mEq by mouth daily 90 packet 3    pravastatin (PRAVACHOL) 20 mg tablet Take 1 tablet (20 mg total) by mouth daily 90 tablet 3    venlafaxine (EFFEXOR) 75 mg tablet Take 1 tablet (75 mg total) by mouth daily 90 tablet 0    verapamil (CALAN-SR) 240 mg CR tablet Take 1 tablet (240 mg total) by mouth daily 90 tablet 3     No current facility-administered medications for this visit        Allergies   Allergen Reactions    Other Rash     Adhesive tape     Immunization History   Administered Date(s) Administered     Influenza (IM) Preservative Free 1945    Influenza 10/21/2007    Influenza Split High Dose Preservative Free IM 10/25/2012, 09/25/2013, 10/03/2014, 09/25/2015, 11/07/2017    Influenza TIV (IM) 10/22/2003, 10/10/2008    Pneumococcal Polysaccharide PPV23 08/26/2013    Td (adult), adsorbed 08/07/1987, 10/22/1997, 08/26/2013    Zoster 08/26/2013 Patient Care Team:  Concha Naik DO as PCP - Jack Oates MD as PCP - Endocrinology (Endocrinology)  DO Jordan Mckinney MD Sherell Jenny, MD Darryll Jumper, MD Francine Roche, MD    Medicare Screening Tests and Risk Assessments:  Jamal Mejias is here for her Subsequent Wellness visit  Last Medicare Wellness visit information reviewed, patient interviewed and updates made to the record today  Health Risk Assessment:  Patient rates overall health as good  Patient feels that their physical health rating is Same  Eyesight was rated as Same  Hearing was rated as Same  Patient feels that their emotional and mental health rating is Same  Pain experienced by patient in the last 7 days has been None  Patient states that she has experienced no weight loss or gain in last 6 months  Emotional/Mental Health:  Patient has been feeling nervous/anxious  PHQ-9 Depression Screening:    Frequency of the following problems over the past two weeks:      1  Little interest or pleasure in doing things: 0 - not at all      2  Feeling down, depressed, or hopeless: 0 - not at all  PHQ-2 Score: 0          Broken Bones/Falls: Fall Risk Assessment:    In the past year, patient has experienced: No history of falling in past year          Bladder/Bowel:  Patient has not leaked urine accidently in the last six months  Patient reports no loss of bowel control  Immunizations:  Patient has had a flu vaccination within the last year  Patient has received a pneumonia shot  Patient has received a shingles shot  Patient has received tetanus/diphtheria shot  Home Safety:  Patient does not have trouble with stairs inside or outside of their home  Patient currently reports that there are no safety hazards present in home, working smoke alarms, working carbon monoxide detectors        Preventative Screenings:   No breast cancer screening performed, no colon cancer screen completed, cholesterol screen completed, no glaucoma eye exam completed    Nutrition:  Current diet: Regular with servings of the following:    Medications:  Patient is currently taking over-the-counter supplements  Patient is able to manage medications  Lifestyle Choices:  Patient reports no tobacco use  Patient has not smoked or used tobacco in the past   Patient reports alcohol use  Alcohol use per week: wine at dinner  Patient drives a vehicle  Patient wears seat belt  Activities of Daily Living:  Can get out of bed by his or her self, able to dress self, able to make own meals, able to do own shopping, able to bathe self, can do own laundry/housekeeping, can manage own money, pay bills and track expenses    Previous Hospitalizations:  No hospitalization or ED visit in past 12 months        Advanced Directives:  Patient has decided on a power of   Patient has spoken to designated power of   Patient has completed advanced directive  Preventative Screening/Counseling:      Cardiovascular:      General: Risks and Benefits Discussed and Screening Current          Diabetes:      General: Risks and Benefits Discussed and Screening Current          Colorectal Cancer:      General: Risks and Benefits Discussed and Screening Current          Breast Cancer:      General: Risks and Benefits Discussed and Patient Declines          Cervical Cancer:      General: Risks and Benefits Discussed and Patient Declines          Osteoporosis:      General: Risks and Benefits Discussed      Counseling: Calcium and Vitamin D Intake          AAA:      General: Screening Not Indicated          Glaucoma:      General: Risks and Benefits Discussed and Screening Current          HIV:      General: Screening Not Indicated          Advanced Directives:   Patient has no living will for healthcare, does not have durable POA for healthcare, End of life assessment reviewed with patient       Immunizations:      Influenza: Risks & Benefits Discussed and Influenza UTD This Year      Pneumococcal: Risks & Benefits Discussed and Pneumococcal Due Today      Shingrix: Risks & Benefits Discussed      Zostavax: Risks & Benefits Discussed and Zostavax Vaccine UTD      TD: Risks & Benefits Discussed and Td Vaccine UTD          No exam data present  Physical Exam :  Negative except Physical Exam   Constitutional: She is oriented to person, place, and time  She appears well-developed and well-nourished  HENT:   Head: Normocephalic and atraumatic  Right Ear: External ear normal    Left Ear: External ear normal    Nose: Nose normal    Mouth/Throat: Oropharynx is clear and moist    Eyes: Conjunctivae and EOM are normal  Pupils are equal, round, and reactive to light  Right eye exhibits no discharge  Left eye exhibits no discharge  Neck: Normal range of motion  Neck supple  No tracheal deviation present  No thyromegaly present  Cardiovascular: Normal rate, regular rhythm and normal heart sounds  Exam reveals no friction rub  No murmur heard  Pulmonary/Chest: Effort normal and breath sounds normal  No respiratory distress  She has no wheezes  Abdominal: Soft  Bowel sounds are normal  She exhibits no distension  There is no tenderness  Musculoskeletal: Normal range of motion  Lymphadenopathy:     She has no cervical adenopathy  Neurological: She is alert and oriented to person, place, and time  She displays normal reflexes  No cranial nerve deficit  She exhibits normal muscle tone  Coordination normal    Skin: Skin is warm and dry  Capillary refill takes less than 2 seconds  Psychiatric: She has a normal mood and affect  Her behavior is normal  Judgment and thought content normal    Nursing note and vitals reviewed            Answers for HPI/ROS submitted by the patient on 8/17/2018   Hypertension  Chronicity: recurrent  Onset: more than 1 year ago  Progression since onset: gradually improving  Condition status: controlled  anxiety: No  blurred vision: No  chest pain: No  headaches: No  malaise/fatigue: No  neck pain: No  orthopnea: No  palpitations: No  peripheral edema: No  PND: No  shortness of breath: Yes  sweats: No  Agents associated with hypertension: NSAIDs, thyroid hormones  Compliance problems: no compliance problems

## 2018-08-20 ENCOUNTER — OFFICE VISIT (OUTPATIENT)
Dept: FAMILY MEDICINE CLINIC | Facility: CLINIC | Age: 73
End: 2018-08-20
Payer: COMMERCIAL

## 2018-08-20 VITALS
BODY MASS INDEX: 46.81 KG/M2 | DIASTOLIC BLOOD PRESSURE: 100 MMHG | OXYGEN SATURATION: 97 % | SYSTOLIC BLOOD PRESSURE: 142 MMHG | HEART RATE: 46 BPM | WEIGHT: 254.4 LBS | HEIGHT: 62 IN

## 2018-08-20 DIAGNOSIS — K21.9 GERD WITHOUT ESOPHAGITIS: ICD-10-CM

## 2018-08-20 DIAGNOSIS — C73 PAPILLARY THYROID CARCINOMA (HCC): Primary | ICD-10-CM

## 2018-08-20 DIAGNOSIS — F32.A DEPRESSION, UNSPECIFIED DEPRESSION TYPE: ICD-10-CM

## 2018-08-20 DIAGNOSIS — K21.9 GASTROESOPHAGEAL REFLUX DISEASE, ESOPHAGITIS PRESENCE NOT SPECIFIED: ICD-10-CM

## 2018-08-20 DIAGNOSIS — E78.2 MIXED HYPERLIPIDEMIA: ICD-10-CM

## 2018-08-20 DIAGNOSIS — I10 ESSENTIAL HYPERTENSION: ICD-10-CM

## 2018-08-20 DIAGNOSIS — Z12.11 SCREENING FOR COLON CANCER: ICD-10-CM

## 2018-08-20 DIAGNOSIS — J45.909 ASTHMA, UNSPECIFIED ASTHMA SEVERITY, UNSPECIFIED WHETHER COMPLICATED, UNSPECIFIED WHETHER PERSISTENT: ICD-10-CM

## 2018-08-20 DIAGNOSIS — R60.9 EDEMA, UNSPECIFIED TYPE: ICD-10-CM

## 2018-08-20 DIAGNOSIS — E89.0 HYPOTHYROIDISM, POSTSURGICAL: ICD-10-CM

## 2018-08-20 PROCEDURE — G0439 PPPS, SUBSEQ VISIT: HCPCS | Performed by: FAMILY MEDICINE

## 2018-08-20 PROCEDURE — 1125F AMNT PAIN NOTED PAIN PRSNT: CPT | Performed by: FAMILY MEDICINE

## 2018-08-20 PROCEDURE — 1170F FXNL STATUS ASSESSED: CPT | Performed by: FAMILY MEDICINE

## 2018-08-20 PROCEDURE — 90732 PPSV23 VACC 2 YRS+ SUBQ/IM: CPT

## 2018-08-20 PROCEDURE — 90471 IMMUNIZATION ADMIN: CPT

## 2018-08-20 PROCEDURE — 99214 OFFICE O/P EST MOD 30 MIN: CPT | Performed by: FAMILY MEDICINE

## 2018-08-20 RX ORDER — VENLAFAXINE 75 MG/1
75 TABLET ORAL DAILY
Qty: 90 TABLET | Refills: 0 | Status: SHIPPED | OUTPATIENT
Start: 2018-08-20 | End: 2018-11-12 | Stop reason: SDUPTHER

## 2018-08-20 RX ORDER — ALBUTEROL SULFATE 90 UG/1
2 AEROSOL, METERED RESPIRATORY (INHALATION) 4 TIMES DAILY
Qty: 1 INHALER | Refills: 0 | Status: SHIPPED | OUTPATIENT
Start: 2018-08-20 | End: 2018-11-12 | Stop reason: SDUPTHER

## 2018-08-20 RX ORDER — FUROSEMIDE 20 MG/1
20 TABLET ORAL EVERY OTHER DAY
Qty: 90 TABLET | Refills: 1 | Status: SHIPPED | OUTPATIENT
Start: 2018-08-20 | End: 2018-11-12 | Stop reason: SDUPTHER

## 2018-08-20 RX ORDER — OMEPRAZOLE 40 MG/1
40 CAPSULE, DELAYED RELEASE ORAL DAILY
COMMUNITY
End: 2018-08-20 | Stop reason: SDUPTHER

## 2018-08-20 RX ORDER — OMEPRAZOLE 40 MG/1
40 CAPSULE, DELAYED RELEASE ORAL DAILY
Qty: 90 CAPSULE | Refills: 1 | Status: SHIPPED | OUTPATIENT
Start: 2018-08-20 | End: 2018-11-12 | Stop reason: SDUPTHER

## 2018-08-20 RX ORDER — CETIRIZINE HYDROCHLORIDE 10 MG/1
10 TABLET ORAL DAILY
Qty: 90 TABLET | Refills: 1 | Status: SHIPPED | OUTPATIENT
Start: 2018-08-20 | End: 2019-02-22

## 2018-08-20 RX ORDER — POTASSIUM CHLORIDE 20 MEQ/1
20 TABLET, EXTENDED RELEASE ORAL DAILY
Qty: 90 TABLET | Refills: 0 | Status: SHIPPED | OUTPATIENT
Start: 2018-08-20 | End: 2018-11-12 | Stop reason: SDUPTHER

## 2018-08-20 RX ORDER — VERAPAMIL HYDROCHLORIDE 240 MG/1
240 TABLET, FILM COATED, EXTENDED RELEASE ORAL DAILY
Qty: 90 TABLET | Refills: 1 | Status: SHIPPED | OUTPATIENT
Start: 2018-08-20 | End: 2018-11-12 | Stop reason: SDUPTHER

## 2018-08-20 NOTE — PROGRESS NOTES
Assessment/Plan:   refills given on medications  Chronic conditions stable  Patient going for radiation therapy for papillary carcinoma of the thyroid gland  Patient off diuretic  Blood pressure elevated due to this  Restart at this time  Patient will have pneumococcal vaccine at this time  Follow-up in 6 months on       Diagnoses and all orders for this visit:    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[facility] [address] )    Screening for colon cancer  -     PNEUMOCOCCAL POLYSACCHARIDE VACCINE 23-VALENT =>3YO SQ IM    Asthma, unspecified asthma severity, unspecified whether complicated, unspecified whether persistent  -     albuterol (VENTOLIN HFA) 90 mcg/act inhaler; Inhale 2 puffs 4 (four) times a day  -     cetirizine (ZyrTEC) 10 mg tablet; Take 1 tablet (10 mg total) by mouth daily    Edema, unspecified type  -     furosemide (LASIX) 20 mg tablet; Take 1 tablet (20 mg total) by mouth every other day    Hypothyroidism, postsurgical    Depression, unspecified depression type  -     venlafaxine (EFFEXOR) 75 mg tablet; Take 1 tablet (75 mg total) by mouth daily    Essential hypertension  -     verapamil (CALAN-SR) 240 mg CR tablet; Take 1 tablet (240 mg total) by mouth daily  -     potassium chloride (K-DUR,KLOR-CON) 20 mEq tablet; Take 1 tablet (20 mEq total) by mouth daily    Gastroesophageal reflux disease, esophagitis presence not specified  -     omeprazole (PriLOSEC) 40 MG capsule; Take 1 capsule (40 mg total) by mouth daily    Mixed hyperlipidemia    GERD without esophagitis    Other orders  -     Cancel: Ambulatory referral to Gastroenterology; Future  -     Cancel: Occult Bloood,Fecal Immunochemical; Future  -     Discontinue: omeprazole (PriLOSEC) 40 MG capsule; Take 40 mg by mouth daily          Subjective:      Patient ID: Mattie Garrett is a 81 y o  female  Patient here to follow-up on hypothyroidism status post surgery for papillary carcinoma the thyroid gland    Patient also here to follow-up on GERD and hypertension hyperlipidemia  Patient does need medications refilled at this time  Patient has been off diuretic over the past few weeks      Hypertension   This is a recurrent problem  The current episode started more than 1 year ago  The problem has been gradually improving since onset  The problem is controlled  Associated symptoms include shortness of breath  Pertinent negatives include no anxiety, blurred vision, chest pain, headaches, malaise/fatigue, neck pain, orthopnea, palpitations, peripheral edema, PND or sweats  Agents associated with hypertension include NSAIDs and thyroid hormones  There are no compliance problems  The following portions of the patient's history were reviewed and updated as appropriate: allergies, current medications, past family history, past medical history, past social history, past surgical history and problem list     Review of Systems   Constitutional: Negative  Negative for malaise/fatigue  HENT: Negative  Eyes: Negative  Negative for blurred vision  Respiratory: Positive for shortness of breath  Cardiovascular: Negative  Negative for chest pain, palpitations, orthopnea and PND  Gastrointestinal: Negative  Endocrine: Negative  Genitourinary: Negative  Musculoskeletal: Negative  Negative for neck pain  Skin: Negative  Allergic/Immunologic: Negative  Neurological: Negative  Negative for headaches  Hematological: Negative  Psychiatric/Behavioral: Negative  Objective:      /100 (BP Location: Right arm, Patient Position: Sitting, Cuff Size: Large)   Pulse (!) 46   Ht 5' 2" (1 575 m)   Wt 115 kg (254 lb 6 4 oz)   LMP  (LMP Unknown)   SpO2 97%   BMI 46 53 kg/m²          Physical Exam   Constitutional: She is oriented to person, place, and time  She appears well-developed and well-nourished  No distress  HENT:   Head: Normocephalic     Right Ear: External ear normal    Left Ear: External ear normal    Mouth/Throat: Oropharynx is clear and moist  No oropharyngeal exudate  Eyes: EOM are normal  Pupils are equal, round, and reactive to light  Right eye exhibits no discharge  Left eye exhibits no discharge  No scleral icterus  Neck: Normal range of motion  Neck supple  No thyromegaly present  Cardiovascular: Normal rate, regular rhythm, normal heart sounds and intact distal pulses  Exam reveals no gallop and no friction rub  No murmur heard  Pulmonary/Chest: Effort normal and breath sounds normal  No respiratory distress  She has no wheezes  She has no rales  She exhibits no tenderness  Abdominal: Soft  Bowel sounds are normal  She exhibits no distension  There is no tenderness  There is no rebound and no guarding  Musculoskeletal: Normal range of motion  She exhibits edema  She exhibits no tenderness  Lymphadenopathy:     She has no cervical adenopathy  Neurological: She is oriented to person, place, and time  No cranial nerve deficit  She exhibits normal muscle tone  Coordination normal    Skin: Skin is warm and dry  No rash noted  She is not diaphoretic  No erythema  No pallor  Psychiatric: She has a normal mood and affect  Her behavior is normal  Judgment and thought content normal    Nursing note and vitals reviewed

## 2018-09-14 DIAGNOSIS — C73 PAPILLARY THYROID CARCINOMA (HCC): Primary | ICD-10-CM

## 2018-09-16 DIAGNOSIS — I10 ESSENTIAL HYPERTENSION: ICD-10-CM

## 2018-09-17 RX ORDER — VERAPAMIL HYDROCHLORIDE 240 MG/1
TABLET, FILM COATED, EXTENDED RELEASE ORAL
Qty: 90 TABLET | Refills: 0 | Status: SHIPPED | OUTPATIENT
Start: 2018-09-17 | End: 2019-01-28 | Stop reason: SDUPTHER

## 2018-09-18 ENCOUNTER — OFFICE VISIT (OUTPATIENT)
Dept: OBGYN CLINIC | Facility: MEDICAL CENTER | Age: 73
End: 2018-09-18
Payer: COMMERCIAL

## 2018-09-18 VITALS
BODY MASS INDEX: 45.08 KG/M2 | HEIGHT: 62 IN | HEART RATE: 58 BPM | WEIGHT: 245 LBS | DIASTOLIC BLOOD PRESSURE: 89 MMHG | SYSTOLIC BLOOD PRESSURE: 175 MMHG

## 2018-09-18 DIAGNOSIS — M75.51 SUBACROMIAL BURSITIS OF RIGHT SHOULDER JOINT: Primary | ICD-10-CM

## 2018-09-18 DIAGNOSIS — M19.012 PRIMARY OSTEOARTHRITIS OF LEFT SHOULDER: ICD-10-CM

## 2018-09-18 PROCEDURE — 20610 DRAIN/INJ JOINT/BURSA W/O US: CPT | Performed by: ORTHOPAEDIC SURGERY

## 2018-09-18 PROCEDURE — 99213 OFFICE O/P EST LOW 20 MIN: CPT | Performed by: ORTHOPAEDIC SURGERY

## 2018-09-18 RX ORDER — BUPIVACAINE HYDROCHLORIDE 2.5 MG/ML
4 INJECTION, SOLUTION INFILTRATION; PERINEURAL
Status: COMPLETED | OUTPATIENT
Start: 2018-09-18 | End: 2018-09-18

## 2018-09-18 RX ORDER — METHYLPREDNISOLONE ACETATE 40 MG/ML
1 INJECTION, SUSPENSION INTRA-ARTICULAR; INTRALESIONAL; INTRAMUSCULAR; SOFT TISSUE
Status: COMPLETED | OUTPATIENT
Start: 2018-09-18 | End: 2018-09-18

## 2018-09-18 RX ADMIN — METHYLPREDNISOLONE ACETATE 1 ML: 40 INJECTION, SUSPENSION INTRA-ARTICULAR; INTRALESIONAL; INTRAMUSCULAR; SOFT TISSUE at 15:31

## 2018-09-18 RX ADMIN — BUPIVACAINE HYDROCHLORIDE 4 ML: 2.5 INJECTION, SOLUTION INFILTRATION; PERINEURAL at 15:31

## 2018-09-18 RX ADMIN — METHYLPREDNISOLONE ACETATE 1 ML: 40 INJECTION, SUSPENSION INTRA-ARTICULAR; INTRALESIONAL; INTRAMUSCULAR; SOFT TISSUE at 15:29

## 2018-09-18 RX ADMIN — BUPIVACAINE HYDROCHLORIDE 4 ML: 2.5 INJECTION, SOLUTION INFILTRATION; PERINEURAL at 15:29

## 2018-09-18 NOTE — PROGRESS NOTES
Orthopaedic Surgery - Office Note  Vivi Abad (73 y o  female)   : 1945   MRN: [id number]  Encounter Date: 2018    Chief Complaint   Patient presents with    Right Knee - Follow-up       Assessment / Plan  · Bilateral shoulder CSI was performed  · Bilateral shoulder OA,   · L shoulder enchondroma  · R shoulder subacromial bursitis  · Bilateral knee OA  Return in about 2 months (around 2018) for Recheck  History of Present Illness  Vivi Abad is a 81 y o  female who presents for recheck after right knee CSI was performed at her last appointment  She reports the pain on the lateral aspect of her knee has improved but she continues to have unchanged pain on the medial aspect of her right knee  The patient reports continued bilateral shoulder pain, L>R  She states that the pain disrupts her sleep at night because she tends to sleep on her side  Pt have a left shoulder lesion found incidentally on CT of the chest while looking for metastasis to lymph nodes from thyroid cancer  The patient reports she is currently on an iodine free diet and will begin radiation therapy for thyroid cancer in 2 weeks  The patient reports she hasn't yet been to formal physical therapy and would rather wait continue with HEP until after radiation therapy is complete         Review of Systems  Pertinent items are noted in HPI  All other systems were reviewed and are negative  Physical Exam  BP (!) 175/89   Pulse 58   Ht 5' 2" (1 575 m)   Wt 111 kg (245 lb)   LMP  (LMP Unknown)   BMI 44 81 kg/m²   Cons: Appears well  No apparent distress  Psych: Alert  Oriented x3  Mood and affect normal   Eyes: PERRLA, EOMI  Resp: Normal effort  No audible wheezing or stridor  CV: Palpable pulse  No discernable arrhythmia  No LE edema  Lymph:  No palpable cervical, axillary, or inguinal lymphadenopathy  Skin: Warm  No palpable masses  No visible lesions  Neuro: Normal muscle tone    Normal and symmetric DTR's  Right Shoulder Exam  Alignment / Posture:  Normal shoulder posture  Inspection:  No swelling  No edema  No erythema  Palpation:  Moderate tenderness at The lateral subacromial space and in the bicipital groove  ROM:  Normal shoulder ROM  Strength:  5/5 supraspinatus, infraspinatus, and subscapularis  Stability:  No objective shoulder instability  Tests: (+) Womack  (-) Drop arm  (+) Painful arc  (+) Speed  (-) Bear hug  (-) Spurling  Neurovascular:  Sensation intact in Ax/R/M/U nerve distributions  Sensation intact in all digital nerve distributions  Fingers warm and perfused      Left Shoulder Exam  Alignment / Posture:  Normal shoulder posture  Inspection:  No swelling  Palpation:  Moderate Tenderness along biceps tendon and lateral subacromial space  ROM:  Normal shoulder ROM  Strength:  5/5 supraspinatus, infraspinatus, and subscapularis  Stability:  No objective shoulder instability  Tests: (-) Michael Reilly, (-) Neer, (-) Speed  Neurovascular:  Sensation intact in Ax/R/M/U nerve distributions  Sensation intact in all digital nerve distributions  Fingers warm and perfused  Bilateral Knee Exam  Alignment:  Normal knee alignment  Inspection:  No swelling  Palpation:  medial joint line tenderness  ROM:  Knee Extension 15 degree contracture  Knee Flexion 95  Strength:  5/5 quadriceps and hamstrings  Stability:  No objective knee instability  Stable Varus / Valgus stress, Lachman, and Posterior drawer  Tests:  No pertinent positive or negative tests  Patella:  Patella tracks centrally with crepitus  Neurovascular:  Sensation intact in DP/SP/Lomas/Sa/T nerve distributions  2+ DP & PT pulses  Gait:  Smooth      Studies Reviewed  xrays of bilateral knees reviewed with the patient on 9/18/18 show severe degenerative changes with medial joint space loss       Large joint arthrocentesis  Date/Time: 9/18/2018 3:29 PM  Consent given by: patient  Supporting Documentation  Indications: pain   Procedure Details  Location: shoulder - R subacromial bursa  Preparation: Patient was prepped and draped in the usual sterile fashion  Needle size: 25 G  Ultrasound guidance: no  Approach: anterior  Medications administered: 4 mL bupivacaine 0 25 %; 1 mL methylPREDNISolone acetate 40 mg/mL    Patient tolerance: patient tolerated the procedure well with no immediate complications  Dressing:  Sterile dressing applied  Large joint arthrocentesis  Date/Time: 9/18/2018 3:31 PM  Consent given by: patient  Supporting Documentation  Indications: pain   Procedure Details  Location: shoulder - L subacromial bursa  Needle size: 22 G  Approach: anterior  Medications administered: 4 mL bupivacaine 0 25 %; 1 mL methylPREDNISolone acetate 40 mg/mL    Patient tolerance: patient tolerated the procedure well with no immediate complications  Dressing:  Sterile dressing applied           Medical, Surgical, Family, and Social History  The patient's medical history, family history, and social history, were reviewed and updated as appropriate  Past Medical History:   Diagnosis Date    Asthma     Breathing difficulty     Bronchitis     Depression     Fracture of arm     Fracture of carpal bone     Hyperlipidemia     Hypertension     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HCC) 12/16/2016    Shortness of breath     Thyroid nodule     Thyroid nodule     Thyroid nodule        Past Surgical History:   Procedure Laterality Date    HEMORRHOID SURGERY      VA THYROID LOBECTOMY,UNILAT Right 2/25/2016    Procedure: HEMITHYROIDECTOMY;  Surgeon: Althea Mcduffie MD;  [facility];  Service: Surgical Oncology    VA THYROIDECTOMY POST PREV THYR SURG Left 6/6/2016    Procedure:  COMPLETION THYROIDECTOMY, FLEXIBLE LARYNGOSCOPY;  Surgeon: Althea Mcduffie MD;  [facility];  Service: Surgical Oncology    THYROIDECTOMY, PARTIAL Left        Family History   Problem Relation Age of Onset    Adopted:  Yes    Hypertension Family     Kidney disease Family     Hypertension Mother        Social History     Occupational History    Not on file       Social History Main Topics    Smoking status: Former Smoker     Packs/day: 0 25     Years: 4 00     Types: Cigarettes     Quit date: 6/15/1981    Smokeless tobacco: Never Used      Comment: quit 40 yrs ago (Never a smoker per [employer])    Alcohol use No      Comment: occasional glass of wine    Drug use: No    Sexual activity: No       Allergies   Allergen Reactions    Other Rash     Adhesive tape         Current Outpatient Prescriptions:     albuterol (VENTOLIN HFA) 90 mcg/act inhaler, Inhale 2 puffs 4 (four) times a day, Disp: 1 Inhaler, Rfl: 0    cetirizine (ZyrTEC) 10 mg tablet, Take 1 tablet (10 mg total) by mouth daily, Disp: 90 tablet, Rfl: 1    furosemide (LASIX) 20 mg tablet, Take 1 tablet (20 mg total) by mouth every other day, Disp: 90 tablet, Rfl: 1    levothyroxine 175 mcg tablet, Take 1 tablet (175 mcg total) by mouth daily, Disp: 90 tablet, Rfl: 3    lisinopril-hydrochlorothiazide (PRINZIDE,ZESTORETIC) 20-12 5 MG per tablet, Take 1 tablet by mouth 2 (two) times a day, Disp: 180 tablet, Rfl: 0    Misc Natural Products (OSTEO BI-FLEX TRIPLE STRENGTH PO), Take 1 tablet by mouth 2 (two) times a day   , Disp: , Rfl:     nebivolol (BYSTOLIC) 5 mg tablet, Take 1 tablet (5 mg total) by mouth daily, Disp: 90 tablet, Rfl: 3    omeprazole (PriLOSEC) 40 MG capsule, Take 1 capsule (40 mg total) by mouth daily, Disp: 90 capsule, Rfl: 1    potassium chloride (K-DUR,KLOR-CON) 20 mEq tablet, Take 1 tablet (20 mEq total) by mouth daily, Disp: 90 tablet, Rfl: 0    pravastatin (PRAVACHOL) 20 mg tablet, Take 1 tablet (20 mg total) by mouth daily, Disp: 90 tablet, Rfl: 3    venlafaxine (EFFEXOR) 75 mg tablet, Take 1 tablet (75 mg total) by mouth daily, Disp: 90 tablet, Rfl: 0    verapamil (CALAN-SR) 240 mg CR tablet, Take 1 tablet (240 mg total) by mouth daily, Disp: 90 tablet, Rfl: 1    verapamil (CALAN-SR) 240 mg CR tablet, TAKE 1 TABLET DAILY, Disp: 90 tablet, Rfl: 0      Edith S JULIO Cormier    Scribe Attestation    I,:    am acting as a scribe while in the presence of the attending physician :        I,:    personally performed the services described in this documentation    as scribed in my presence :

## 2018-09-26 ENCOUNTER — HOSPITAL ENCOUNTER (OUTPATIENT)
Dept: RADIOLOGY | Age: 73
Discharge: HOME | End: 2018-09-26
Payer: COMMERCIAL

## 2018-09-26 RX ADMIN — THYROTROPIN ALFA 0.9 MG: 0.9 INJECTION, POWDER, FOR SOLUTION INTRAMUSCULAR at 09:00

## 2018-09-27 ENCOUNTER — HOSPITAL ENCOUNTER (OUTPATIENT)
Dept: RADIOLOGY | Age: 73
Discharge: HOME | End: 2018-09-27
Payer: COMMERCIAL

## 2018-09-27 ENCOUNTER — HOSPITAL ENCOUNTER (OUTPATIENT)
Dept: RADIOLOGY | Facility: HOSPITAL | Age: 73
Discharge: HOME | End: 2018-09-27

## 2018-09-27 ENCOUNTER — APPOINTMENT (OUTPATIENT)
Dept: LAB | Age: 73
End: 2018-09-27
Payer: COMMERCIAL

## 2018-09-27 DIAGNOSIS — C73 PAPILLARY THYROID CARCINOMA (HCC): ICD-10-CM

## 2018-09-27 PROCEDURE — 36415 COLL VENOUS BLD VENIPUNCTURE: CPT

## 2018-09-27 PROCEDURE — 84432 ASSAY OF THYROGLOBULIN: CPT

## 2018-09-27 PROCEDURE — 79005 NUCLEAR RX ORAL ADMIN: CPT

## 2018-09-27 PROCEDURE — A9517 I131 IODIDE CAP, RX: HCPCS

## 2018-09-27 PROCEDURE — 86800 THYROGLOBULIN ANTIBODY: CPT

## 2018-09-28 LAB
THYROGLOB AB SERPL-ACNC: <1 IU/ML (ref 0–0.9)
THYROGLOB SERPL-MCNC: 84.5 NG/ML (ref 1.5–38.5)

## 2018-10-02 DIAGNOSIS — C73 PAPILLARY THYROID CARCINOMA (HCC): Primary | ICD-10-CM

## 2018-10-03 ENCOUNTER — HOSPITAL ENCOUNTER (OUTPATIENT)
Dept: RADIOLOGY | Facility: HOSPITAL | Age: 73
Discharge: HOME | End: 2018-10-03
Payer: COMMERCIAL

## 2018-10-03 DIAGNOSIS — C73 MALIGNANT NEOPLASM OF THYROID GLAND (HCC): ICD-10-CM

## 2018-10-03 PROCEDURE — 78018 THYROID MET IMAGING BODY: CPT

## 2018-10-04 ENCOUNTER — TELEPHONE (OUTPATIENT)
Dept: ENDOCRINOLOGY | Facility: HOSPITAL | Age: 73
End: 2018-10-04

## 2018-10-04 NOTE — TELEPHONE ENCOUNTER
----- Message from [place] sent at 10/2/2018  9:51 AM EDT -----  Please call the patient regarding her abnormal result  Patient underwent Thyrogen stimulated whole body scan with subsequent JOYNER  To be going for post treatment whole body scan   Repeat thyroglobulin and thyroglobulin antibody in 3 months

## 2018-10-09 ENCOUNTER — TELEPHONE (OUTPATIENT)
Dept: ENDOCRINOLOGY | Facility: CLINIC | Age: 73
End: 2018-10-09

## 2018-10-09 NOTE — TELEPHONE ENCOUNTER
----- Message from [place] sent at 10/9/2018  3:20 PM EDT -----  Please call the patient regarding her  Result   Post JOYNER whole body scan shows no evidence of disease

## 2018-11-12 ENCOUNTER — CLINICAL SUPPORT (OUTPATIENT)
Dept: FAMILY MEDICINE CLINIC | Facility: CLINIC | Age: 73
End: 2018-11-12
Payer: COMMERCIAL

## 2018-11-12 DIAGNOSIS — E89.0 HYPOTHYROIDISM, POSTSURGICAL: ICD-10-CM

## 2018-11-12 DIAGNOSIS — K21.9 GASTROESOPHAGEAL REFLUX DISEASE, ESOPHAGITIS PRESENCE NOT SPECIFIED: ICD-10-CM

## 2018-11-12 DIAGNOSIS — J45.909 ASTHMA, UNSPECIFIED ASTHMA SEVERITY, UNSPECIFIED WHETHER COMPLICATED, UNSPECIFIED WHETHER PERSISTENT: ICD-10-CM

## 2018-11-12 DIAGNOSIS — Z23 NEED FOR PROPHYLACTIC VACCINATION AND INOCULATION AGAINST INFLUENZA: Primary | ICD-10-CM

## 2018-11-12 DIAGNOSIS — R60.9 EDEMA, UNSPECIFIED TYPE: ICD-10-CM

## 2018-11-12 DIAGNOSIS — E78.49 OTHER HYPERLIPIDEMIA: ICD-10-CM

## 2018-11-12 DIAGNOSIS — I10 ESSENTIAL HYPERTENSION: ICD-10-CM

## 2018-11-12 DIAGNOSIS — F32.A DEPRESSION, UNSPECIFIED DEPRESSION TYPE: ICD-10-CM

## 2018-11-12 PROCEDURE — 90662 IIV NO PRSV INCREASED AG IM: CPT

## 2018-11-12 PROCEDURE — G0008 ADMIN INFLUENZA VIRUS VAC: HCPCS

## 2018-11-12 RX ORDER — ALBUTEROL SULFATE 90 UG/1
2 AEROSOL, METERED RESPIRATORY (INHALATION) 4 TIMES DAILY
Qty: 1 INHALER | Refills: 0 | Status: SHIPPED | OUTPATIENT
Start: 2018-11-12 | End: 2018-12-18 | Stop reason: SDUPTHER

## 2018-11-12 RX ORDER — POTASSIUM CHLORIDE 20 MEQ/1
20 TABLET, EXTENDED RELEASE ORAL DAILY
Qty: 90 TABLET | Refills: 0 | Status: SHIPPED | OUTPATIENT
Start: 2018-11-12 | End: 2019-02-22 | Stop reason: SDUPTHER

## 2018-11-12 RX ORDER — OMEPRAZOLE 40 MG/1
40 CAPSULE, DELAYED RELEASE ORAL DAILY
Qty: 90 CAPSULE | Refills: 0 | Status: SHIPPED | OUTPATIENT
Start: 2018-11-12 | End: 2019-02-22 | Stop reason: SDUPTHER

## 2018-11-12 RX ORDER — PRAVASTATIN SODIUM 20 MG
20 TABLET ORAL DAILY
Qty: 90 TABLET | Refills: 0 | Status: SHIPPED | OUTPATIENT
Start: 2018-11-12 | End: 2019-02-22 | Stop reason: SDUPTHER

## 2018-11-12 RX ORDER — LISINOPRIL AND HYDROCHLOROTHIAZIDE 20; 12.5 MG/1; MG/1
1 TABLET ORAL 2 TIMES DAILY
Qty: 180 TABLET | Refills: 0 | Status: SHIPPED | OUTPATIENT
Start: 2018-11-12 | End: 2019-02-22 | Stop reason: SDUPTHER

## 2018-11-12 RX ORDER — FUROSEMIDE 20 MG/1
20 TABLET ORAL EVERY OTHER DAY
Qty: 90 TABLET | Refills: 0 | Status: SHIPPED | OUTPATIENT
Start: 2018-11-12 | End: 2019-02-22 | Stop reason: SDUPTHER

## 2018-11-12 RX ORDER — VENLAFAXINE 75 MG/1
75 TABLET ORAL DAILY
Qty: 90 TABLET | Refills: 0 | Status: SHIPPED | OUTPATIENT
Start: 2018-11-12 | End: 2019-02-22 | Stop reason: SDUPTHER

## 2018-11-12 RX ORDER — LEVOTHYROXINE SODIUM 175 UG/1
175 TABLET ORAL DAILY
Qty: 90 TABLET | Refills: 0 | Status: SHIPPED | OUTPATIENT
Start: 2018-11-12 | End: 2019-01-28 | Stop reason: SDUPTHER

## 2018-11-12 RX ORDER — VERAPAMIL HYDROCHLORIDE 240 MG/1
240 TABLET, FILM COATED, EXTENDED RELEASE ORAL DAILY
Qty: 90 TABLET | Refills: 0 | Status: SHIPPED | OUTPATIENT
Start: 2018-11-12 | End: 2019-02-22 | Stop reason: SDUPTHER

## 2018-11-12 NOTE — TELEPHONE ENCOUNTER
Patient stopped in the office for flu shot today  She is scheduled to see you in Feb 2019 but will run out of meds before this appointment  Please authorized another 90 days to hold her until that appointment for follow up  Thank you

## 2018-12-03 ENCOUNTER — APPOINTMENT (OUTPATIENT)
Dept: LAB | Facility: HOSPITAL | Age: 73
End: 2018-12-03
Payer: COMMERCIAL

## 2018-12-03 ENCOUNTER — HOSPITAL ENCOUNTER (OUTPATIENT)
Dept: ULTRASOUND IMAGING | Facility: HOSPITAL | Age: 73
Discharge: HOME | End: 2018-12-03
Payer: COMMERCIAL

## 2018-12-03 DIAGNOSIS — M89.9 HUMERUS LESION, LEFT: ICD-10-CM

## 2018-12-03 DIAGNOSIS — E89.0 HYPOTHYROIDISM, POSTSURGICAL: ICD-10-CM

## 2018-12-03 DIAGNOSIS — C73 PAPILLARY THYROID CARCINOMA (HCC): ICD-10-CM

## 2018-12-03 LAB
CREAT SERPL-MCNC: 0.95 MG/DL (ref 0.6–1.3)
GFR SERPL CREATININE-BSD FRML MDRD: 60 ML/MIN/1.73SQ M

## 2018-12-03 PROCEDURE — 84432 ASSAY OF THYROGLOBULIN: CPT

## 2018-12-03 PROCEDURE — 76536 US EXAM OF HEAD AND NECK: CPT

## 2018-12-03 PROCEDURE — 82565 ASSAY OF CREATININE: CPT

## 2018-12-03 PROCEDURE — 36415 COLL VENOUS BLD VENIPUNCTURE: CPT

## 2018-12-03 PROCEDURE — 86800 THYROGLOBULIN ANTIBODY: CPT

## 2018-12-04 ENCOUNTER — TELEPHONE (OUTPATIENT)
Dept: ENDOCRINOLOGY | Facility: CLINIC | Age: 73
End: 2018-12-04

## 2018-12-04 LAB
THYROGLOB AB SERPL-ACNC: <1 IU/ML (ref 0–0.9)
THYROGLOB SERPL-MCNC: 2.4 NG/ML (ref 1.5–38.5)

## 2018-12-04 NOTE — TELEPHONE ENCOUNTER
----- Message from [place] sent at 12/4/2018 12:29 PM EST -----  Please call the patient regarding her abnormal result   Thyroglobulin level improving

## 2018-12-14 ENCOUNTER — OFFICE VISIT (OUTPATIENT)
Dept: SURGICAL ONCOLOGY | Facility: CLINIC | Age: 73
End: 2018-12-14
Payer: COMMERCIAL

## 2018-12-14 VITALS
DIASTOLIC BLOOD PRESSURE: 80 MMHG | BODY MASS INDEX: 46.56 KG/M2 | HEART RATE: 56 BPM | SYSTOLIC BLOOD PRESSURE: 150 MMHG | WEIGHT: 253 LBS | TEMPERATURE: 98.9 F | HEIGHT: 62 IN | RESPIRATION RATE: 16 BRPM

## 2018-12-14 DIAGNOSIS — C73 PAPILLARY THYROID CARCINOMA (HCC): Primary | ICD-10-CM

## 2018-12-14 PROCEDURE — 4040F PNEUMOC VAC/ADMIN/RCVD: CPT | Performed by: SURGERY

## 2018-12-14 PROCEDURE — 99214 OFFICE O/P EST MOD 30 MIN: CPT | Performed by: SURGERY

## 2018-12-14 RX ORDER — PHENOL 1.4 %
1 AEROSOL, SPRAY (ML) MUCOUS MEMBRANE
COMMUNITY
End: 2019-08-13

## 2018-12-14 NOTE — PROGRESS NOTES
Surgical Oncology Follow Up       [facility] [place]  [facility]  1945  [id number]  [facility] [place]  [address] [place] [address]    Chief Complaint   Patient presents with    Follow-up     6 month thyroid follow up  Assessment/Plan:    No problem-specific Assessment & Plan notes found for this encounter  Diagnoses and all orders for this visit:    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[address] )    Other orders  -     Melatonin 10 MG TABS; Take 1 tablet by mouth daily at bedtime        Advance Care Planning/Advance Directives:  Discussed disease status, cancer treatment plans and/or cancer treatment goals with the patient  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[address] )    2/5/2016 Biopsy     Right thyroid biopsy    [place] [facility]           2/25/2016 Surgery     Right pierre thyroidectomy (Dr Angela Knutson)    Thyroid, right lobe (11 gram), lobectomy:  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, 3 distinct foci, measuring 0 5, 1 5 and 1 7 in greatest dimensions  All confined to the thyroid    Stage I - T1b pNX         5/4/2016 Biopsy     Left anterior lymph node biopsy    A -B -C   Lymph Node, left anterior jugular chain inferior (Thin-prep, smears and cell block preparations):  Specimen consists of unremarkable-appearing follicular cells in small groups and flat sheets associated with scant colloid  6/7/2016 Surgery     Completion thyroidectomy     Left thyroid remnant, left completion thyroidectomy:             - Follicular adenoma with oncocytic features   - No dysplasia or malignancy is identified              History of Present Illness:   Diagnosis and Staging: Stage I thyroid cancer   Treatment History: right thyroid lobectomy march 2016, completion left thyroid lobectomy june 2016     Interval History:   [facility] is a 72 -year-old woman here for a surveillance visit status post pierre-then completion thyroidectomy in 2016  She is status post recent ultrasound and blood work in anticipation of today's visit  Review of Systems:  Review of Systems   Constitutional: Negative  HENT: Negative  Eyes: Negative  Respiratory: Negative  Cardiovascular: Negative  Gastrointestinal: Negative  Endocrine: Negative  Genitourinary: Negative  Musculoskeletal: Negative  Skin: Negative  Allergic/Immunologic: Negative  Neurological: Negative  Hematological: Negative  Psychiatric/Behavioral: Negative          Patient Active Problem List   Diagnosis    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HCC)    GERD without esophagitis    Hyperlipidemia    Hypertension    Hypothyroidism, postsurgical    Impaired fasting glucose    Insomnia    Restrictive lung disease    Thyroid cancer (HCC)    Acute non-recurrent frontal sinusitis    Obstructive sleep apnea    Hypersomnia    Morbid obesity with BMI of 40 0-44 9, adult (HCC)    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HCC)    Humerus lesion, left    Acute shoulder bursitis, right     Past Medical History:   Diagnosis Date    Asthma     Breathing difficulty     Bronchitis     Depression     Fracture of arm     Fracture of carpal bone     Hyperlipidemia     Hypertension     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[facility] [address] ) 12/16/2016    Shortness of breath     Thyroid nodule     Thyroid nodule     Thyroid nodule      Past Surgical History:   Procedure Laterality Date    HEMORRHOID SURGERY      IN THYROID LOBECTOMY,UNILAT Right 2/25/2016    Procedure: HEMITHYROIDECTOMY;  Surgeon: Rafael Fleischer, MD;  [facility];  Service: Surgical Oncology    IN THYROIDECTOMY POST PREV THYR SURG Left 6/6/2016    Procedure:  COMPLETION THYROIDECTOMY, FLEXIBLE LARYNGOSCOPY;  Surgeon: Rafael Fleischer, MD;  [facility];  Service: Surgical Oncology    THYROIDECTOMY, PARTIAL Left      Family History   Problem Relation Age of Onset    Adopted: Yes    Hypertension Family     Kidney disease Family     Hypertension Mother      Social History     Social History    Marital status: /Civil Union     Spouse name: N/A    Number of children: N/A    Years of education: N/A     Occupational History    Not on file       Social History Main Topics    Smoking status: Former Smoker     Packs/day: 0 25     Years: 4 00     Types: Cigarettes     Quit date: 6/15/1981    Smokeless tobacco: Never Used      Comment: quit 40 yrs ago (Never a smoker per [employer])    Alcohol use No      Comment: occasional glass of wine    Drug use: No    Sexual activity: No     Other Topics Concern    Not on file     Social History Narrative    Occasional caffeine consumption       Current Outpatient Prescriptions:     albuterol (VENTOLIN HFA) 90 mcg/act inhaler, Inhale 2 puffs 4 (four) times a day, Disp: 1 Inhaler, Rfl: 0    cetirizine (ZyrTEC) 10 mg tablet, Take 1 tablet (10 mg total) by mouth daily, Disp: 90 tablet, Rfl: 1    furosemide (LASIX) 20 mg tablet, Take 1 tablet (20 mg total) by mouth every other day, Disp: 90 tablet, Rfl: 0    levothyroxine 175 mcg tablet, Take 1 tablet (175 mcg total) by mouth daily, Disp: 90 tablet, Rfl: 0    lisinopril-hydrochlorothiazide (PRINZIDE,ZESTORETIC) 20-12 5 MG per tablet, Take 1 tablet by mouth 2 (two) times a day, Disp: 180 tablet, Rfl: 0    Melatonin 10 MG TABS, Take 1 tablet by mouth daily at bedtime, Disp: , Rfl:     Misc Natural Products (OSTEO BI-FLEX TRIPLE STRENGTH PO), Take 1 tablet by mouth 2 (two) times a day   , Disp: , Rfl:     nebivolol (BYSTOLIC) 5 mg tablet, Take 1 tablet (5 mg total) by mouth daily, Disp: 90 tablet, Rfl: 3    omeprazole (PriLOSEC) 40 MG capsule, Take 1 capsule (40 mg total) by mouth daily, Disp: 90 capsule, Rfl: 0    potassium chloride (K-DUR,KLOR-CON) 20 mEq tablet, Take 1 tablet (20 mEq total) by mouth daily, Disp: 90 tablet, Rfl: 0    pravastatin (PRAVACHOL) 20 mg tablet, Take 1 tablet (20 mg total) by mouth daily, Disp: 90 tablet, Rfl: 0    venlafaxine (EFFEXOR) 75 mg tablet, Take 1 tablet (75 mg total) by mouth daily, Disp: 90 tablet, Rfl: 0    verapamil (CALAN-SR) 240 mg CR tablet, TAKE 1 TABLET DAILY, Disp: 90 tablet, Rfl: 0    verapamil (CALAN-SR) 240 mg CR tablet, Take 1 tablet (240 mg total) by mouth daily, Disp: 90 tablet, Rfl: 0  Allergies   Allergen Reactions    Other Rash     Adhesive tape     Vitals:    12/14/18 1309   BP: 150/80   Pulse: 56   Resp: 16   Temp: 98 9 °F (37 2 °C)       Physical Exam   Constitutional: She is oriented to person, place, and time  She appears well-developed  HENT:   Head: Normocephalic and atraumatic  Right Ear: External ear normal    Left Ear: External ear normal    Eyes: Pupils are equal, round, and reactive to light  Conjunctivae are normal    Neck: Normal range of motion  Neck supple  No JVD present  No tracheal deviation present  No thyromegaly present  Cardiovascular: Normal rate, regular rhythm, normal heart sounds and intact distal pulses  Pulmonary/Chest: Effort normal and breath sounds normal    Abdominal: Soft  Bowel sounds are normal    Musculoskeletal: Normal range of motion  Lymphadenopathy:     She has no cervical adenopathy  Neurological: She is alert and oriented to person, place, and time  Skin: Skin is warm and dry  Results:  Labs:   Ref Range & Units 12/3/18 12:37 PM 9/27/18 11:59 AM     Thyroglobulin-BALJIT 1 5 - 38 5 ng/mL 2 4  84 5CM     Comment: According to the [facility] of Clinical Biochemistry, the   reference interval for Thyroglobulin (TG) should be related to   euthyroid patients and not for patients who underwent thyroidectomy  TG reference intervals for these patients depend on the residual   mass of the thyroid tissue left after surgery  Establishing a   post-operative baseline is recommended     The assay limit of quantitation is 0 1 ng/mL   Thyroglobulin measured by [facility] Immunometric Assay     Lab Results   Component Value Date    SBT4VFNSYLMP 1 012 06/19/2018    G5RRSDC 1 00 06/19/2018         Imaging  Us Head Neck Lymph Node Mapping    Result Date: 12/3/2018  Narrative: NECK ULTRASOUND INDICATION:     C73: Malignant neoplasm of thyroid gland   + COMPARISON:  6/19/2018  FINDINGS: Ultrasound of the thyroidectomy bed and cervical lymph node chains was performed with a high frequency linear transducer  There is no suspicion of recurrent mass in the thyroidectomy bed  Right neck lymph nodes are within normal limits  There is a single lymph node in the left mid jugular region which although normal in size measuring 8 x 3 x 7 mm appears to have a cystic component  FNA of this node may be warranted  Remaining left cervical nodes appear normal      Impression: Single left mid jugular lymph node which although normal in size has a cystic focus for which FNA may be warranted  Workstation performed: [id number]     I reviewed the above laboratory and imaging data  Discussion/Summary:  74-year-old woman with history of stage I thyroid cancer, doing well  She has a questionable lymph node on the left side  Size is normal   There is a cystic component to it  Her thyroglobulin levels have decreased significantly however  Treatment options discussed with her  Will plan on 3 month follow-up ultrasound and blood work at that time  If thyroglobulin levels are elevated or the lymph node is larger or more worrisome in appearance, will plan on biopsy of lymph node at that time

## 2018-12-14 NOTE — LETTER
December 14, 2018     Kelvin Dejesus, [address] [address]    Patient: Ronak Wing   YOB: 1945   Date of Visit: 12/14/2018       Dear Dr Sincere Boo:    Thank you for referring Serena Mcintyre to me for evaluation  Below are my notes for this consultation  If you have questions, please do not hesitate to call me  I look forward to following your patient along with you  Sincerely,        Cecil Collazo MD        CC: MD Tessa Lucero MD Victor Shad, MD Veneda Fluke, MD  12/14/2018  1:54 PM  Sign at close encounter     Surgical Oncology Follow Up       [address] [facility]  1945  [id number]  [facility] SURGICAL ONCOLOGY [address] [address]    Chief Complaint   Patient presents with    Follow-up     6 month thyroid follow up  Assessment/Plan:    No problem-specific Assessment & Plan notes found for this encounter  Diagnoses and all orders for this visit:    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[address] )    Other orders  -     Melatonin 10 MG TABS; Take 1 tablet by mouth daily at bedtime        Advance Care Planning/Advance Directives:  Discussed disease status, cancer treatment plans and/or cancer treatment goals with the patient  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[address] )    2/5/2016 Biopsy     Right thyroid biopsy    [place] [facility]           2/25/2016 Surgery     Right pierre thyroidectomy (Dr Shailesh Bach)    Thyroid, right lobe (11 gram), lobectomy:  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, 3 distinct foci, measuring 0 5, 1 5 and 1 7 in greatest dimensions    All confined to the thyroid    Stage I - T1b pNX         5/4/2016 Biopsy     Left anterior lymph node biopsy    A -B -C   Lymph Node, left anterior jugular chain inferior (Thin-prep, smears and cell block preparations):  Specimen consists of unremarkable-appearing follicular cells in small groups and flat sheets associated with scant colloid  6/7/2016 Surgery     Completion thyroidectomy     Left thyroid remnant, left completion thyroidectomy:             - Follicular adenoma with oncocytic features   - No dysplasia or malignancy is identified  History of Present Illness:   Diagnosis and Staging: Stage I thyroid cancer   Treatment History: right thyroid lobectomy march 2016, completion left thyroid lobectomy june 2016     Interval History: Mrs Dominik Jenkins is a 72 -year-old woman here for a surveillance visit status post pierre-then completion thyroidectomy in 2016  She is status post recent ultrasound and blood work in anticipation of today's visit  Review of Systems:  Review of Systems   Constitutional: Negative  HENT: Negative  Eyes: Negative  Respiratory: Negative  Cardiovascular: Negative  Gastrointestinal: Negative  Endocrine: Negative  Genitourinary: Negative  Musculoskeletal: Negative  Skin: Negative  Allergic/Immunologic: Negative  Neurological: Negative  Hematological: Negative  Psychiatric/Behavioral: Negative          Patient Active Problem List   Diagnosis    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[address] )    GERD without esophagitis    Hyperlipidemia    Hypertension    Hypothyroidism, postsurgical    Impaired fasting glucose    Insomnia    Restrictive lung disease    Thyroid cancer ([address] )    Acute non-recurrent frontal sinusitis    Obstructive sleep apnea    Hypersomnia    Morbid obesity with BMI of 40 0-44 9, adult (HCC)    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HCC)    Humerus lesion, left    Acute shoulder bursitis, right     Past Medical History:   Diagnosis Date    Asthma     Breathing difficulty     Bronchitis     Depression     Fracture of arm     Fracture of carpal bone     Hyperlipidemia     Hypertension     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 ) 12/16/2016    Shortness of breath     Thyroid nodule     Thyroid nodule     Thyroid nodule      Past Surgical History:   Procedure Laterality Date    HEMORRHOID SURGERY      IL THYROID LOBECTOMY,UNILAT Right 2/25/2016    Procedure: HEMITHYROIDECTOMY;  Surgeon: Elina Valera MD;  Location:  MAIN OR;  Service: Surgical Oncology    IL THYROIDECTOMY POST PREV THYR SURG Left 6/6/2016    Procedure:  COMPLETION THYROIDECTOMY, FLEXIBLE LARYNGOSCOPY;  Surgeon: Elina Valera MD;  Location: [facility];  Service: Surgical Oncology    THYROIDECTOMY, PARTIAL Left      Family History   Problem Relation Age of Onset    Adopted: Yes    Hypertension Family     Kidney disease Family     Hypertension Mother      Social History     Social History    Marital status: /Civil Union     Spouse name: N/A    Number of children: N/A    Years of education: N/A     Occupational History    Not on file       Social History Main Topics    Smoking status: Former Smoker     Packs/day: 0 25     Years: 4 00     Types: Cigarettes     Quit date: 6/15/1981    Smokeless tobacco: Never Used      Comment: quit 40 yrs ago (Never a smoker per [employer])    Alcohol use No      Comment: occasional glass of wine    Drug use: No    Sexual activity: No     Other Topics Concern    Not on file     Social History Narrative    Occasional caffeine consumption       Current Outpatient Prescriptions:     albuterol (VENTOLIN HFA) 90 mcg/act inhaler, Inhale 2 puffs 4 (four) times a day, Disp: 1 Inhaler, Rfl: 0    cetirizine (ZyrTEC) 10 mg tablet, Take 1 tablet (10 mg total) by mouth daily, Disp: 90 tablet, Rfl: 1    furosemide (LASIX) 20 mg tablet, Take 1 tablet (20 mg total) by mouth every other day, Disp: 90 tablet, Rfl: 0    levothyroxine 175 mcg tablet, Take 1 tablet (175 mcg total) by mouth daily, Disp: 90 tablet, Rfl: 0    lisinopril-hydrochlorothiazide (PRINZIDE,ZESTORETIC) 20-12 5 MG per tablet, Take 1 tablet by mouth 2 (two) times a day, Disp: 180 tablet, Rfl: 0    Melatonin 10 MG TABS, Take 1 tablet by mouth daily at bedtime, Disp: , Rfl:     Misc Natural Products (OSTEO BI-FLEX TRIPLE STRENGTH PO), Take 1 tablet by mouth 2 (two) times a day   , Disp: , Rfl:     nebivolol (BYSTOLIC) 5 mg tablet, Take 1 tablet (5 mg total) by mouth daily, Disp: 90 tablet, Rfl: 3    omeprazole (PriLOSEC) 40 MG capsule, Take 1 capsule (40 mg total) by mouth daily, Disp: 90 capsule, Rfl: 0    potassium chloride (K-DUR,KLOR-CON) 20 mEq tablet, Take 1 tablet (20 mEq total) by mouth daily, Disp: 90 tablet, Rfl: 0    pravastatin (PRAVACHOL) 20 mg tablet, Take 1 tablet (20 mg total) by mouth daily, Disp: 90 tablet, Rfl: 0    venlafaxine (EFFEXOR) 75 mg tablet, Take 1 tablet (75 mg total) by mouth daily, Disp: 90 tablet, Rfl: 0    verapamil (CALAN-SR) 240 mg CR tablet, TAKE 1 TABLET DAILY, Disp: 90 tablet, Rfl: 0    verapamil (CALAN-SR) 240 mg CR tablet, Take 1 tablet (240 mg total) by mouth daily, Disp: 90 tablet, Rfl: 0  Allergies   Allergen Reactions    Other Rash     Adhesive tape     Vitals:    12/14/18 1309   BP: 150/80   Pulse: 56   Resp: 16   Temp: 98 9 °F (37 2 °C)       Physical Exam   Constitutional: She is oriented to person, place, and time  She appears well-developed  HENT:   Head: Normocephalic and atraumatic  Right Ear: External ear normal    Left Ear: External ear normal    Eyes: Pupils are equal, round, and reactive to light  Conjunctivae are normal    Neck: Normal range of motion  Neck supple  No JVD present  No tracheal deviation present  No thyromegaly present  Cardiovascular: Normal rate, regular rhythm, normal heart sounds and intact distal pulses  Pulmonary/Chest: Effort normal and breath sounds normal    Abdominal: Soft  Bowel sounds are normal    Musculoskeletal: Normal range of motion  Lymphadenopathy:     She has no cervical adenopathy     Neurological: She is alert and oriented to person, place, and time  Skin: Skin is warm and dry  Results:  Labs:   Ref Range & Units 12/3/18 12:37 PM 9/27/18 11:59 AM     Thyroglobulin-BALJIT 1 5 - 38 5 ng/mL 2 4  84 5CM     Comment: According to the [facility] of Clinical Biochemistry, the   reference interval for Thyroglobulin (TG) should be related to   euthyroid patients and not for patients who underwent thyroidectomy  TG reference intervals for these patients depend on the residual   mass of the thyroid tissue left after surgery  Establishing a   post-operative baseline is recommended  The assay limit of quantitation is 0 1 ng/mL   Thyroglobulin measured by [facility] Immunometric Assay     Lab Results   Component Value Date    VLH2HMROHTXZ 1 012 06/19/2018    V7VMJWE 1 00 06/19/2018         Imaging  Us Head Neck Lymph Node Mapping    Result Date: 12/3/2018  Narrative: NECK ULTRASOUND INDICATION:     C73: Malignant neoplasm of thyroid gland   + COMPARISON:  6/19/2018  FINDINGS: Ultrasound of the thyroidectomy bed and cervical lymph node chains was performed with a high frequency linear transducer  There is no suspicion of recurrent mass in the thyroidectomy bed  Right neck lymph nodes are within normal limits  There is a single lymph node in the left mid jugular region which although normal in size measuring 8 x 3 x 7 mm appears to have a cystic component  FNA of this node may be warranted  Remaining left cervical nodes appear normal      Impression: Single left mid jugular lymph node which although normal in size has a cystic focus for which FNA may be warranted  Workstation performed: [id number]     I reviewed the above laboratory and imaging data  Discussion/Summary:  81-year-old woman with history of stage I thyroid cancer, doing well  She has a questionable lymph node on the left side  Size is normal   There is a cystic component to it  Her thyroglobulin levels have decreased significantly however    Treatment options discussed with her   Will plan on 3 month follow-up ultrasound and blood work at that time  If thyroglobulin levels are elevated or the lymph node is larger or more worrisome in appearance, will plan on biopsy of lymph node at that time

## 2018-12-18 DIAGNOSIS — J45.909 ASTHMA, UNSPECIFIED ASTHMA SEVERITY, UNSPECIFIED WHETHER COMPLICATED, UNSPECIFIED WHETHER PERSISTENT: ICD-10-CM

## 2018-12-18 RX ORDER — ALBUTEROL SULFATE 90 UG/1
2 AEROSOL, METERED RESPIRATORY (INHALATION) 4 TIMES DAILY
Qty: 3 INHALER | Refills: 0 | Status: SHIPPED | OUTPATIENT
Start: 2018-12-18 | End: 2019-02-22 | Stop reason: SDUPTHER

## 2019-01-28 ENCOUNTER — OFFICE VISIT (OUTPATIENT)
Dept: ENDOCRINOLOGY | Facility: CLINIC | Age: 74
End: 2019-01-28
Payer: COMMERCIAL

## 2019-01-28 VITALS
BODY MASS INDEX: 47.11 KG/M2 | WEIGHT: 256 LBS | HEIGHT: 62 IN | SYSTOLIC BLOOD PRESSURE: 150 MMHG | HEART RATE: 54 BPM | DIASTOLIC BLOOD PRESSURE: 98 MMHG

## 2019-01-28 DIAGNOSIS — R73.01 IMPAIRED FASTING GLUCOSE: ICD-10-CM

## 2019-01-28 DIAGNOSIS — C73 PAPILLARY THYROID CARCINOMA (HCC): Primary | ICD-10-CM

## 2019-01-28 DIAGNOSIS — E89.0 HYPOTHYROIDISM, POSTSURGICAL: ICD-10-CM

## 2019-01-28 PROCEDURE — 99214 OFFICE O/P EST MOD 30 MIN: CPT | Performed by: INTERNAL MEDICINE

## 2019-01-28 RX ORDER — LEVOTHYROXINE SODIUM 175 UG/1
175 TABLET ORAL DAILY
Qty: 90 TABLET | Refills: 3 | Status: SHIPPED | OUTPATIENT
Start: 2019-01-28 | End: 2019-10-22 | Stop reason: SDUPTHER

## 2019-01-28 NOTE — PROGRESS NOTES
Vi Collazo 72 y o  female MRN: [id number]    Encounter: [id number]      Assessment/Plan     Problem List Items Addressed This Visit     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[address] ) - Primary     Recent neck ultrasound showed normal sized left mid jugular lymph node with a cystic focus-she is due for repeat neck ultrasound in March  Will follow up on that  Also monitor thyroglobulin level         Relevant Medications    levothyroxine 175 mcg tablet    Other Relevant Orders    Thyroglobulin w/ab Lab Collect    Hypothyroidism, postsurgical     Continue levothyroxine at current dose-will check thyroid function tests         Relevant Medications    levothyroxine 175 mcg tablet    Other Relevant Orders    TSH, 3rd generation Lab Collect    T4, free Lab Collect    Impaired fasting glucose     Repeat fasting glucose as well as hemoglobin A1c-focus on dietary and lifestyle modifications and weight loss         Relevant Orders    HEMOGLOBIN A1C W/ EAG ESTIMATION Lab Collect    Comprehensive metabolic panel Lab Collect        CC:  Thyroid cancer and postsurgical hypothyroidism    History of Present Illness      HPI:  79-year-old female with history of thyroid cancer and postsurgical hypothyroidism here for follow-up  She underwent completion thyroidectomy in 2016- underwent radioactive iodine ablation in September 2018  C/o weight gain , fatigue , no constipation , sleeps ok with C-PAP     For postsurgical hypothyroidism-LT4 175 mcg daily - takes regularly and properly  Review of Systems   Constitutional: Positive for fatigue  Negative for unexpected weight change  Respiratory: Positive for shortness of breath  Negative for cough  Cardiovascular: Negative for leg swelling  Gastrointestinal: Negative for constipation, diarrhea, nausea and vomiting  Endocrine: Negative for polydipsia and polyuria  Musculoskeletal: Positive for arthralgias and gait problem  Negative for myalgias     Skin: Negative for color change, pallor and rash  Psychiatric/Behavioral: Negative for sleep disturbance  All other systems reviewed and are negative  Historical Information   Past Medical History:   Diagnosis Date    Asthma     Breathing difficulty     Bronchitis     Depression     Fracture of arm     Fracture of carpal bone     Hyperlipidemia     Hypertension     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[address] ) 12/16/2016    Shortness of breath     Thyroid nodule     Thyroid nodule     Thyroid nodule      Past Surgical History:   Procedure Laterality Date    HEMORRHOID SURGERY      VT THYROID LOBECTOMY,UNILAT Right 2/25/2016    Procedure: HEMITHYROIDECTOMY;  Surgeon: Hola Flores MD;  [facility];  Service: Surgical Oncology    VT THYROIDECTOMY POST PREV THYR SURG Left 6/6/2016    Procedure:  COMPLETION THYROIDECTOMY, FLEXIBLE LARYNGOSCOPY;  Surgeon: Hola Flores MD;  Location: [facility];  Service: Surgical Oncology    THYROIDECTOMY, PARTIAL Left      Social History   History   Alcohol Use No     Comment: occasional glass of wine     History   Drug Use No     History   Smoking Status    Former Smoker    Packs/day: 0 25    Years: 4 00    Types: Cigarettes    Quit date: 6/15/1981   Smokeless Tobacco    Never Used     Comment: quit 40 yrs ago (Never a smoker per [employer])     Family History:   Family History   Problem Relation Age of Onset    Adopted:  Yes    Hypertension Family     Kidney disease Family     Hypertension Mother        Meds/Allergies   Current Outpatient Prescriptions   Medication Sig Dispense Refill    albuterol (VENTOLIN HFA) 90 mcg/act inhaler Inhale 2 puffs 4 (four) times a day 3 Inhaler 0    cetirizine (ZyrTEC) 10 mg tablet Take 1 tablet (10 mg total) by mouth daily 90 tablet 1    furosemide (LASIX) 20 mg tablet Take 1 tablet (20 mg total) by mouth every other day 90 tablet 0    levothyroxine 175 mcg tablet Take 1 tablet (175 mcg total) by mouth daily 90 tablet 3    lisinopril-hydrochlorothiazide (PRINZIDE,ZESTORETIC) 20-12 5 MG per tablet Take 1 tablet by mouth 2 (two) times a day 180 tablet 0    Melatonin 10 MG TABS Take 1 tablet by mouth daily at bedtime      Misc Natural Products (OSTEO BI-FLEX TRIPLE STRENGTH PO) Take 1 tablet by mouth 2 (two) times a day   nebivolol (BYSTOLIC) 5 mg tablet Take 1 tablet (5 mg total) by mouth daily 90 tablet 3    omeprazole (PriLOSEC) 40 MG capsule Take 1 capsule (40 mg total) by mouth daily 90 capsule 0    potassium chloride (K-DUR,KLOR-CON) 20 mEq tablet Take 1 tablet (20 mEq total) by mouth daily 90 tablet 0    pravastatin (PRAVACHOL) 20 mg tablet Take 1 tablet (20 mg total) by mouth daily 90 tablet 0    venlafaxine (EFFEXOR) 75 mg tablet Take 1 tablet (75 mg total) by mouth daily 90 tablet 0    verapamil (CALAN-SR) 240 mg CR tablet Take 1 tablet (240 mg total) by mouth daily 90 tablet 0     No current facility-administered medications for this visit  Allergies   Allergen Reactions    Other Rash     Adhesive tape       Objective   Vitals: Blood pressure 150/98, pulse (!) 54, height 5' 2" (1 575 m), weight 116 kg (256 lb), not currently breastfeeding  Physical Exam   Constitutional: She is oriented to person, place, and time  She appears well-developed and well-nourished  No distress  HENT:   Head: Normocephalic and atraumatic  Mouth/Throat: No oropharyngeal exudate  Eyes: Conjunctivae and EOM are normal  No scleral icterus  Neck: Normal range of motion  Neck supple  Anterior neck surgical scar-no masses   Cardiovascular: Normal rate, regular rhythm and normal heart sounds  No murmur heard  Pulmonary/Chest: Effort normal and breath sounds normal  No respiratory distress  She has no wheezes  She has no rales  Abdominal: Soft  Bowel sounds are normal  She exhibits no distension  There is no tenderness  There is no rebound  Musculoskeletal: Normal range of motion  She exhibits no edema or deformity  Lymphadenopathy:     She has no cervical adenopathy  Neurological: She is alert and oriented to person, place, and time  Skin: Skin is warm and dry  No rash noted  No erythema  No pallor  Psychiatric: She has a normal mood and affect  Her behavior is normal  Judgment and thought content normal        The history was obtained from the review of the chart, patient  Lab Results:   Lab Results   Component Value Date/Time    TSH 3RD GENERATON 1 012 06/19/2018 10:52 AM    Free T4 1 25 06/19/2018 10:52 AM    Thyroglobulin Ab <1 0 12/03/2018 12:37 PM    Thyroglobulin Ab <1 0 09/27/2018 11:59 AM    Thyroglobulin Ab <1 0 06/01/2018 09:52 AM     Imaging Studies:  Results for orders placed during the hospital encounter of 12/03/18   US head neck lymph node mapping    Impression Single left mid jugular lymph node which although normal in size has a cystic focus for which FNA may be warranted  Workstation performed: [id number]            I have personally reviewed pertinent reports  Portions of the record may have been created with voice recognition software  Occasional wrong word or "sound a like" substitutions may have occurred due to the inherent limitations of voice recognition software  Read the chart carefully and recognize, using context, where substitutions have occurred

## 2019-01-29 NOTE — ASSESSMENT & PLAN NOTE
Repeat fasting glucose as well as hemoglobin A1c-focus on dietary and lifestyle modifications and weight loss

## 2019-01-29 NOTE — ASSESSMENT & PLAN NOTE
Recent neck ultrasound showed normal sized left mid jugular lymph node with a cystic focus-she is due for repeat neck ultrasound in March  Will follow up on that    Also monitor thyroglobulin level

## 2019-02-22 ENCOUNTER — OFFICE VISIT (OUTPATIENT)
Dept: FAMILY MEDICINE CLINIC | Facility: CLINIC | Age: 74
End: 2019-02-22
Payer: COMMERCIAL

## 2019-02-22 ENCOUNTER — TELEPHONE (OUTPATIENT)
Dept: OBGYN CLINIC | Facility: CLINIC | Age: 74
End: 2019-02-22

## 2019-02-22 VITALS
WEIGHT: 251 LBS | DIASTOLIC BLOOD PRESSURE: 98 MMHG | HEIGHT: 62 IN | TEMPERATURE: 99.1 F | BODY MASS INDEX: 46.19 KG/M2 | SYSTOLIC BLOOD PRESSURE: 136 MMHG

## 2019-02-22 DIAGNOSIS — C73 THYROID CANCER (HCC): ICD-10-CM

## 2019-02-22 DIAGNOSIS — Z11.59 NEED FOR HEPATITIS C SCREENING TEST: ICD-10-CM

## 2019-02-22 DIAGNOSIS — M25.562 PAIN IN BOTH KNEES, UNSPECIFIED CHRONICITY: Primary | ICD-10-CM

## 2019-02-22 DIAGNOSIS — J45.909 ASTHMA, UNSPECIFIED ASTHMA SEVERITY, UNSPECIFIED WHETHER COMPLICATED, UNSPECIFIED WHETHER PERSISTENT: Primary | ICD-10-CM

## 2019-02-22 DIAGNOSIS — R73.01 IMPAIRED FASTING GLUCOSE: ICD-10-CM

## 2019-02-22 DIAGNOSIS — F32.A DEPRESSION, UNSPECIFIED DEPRESSION TYPE: ICD-10-CM

## 2019-02-22 DIAGNOSIS — R60.9 EDEMA, UNSPECIFIED TYPE: ICD-10-CM

## 2019-02-22 DIAGNOSIS — E89.0 HYPOTHYROIDISM, POSTSURGICAL: ICD-10-CM

## 2019-02-22 DIAGNOSIS — M25.561 PAIN IN BOTH KNEES, UNSPECIFIED CHRONICITY: Primary | ICD-10-CM

## 2019-02-22 DIAGNOSIS — K21.9 GASTROESOPHAGEAL REFLUX DISEASE, ESOPHAGITIS PRESENCE NOT SPECIFIED: ICD-10-CM

## 2019-02-22 DIAGNOSIS — I10 ESSENTIAL HYPERTENSION: ICD-10-CM

## 2019-02-22 DIAGNOSIS — J98.4 RESTRICTIVE LUNG DISEASE: ICD-10-CM

## 2019-02-22 DIAGNOSIS — G47.33 OBSTRUCTIVE SLEEP APNEA: ICD-10-CM

## 2019-02-22 DIAGNOSIS — E78.49 OTHER HYPERLIPIDEMIA: ICD-10-CM

## 2019-02-22 PROCEDURE — 99214 OFFICE O/P EST MOD 30 MIN: CPT | Performed by: FAMILY MEDICINE

## 2019-02-22 RX ORDER — POTASSIUM CHLORIDE 20 MEQ/1
20 TABLET, EXTENDED RELEASE ORAL DAILY
Qty: 90 TABLET | Refills: 1 | Status: SHIPPED | OUTPATIENT
Start: 2019-02-22 | End: 2019-07-31 | Stop reason: SDUPTHER

## 2019-02-22 RX ORDER — FLUTICASONE FUROATE AND VILANTEROL 100; 25 UG/1; UG/1
1 POWDER RESPIRATORY (INHALATION) DAILY
Qty: 3 INHALER | Refills: 1 | Status: SHIPPED | OUTPATIENT
Start: 2019-02-22 | End: 2019-08-13 | Stop reason: SDUPTHER

## 2019-02-22 RX ORDER — FUROSEMIDE 20 MG/1
20 TABLET ORAL EVERY OTHER DAY
Qty: 90 TABLET | Refills: 1 | Status: SHIPPED | OUTPATIENT
Start: 2019-02-22 | End: 2019-08-13 | Stop reason: SDUPTHER

## 2019-02-22 RX ORDER — LISINOPRIL AND HYDROCHLOROTHIAZIDE 20; 12.5 MG/1; MG/1
1 TABLET ORAL 2 TIMES DAILY
Qty: 180 TABLET | Refills: 1 | Status: SHIPPED | OUTPATIENT
Start: 2019-02-22 | End: 2019-08-13 | Stop reason: SDUPTHER

## 2019-02-22 RX ORDER — VERAPAMIL HYDROCHLORIDE 240 MG/1
240 TABLET, FILM COATED, EXTENDED RELEASE ORAL DAILY
Qty: 90 TABLET | Refills: 1 | Status: SHIPPED | OUTPATIENT
Start: 2019-02-22 | End: 2019-03-04 | Stop reason: ALTCHOICE

## 2019-02-22 RX ORDER — OMEPRAZOLE 40 MG/1
40 CAPSULE, DELAYED RELEASE ORAL DAILY
Qty: 90 CAPSULE | Refills: 1 | Status: SHIPPED | OUTPATIENT
Start: 2019-02-22 | End: 2019-07-31 | Stop reason: SDUPTHER

## 2019-02-22 RX ORDER — ALBUTEROL SULFATE 90 UG/1
2 AEROSOL, METERED RESPIRATORY (INHALATION) EVERY 4 HOURS PRN
Qty: 3 INHALER | Refills: 0 | Status: SHIPPED | OUTPATIENT
Start: 2019-02-22 | End: 2020-02-17 | Stop reason: SDUPTHER

## 2019-02-22 RX ORDER — PRAVASTATIN SODIUM 20 MG
20 TABLET ORAL DAILY
Qty: 90 TABLET | Refills: 1 | Status: SHIPPED | OUTPATIENT
Start: 2019-02-22 | End: 2019-08-13 | Stop reason: SDUPTHER

## 2019-02-22 RX ORDER — VENLAFAXINE 75 MG/1
75 TABLET ORAL DAILY
Qty: 90 TABLET | Refills: 1 | Status: SHIPPED | OUTPATIENT
Start: 2019-02-22 | End: 2019-04-11 | Stop reason: SDUPTHER

## 2019-02-22 NOTE — TELEPHONE ENCOUNTER
Dr Akhil Collazo would like to continue her knee care with Dr Raisa Cardoza; she did not state why    Thank you

## 2019-02-22 NOTE — TELEPHONE ENCOUNTER
Dr Pedro Ch would like to transfer her b/l knee care to you from Dr Linda Alejandro  If this is ok with you she is on your schedule for 3/8/19     Thank you

## 2019-03-04 ENCOUNTER — OFFICE VISIT (OUTPATIENT)
Dept: CARDIOLOGY CLINIC | Facility: CLINIC | Age: 74
End: 2019-03-04
Payer: COMMERCIAL

## 2019-03-04 VITALS
HEIGHT: 62 IN | SYSTOLIC BLOOD PRESSURE: 172 MMHG | HEART RATE: 60 BPM | WEIGHT: 250 LBS | DIASTOLIC BLOOD PRESSURE: 98 MMHG | BODY MASS INDEX: 46.01 KG/M2

## 2019-03-04 DIAGNOSIS — I10 ESSENTIAL HYPERTENSION: ICD-10-CM

## 2019-03-04 DIAGNOSIS — I10 ESSENTIAL HYPERTENSION: Primary | ICD-10-CM

## 2019-03-04 DIAGNOSIS — R60.0 LOWER EXTREMITY EDEMA: ICD-10-CM

## 2019-03-04 DIAGNOSIS — E78.2 MIXED HYPERLIPIDEMIA: ICD-10-CM

## 2019-03-04 PROCEDURE — 99214 OFFICE O/P EST MOD 30 MIN: CPT | Performed by: INTERNAL MEDICINE

## 2019-03-04 RX ORDER — AMLODIPINE BESYLATE 5 MG/1
5 TABLET ORAL DAILY
Qty: 90 TABLET | Refills: 1 | Status: SHIPPED | OUTPATIENT
Start: 2019-03-04 | End: 2019-08-13 | Stop reason: SDUPTHER

## 2019-03-04 RX ORDER — AMLODIPINE BESYLATE 5 MG/1
5 TABLET ORAL DAILY
Qty: 30 TABLET | Refills: 1 | Status: SHIPPED | OUTPATIENT
Start: 2019-03-04 | End: 2019-03-04 | Stop reason: SDUPTHER

## 2019-03-04 NOTE — TELEPHONE ENCOUNTER
Phone call from patient  Forgot to request bystolic refill at office visit today      [facility] 90 day supply

## 2019-03-04 NOTE — PROGRESS NOTES
Cardiology Follow Up    Maria Isabel Avendaño [facility]  1945  [id number]  [address] [id number] [phone]    Reason for visit: 9 month FU for HTN and HLP      1  Essential hypertension     2  Mixed hyperlipidemia         Interval History: Since her last visit she has GAINES which is not worse  She denies CP  Narcisa Nissen She is very stressed due to the recurrent thyroid ca  She denies edema  She denies palpitations   She did get lightheadedness this AM      Patient Active Problem List   Diagnosis    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HCC)    GERD without esophagitis    Hyperlipidemia    Hypertension    Hypothyroidism, postsurgical    Impaired fasting glucose    Insomnia    Restrictive lung disease    Thyroid cancer (HCC)    Acute non-recurrent frontal sinusitis    Obstructive sleep apnea    Hypersomnia    Morbid obesity with BMI of 40 0-44 9, adult (HCC)    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HCC)    Humerus lesion, left    Acute shoulder bursitis, right     Past Medical History:   Diagnosis Date    Asthma     Breathing difficulty     Bronchitis     Depression     Fracture of arm     Fracture of carpal bone     Hyperlipidemia     Hypertension     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[facility] [address] ) 12/16/2016    Shortness of breath     Thyroid nodule     Thyroid nodule     Thyroid nodule      Social History     Socioeconomic History    Marital status: /Civil Union     Spouse name: Not on file    Number of children: Not on file    Years of education: Not on file    Highest education level: Not on file   Occupational History    Not on file   Social Needs    Financial resource strain: Not on file    Food insecurity:     Worry: Not on file     Inability: Not on file    Transportation needs:     Medical: Not on file     Non-medical: Not on file   Tobacco Use    Smoking status: Former Smoker     Packs/day: 0 25     Years: 4 00 Pack years: 1 00     Types: Cigarettes     Last attempt to quit: 6/15/1981     Years since quittin 7    Smokeless tobacco: Never Used    Tobacco comment: quit 40 yrs ago (Never a smoker per [employer])   Substance and Sexual Activity    Alcohol use: No     Comment: occasional glass of wine    Drug use: No    Sexual activity: Never   Lifestyle    Physical activity:     Days per week: Not on file     Minutes per session: Not on file    Stress: Not on file   Relationships    Social connections:     Talks on phone: Not on file     Gets together: Not on file     Attends [employer] service: Not on file     Active member of club or organization: Not on file     Attends meetings of clubs or organizations: Not on file     Relationship status: Not on file    Intimate partner violence:     Fear of current or ex partner: Not on file     Emotionally abused: Not on file     Physically abused: Not on file     Forced sexual activity: Not on file   Other Topics Concern    Not on file   Social History Narrative    Occasional caffeine consumption      Family History   Adopted: Yes   Problem Relation Age of Onset    Hypertension Family     Kidney disease Family     Hypertension Mother      Past Surgical History:   Procedure Laterality Date    HEMORRHOID SURGERY      [place] THYROID LOBECTOMY,UNILAT Right 2016    Procedure: HEMITHYROIDECTOMY;  Surgeon: Padma Bobo MD;  [facility];  Service: Surgical Oncology    [place] THYROIDECTOMY POST PREV THYR SURG Left 2016    Procedure:  COMPLETION THYROIDECTOMY, FLEXIBLE LARYNGOSCOPY;  Surgeon: Padma Bobo MD;  [facility];  Service: Surgical Oncology    THYROIDECTOMY, PARTIAL Left        Current Outpatient Medications:     albuterol (VENTOLIN HFA) 90 mcg/act inhaler, Inhale 2 puffs every 4 (four) hours as needed for wheezing, Disp: 3 Inhaler, Rfl: 0    fluticasone-vilanterol (BREO ELLIPTA) 100-25 mcg/inh inhaler, Inhale 1 puff daily Rinse mouth after use , Disp: 3 Inhaler, Rfl: 1    furosemide (LASIX) 20 mg tablet, Take 1 tablet (20 mg total) by mouth every other day, Disp: 90 tablet, Rfl: 1    levothyroxine 175 mcg tablet, Take 1 tablet (175 mcg total) by mouth daily, Disp: 90 tablet, Rfl: 3    lisinopril-hydrochlorothiazide (PRINZIDE,ZESTORETIC) 20-12 5 MG per tablet, Take 1 tablet by mouth 2 (two) times a day, Disp: 180 tablet, Rfl: 1    Melatonin 10 MG TABS, Take 1 tablet by mouth daily at bedtime, Disp: , Rfl:     Misc Natural Products (OSTEO BI-FLEX TRIPLE STRENGTH PO), Take 1 tablet by mouth 2 (two) times a day   , Disp: , Rfl:     nebivolol (BYSTOLIC) 5 mg tablet, Take 1 tablet (5 mg total) by mouth daily, Disp: 90 tablet, Rfl: 3    omeprazole (PriLOSEC) 40 MG capsule, Take 1 capsule (40 mg total) by mouth daily, Disp: 90 capsule, Rfl: 1    potassium chloride (K-DUR,KLOR-CON) 20 mEq tablet, Take 1 tablet (20 mEq total) by mouth daily, Disp: 90 tablet, Rfl: 1    pravastatin (PRAVACHOL) 20 mg tablet, Take 1 tablet (20 mg total) by mouth daily, Disp: 90 tablet, Rfl: 1    venlafaxine (EFFEXOR) 75 mg tablet, Take 1 tablet (75 mg total) by mouth daily, Disp: 90 tablet, Rfl: 1    verapamil (CALAN-SR) 240 mg CR tablet, Take 1 tablet (240 mg total) by mouth daily, Disp: 90 tablet, Rfl: 1  Allergies   Allergen Reactions    Other Rash     Adhesive tape     Review of Systems:  Review of Systems   Constitutional: Positive for fatigue  Negative for activity change, appetite change and unexpected weight change  Respiratory: Positive for shortness of breath  Negative for cough, chest tightness and wheezing  Cardiovascular: Negative for chest pain, palpitations and leg swelling  Gastrointestinal: Positive for constipation  Negative for abdominal pain, blood in stool and diarrhea  Genitourinary: Negative for dysuria, frequency, hematuria and urgency  Musculoskeletal: Positive for arthralgias and back pain     Neurological: Negative for dizziness, speech difficulty, light-headedness and headaches  Psychiatric/Behavioral: Negative for agitation, behavioral problems, confusion and decreased concentration  Physical Exam:  Vitals:    03/04/19 1346   BP: (!) 172/98   Pulse: 60   Weight: 113 kg (250 lb)   Height: 5' 2" (1 575 m)       Physical Exam   Constitutional: She appears well-developed and well-nourished  No distress  Obese     HENT:   Head: Normocephalic and atraumatic  Mouth/Throat: Oropharynx is clear and moist  No oropharyngeal exudate  Eyes: Pupils are equal, round, and reactive to light  Conjunctivae are normal  No scleral icterus  Neck: Normal carotid pulses and no JVD present  Carotid bruit is not present  No thyromegaly present  Cardiovascular: Normal rate, regular rhythm and intact distal pulses  Exam reveals no gallop and no friction rub  Murmur heard  Crescendo decrescendo systolic (basal) murmur is present with a grade of 1/6  No diastolic murmur is present  Pulses:       Dorsalis pedis pulses are 2+ on the right side, and 2+ on the left side  Posterior tibial pulses are 2+ on the right side, and 2+ on the left side  Pulmonary/Chest: Effort normal  She has decreased breath sounds  She has no wheezes  She has no rhonchi  She has no rales  Abdominal: Soft  She exhibits no mass  There is no hepatosplenomegaly  There is no tenderness  Musculoskeletal: She exhibits no edema, tenderness or deformity  Neurological: She is alert  She has normal strength  No cranial nerve deficit or sensory deficit  Skin: Skin is warm and dry  No rash noted  No erythema  No pallor  Psychiatric: She has a normal mood and affect  Her behavior is normal  Judgment and thought content normal        Discussion/Summary:  1  HTN-BP fairly elevated  Will continue nebivolol and lisinopril/hydrochlorothiazide b i d     Will switch verapamil to amlodipine 5 mg daily    Going forward if she continues to have high blood pressures I might consider using olmesartan versus lisinopril  2  HLP-generally well controlled on pravastatin    Continue same  3  LE edema    Well controlled on lasix QOD    Told to use on prn basis only        FU 6 months      Ck Miller MD

## 2019-03-05 RX ORDER — NEBIVOLOL 5 MG/1
5 TABLET ORAL DAILY
Qty: 90 TABLET | Refills: 3 | Status: SHIPPED | OUTPATIENT
Start: 2019-03-05 | End: 2019-09-10 | Stop reason: SDUPTHER

## 2019-03-11 ENCOUNTER — TELEPHONE (OUTPATIENT)
Dept: OBGYN CLINIC | Facility: HOSPITAL | Age: 74
End: 2019-03-11

## 2019-03-11 ENCOUNTER — OFFICE VISIT (OUTPATIENT)
Dept: OBGYN CLINIC | Facility: MEDICAL CENTER | Age: 74
End: 2019-03-11
Payer: COMMERCIAL

## 2019-03-11 ENCOUNTER — LAB (OUTPATIENT)
Dept: LAB | Facility: MEDICAL CENTER | Age: 74
End: 2019-03-11
Payer: COMMERCIAL

## 2019-03-11 VITALS
HEIGHT: 62 IN | HEART RATE: 62 BPM | BODY MASS INDEX: 45.64 KG/M2 | SYSTOLIC BLOOD PRESSURE: 173 MMHG | DIASTOLIC BLOOD PRESSURE: 89 MMHG | WEIGHT: 248 LBS

## 2019-03-11 DIAGNOSIS — R73.01 IMPAIRED FASTING GLUCOSE: ICD-10-CM

## 2019-03-11 DIAGNOSIS — Z11.59 NEED FOR HEPATITIS C SCREENING TEST: ICD-10-CM

## 2019-03-11 DIAGNOSIS — M17.11 PRIMARY OSTEOARTHRITIS OF RIGHT KNEE: Primary | ICD-10-CM

## 2019-03-11 DIAGNOSIS — E89.0 HYPOTHYROIDISM, POSTSURGICAL: ICD-10-CM

## 2019-03-11 DIAGNOSIS — M17.12 PRIMARY OSTEOARTHRITIS OF LEFT KNEE: ICD-10-CM

## 2019-03-11 DIAGNOSIS — M25.562 PAIN IN BOTH KNEES, UNSPECIFIED CHRONICITY: ICD-10-CM

## 2019-03-11 DIAGNOSIS — M25.561 PAIN IN BOTH KNEES, UNSPECIFIED CHRONICITY: ICD-10-CM

## 2019-03-11 DIAGNOSIS — C73 PAPILLARY THYROID CARCINOMA (HCC): ICD-10-CM

## 2019-03-11 LAB
ALBUMIN SERPL BCP-MCNC: 4.1 G/DL (ref 3.5–5)
ALP SERPL-CCNC: 60 U/L (ref 46–116)
ALT SERPL W P-5'-P-CCNC: 23 U/L (ref 12–78)
ANION GAP SERPL CALCULATED.3IONS-SCNC: 8 MMOL/L (ref 4–13)
AST SERPL W P-5'-P-CCNC: 15 U/L (ref 5–45)
BILIRUB SERPL-MCNC: 0.39 MG/DL (ref 0.2–1)
BUN SERPL-MCNC: 19 MG/DL (ref 5–25)
CALCIUM SERPL-MCNC: 9.4 MG/DL (ref 8.3–10.1)
CHLORIDE SERPL-SCNC: 104 MMOL/L (ref 100–108)
CO2 SERPL-SCNC: 29 MMOL/L (ref 21–32)
CREAT SERPL-MCNC: 0.83 MG/DL (ref 0.6–1.3)
EST. AVERAGE GLUCOSE BLD GHB EST-MCNC: 134 MG/DL
GFR SERPL CREATININE-BSD FRML MDRD: 71 ML/MIN/1.73SQ M
GLUCOSE SERPL-MCNC: 90 MG/DL (ref 65–140)
HBA1C MFR BLD: 6.3 % (ref 4.2–6.3)
POTASSIUM SERPL-SCNC: 3.9 MMOL/L (ref 3.5–5.3)
PROT SERPL-MCNC: 8 G/DL (ref 6.4–8.2)
SODIUM SERPL-SCNC: 141 MMOL/L (ref 136–145)
T4 FREE SERPL-MCNC: 1.32 NG/DL (ref 0.76–1.46)
TSH SERPL DL<=0.05 MIU/L-ACNC: 0.57 UIU/ML (ref 0.36–3.74)

## 2019-03-11 PROCEDURE — 86803 HEPATITIS C AB TEST: CPT

## 2019-03-11 PROCEDURE — 84443 ASSAY THYROID STIM HORMONE: CPT

## 2019-03-11 PROCEDURE — 83036 HEMOGLOBIN GLYCOSYLATED A1C: CPT

## 2019-03-11 PROCEDURE — 36415 COLL VENOUS BLD VENIPUNCTURE: CPT

## 2019-03-11 PROCEDURE — 84432 ASSAY OF THYROGLOBULIN: CPT

## 2019-03-11 PROCEDURE — 86800 THYROGLOBULIN ANTIBODY: CPT

## 2019-03-11 PROCEDURE — 84439 ASSAY OF FREE THYROXINE: CPT

## 2019-03-11 PROCEDURE — 80053 COMPREHEN METABOLIC PANEL: CPT

## 2019-03-11 PROCEDURE — 99213 OFFICE O/P EST LOW 20 MIN: CPT | Performed by: ORTHOPAEDIC SURGERY

## 2019-03-11 NOTE — PROGRESS NOTES
Assessment/Plan     1  Primary osteoarthritis of right knee    2  Pain in both knees, unspecified chronicity    3  Primary osteoarthritis of left knee      Orders Placed This Encounter   Procedures    Injection procedure prior authorization     Diclofenac Gel prn for pain   · May take 1000 mg Tylenol prn for pain  May not exceed no more than 3000 mg a day  · Continue with home exercises   · Continue working on weight loss   · Patient declined knee brace   · Declined PT, encouraged home exercises especially stretching  Return for Will contact patient when authorization has been approved for bilateral Euflexxa injection    I answered all of the patient's questions during the visit and provided education of the patient's condition during the visit  The patient verbalized understanding of the information given and agrees with the plan  This note was dictated using [employer] software  It may contain errors including improperly dictated words  Please contact physician directly for any questions  History of Present Illness   Chief complaint:   Chief Complaint   Patient presents with    Left Knee - Pain    Right Knee - Pain       HPI: Sade Gibson is a 72 y o  female that c/o bilateral knee pain worse on the right  Patient states she was treating with Dr Thomas Bob and had bilateral knee steroid injection on 9/18/18 with no relief and was provided with homes exercises  Patient notes bilateral knee pain has been going on for years  Denies any recent falls or trauma  Pain in the right knee  is a constant anterior lateral ache pain and left knee is a anterior medial ache pain  Patient does note instability and also locking when standing for a long period of time  Pain is worse with any activity she does  She is taking Tylenol prn for pain and is doing home exercises  Patient had not had any bilateral knee surgery  ROS:    See HPI for musculoskeletal review     All other systems reviewed are negative Historical Information   Past Medical History:   Diagnosis Date    Asthma     Breathing difficulty     Bronchitis     Depression     Fracture of arm     Fracture of carpal bone     Hyperlipidemia     Hypertension     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[facility] [address] ) 2016    Shortness of breath     Thyroid nodule     Thyroid nodule     Thyroid nodule      Past Surgical History:   Procedure Laterality Date    HEMORRHOID SURGERY      [place] THYROID LOBECTOMY,UNILAT Right 2016    Procedure: HEMITHYROIDECTOMY;  Surgeon: Yash Peterson MD;  Location:  MAIN OR;  Service: Surgical Oncology    [place] THYROIDECTOMY POST PREV THYR SURG Left 2016    Procedure:  COMPLETION THYROIDECTOMY, FLEXIBLE LARYNGOSCOPY;  Surgeon: Yash Peterson MD;  [facility];  Service: Surgical Oncology    THYROIDECTOMY, PARTIAL Left      Social History   Social History     Substance and Sexual Activity   Alcohol Use No    Comment: occasional glass of wine     Social History     Substance and Sexual Activity   Drug Use No     Social History     Tobacco Use   Smoking Status Former Smoker    Packs/day: 0 25    Years: 4 00    Pack years: 1 00    Types: Cigarettes    Last attempt to quit: 6/15/1981    Years since quittin 7   Smokeless Tobacco Never Used   Tobacco Comment    quit 40 yrs ago (Never a smoker per [employer])     Family History:   Family History   Adopted: Yes   Problem Relation Age of Onset    Hypertension Family     Kidney disease Family     Hypertension Mother        Current Outpatient Medications on File Prior to Visit   Medication Sig Dispense Refill    albuterol (VENTOLIN HFA) 90 mcg/act inhaler Inhale 2 puffs every 4 (four) hours as needed for wheezing 3 Inhaler 0    amLODIPine (NORVASC) 5 mg tablet Take 1 tablet (5 mg total) by mouth daily 90 tablet 1    fluticasone-vilanterol (BREO ELLIPTA) 100-25 mcg/inh inhaler Inhale 1 puff daily Rinse mouth after use   3 Inhaler 1    furosemide (LASIX) 20 mg tablet Take 1 tablet (20 mg total) by mouth every other day 90 tablet 1    levothyroxine 175 mcg tablet Take 1 tablet (175 mcg total) by mouth daily 90 tablet 3    lisinopril-hydrochlorothiazide (PRINZIDE,ZESTORETIC) 20-12 5 MG per tablet Take 1 tablet by mouth 2 (two) times a day 180 tablet 1    Melatonin 10 MG TABS Take 1 tablet by mouth daily at bedtime      Misc Natural Products (OSTEO BI-FLEX TRIPLE STRENGTH PO) Take 1 tablet by mouth 2 (two) times a day   nebivolol (BYSTOLIC) 5 mg tablet Take 1 tablet (5 mg total) by mouth daily 90 tablet 3    omeprazole (PriLOSEC) 40 MG capsule Take 1 capsule (40 mg total) by mouth daily 90 capsule 1    potassium chloride (K-DUR,KLOR-CON) 20 mEq tablet Take 1 tablet (20 mEq total) by mouth daily 90 tablet 1    pravastatin (PRAVACHOL) 20 mg tablet Take 1 tablet (20 mg total) by mouth daily 90 tablet 1    venlafaxine (EFFEXOR) 75 mg tablet Take 1 tablet (75 mg total) by mouth daily 90 tablet 1     No current facility-administered medications on file prior to visit  Allergies   Allergen Reactions    Other Rash     Adhesive tape       Current Outpatient Medications on File Prior to Visit   Medication Sig Dispense Refill    albuterol (VENTOLIN HFA) 90 mcg/act inhaler Inhale 2 puffs every 4 (four) hours as needed for wheezing 3 Inhaler 0    amLODIPine (NORVASC) 5 mg tablet Take 1 tablet (5 mg total) by mouth daily 90 tablet 1    fluticasone-vilanterol (BREO ELLIPTA) 100-25 mcg/inh inhaler Inhale 1 puff daily Rinse mouth after use   3 Inhaler 1    furosemide (LASIX) 20 mg tablet Take 1 tablet (20 mg total) by mouth every other day 90 tablet 1    levothyroxine 175 mcg tablet Take 1 tablet (175 mcg total) by mouth daily 90 tablet 3    lisinopril-hydrochlorothiazide (PRINZIDE,ZESTORETIC) 20-12 5 MG per tablet Take 1 tablet by mouth 2 (two) times a day 180 tablet 1    Melatonin 10 MG TABS Take 1 tablet by mouth daily at bedtime      Misc Natural Products (OSTEO BI-FLEX TRIPLE STRENGTH PO) Take 1 tablet by mouth 2 (two) times a day   nebivolol (BYSTOLIC) 5 mg tablet Take 1 tablet (5 mg total) by mouth daily 90 tablet 3    omeprazole (PriLOSEC) 40 MG capsule Take 1 capsule (40 mg total) by mouth daily 90 capsule 1    potassium chloride (K-DUR,KLOR-CON) 20 mEq tablet Take 1 tablet (20 mEq total) by mouth daily 90 tablet 1    pravastatin (PRAVACHOL) 20 mg tablet Take 1 tablet (20 mg total) by mouth daily 90 tablet 1    venlafaxine (EFFEXOR) 75 mg tablet Take 1 tablet (75 mg total) by mouth daily 90 tablet 1     No current facility-administered medications on file prior to visit  Objective   Vitals: Blood pressure (!) 173/89, pulse 62, height 5' 1 5" (1 562 m), weight 112 kg (248 lb), not currently breastfeeding  ,Body mass index is 46 1 kg/m²      PE:  AAOx 3  WDWN  Hearing intact, no drainage from eyes  Regular rate  no audible wheezing  no abdominal distension  LE compartments soft, skin intact    rightknee:    Appearance:  no swelling   No ecchymosis  no obvious joint deformity   No effusion  Palpation/Tenderness:  No TTP over medial joint line  + TTP over lateral joint line   No TTP over patella  + TTP over patellar tendon  + TTP over pes anserine bursa  Active Range of Motion:  AROM: / Passive    Special Tests:  Medial Kenan's Test:  Negative  Lateral Kenan's Test:  Positive   Anterior Drawer Test:  Negative  Valgus Stress Test:  negative  Varus Stress Test:  negative     Mild ipsilateral hip pain with ROM    bilateralLE:    Sensation grossly intact L4, S1  Palpable posterior tibial  pulse  AT/GS/EHL intact    Leftknee:    Appearance:  no swelling   No ecchymosis  no obvious joint deformity   No effusion  Palpation/Tenderness:  +TTP over medial joint line  No TTP over lateral joint line   No TTP over patella  No TTP over patellar tendon  + TTP over pes anserine bursa  Active Range of Motion:  AROM: / Passive   Special Tests:  Valgus Stress Test:  negative  Varus Stress Test:  negative   No ipsilateral hip pain with ROM    Imaging Studies: I have personally reviewed pertinent films in PACS  XR bilateral knee: Xrays were not appropriately labeled as to which one was right or left, Suspect right knee severe arthritis, left knee moderate arthritis

## 2019-03-12 LAB — HCV AB SER QL: NORMAL

## 2019-03-13 LAB
THYROGLOB AB SERPL-ACNC: <1 IU/ML (ref 0–0.9)
THYROGLOB SERPL-MCNC: 1.6 NG/ML (ref 1.5–38.5)

## 2019-03-15 ENCOUNTER — HOSPITAL ENCOUNTER (OUTPATIENT)
Dept: ULTRASOUND IMAGING | Facility: HOSPITAL | Age: 74
Discharge: HOME | End: 2019-03-15
Attending: SURGERY
Payer: COMMERCIAL

## 2019-03-15 DIAGNOSIS — C73 PAPILLARY THYROID CARCINOMA (HCC): ICD-10-CM

## 2019-03-15 PROCEDURE — 76536 US EXAM OF HEAD AND NECK: CPT

## 2019-03-18 ENCOUNTER — OFFICE VISIT (OUTPATIENT)
Dept: OBGYN CLINIC | Facility: MEDICAL CENTER | Age: 74
End: 2019-03-18
Payer: COMMERCIAL

## 2019-03-18 VITALS
BODY MASS INDEX: 45.82 KG/M2 | HEIGHT: 62 IN | SYSTOLIC BLOOD PRESSURE: 150 MMHG | DIASTOLIC BLOOD PRESSURE: 84 MMHG | HEART RATE: 80 BPM | WEIGHT: 249 LBS

## 2019-03-18 DIAGNOSIS — M17.0 PRIMARY OSTEOARTHRITIS OF BOTH KNEES: Primary | ICD-10-CM

## 2019-03-18 PROCEDURE — 20610 DRAIN/INJ JOINT/BURSA W/O US: CPT | Performed by: ORTHOPAEDIC SURGERY

## 2019-03-18 RX ORDER — HYALURONATE SODIUM 10 MG/ML
20 SYRINGE (ML) INTRAARTICULAR
Status: COMPLETED | OUTPATIENT
Start: 2019-03-18 | End: 2019-03-18

## 2019-03-18 RX ADMIN — Medication 20 MG: at 11:52

## 2019-03-18 RX ADMIN — Medication 20 MG: at 11:53

## 2019-03-18 NOTE — PROGRESS NOTES
Assessment/Plan:  1  Primary osteoarthritis of both knees      Orders Placed This Encounter   Procedures    Large joint arthrocentesis    Large joint arthrocentesis   -patient received bilateral Euflexxa injections today  This is her 1st of the 3 in the series  She should avoid strenuous activity and rest and ice her knee for 1-2 days   -continue with diclofenac gel and Tylenol as needed for pain control  -continue with home exercises    Return in about 1 week (around 3/25/2019)  I answered all of the patient's questions during the visit and provided education of the patient's condition during the visit  The patient verbalized understanding of the information given and agrees with the plan  This note was dictated using [employer] software  It may contain errors including improperly dictated words  Please contact physician directly for any questions  Subjective   Chief Complaint:   Chief Complaint   Patient presents with    Left Knee - Follow-up    Right Knee - Follow-up       Marilynn Chacon is a 72 y o  female who presents for follow up for bilateral knee pain today  She states her right knee is worse than her left  Her knee pain is medial and lateral   She use diclofenac gel once which did not help  She has Tylenol which does seem to help  She has   been doing home exercises which seem to help  She is here for her Euflexxa injections today  Patient had a right knee steroid  injection in July which did help  Review of Systems  ROS:    See HPI for musculoskeletal review     All other systems reviewed are negative     History:  Past Medical History:   Diagnosis Date    Asthma     Breathing difficulty     Bronchitis     Depression     Fracture of arm     Fracture of carpal bone     Hyperlipidemia     Hypertension     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[facility] [address] ) 12/16/2016    Shortness of breath     Thyroid nodule     Thyroid nodule     Thyroid nodule      Past Surgical History:   Procedure Laterality Date    HEMORRHOID SURGERY      [place] THYROID LOBECTOMY,UNILAT Right 2016    Procedure: HEMITHYROIDECTOMY;  Surgeon: Ramirez Manzano MD;  [facility];  Service: Surgical Oncology    [place] THYROIDECTOMY POST PREV THYR SURG Left 2016    Procedure:  COMPLETION THYROIDECTOMY, FLEXIBLE LARYNGOSCOPY;  Surgeon: Ramirez Manzano MD;  [facility];  Service: Surgical Oncology    THYROIDECTOMY, PARTIAL Left      Social History   Social History     Substance and Sexual Activity   Alcohol Use No    Comment: occasional glass of wine     Social History     Substance and Sexual Activity   Drug Use No     Social History     Tobacco Use   Smoking Status Former Smoker    Packs/day: 0 25    Years: 4 00    Pack years: 1 00    Types: Cigarettes    Last attempt to quit: 6/15/1981    Years since quittin 7   Smokeless Tobacco Never Used   Tobacco Comment    quit 40 yrs ago (Never a smoker per [employer])     Family History:   Family History   Adopted: Yes   Problem Relation Age of Onset    Hypertension Family     Kidney disease Family     Hypertension Mother        Current Outpatient Medications on File Prior to Visit   Medication Sig Dispense Refill    albuterol (VENTOLIN HFA) 90 mcg/act inhaler Inhale 2 puffs every 4 (four) hours as needed for wheezing 3 Inhaler 0    amLODIPine (NORVASC) 5 mg tablet Take 1 tablet (5 mg total) by mouth daily 90 tablet 1    diclofenac sodium (VOLTAREN) 1 % Apply 2 g topically 4 (four) times a day PRN for pain 1 Tube 2    fluticasone-vilanterol (BREO ELLIPTA) 100-25 mcg/inh inhaler Inhale 1 puff daily Rinse mouth after use   3 Inhaler 1    furosemide (LASIX) 20 mg tablet Take 1 tablet (20 mg total) by mouth every other day 90 tablet 1    levothyroxine 175 mcg tablet Take 1 tablet (175 mcg total) by mouth daily 90 tablet 3    lisinopril-hydrochlorothiazide (PRINZIDE,ZESTORETIC) 20-12 5 MG per tablet Take 1 tablet by mouth 2 (two) times a day 180 tablet 1    Melatonin 10 MG TABS Take 1 tablet by mouth daily at bedtime      Misc Natural Products (OSTEO BI-FLEX TRIPLE STRENGTH PO) Take 1 tablet by mouth 2 (two) times a day   nebivolol (BYSTOLIC) 5 mg tablet Take 1 tablet (5 mg total) by mouth daily 90 tablet 3    omeprazole (PriLOSEC) 40 MG capsule Take 1 capsule (40 mg total) by mouth daily 90 capsule 1    potassium chloride (K-DUR,KLOR-CON) 20 mEq tablet Take 1 tablet (20 mEq total) by mouth daily 90 tablet 1    pravastatin (PRAVACHOL) 20 mg tablet Take 1 tablet (20 mg total) by mouth daily 90 tablet 1    venlafaxine (EFFEXOR) 75 mg tablet Take 1 tablet (75 mg total) by mouth daily 90 tablet 1     No current facility-administered medications on file prior to visit        Allergies   Allergen Reactions    Other Rash     Adhesive tape        Objective     /84   Pulse 80   Ht 5' 1 5" (1 562 m)   Wt 113 kg (249 lb)   LMP  (LMP Unknown)   BMI 46 29 kg/m²      PE:  AAOx 3  WDWN  Hearing intact, no drainage from eyes  no audible wheezing  no abdominal distension  LE compartments soft, skin intact    Ortho Exam:  bilateral Knee:   No erythema  no swelling  no effusion  no warmth  AROM:   TTP over medial and lateral joint line   Stable to varus/valgus stress    Large joint arthrocentesis: R knee  Date/Time: 3/18/2019 11:52 AM  Consent given by: patient  Site marked: site marked  Timeout: Immediately prior to procedure a time out was called to verify the correct patient, procedure, equipment, support staff and site/side marked as required   Supporting Documentation  Indications: pain   Procedure Details  Location: knee - R knee  Preparation: Patient was prepped and draped in the usual sterile fashion  Needle size: 22 G  Ultrasound guidance: no  Approach: anterolateral  Medications administered: 20 mg Sodium Hyaluronate 20 MG/2ML    Patient tolerance: patient tolerated the procedure well with no immediate complications  Dressing:  Sterile dressing applied    Large joint arthrocentesis: L knee  Date/Time: 3/18/2019 11:53 AM  Consent given by: patient  Site marked: site marked  Timeout: Immediately prior to procedure a time out was called to verify the correct patient, procedure, equipment, support staff and site/side marked as required   Supporting Documentation  Indications: pain   Procedure Details  Location: knee - L knee  Preparation: Patient was prepped and draped in the usual sterile fashion  Needle size: 22 G  Ultrasound guidance: no  Approach: anterolateral  Medications administered: 20 mg Sodium Hyaluronate 20 MG/2ML    Patient tolerance: patient tolerated the procedure well with no immediate complications  Dressing:  Sterile dressing applied

## 2019-03-25 ENCOUNTER — OFFICE VISIT (OUTPATIENT)
Dept: OBGYN CLINIC | Facility: MEDICAL CENTER | Age: 74
End: 2019-03-25
Payer: COMMERCIAL

## 2019-03-25 VITALS
DIASTOLIC BLOOD PRESSURE: 76 MMHG | WEIGHT: 250 LBS | BODY MASS INDEX: 46.01 KG/M2 | HEIGHT: 62 IN | SYSTOLIC BLOOD PRESSURE: 134 MMHG | HEART RATE: 80 BPM

## 2019-03-25 DIAGNOSIS — M17.11 PRIMARY OSTEOARTHRITIS OF RIGHT KNEE: ICD-10-CM

## 2019-03-25 DIAGNOSIS — M17.12 PRIMARY OSTEOARTHRITIS OF LEFT KNEE: Primary | ICD-10-CM

## 2019-03-25 PROCEDURE — 20610 DRAIN/INJ JOINT/BURSA W/O US: CPT | Performed by: ORTHOPAEDIC SURGERY

## 2019-03-25 RX ORDER — HYALURONATE SODIUM 10 MG/ML
20 SYRINGE (ML) INTRAARTICULAR
Status: COMPLETED | OUTPATIENT
Start: 2019-03-25 | End: 2019-03-25

## 2019-03-25 RX ADMIN — Medication 20 MG: at 11:47

## 2019-03-25 RX ADMIN — Medication 20 MG: at 11:48

## 2019-03-25 NOTE — PROGRESS NOTES
Assessment/Plan:  1  Primary osteoarthritis of left knee    2  Primary osteoarthritis of right knee      Orders Placed This Encounter   Procedures    Large joint arthrocentesis: L knee    Large joint arthrocentesis: R knee     Received bilateral Euflexxa injections today  Patient knows to ice and avoid strenuous activity for 1-2 days if needed  Continue diclofenac gel and Tylenol as needed for pain control  Continue home exercises    Return in about 1 week (around 4/1/2019) for for 3rd euflexxa injections bilateral knees  I answered all of the patient's questions during the visit and provided education of the patient's condition during the visit  The patient verbalized understanding of the information given and agrees with the plan  This note was dictated using [employer] software  It may contain errors including improperly dictated words  Please contact physician directly for any questions  Subjective   Chief Complaint: No chief complaint on file  Jimy Ahumada is a 72 y o  female who presents for follow up for bilateral knee osteoarthritis  She is here today for her 2nd Euflexxa injection  She received her 1st injection last week and feels her left knee is feeling mildly improved  She continues to have right knee pain specially with weather changes  She is diclofenac gel as needed  She also uses Tylenol when needed  She does home exercises  Review of Systems  ROS:    See HPI for musculoskeletal review     All other systems reviewed are negative     History:  Past Medical History:   Diagnosis Date    Asthma     Breathing difficulty     Bronchitis     Depression     Fracture of arm     Fracture of carpal bone     Hyperlipidemia     Hypertension     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[facility] [address] ) 12/16/2016    Shortness of breath     Thyroid nodule     Thyroid nodule     Thyroid nodule      Past Surgical History:   Procedure Laterality Date    HEMORRHOID SURGERY      DE THYROID LOBECTOMY,UNILAT Right 2016    Procedure: HEMITHYROIDECTOMY;  Surgeon: Zan Caro MD;  [facility]: [facility];  Service: Surgical Oncology    OK THYROIDECTOMY POST PREV THYR SURG Left 2016    Procedure:  COMPLETION THYROIDECTOMY, FLEXIBLE LARYNGOSCOPY;  Surgeon: Zan Caro MD;  Location: [facility];  Service: Surgical Oncology    THYROIDECTOMY, PARTIAL Left      Social History   Social History     Substance and Sexual Activity   Alcohol Use No    Comment: occasional glass of wine     Social History     Substance and Sexual Activity   Drug Use No     Social History     Tobacco Use   Smoking Status Former Smoker    Packs/day: 0 25    Years: 4 00    Pack years: 1 00    Types: Cigarettes    Last attempt to quit: 6/15/1981    Years since quittin 8   Smokeless Tobacco Never Used   Tobacco Comment    quit 40 yrs ago (Never a smoker per [employer])     Family History:   Family History   Adopted: Yes   Problem Relation Age of Onset    Hypertension Family     Kidney disease Family     Hypertension Mother        Current Outpatient Medications on File Prior to Visit   Medication Sig Dispense Refill    albuterol (VENTOLIN HFA) 90 mcg/act inhaler Inhale 2 puffs every 4 (four) hours as needed for wheezing 3 Inhaler 0    amLODIPine (NORVASC) 5 mg tablet Take 1 tablet (5 mg total) by mouth daily 90 tablet 1    diclofenac sodium (VOLTAREN) 1 % Apply 2 g topically 4 (four) times a day PRN for pain 1 Tube 2    fluticasone-vilanterol (BREO ELLIPTA) 100-25 mcg/inh inhaler Inhale 1 puff daily Rinse mouth after use   3 Inhaler 1    furosemide (LASIX) 20 mg tablet Take 1 tablet (20 mg total) by mouth every other day 90 tablet 1    levothyroxine 175 mcg tablet Take 1 tablet (175 mcg total) by mouth daily 90 tablet 3    lisinopril-hydrochlorothiazide (PRINZIDE,ZESTORETIC) 20-12 5 MG per tablet Take 1 tablet by mouth 2 (two) times a day 180 tablet 1    Melatonin 10 MG TABS Take 1 tablet by mouth daily at bedtime      Misc Natural Products (OSTEO BI-FLEX TRIPLE STRENGTH PO) Take 1 tablet by mouth 2 (two) times a day   nebivolol (BYSTOLIC) 5 mg tablet Take 1 tablet (5 mg total) by mouth daily 90 tablet 3    omeprazole (PriLOSEC) 40 MG capsule Take 1 capsule (40 mg total) by mouth daily 90 capsule 1    potassium chloride (K-DUR,KLOR-CON) 20 mEq tablet Take 1 tablet (20 mEq total) by mouth daily 90 tablet 1    pravastatin (PRAVACHOL) 20 mg tablet Take 1 tablet (20 mg total) by mouth daily 90 tablet 1    venlafaxine (EFFEXOR) 75 mg tablet Take 1 tablet (75 mg total) by mouth daily 90 tablet 1     No current facility-administered medications on file prior to visit        Allergies   Allergen Reactions    Other Rash     Adhesive tape        Objective     /76   Pulse 80   Ht 5' 1 5" (1 562 m)   Wt 113 kg (250 lb)   LMP  (LMP Unknown)   BMI 46 47 kg/m²      PE:  AAOx 3  WDWN  Hearing intact, no drainage from eyes  no audible wheezing  no abdominal distension  LE compartments soft, skin intact    Ortho Exam:  bilateral Knee:   No erythema  no swelling  no effusion  no warmth    Large joint arthrocentesis: L knee  Date/Time: 3/25/2019 11:47 AM  Consent given by: patient  Site marked: site marked  Timeout: Immediately prior to procedure a time out was called to verify the correct patient, procedure, equipment, support staff and site/side marked as required   Supporting Documentation  Indications: pain   Procedure Details  Location: knee - L knee  Preparation: Patient was prepped and draped in the usual sterile fashion  Needle size: 22 G  Ultrasound guidance: no  Approach: anterolateral  Medications administered: 20 mg Sodium Hyaluronate 20 MG/2ML    Patient tolerance: patient tolerated the procedure well with no immediate complications  Dressing:  Sterile dressing applied    Large joint arthrocentesis: R knee  Date/Time: 3/25/2019 11:48 AM  Consent given by: patient  Site marked: site marked  Timeout: Immediately prior to procedure a time out was called to verify the correct patient, procedure, equipment, support staff and site/side marked as required   Supporting Documentation  Indications: pain   Procedure Details  Location: knee - R knee  Preparation: Patient was prepped and draped in the usual sterile fashion  Needle size: 22 G  Ultrasound guidance: no  Approach: anterolateral  Medications administered: 20 mg Sodium Hyaluronate 20 MG/2ML    Patient tolerance: patient tolerated the procedure well with no immediate complications  Dressing:  Sterile dressing applied

## 2019-04-01 ENCOUNTER — OFFICE VISIT (OUTPATIENT)
Dept: OBGYN CLINIC | Facility: MEDICAL CENTER | Age: 74
End: 2019-04-01
Payer: COMMERCIAL

## 2019-04-01 VITALS
HEART RATE: 101 BPM | HEIGHT: 62 IN | WEIGHT: 250 LBS | BODY MASS INDEX: 46.01 KG/M2 | DIASTOLIC BLOOD PRESSURE: 94 MMHG | SYSTOLIC BLOOD PRESSURE: 162 MMHG

## 2019-04-01 DIAGNOSIS — M17.0 PRIMARY OSTEOARTHRITIS OF BOTH KNEES: Primary | ICD-10-CM

## 2019-04-01 PROBLEM — Z08 ENCOUNTER FOR FOLLOW-UP EXAMINATION AFTER COMPLETED TREATMENT FOR MALIGNANT NEOPLASM: Status: ACTIVE | Noted: 2019-04-01

## 2019-04-01 PROCEDURE — 20610 DRAIN/INJ JOINT/BURSA W/O US: CPT | Performed by: PHYSICIAN ASSISTANT

## 2019-04-01 RX ORDER — HYALURONATE SODIUM 10 MG/ML
20 SYRINGE (ML) INTRAARTICULAR
Status: COMPLETED | OUTPATIENT
Start: 2019-04-01 | End: 2019-04-01

## 2019-04-01 RX ADMIN — Medication 20 MG: at 12:59

## 2019-04-02 ENCOUNTER — TELEPHONE (OUTPATIENT)
Dept: OBGYN CLINIC | Facility: MEDICAL CENTER | Age: 74
End: 2019-04-02

## 2019-04-05 ENCOUNTER — OFFICE VISIT (OUTPATIENT)
Dept: SURGICAL ONCOLOGY | Facility: CLINIC | Age: 74
End: 2019-04-05
Payer: COMMERCIAL

## 2019-04-05 VITALS
SYSTOLIC BLOOD PRESSURE: 132 MMHG | WEIGHT: 255 LBS | RESPIRATION RATE: 14 BRPM | TEMPERATURE: 98.7 F | HEART RATE: 77 BPM | HEIGHT: 62 IN | DIASTOLIC BLOOD PRESSURE: 80 MMHG | BODY MASS INDEX: 46.93 KG/M2

## 2019-04-05 DIAGNOSIS — Z08 ENCOUNTER FOR FOLLOW-UP EXAMINATION AFTER COMPLETED TREATMENT FOR MALIGNANT NEOPLASM: Primary | ICD-10-CM

## 2019-04-05 DIAGNOSIS — Z85.850 HISTORY OF THYROID CANCER: ICD-10-CM

## 2019-04-05 PROCEDURE — 99213 OFFICE O/P EST LOW 20 MIN: CPT | Performed by: SURGERY

## 2019-04-11 ENCOUNTER — OFFICE VISIT (OUTPATIENT)
Dept: FAMILY MEDICINE CLINIC | Facility: CLINIC | Age: 74
End: 2019-04-11
Payer: COMMERCIAL

## 2019-04-11 VITALS
WEIGHT: 255 LBS | HEIGHT: 62 IN | SYSTOLIC BLOOD PRESSURE: 144 MMHG | DIASTOLIC BLOOD PRESSURE: 74 MMHG | BODY MASS INDEX: 46.93 KG/M2 | TEMPERATURE: 99.4 F

## 2019-04-11 DIAGNOSIS — J01.10 ACUTE NON-RECURRENT FRONTAL SINUSITIS: Primary | ICD-10-CM

## 2019-04-11 DIAGNOSIS — F32.A DEPRESSION, UNSPECIFIED DEPRESSION TYPE: ICD-10-CM

## 2019-04-11 PROCEDURE — 99213 OFFICE O/P EST LOW 20 MIN: CPT | Performed by: FAMILY MEDICINE

## 2019-04-11 PROCEDURE — 1160F RVW MEDS BY RX/DR IN RCRD: CPT | Performed by: FAMILY MEDICINE

## 2019-04-11 PROCEDURE — 1036F TOBACCO NON-USER: CPT | Performed by: FAMILY MEDICINE

## 2019-04-11 PROCEDURE — 3008F BODY MASS INDEX DOCD: CPT | Performed by: FAMILY MEDICINE

## 2019-04-11 RX ORDER — AZITHROMYCIN 250 MG/1
TABLET, FILM COATED ORAL
Qty: 6 TABLET | Refills: 0 | Status: SHIPPED | OUTPATIENT
Start: 2019-04-11 | End: 2019-04-15

## 2019-04-11 RX ORDER — VENLAFAXINE 37.5 MG/1
37.5 TABLET ORAL DAILY
Qty: 90 TABLET | Refills: 1 | Status: SHIPPED | OUTPATIENT
Start: 2019-04-11 | End: 2019-04-11 | Stop reason: SDUPTHER

## 2019-04-11 RX ORDER — GUAIFENESIN AND CODEINE PHOSPHATE 100; 10 MG/5ML; MG/5ML
5 SOLUTION ORAL 3 TIMES DAILY PRN
Qty: 120 ML | Refills: 0 | Status: SHIPPED | OUTPATIENT
Start: 2019-04-11 | End: 2019-05-16

## 2019-04-11 RX ORDER — VENLAFAXINE 37.5 MG/1
37.5 TABLET ORAL DAILY
Qty: 90 TABLET | Refills: 1 | Status: SHIPPED | OUTPATIENT
Start: 2019-04-11 | End: 2019-08-13 | Stop reason: SDUPTHER

## 2019-04-23 ENCOUNTER — TELEPHONE (OUTPATIENT)
Dept: ENDOCRINOLOGY | Facility: CLINIC | Age: 74
End: 2019-04-23

## 2019-04-23 NOTE — RESULT ENCOUNTER NOTE
Please call the patient regarding labs -TG level trending down , will continue to monitor - tsh ok- continue synthroid at current dose

## 2019-05-16 ENCOUNTER — OFFICE VISIT (OUTPATIENT)
Dept: ENDOCRINOLOGY | Facility: CLINIC | Age: 74
End: 2019-05-16
Payer: COMMERCIAL

## 2019-05-16 VITALS
HEART RATE: 63 BPM | BODY MASS INDEX: 46.89 KG/M2 | WEIGHT: 254.8 LBS | DIASTOLIC BLOOD PRESSURE: 100 MMHG | SYSTOLIC BLOOD PRESSURE: 140 MMHG | HEIGHT: 62 IN

## 2019-05-16 DIAGNOSIS — C73 THYROID CANCER (HCC): ICD-10-CM

## 2019-05-16 DIAGNOSIS — E89.0 HYPOTHYROIDISM, POSTSURGICAL: Primary | ICD-10-CM

## 2019-05-16 DIAGNOSIS — R73.01 IMPAIRED FASTING GLUCOSE: ICD-10-CM

## 2019-05-16 PROCEDURE — 99214 OFFICE O/P EST MOD 30 MIN: CPT | Performed by: INTERNAL MEDICINE

## 2019-06-03 ENCOUNTER — OFFICE VISIT (OUTPATIENT)
Dept: OBGYN CLINIC | Facility: MEDICAL CENTER | Age: 74
End: 2019-06-03
Payer: COMMERCIAL

## 2019-06-03 VITALS
DIASTOLIC BLOOD PRESSURE: 84 MMHG | BODY MASS INDEX: 46.74 KG/M2 | WEIGHT: 254 LBS | HEIGHT: 62 IN | SYSTOLIC BLOOD PRESSURE: 124 MMHG | HEART RATE: 80 BPM

## 2019-06-03 DIAGNOSIS — M17.0 PRIMARY OSTEOARTHRITIS OF BOTH KNEES: Primary | ICD-10-CM

## 2019-06-03 DIAGNOSIS — M70.50 PES ANSERINE BURSITIS: ICD-10-CM

## 2019-06-03 PROCEDURE — 20610 DRAIN/INJ JOINT/BURSA W/O US: CPT | Performed by: PHYSICIAN ASSISTANT

## 2019-06-03 PROCEDURE — 99213 OFFICE O/P EST LOW 20 MIN: CPT | Performed by: ORTHOPAEDIC SURGERY

## 2019-06-03 RX ORDER — METHYLPREDNISOLONE ACETATE 40 MG/ML
1 INJECTION, SUSPENSION INTRA-ARTICULAR; INTRALESIONAL; INTRAMUSCULAR; SOFT TISSUE
Status: COMPLETED | OUTPATIENT
Start: 2019-06-03 | End: 2019-06-03

## 2019-06-03 RX ORDER — LIDOCAINE HYDROCHLORIDE 10 MG/ML
1 INJECTION, SOLUTION INFILTRATION; PERINEURAL
Status: COMPLETED | OUTPATIENT
Start: 2019-06-03 | End: 2019-06-03

## 2019-06-03 RX ADMIN — METHYLPREDNISOLONE ACETATE 1 ML: 40 INJECTION, SUSPENSION INTRA-ARTICULAR; INTRALESIONAL; INTRAMUSCULAR; SOFT TISSUE at 13:14

## 2019-06-03 RX ADMIN — LIDOCAINE HYDROCHLORIDE 1 ML: 10 INJECTION, SOLUTION INFILTRATION; PERINEURAL at 13:14

## 2019-06-19 ENCOUNTER — OFFICE VISIT (OUTPATIENT)
Dept: OBGYN CLINIC | Facility: MEDICAL CENTER | Age: 74
End: 2019-06-19
Payer: COMMERCIAL

## 2019-06-19 ENCOUNTER — TELEPHONE (OUTPATIENT)
Dept: OBGYN CLINIC | Facility: MEDICAL CENTER | Age: 74
End: 2019-06-19

## 2019-06-19 VITALS
HEIGHT: 62 IN | WEIGHT: 245.6 LBS | SYSTOLIC BLOOD PRESSURE: 140 MMHG | HEART RATE: 80 BPM | BODY MASS INDEX: 45.19 KG/M2 | DIASTOLIC BLOOD PRESSURE: 82 MMHG

## 2019-06-19 DIAGNOSIS — M17.12 PRIMARY OSTEOARTHRITIS OF LEFT KNEE: ICD-10-CM

## 2019-06-19 DIAGNOSIS — M17.11 PRIMARY OSTEOARTHRITIS OF RIGHT KNEE: Primary | ICD-10-CM

## 2019-06-19 DIAGNOSIS — M70.50 PES ANSERINE BURSITIS: ICD-10-CM

## 2019-06-19 PROCEDURE — 99213 OFFICE O/P EST LOW 20 MIN: CPT | Performed by: ORTHOPAEDIC SURGERY

## 2019-06-19 PROCEDURE — 20610 DRAIN/INJ JOINT/BURSA W/O US: CPT | Performed by: PHYSICIAN ASSISTANT

## 2019-06-19 RX ORDER — METHYLPREDNISOLONE ACETATE 40 MG/ML
2 INJECTION, SUSPENSION INTRA-ARTICULAR; INTRALESIONAL; INTRAMUSCULAR; SOFT TISSUE
Status: COMPLETED | OUTPATIENT
Start: 2019-06-19 | End: 2019-06-19

## 2019-06-19 RX ORDER — LIDOCAINE HYDROCHLORIDE 10 MG/ML
3 INJECTION, SOLUTION INFILTRATION; PERINEURAL
Status: COMPLETED | OUTPATIENT
Start: 2019-06-19 | End: 2019-06-19

## 2019-06-19 RX ADMIN — LIDOCAINE HYDROCHLORIDE 3 ML: 10 INJECTION, SOLUTION INFILTRATION; PERINEURAL at 10:50

## 2019-06-19 RX ADMIN — METHYLPREDNISOLONE ACETATE 2 ML: 40 INJECTION, SUSPENSION INTRA-ARTICULAR; INTRALESIONAL; INTRAMUSCULAR; SOFT TISSUE at 10:50

## 2019-06-21 ENCOUNTER — OFFICE VISIT (OUTPATIENT)
Dept: SLEEP CENTER | Facility: CLINIC | Age: 74
End: 2019-06-21
Payer: COMMERCIAL

## 2019-06-21 VITALS
HEIGHT: 62 IN | SYSTOLIC BLOOD PRESSURE: 134 MMHG | BODY MASS INDEX: 45.27 KG/M2 | WEIGHT: 246 LBS | DIASTOLIC BLOOD PRESSURE: 88 MMHG

## 2019-06-21 DIAGNOSIS — E66.01 MORBID OBESITY WITH BMI OF 40.0-44.9, ADULT (HCC): ICD-10-CM

## 2019-06-21 DIAGNOSIS — I10 ESSENTIAL HYPERTENSION: ICD-10-CM

## 2019-06-21 DIAGNOSIS — J98.4 RESTRICTIVE LUNG DISEASE: ICD-10-CM

## 2019-06-21 DIAGNOSIS — G47.33 OBSTRUCTIVE SLEEP APNEA: Primary | ICD-10-CM

## 2019-06-21 DIAGNOSIS — R45.86 MOOD DISTURBANCE: ICD-10-CM

## 2019-06-21 DIAGNOSIS — K21.9 GERD WITHOUT ESOPHAGITIS: ICD-10-CM

## 2019-06-21 PROCEDURE — 99214 OFFICE O/P EST MOD 30 MIN: CPT | Performed by: INTERNAL MEDICINE

## 2019-07-29 DIAGNOSIS — M17.0 PRIMARY OSTEOARTHRITIS OF BOTH KNEES: ICD-10-CM

## 2019-07-31 DIAGNOSIS — K21.9 GASTROESOPHAGEAL REFLUX DISEASE, ESOPHAGITIS PRESENCE NOT SPECIFIED: ICD-10-CM

## 2019-07-31 DIAGNOSIS — I10 ESSENTIAL HYPERTENSION: ICD-10-CM

## 2019-07-31 RX ORDER — POTASSIUM CHLORIDE 1500 MG/1
TABLET, EXTENDED RELEASE ORAL
Qty: 90 TABLET | Refills: 1 | Status: SHIPPED | OUTPATIENT
Start: 2019-07-31 | End: 2019-08-13 | Stop reason: SDUPTHER

## 2019-07-31 RX ORDER — OMEPRAZOLE 40 MG/1
CAPSULE, DELAYED RELEASE ORAL
Qty: 90 CAPSULE | Refills: 1 | Status: SHIPPED | OUTPATIENT
Start: 2019-07-31 | End: 2019-08-13 | Stop reason: SDUPTHER

## 2019-08-13 ENCOUNTER — OFFICE VISIT (OUTPATIENT)
Dept: FAMILY MEDICINE CLINIC | Facility: CLINIC | Age: 74
End: 2019-08-13
Payer: COMMERCIAL

## 2019-08-13 VITALS
HEIGHT: 63 IN | SYSTOLIC BLOOD PRESSURE: 130 MMHG | BODY MASS INDEX: 43.94 KG/M2 | DIASTOLIC BLOOD PRESSURE: 88 MMHG | WEIGHT: 248 LBS | TEMPERATURE: 97.9 F

## 2019-08-13 DIAGNOSIS — K21.9 GASTROESOPHAGEAL REFLUX DISEASE, ESOPHAGITIS PRESENCE NOT SPECIFIED: ICD-10-CM

## 2019-08-13 DIAGNOSIS — F32.A DEPRESSION, UNSPECIFIED DEPRESSION TYPE: ICD-10-CM

## 2019-08-13 DIAGNOSIS — R60.9 EDEMA, UNSPECIFIED TYPE: ICD-10-CM

## 2019-08-13 DIAGNOSIS — E78.49 OTHER HYPERLIPIDEMIA: ICD-10-CM

## 2019-08-13 DIAGNOSIS — Z12.11 SCREENING FOR COLON CANCER: Primary | ICD-10-CM

## 2019-08-13 DIAGNOSIS — I10 ESSENTIAL HYPERTENSION: ICD-10-CM

## 2019-08-13 DIAGNOSIS — J45.909 ASTHMA, UNSPECIFIED ASTHMA SEVERITY, UNSPECIFIED WHETHER COMPLICATED, UNSPECIFIED WHETHER PERSISTENT: ICD-10-CM

## 2019-08-13 DIAGNOSIS — R73.01 IMPAIRED FASTING GLUCOSE: ICD-10-CM

## 2019-08-13 DIAGNOSIS — E66.01 MORBID OBESITY WITH BMI OF 40.0-44.9, ADULT (HCC): ICD-10-CM

## 2019-08-13 DIAGNOSIS — K21.9 GERD WITHOUT ESOPHAGITIS: ICD-10-CM

## 2019-08-13 PROCEDURE — 3075F SYST BP GE 130 - 139MM HG: CPT | Performed by: FAMILY MEDICINE

## 2019-08-13 PROCEDURE — 99214 OFFICE O/P EST MOD 30 MIN: CPT | Performed by: FAMILY MEDICINE

## 2019-08-13 RX ORDER — FUROSEMIDE 20 MG/1
20 TABLET ORAL EVERY OTHER DAY
Qty: 90 TABLET | Refills: 1 | Status: SHIPPED | OUTPATIENT
Start: 2019-08-13 | End: 2020-02-17 | Stop reason: SDUPTHER

## 2019-08-13 RX ORDER — FLUTICASONE FUROATE AND VILANTEROL 100; 25 UG/1; UG/1
1 POWDER RESPIRATORY (INHALATION) DAILY
Qty: 3 INHALER | Refills: 1 | Status: SHIPPED | OUTPATIENT
Start: 2019-08-13 | End: 2020-02-17 | Stop reason: SDUPTHER

## 2019-08-13 RX ORDER — AMLODIPINE BESYLATE 5 MG/1
5 TABLET ORAL DAILY
Qty: 90 TABLET | Refills: 1 | Status: SHIPPED | OUTPATIENT
Start: 2019-08-13 | End: 2020-02-17 | Stop reason: SDUPTHER

## 2019-08-13 RX ORDER — PRAVASTATIN SODIUM 20 MG
20 TABLET ORAL DAILY
Qty: 90 TABLET | Refills: 1 | Status: SHIPPED | OUTPATIENT
Start: 2019-08-13 | End: 2020-02-17 | Stop reason: SDUPTHER

## 2019-08-13 RX ORDER — OMEPRAZOLE 40 MG/1
40 CAPSULE, DELAYED RELEASE ORAL DAILY
Qty: 90 CAPSULE | Refills: 1 | Status: SHIPPED | OUTPATIENT
Start: 2019-08-13 | End: 2020-02-17 | Stop reason: SDUPTHER

## 2019-08-13 RX ORDER — POTASSIUM CHLORIDE 20 MEQ/1
20 TABLET, EXTENDED RELEASE ORAL DAILY
Qty: 90 TABLET | Refills: 1 | Status: SHIPPED | OUTPATIENT
Start: 2019-08-13 | End: 2019-10-06 | Stop reason: SDUPTHER

## 2019-08-13 RX ORDER — VENLAFAXINE 37.5 MG/1
37.5 TABLET ORAL DAILY
Qty: 90 TABLET | Refills: 1 | Status: SHIPPED | OUTPATIENT
Start: 2019-08-13 | End: 2020-02-17 | Stop reason: SDUPTHER

## 2019-08-13 RX ORDER — LISINOPRIL AND HYDROCHLOROTHIAZIDE 20; 12.5 MG/1; MG/1
1 TABLET ORAL 2 TIMES DAILY
Qty: 180 TABLET | Refills: 1 | Status: SHIPPED | OUTPATIENT
Start: 2019-08-13 | End: 2020-02-17 | Stop reason: SDUPTHER

## 2019-08-13 NOTE — PROGRESS NOTES
Assessment/Plan:  Refills for chronic conditions listed  Patient stable in this regard  Refills given  Patient follow-up in 6 months  Patient will follow up with surgical oncologist regarding thyroid cancer  Diagnoses and all orders for this visit:    Screening for colon cancer  -     Occult Blood, Fecal Immunochemical; Future    Essential hypertension  -     amLODIPine (NORVASC) 5 mg tablet; Take 1 tablet (5 mg total) by mouth daily  -     lisinopril-hydrochlorothiazide (PRINZIDE,ZESTORETIC) 20-12 5 MG per tablet; Take 1 tablet by mouth 2 (two) times a day  -     potassium chloride (KLOR-CON M20) 20 mEq tablet; Take 1 tablet (20 mEq total) by mouth daily    Asthma, unspecified asthma severity, unspecified whether complicated, unspecified whether persistent  -     fluticasone-vilanterol (BREO ELLIPTA) 100-25 mcg/inh inhaler; Inhale 1 puff daily Rinse mouth after use  Edema, unspecified type  -     furosemide (LASIX) 20 mg tablet; Take 1 tablet (20 mg total) by mouth every other day    Gastroesophageal reflux disease, esophagitis presence not specified  -     omeprazole (PriLOSEC) 40 MG capsule; Take 1 capsule (40 mg total) by mouth daily    Other hyperlipidemia  -     pravastatin (PRAVACHOL) 20 mg tablet; Take 1 tablet (20 mg total) by mouth daily    Depression, unspecified depression type  -     venlafaxine (EFFEXOR) 37 5 mg tablet; Take 1 tablet (37 5 mg total) by mouth daily    Impaired fasting glucose    GERD without esophagitis    Morbid obesity with BMI of 40 0-44 9, adult ([facility] )    Other orders  -     Cancel: Ambulatory referral to Gastroenterology; Future            Subjective:        Patient ID: Peg Weiner is a 72 y o  female  Patient follow-up on hypertension hyperlipidemia GERD  Patient feeling fairly well overall at this time  Emotional state stable  Patient does see endocrinologist for hypothyroidism  Patient's cancer has spread to lymph node on the left cervical region  The patient with ongoing shortness of breath with seizures unchanged  No chest pain  No problems with urination or defecation  No edema  The following portions of the patient's history were reviewed and updated as appropriate: allergies, current medications, past family history, past medical history, past social history, past surgical history and problem list       Review of Systems   Constitutional: Negative  HENT: Negative  Eyes: Negative  Respiratory: Negative  Cardiovascular: Negative  Gastrointestinal: Negative  Endocrine: Negative  Genitourinary: Negative  Musculoskeletal: Negative  Skin: Negative  Allergic/Immunologic: Negative  Neurological: Negative  Hematological: Negative  Psychiatric/Behavioral: Negative  Objective:      BMI Counseling: Body mass index is 44 64 kg/m²  Discussed the patient's BMI with her  The BMI is above average  BMI counseling and education was provided to the patient  Nutrition recommendations include reducing portion sizes  Depression Screening Follow-up Plan: Patient's depression screening was positive with a PHQ-2 score of   Their PHQ-9 score was   Patient assessed for underlying major depression  They have no active suicidal ideations  Brief counseling provided and recommend additional follow-up/re-evaluation next office visit  /88 (BP Location: Right arm, Patient Position: Sitting, Cuff Size: Large)   Temp 97 9 °F (36 6 °C) (Tympanic)   Ht 5' 2 5" (1 588 m)   Wt 112 kg (248 lb)   LMP  (LMP Unknown)   BMI 44 64 kg/m²          Physical Exam   Constitutional: She is oriented to person, place, and time  She appears well-developed and well-nourished  No distress  HENT:   Head: Normocephalic  Right Ear: External ear normal    Left Ear: External ear normal    Mouth/Throat: Oropharynx is clear and moist  No oropharyngeal exudate  Eyes: Pupils are equal, round, and reactive to light   EOM are normal  Right eye exhibits no discharge  Left eye exhibits no discharge  No scleral icterus  Neck: Normal range of motion  Neck supple  No thyromegaly present  Cardiovascular: Normal rate, regular rhythm, normal heart sounds and intact distal pulses  Exam reveals no gallop and no friction rub  No murmur heard  Pulmonary/Chest: Effort normal and breath sounds normal  No respiratory distress  She has no wheezes  She has no rales  She exhibits no tenderness  Abdominal: Soft  Bowel sounds are normal  She exhibits no distension  There is no tenderness  There is no rebound and no guarding  Musculoskeletal: Normal range of motion  She exhibits no edema or tenderness  Lymphadenopathy:     She has no cervical adenopathy  Neurological: She is oriented to person, place, and time  No cranial nerve deficit  She exhibits normal muscle tone  Coordination normal    Skin: Skin is warm and dry  No rash noted  She is not diaphoretic  No erythema  No pallor  Psychiatric: She has a normal mood and affect  Her behavior is normal  Judgment and thought content normal    Nursing note and vitals reviewed

## 2019-08-22 ENCOUNTER — OFFICE VISIT (OUTPATIENT)
Dept: OBGYN CLINIC | Facility: MEDICAL CENTER | Age: 74
End: 2019-08-22
Payer: COMMERCIAL

## 2019-08-22 VITALS
SYSTOLIC BLOOD PRESSURE: 128 MMHG | DIASTOLIC BLOOD PRESSURE: 82 MMHG | WEIGHT: 250.4 LBS | HEIGHT: 62 IN | BODY MASS INDEX: 46.08 KG/M2 | HEART RATE: 80 BPM

## 2019-08-22 DIAGNOSIS — M17.12 PRIMARY OSTEOARTHRITIS OF LEFT KNEE: Primary | ICD-10-CM

## 2019-08-22 DIAGNOSIS — M17.11 PRIMARY OSTEOARTHRITIS OF RIGHT KNEE: ICD-10-CM

## 2019-08-22 DIAGNOSIS — M70.50 PES ANSERINE BURSITIS: ICD-10-CM

## 2019-08-22 PROCEDURE — 99213 OFFICE O/P EST LOW 20 MIN: CPT | Performed by: ORTHOPAEDIC SURGERY

## 2019-08-22 PROCEDURE — 20610 DRAIN/INJ JOINT/BURSA W/O US: CPT | Performed by: ORTHOPAEDIC SURGERY

## 2019-08-22 RX ORDER — LIDOCAINE HYDROCHLORIDE 10 MG/ML
1 INJECTION, SOLUTION INFILTRATION; PERINEURAL
Status: COMPLETED | OUTPATIENT
Start: 2019-08-22 | End: 2019-08-22

## 2019-08-22 RX ORDER — TRIAMCINOLONE ACETONIDE 40 MG/ML
40 INJECTION, SUSPENSION INTRA-ARTICULAR; INTRAMUSCULAR
Status: COMPLETED | OUTPATIENT
Start: 2019-08-22 | End: 2019-08-22

## 2019-08-22 RX ADMIN — LIDOCAINE HYDROCHLORIDE 1 ML: 10 INJECTION, SOLUTION INFILTRATION; PERINEURAL at 09:22

## 2019-08-22 RX ADMIN — TRIAMCINOLONE ACETONIDE 40 MG: 40 INJECTION, SUSPENSION INTRA-ARTICULAR; INTRAMUSCULAR at 09:22

## 2019-08-22 NOTE — PROGRESS NOTES
Assessment/Plan:  1  Primary osteoarthritis of left knee    2  Primary osteoarthritis of right knee    3  Pes anserine bursitis      Orders Placed This Encounter   Procedures    Large joint arthrocentesis: bilateral pes anserine bursa     - BL pes anserine bursa injections done today, patient tolerated well  - Prior auth for BL Euflexxa injections sent today, earliest she may get these is 10/1/19  - activities and WB as tolerated    Return in about 6 weeks (around 10/2/2019)  I answered all of the patient's questions during the visit and provided education of the patient's condition during the visit  The patient verbalized understanding of the information given and agrees with the plan  This note was dictated using [employer] software  It may contain errors including improperly dictated words  Please contact physician directly for any questions  Subjective    Chief Complaint: Follow up BL knee pain    Vita Sumner is a 72 y o  female who presents for follow up for follow-up of bilateral knee pain, right worse than left  Patient had bilateral intra-articular steroid injections in both knees at her last visit on 06/19/2019  She states that this did help to relieve some of her pain, however she still has good and bad days  She is here today for bilateral pes anserine bursa injections, again right is worse than left  Additionally, patient would like to begin the Euflexxa series again, earliest she may get these is 10/02/2019  Patient notes most of her pain is medially over the pes anserine bursa, she denies any numbness or tingling  Review of Systems  ROS:    See HPI for musculoskeletal review     All other systems reviewed are negative     History:  Past Medical History:   Diagnosis Date    Asthma     Breathing difficulty     Bronchitis     Depression     Fracture of arm     Fracture of carpal bone     Hyperlipidemia     Hypertension     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[facility] [address] ) 12/16/2016    Shortness of breath     Thyroid nodule     Thyroid nodule     Thyroid nodule      Past Surgical History:   Procedure Laterality Date    HEMORRHOID SURGERY      AR THYROID LOBECTOMY,UNILAT Right 2016    Procedure: HEMITHYROIDECTOMY;  Surgeon: Gentry Elizondo MD;  Location:  MAIN OR;  Service: Surgical Oncology    AR THYROIDECTOMY POST PREV THYR SURG Left 2016    Procedure:  COMPLETION THYROIDECTOMY, FLEXIBLE LARYNGOSCOPY;  Surgeon: Gentry Elizondo MD;  Location: [facility];  Service: Surgical Oncology    THYROIDECTOMY, PARTIAL Left      Social History   Social History     Substance and Sexual Activity   Alcohol Use No    Comment: occasional glass of wine     Social History     Substance and Sexual Activity   Drug Use No     Social History     Tobacco Use   Smoking Status Former Smoker    Packs/day: 0 25    Years: 4 00    Pack years: 1 00    Types: Cigarettes    Last attempt to quit: 6/15/1981    Years since quittin 2   Smokeless Tobacco Never Used   Tobacco Comment    quit 40 yrs ago (Never a smoker per [employer])     Family History:   Family History   Adopted: Yes   Problem Relation Age of Onset    Hypertension Family     Kidney disease Family     Hypertension Mother        Current Outpatient Medications on File Prior to Visit   Medication Sig Dispense Refill    albuterol (VENTOLIN HFA) 90 mcg/act inhaler Inhale 2 puffs every 4 (four) hours as needed for wheezing 3 Inhaler 0    amLODIPine (NORVASC) 5 mg tablet Take 1 tablet (5 mg total) by mouth daily 90 tablet 1    diclofenac sodium (VOLTAREN) 1 % Apply 2 g topically 4 (four) times a day 1 Tube 0    fluticasone-vilanterol (BREO ELLIPTA) 100-25 mcg/inh inhaler Inhale 1 puff daily Rinse mouth after use   3 Inhaler 1    furosemide (LASIX) 20 mg tablet Take 1 tablet (20 mg total) by mouth every other day 90 tablet 1    levothyroxine 175 mcg tablet Take 1 tablet (175 mcg total) by mouth daily 90 tablet 3    lisinopril-hydrochlorothiazide (PRINZIDE,ZESTORETIC) 20-12 5 MG per tablet Take 1 tablet by mouth 2 (two) times a day 180 tablet 1    Misc Natural Products (OSTEO BI-FLEX TRIPLE STRENGTH PO) Take 1 tablet by mouth 2 (two) times a day   nebivolol (BYSTOLIC) 5 mg tablet Take 1 tablet (5 mg total) by mouth daily 90 tablet 3    omeprazole (PriLOSEC) 40 MG capsule Take 1 capsule (40 mg total) by mouth daily 90 capsule 1    potassium chloride (KLOR-CON M20) 20 mEq tablet Take 1 tablet (20 mEq total) by mouth daily 90 tablet 1    pravastatin (PRAVACHOL) 20 mg tablet Take 1 tablet (20 mg total) by mouth daily 90 tablet 1    venlafaxine (EFFEXOR) 37 5 mg tablet Take 1 tablet (37 5 mg total) by mouth daily 90 tablet 1     No current facility-administered medications on file prior to visit        Allergies   Allergen Reactions    Adhesive [Medical Tape] Rash        Objective     /82   Pulse 80   Ht 5' 2" (1 575 m)   Wt 114 kg (250 lb 6 4 oz)   LMP  (LMP Unknown)   BMI 45 80 kg/m²      PE:  AAOx 3  WDWN  Hearing intact, no drainage from eyes  no audible wheezing  no abdominal distension  LE compartments soft, skin intact    Ortho Exam:  bilateral Knee:   No erythema  no swelling  no effusion  no warmth  AROM: full  Stable to varus/valgus stress  Tender to palpation over pes anserine bursa bilaterally     Large joint arthrocentesis: bilateral pes anserine bursa  Date/Time: 8/22/2019 9:22 AM  Consent given by: patient  Site marked: site marked  Timeout: Immediately prior to procedure a time out was called to verify the correct patient, procedure, equipment, support staff and site/side marked as required   Supporting Documentation  Indications: pain   Procedure Details  Location: knee - bilateral pes anserine bursa  Preparation: Patient was prepped and draped in the usual sterile fashion  Needle size: 25 G  Ultrasound guidance: no  Approach: anteromedial    Medications (Right): 1 mL lidocaine 1 %; 40 mg triamcinolone acetonide 40 mg/mLMedications (Left): 1 mL lidocaine 1 %; 40 mg triamcinolone acetonide 40 mg/mL   Patient tolerance: patient tolerated the procedure well with no immediate complications  Dressing:  Sterile dressing applied

## 2019-09-06 DIAGNOSIS — I10 ESSENTIAL HYPERTENSION: ICD-10-CM

## 2019-09-06 RX ORDER — AMLODIPINE BESYLATE 5 MG/1
TABLET ORAL
Qty: 90 TABLET | Refills: 1 | OUTPATIENT
Start: 2019-09-06

## 2019-09-10 ENCOUNTER — OFFICE VISIT (OUTPATIENT)
Dept: CARDIOLOGY CLINIC | Facility: CLINIC | Age: 74
End: 2019-09-10
Payer: COMMERCIAL

## 2019-09-10 VITALS
HEART RATE: 58 BPM | OXYGEN SATURATION: 97 % | RESPIRATION RATE: 19 BRPM | SYSTOLIC BLOOD PRESSURE: 136 MMHG | DIASTOLIC BLOOD PRESSURE: 82 MMHG | BODY MASS INDEX: 45.27 KG/M2 | HEIGHT: 62 IN | WEIGHT: 246 LBS

## 2019-09-10 DIAGNOSIS — I10 ESSENTIAL HYPERTENSION: Primary | ICD-10-CM

## 2019-09-10 DIAGNOSIS — E78.2 MIXED HYPERLIPIDEMIA: ICD-10-CM

## 2019-09-10 PROCEDURE — 93000 ELECTROCARDIOGRAM COMPLETE: CPT | Performed by: INTERNAL MEDICINE

## 2019-09-10 PROCEDURE — 3079F DIAST BP 80-89 MM HG: CPT | Performed by: INTERNAL MEDICINE

## 2019-09-10 PROCEDURE — 3075F SYST BP GE 130 - 139MM HG: CPT | Performed by: INTERNAL MEDICINE

## 2019-09-10 PROCEDURE — 99213 OFFICE O/P EST LOW 20 MIN: CPT | Performed by: INTERNAL MEDICINE

## 2019-09-10 RX ORDER — NEBIVOLOL 5 MG/1
5 TABLET ORAL DAILY
Qty: 90 TABLET | Refills: 3 | Status: SHIPPED | OUTPATIENT
Start: 2019-09-10 | End: 2020-06-15 | Stop reason: SDUPTHER

## 2019-09-10 NOTE — PROGRESS NOTES
Cardiology Follow Up    Noemi Nyhan [facility]  1945  [id number]  [address] [place] [id number] [phone]    Reason for visit: 6 month FU for HTN and HLP      1  Essential hypertension  POCT ECG   2  Mixed hyperlipidemia  POCT ECG       Interval History: Since her last visit she has monitored her BPs and they have been good  She does have GAINES which is unchanged  She denies CP or edema  She denies palpitations     She can get some positional lightheadedness which is brief      Patient Active Problem List   Diagnosis    History of thyroid cancer    GERD without esophagitis    Hyperlipidemia    Hypertension    Hypothyroidism, postsurgical    Impaired fasting glucose    Insomnia    Restrictive lung disease    Acute non-recurrent frontal sinusitis    Obstructive sleep apnea    Hypersomnia    Morbid obesity with BMI of 40 0-44 9, adult (HCC)    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HCC)    Humerus lesion, left    Acute shoulder bursitis, right    Lower extremity edema    Encounter for follow-up examination after completed treatment for malignant neoplasm    Thyroid cancer ([facility] [address] )    Primary osteoarthritis of left knee    Primary osteoarthritis of right knee    Pes anserine bursitis     Past Medical History:   Diagnosis Date    Asthma     Breathing difficulty     Bronchitis     Depression     Fracture of arm     Fracture of carpal bone     Hyperlipidemia     Hypertension     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[facility] [address] ) 12/16/2016    Shortness of breath     Thyroid nodule     Thyroid nodule     Thyroid nodule      Social History     Socioeconomic History    Marital status: /Civil Union     Spouse name: Not on file    Number of children: Not on file    Years of education: Not on file    Highest education level: Not on file   Occupational History    Not on file   Social Needs    Financial resource strain: Not on file    Food insecurity:     Worry: Not on file     Inability: Not on file    Transportation needs:     Medical: Not on file     Non-medical: Not on file   Tobacco Use    Smoking status: Former Smoker     Packs/day: 0 25     Years: 4 00     Pack years: 1 00     Types: Cigarettes     Last attempt to quit: 6/15/1981     Years since quittin 2    Smokeless tobacco: Never Used    Tobacco comment: quit 40 yrs ago (Never a smoker per [employer])   Substance and Sexual Activity    Alcohol use: No     Comment: occasional glass of wine    Drug use: No    Sexual activity: Never   Lifestyle    Physical activity:     Days per week: Not on file     Minutes per session: Not on file    Stress: Not on file   Relationships    Social connections:     Talks on phone: Not on file     Gets together: Not on file     Attends Sabianist service: Not on file     Active member of club or organization: Not on file     Attends meetings of clubs or organizations: Not on file     Relationship status: Not on file    Intimate partner violence:     Fear of current or ex partner: Not on file     Emotionally abused: Not on file     Physically abused: Not on file     Forced sexual activity: Not on file   Other Topics Concern    Not on file   Social History Narrative    Occasional caffeine consumption      Family History   Adopted: Yes   Problem Relation Age of Onset    Hypertension Family     Kidney disease Family     Hypertension Mother      Past Surgical History:   Procedure Laterality Date    HEMORRHOID SURGERY      MO THYROID LOBECTOMY,UNILAT Right 2016    Procedure: HEMITHYROIDECTOMY;  Surgeon: Milvia Millan MD;  Location:  MAIN OR;  Service: Surgical Oncology    MO THYROIDECTOMY POST PREV THYR SURG Left 2016    Procedure:  COMPLETION THYROIDECTOMY, FLEXIBLE LARYNGOSCOPY;  Surgeon: Milvia Millan MD;  [facility];  Service: Surgical Oncology    THYROIDECTOMY, PARTIAL Left        Current Outpatient Medications:     albuterol (VENTOLIN HFA) 90 mcg/act inhaler, Inhale 2 puffs every 4 (four) hours as needed for wheezing, Disp: 3 Inhaler, Rfl: 0    amLODIPine (NORVASC) 5 mg tablet, Take 1 tablet (5 mg total) by mouth daily, Disp: 90 tablet, Rfl: 1    diclofenac sodium (VOLTAREN) 1 %, Apply 2 g topically 4 (four) times a day, Disp: 1 Tube, Rfl: 0    fluticasone-vilanterol (BREO ELLIPTA) 100-25 mcg/inh inhaler, Inhale 1 puff daily Rinse mouth after use , Disp: 3 Inhaler, Rfl: 1    levothyroxine 175 mcg tablet, Take 1 tablet (175 mcg total) by mouth daily, Disp: 90 tablet, Rfl: 3    lisinopril-hydrochlorothiazide (PRINZIDE,ZESTORETIC) 20-12 5 MG per tablet, Take 1 tablet by mouth 2 (two) times a day, Disp: 180 tablet, Rfl: 1    Misc Natural Products (OSTEO BI-FLEX TRIPLE STRENGTH PO), Take 1 tablet by mouth 2 (two) times a day   , Disp: , Rfl:     nebivolol (BYSTOLIC) 5 mg tablet, Take 1 tablet (5 mg total) by mouth daily, Disp: 90 tablet, Rfl: 3    omeprazole (PriLOSEC) 40 MG capsule, Take 1 capsule (40 mg total) by mouth daily, Disp: 90 capsule, Rfl: 1    potassium chloride (KLOR-CON M20) 20 mEq tablet, Take 1 tablet (20 mEq total) by mouth daily, Disp: 90 tablet, Rfl: 1    pravastatin (PRAVACHOL) 20 mg tablet, Take 1 tablet (20 mg total) by mouth daily, Disp: 90 tablet, Rfl: 1    venlafaxine (EFFEXOR) 37 5 mg tablet, Take 1 tablet (37 5 mg total) by mouth daily, Disp: 90 tablet, Rfl: 1    furosemide (LASIX) 20 mg tablet, Take 1 tablet (20 mg total) by mouth every other day (Patient not taking: Reported on 9/10/2019), Disp: 90 tablet, Rfl: 1  Allergies   Allergen Reactions    Adhesive [Medical Tape] Rash       Review of Systems:  Review of Systems   Constitutional: Negative for appetite change, fatigue and unexpected weight change  Respiratory: Positive for shortness of breath  Negative for cough and wheezing  Gastrointestinal: Negative for abdominal pain, blood in stool and constipation  Genitourinary: Negative for frequency  Musculoskeletal: Positive for arthralgias and back pain  Neurological: Positive for light-headedness (positional)  Negative for dizziness  Physical Exam:  Vitals:    09/10/19 1411   BP: 136/82   BP Location: Right arm   Patient Position: Sitting   Cuff Size: Large   Pulse: 58   Resp: 19   SpO2: 97%   Weight: 112 kg (246 lb)   Height: 5' 2" (1 575 m)       Physical Exam   Constitutional: She appears well-developed and well-nourished  No distress  obese   HENT:   Head: Normocephalic and atraumatic  Mouth/Throat: Oropharynx is clear and moist  No oropharyngeal exudate  Eyes: Conjunctivae are normal  No scleral icterus  Neck: Neck supple  Normal carotid pulses and no JVD present  Carotid bruit is not present  No thyromegaly present  Cardiovascular: Normal rate, regular rhythm and intact distal pulses  Exam reveals no gallop and no friction rub  Murmur heard  Crescendo decrescendo systolic (basal) murmur is present with a grade of 1/6  No diastolic murmur is present  Pulmonary/Chest: Breath sounds normal  She has no wheezes  She has no rhonchi  She has no rales  Abdominal: Soft  She exhibits no mass  There is no hepatosplenomegaly  There is no tenderness  Musculoskeletal: She exhibits no edema  Skin: She is not diaphoretic  Discussion/Summary:  1  HTN-well controlled on nebivolol, amlodipine, and lisinopril HCTZ  Continue same  2  Mixed hyperlipidemia  On pravastatin with historically good control  Has not had a lipid panel in over 2 years    I have ordered this with upcoming blood work    Follow-up 9 months    Manjula Robertson MD

## 2019-10-03 ENCOUNTER — OFFICE VISIT (OUTPATIENT)
Dept: OBGYN CLINIC | Facility: MEDICAL CENTER | Age: 74
End: 2019-10-03
Payer: COMMERCIAL

## 2019-10-03 ENCOUNTER — LAB (OUTPATIENT)
Dept: LAB | Facility: MEDICAL CENTER | Age: 74
End: 2019-10-03
Payer: COMMERCIAL

## 2019-10-03 VITALS
BODY MASS INDEX: 45.93 KG/M2 | HEART RATE: 76 BPM | WEIGHT: 249.6 LBS | DIASTOLIC BLOOD PRESSURE: 80 MMHG | HEIGHT: 62 IN | SYSTOLIC BLOOD PRESSURE: 127 MMHG

## 2019-10-03 DIAGNOSIS — M17.12 PRIMARY OSTEOARTHRITIS OF LEFT KNEE: ICD-10-CM

## 2019-10-03 DIAGNOSIS — M17.11 PRIMARY OSTEOARTHRITIS OF RIGHT KNEE: Primary | ICD-10-CM

## 2019-10-03 DIAGNOSIS — C73 THYROID CANCER (HCC): ICD-10-CM

## 2019-10-03 DIAGNOSIS — E89.0 HYPOTHYROIDISM, POSTSURGICAL: ICD-10-CM

## 2019-10-03 DIAGNOSIS — Z08 ENCOUNTER FOR FOLLOW-UP EXAMINATION AFTER COMPLETED TREATMENT FOR MALIGNANT NEOPLASM: ICD-10-CM

## 2019-10-03 DIAGNOSIS — M17.0 PRIMARY OSTEOARTHRITIS OF BOTH KNEES: ICD-10-CM

## 2019-10-03 DIAGNOSIS — E78.2 MIXED HYPERLIPIDEMIA: ICD-10-CM

## 2019-10-03 DIAGNOSIS — R73.01 IMPAIRED FASTING GLUCOSE: ICD-10-CM

## 2019-10-03 LAB
ALBUMIN SERPL BCP-MCNC: 3.8 G/DL (ref 3.5–5)
ALP SERPL-CCNC: 48 U/L (ref 46–116)
ALT SERPL W P-5'-P-CCNC: 25 U/L (ref 12–78)
ANION GAP SERPL CALCULATED.3IONS-SCNC: 10 MMOL/L (ref 4–13)
AST SERPL W P-5'-P-CCNC: 16 U/L (ref 5–45)
BILIRUB SERPL-MCNC: 0.55 MG/DL (ref 0.2–1)
BUN SERPL-MCNC: 14 MG/DL (ref 5–25)
CALCIUM SERPL-MCNC: 9.4 MG/DL (ref 8.3–10.1)
CHLORIDE SERPL-SCNC: 102 MMOL/L (ref 100–108)
CHOLEST SERPL-MCNC: 179 MG/DL (ref 50–200)
CO2 SERPL-SCNC: 26 MMOL/L (ref 21–32)
CREAT SERPL-MCNC: 0.71 MG/DL (ref 0.6–1.3)
EST. AVERAGE GLUCOSE BLD GHB EST-MCNC: 123 MG/DL
GFR SERPL CREATININE-BSD FRML MDRD: 85 ML/MIN/1.73SQ M
GLUCOSE P FAST SERPL-MCNC: 145 MG/DL (ref 65–99)
HBA1C MFR BLD: 5.9 % (ref 4.2–6.3)
HDLC SERPL-MCNC: 46 MG/DL (ref 40–60)
LDLC SERPL CALC-MCNC: 83 MG/DL (ref 0–100)
NONHDLC SERPL-MCNC: 133 MG/DL
POTASSIUM SERPL-SCNC: 3.3 MMOL/L (ref 3.5–5.3)
PROT SERPL-MCNC: 7.3 G/DL (ref 6.4–8.2)
SODIUM SERPL-SCNC: 138 MMOL/L (ref 136–145)
T4 FREE SERPL-MCNC: 1.26 NG/DL (ref 0.76–1.46)
TRIGL SERPL-MCNC: 250 MG/DL
TSH SERPL DL<=0.05 MIU/L-ACNC: 0.54 UIU/ML (ref 0.36–3.74)

## 2019-10-03 PROCEDURE — 84439 ASSAY OF FREE THYROXINE: CPT

## 2019-10-03 PROCEDURE — 84443 ASSAY THYROID STIM HORMONE: CPT

## 2019-10-03 PROCEDURE — 20610 DRAIN/INJ JOINT/BURSA W/O US: CPT | Performed by: ORTHOPAEDIC SURGERY

## 2019-10-03 PROCEDURE — 80053 COMPREHEN METABOLIC PANEL: CPT

## 2019-10-03 PROCEDURE — 84432 ASSAY OF THYROGLOBULIN: CPT

## 2019-10-03 PROCEDURE — 80061 LIPID PANEL: CPT

## 2019-10-03 PROCEDURE — 86800 THYROGLOBULIN ANTIBODY: CPT

## 2019-10-03 PROCEDURE — 83036 HEMOGLOBIN GLYCOSYLATED A1C: CPT

## 2019-10-03 PROCEDURE — 36415 COLL VENOUS BLD VENIPUNCTURE: CPT

## 2019-10-03 RX ORDER — HYALURONATE SODIUM 10 MG/ML
20 SYRINGE (ML) INTRAARTICULAR
Status: COMPLETED | OUTPATIENT
Start: 2019-10-03 | End: 2019-10-03

## 2019-10-03 RX ADMIN — Medication 20 MG: at 09:31

## 2019-10-03 RX ADMIN — Medication 20 MG: at 09:30

## 2019-10-03 NOTE — PROGRESS NOTES
Assessment/Plan:  1  Primary osteoarthritis of right knee    2  Primary osteoarthritis of left knee    3  Primary osteoarthritis of both knees      Orders Placed This Encounter   Procedures    Large joint arthrocentesis: R knee    Large joint arthrocentesis: L knee     · Received bilateral knee Euflexxa #2 out of 3 series  injection today  Patient knows to ice and avoid strenuous activity for 1-2 days if needed  · Continue taking Tylenol arthritis  and using Diclofenac Gel prn for pain   · Refill of Diclofenac Gel was given in the office today   · Continue with home exercises   Return in about 1 week (around 10/10/2019) for Recheck Bilateral knee Euflexxa #2 out of 3 series   I answered all of the patient's questions during the visit and provided education of the patient's condition during the visit  The patient verbalized understanding of the information given and agrees with the plan  This note was dictated using [employer] software  It may contain errors including improperly dictated words  Please contact physician directly for any questions  Subjective   Chief Complaint   Chief Complaint   Patient presents with    Left Knee - Follow-up    Right Knee - Follow-up       Isidoro Kaur is a 69 y o  female who presents for follow up for bilateral knee OA  Patient had bilateral knee pes anserine CSI on  8/22/19 with 70 % relief  She had bilateral knee CSI on 6/19/19 with two months of relief  Patient states she is having  Achy pain over anteromedial and anterolateral bilateral knee, worse on the right  Patient states pain is worse with weight bearing and increase activity  She is doing her home exercises occassionally  She is taking Tylenol arthritis and using Diclofenac gel prn for pain  Denies any numbness or tingling  Patient has had bilateral knee Euflexxa injection on 4/1/19 with 85% relief  She is here today for Bilateral knee Euflexxa #1 out of 3 series injections       HPI    Review of Systems  ROS:    See HPI for musculoskeletal review     All other systems reviewed are negative     History:  Past Medical History:   Diagnosis Date    Asthma     Breathing difficulty     Bronchitis     Depression     Fracture of arm     Fracture of carpal bone     Hyperlipidemia     Hypertension     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    Shortness of breath     Thyroid nodule     Thyroid nodule     Thyroid nodule      Past Surgical History:   Procedure Laterality Date    HEMORRHOID SURGERY      RI THYROID LOBECTOMY,UNILAT Right 2016    Procedure: HEMITHYROIDECTOMY;  Surgeon: Grover Jones MD;  Location:  MAIN OR;  Service: Surgical Oncology    RI THYROIDECTOMY POST PREV THYR SURG Left 2016    Procedure:  COMPLETION THYROIDECTOMY, FLEXIBLE LARYNGOSCOPY;  Surgeon: Grover Jones MD;  [facility];  Service: Surgical Oncology    THYROIDECTOMY, PARTIAL Left      Social History   Social History     Substance and Sexual Activity   Alcohol Use No    Comment: occasional glass of wine     Social History     Substance and Sexual Activity   Drug Use No     Social History     Tobacco Use   Smoking Status Former Smoker    Packs/day: 0 25    Years: 4 00    Pack years: 1 00    Types: Cigarettes    Last attempt to quit: 6/15/1981    Years since quittin 3   Smokeless Tobacco Never Used   Tobacco Comment    quit 40 yrs ago (Never a smoker per [employer])     Family History:   Family History   Adopted: Yes   Problem Relation Age of Onset    Hypertension Family     Kidney disease Family     Hypertension Mother        Current Outpatient Medications on File Prior to Visit   Medication Sig Dispense Refill    albuterol (VENTOLIN HFA) 90 mcg/act inhaler Inhale 2 puffs every 4 (four) hours as needed for wheezing 3 Inhaler 0    amLODIPine (NORVASC) 5 mg tablet Take 1 tablet (5 mg total) by mouth daily 90 tablet 1    fluticasone-vilanterol (BREO ELLIPTA) 100-25 mcg/inh inhaler Inhale 1 puff daily Rinse mouth after use  3 Inhaler 1    furosemide (LASIX) 20 mg tablet Take 1 tablet (20 mg total) by mouth every other day (Patient not taking: Reported on 9/10/2019) 90 tablet 1    levothyroxine 175 mcg tablet Take 1 tablet (175 mcg total) by mouth daily 90 tablet 3    lisinopril-hydrochlorothiazide (PRINZIDE,ZESTORETIC) 20-12 5 MG per tablet Take 1 tablet by mouth 2 (two) times a day 180 tablet 1    Misc Natural Products (OSTEO BI-FLEX TRIPLE STRENGTH PO) Take 1 tablet by mouth 2 (two) times a day   nebivolol (BYSTOLIC) 5 mg tablet Take 1 tablet (5 mg total) by mouth daily 90 tablet 3    omeprazole (PriLOSEC) 40 MG capsule Take 1 capsule (40 mg total) by mouth daily 90 capsule 1    potassium chloride (KLOR-CON M20) 20 mEq tablet Take 1 tablet (20 mEq total) by mouth daily 90 tablet 1    pravastatin (PRAVACHOL) 20 mg tablet Take 1 tablet (20 mg total) by mouth daily 90 tablet 1    venlafaxine (EFFEXOR) 37 5 mg tablet Take 1 tablet (37 5 mg total) by mouth daily 90 tablet 1    [DISCONTINUED] diclofenac sodium (VOLTAREN) 1 % Apply 2 g topically 4 (four) times a day 1 Tube 0     No current facility-administered medications on file prior to visit        Allergies   Allergen Reactions    Adhesive [Medical Tape] Rash        Objective     /80   Pulse 76   Ht 5' 2" (1 575 m)   Wt 113 kg (249 lb 9 6 oz)   LMP  (LMP Unknown)   BMI 45 65 kg/m²      PE:  AAOx 3  WDWN  Hearing intact, no drainage from eyes  no audible wheezing  no abdominal distension  LE compartments soft, skin intact    Ortho Exam:  right Knee:   No erythema  no swelling  no effusion  no warmth  AROM: 5-100      Left knee   No erythema  no swelling  no effusion  no warmth  AROM: 5-100      Large joint arthrocentesis: R knee  Date/Time: 10/3/2019 9:30 AM  Consent given by: patient  Site marked: site marked  Timeout: Immediately prior to procedure a time out was called to verify the correct patient, procedure, equipment, support staff and site/side marked as required   Supporting Documentation  Indications: pain   Procedure Details  Location: knee - R knee  Preparation: Patient was prepped and draped in the usual sterile fashion  Needle size: 22 G  Ultrasound guidance: no  Approach: anterior  Medications administered: 20 mg Sodium Hyaluronate 20 MG/2ML    Patient tolerance: patient tolerated the procedure well with no immediate complications  Dressing:  Sterile dressing applied    Large joint arthrocentesis: L knee  Date/Time: 10/3/2019 9:31 AM  Consent given by: patient  Site marked: site marked  Timeout: Immediately prior to procedure a time out was called to verify the correct patient, procedure, equipment, support staff and site/side marked as required   Supporting Documentation  Indications: pain   Procedure Details  Location: knee - L knee  Preparation: Patient was prepped and draped in the usual sterile fashion  Needle size: 22 G  Ultrasound guidance: no  Approach: anterolateral  Medications administered: 20 mg Sodium Hyaluronate 20 MG/2ML    Patient tolerance: patient tolerated the procedure well with no immediate complications  Dressing:  Sterile dressing applied        Scribe Attestation    I,:   Yasir Wilson am acting as a scribe while in the presence of the attending physician :        I,:   Liam Watt DO personally performed the services described in this documentation    as scribed in my presence :

## 2019-10-04 LAB
THYROGLOB AB SERPL-ACNC: <1 IU/ML (ref 0–0.9)
THYROGLOB SERPL-MCNC: 2.2 NG/ML (ref 1.5–38.5)

## 2019-10-06 DIAGNOSIS — I10 ESSENTIAL HYPERTENSION: ICD-10-CM

## 2019-10-06 RX ORDER — POTASSIUM CHLORIDE 20 MEQ/1
20 TABLET, EXTENDED RELEASE ORAL 2 TIMES DAILY
Qty: 180 TABLET | Refills: 3 | Status: SHIPPED | OUTPATIENT
Start: 2019-10-06 | End: 2020-06-15 | Stop reason: SDUPTHER

## 2019-10-06 NOTE — PROGRESS NOTES
Called pt about labs    K a bit low    Increased KCL to BID and sent to [facility]  Lincoln Harrell

## 2019-10-07 LAB — SCAN RESULT: NORMAL

## 2019-10-10 ENCOUNTER — OFFICE VISIT (OUTPATIENT)
Dept: OBGYN CLINIC | Facility: MEDICAL CENTER | Age: 74
End: 2019-10-10
Payer: COMMERCIAL

## 2019-10-10 VITALS
DIASTOLIC BLOOD PRESSURE: 72 MMHG | HEART RATE: 84 BPM | SYSTOLIC BLOOD PRESSURE: 142 MMHG | WEIGHT: 251.2 LBS | HEIGHT: 62 IN | BODY MASS INDEX: 46.22 KG/M2

## 2019-10-10 DIAGNOSIS — M25.562 CHRONIC PAIN OF BOTH KNEES: ICD-10-CM

## 2019-10-10 DIAGNOSIS — M25.561 CHRONIC PAIN OF BOTH KNEES: ICD-10-CM

## 2019-10-10 DIAGNOSIS — G89.29 CHRONIC PAIN OF BOTH KNEES: ICD-10-CM

## 2019-10-10 DIAGNOSIS — M17.11 PRIMARY OSTEOARTHRITIS OF RIGHT KNEE: Primary | ICD-10-CM

## 2019-10-10 DIAGNOSIS — M17.12 PRIMARY OSTEOARTHRITIS OF LEFT KNEE: ICD-10-CM

## 2019-10-10 PROCEDURE — 20610 DRAIN/INJ JOINT/BURSA W/O US: CPT | Performed by: ORTHOPAEDIC SURGERY

## 2019-10-10 RX ORDER — HYALURONATE SODIUM 10 MG/ML
20 SYRINGE (ML) INTRAARTICULAR
Status: COMPLETED | OUTPATIENT
Start: 2019-10-10 | End: 2019-10-10

## 2019-10-10 RX ADMIN — Medication 20 MG: at 09:13

## 2019-10-10 NOTE — PROGRESS NOTES
Assessment/Plan:  No diagnosis found  Orders Placed This Encounter   Procedures    Large joint arthrocentesis    Large joint arthrocentesis       · 2nd Bilateral Euflexxa injections provided  · Patient to scheduld in one 4-6 weeks for bilateral pes anserine steroid injections  · Follow up in one week for 3rd Euflexxa injections    Return in about 1 week (around 10/17/2019)  I answered all of the patient's questions during the visit and provided education of the patient's condition during the visit  The patient verbalized understanding of the information given and agrees with the plan  This note was dictated using [employer] software  It may contain errors including improperly dictated words  Please contact physician directly for any questions  Subjective   Chief Complaint:   Chief Complaint   Patient presents with    Left Knee - Follow-up    Right Knee - Follow-up       CSI pes anserine - 8/22/19 with some benefit  Bilateral Euflexxa 10/2019      Erwin Burnham is a 69 y o  female who presents for 2nd bilateral Euflexxa injections  She is s/p 1st bilateral Euflexxa injections 10/3/19 with benefit  Today she complains of right medial and lateral > left medial knee pain  She rates her pain 6/10  She does use tylenol with benefit  She denies past knee surgery  Review of Systems  ROS:    See HPI for musculoskeletal review     All other systems reviewed are negative     History:  Past Medical History:   Diagnosis Date    Asthma     Breathing difficulty     Bronchitis     Depression     Fracture of arm     Fracture of carpal bone     Hyperlipidemia     Hypertension     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[facility] [address] ) 12/16/2016    Shortness of breath     Thyroid nodule     Thyroid nodule     Thyroid nodule      Past Surgical History:   Procedure Laterality Date    HEMORRHOID SURGERY      HI THYROID LOBECTOMY,UNILAT Right 2/25/2016    Procedure: HEMITHYROIDECTOMY;  Surgeon: Royal Joy MD;  [facility];  Service: Surgical Oncology    [place] THYROIDECTOMY POST PREV THYR SURG Left 2016    Procedure:  COMPLETION THYROIDECTOMY, FLEXIBLE LARYNGOSCOPY;  Surgeon: Luli Fuentes MD;  [facility];  Service: Surgical Oncology    THYROIDECTOMY, PARTIAL Left      Social History   Social History     Substance and Sexual Activity   Alcohol Use No    Comment: occasional glass of wine     Social History     Substance and Sexual Activity   Drug Use No     Social History     Tobacco Use   Smoking Status Former Smoker    Packs/day: 0 25    Years: 4 00    Pack years: 1 00    Types: Cigarettes    Last attempt to quit: 6/15/1981    Years since quittin 3   Smokeless Tobacco Never Used   Tobacco Comment    quit 40 yrs ago (Never a smoker per [employer])     Family History:   Family History   Adopted: Yes   Problem Relation Age of Onset    Hypertension Family     Kidney disease Family     Hypertension Mother        Meds/Allergies     (Not in a hospital admission)  Allergies   Allergen Reactions    Adhesive [Medical Tape] Rash          Objective     /72   Pulse 84   Ht 5' 2" (1 575 m)   Wt 114 kg (251 lb 3 2 oz)   LMP  (LMP Unknown)   BMI 45 95 kg/m²      PE:  AAOx 3  WDWN  Hearing intact, no drainage from eyes  no audible wheezing  no abdominal distension  LE compartments soft, skin intact    Ortho Exam:  bilateral Knee:   No erythema  no swelling  no effusion  no warmth  AROM: full 5-100 bilaterally   Stable to varus/valgus stress    Imaging Studies: I have personally reviewed pertinent reports         Large joint arthrocentesis: R knee  Date/Time: 10/10/2019 9:13 AM  Consent given by: patient  Site marked: site marked  Timeout: Immediately prior to procedure a time out was called to verify the correct patient, procedure, equipment, support staff and site/side marked as required   Supporting Documentation  Indications: pain   Procedure Details  Location: knee - R knee  Preparation: Patient was prepped and draped in the usual sterile fashion  Needle size: 22 G  Ultrasound guidance: no  Approach: anterolateral  Medications administered: 20 mg Sodium Hyaluronate 20 MG/2ML    Patient tolerance: patient tolerated the procedure well with no immediate complications  Dressing:  Sterile dressing applied    Large joint arthrocentesis: L knee  Date/Time: 10/10/2019 9:13 AM  Consent given by: patient  Site marked: site marked  Timeout: Immediately prior to procedure a time out was called to verify the correct patient, procedure, equipment, support staff and site/side marked as required   Supporting Documentation  Indications: pain   Procedure Details  Location: knee - L knee  Preparation: Patient was prepped and draped in the usual sterile fashion  Needle size: 22 G  Ultrasound guidance: no  Approach: anterolateral  Medications administered: 20 mg Sodium Hyaluronate 20 MG/2ML    Patient tolerance: patient tolerated the procedure well with no immediate complications  Dressing:  Sterile dressing applied            Scribe Attestation    I,:   Kalyan Horton am acting as a scribe while in the presence of the attending physician :        I,:   Evelyn Contreras DO personally performed the services described in this documentation    as scribed in my presence :

## 2019-10-14 ENCOUNTER — TELEPHONE (OUTPATIENT)
Dept: ENDOCRINOLOGY | Facility: CLINIC | Age: 74
End: 2019-10-14

## 2019-10-14 NOTE — TELEPHONE ENCOUNTER
----- Message from Prasanna Christianson MD sent at 10/14/2019 11:22 AM EDT -----  Labs to be discussed on upcoming appointment

## 2019-10-15 PROBLEM — C73 THYROID CANCER (HCC): Status: RESOLVED | Noted: 2019-05-16 | Resolved: 2019-10-15

## 2019-10-15 PROBLEM — Z85.850 ENCOUNTER FOR FOLLOW-UP SURVEILLANCE OF THYROID CANCER: Status: ACTIVE | Noted: 2019-04-01

## 2019-10-16 ENCOUNTER — HOSPITAL ENCOUNTER (OUTPATIENT)
Dept: ULTRASOUND IMAGING | Facility: HOSPITAL | Age: 74
Discharge: HOME | End: 2019-10-16
Attending: SURGERY
Payer: COMMERCIAL

## 2019-10-16 DIAGNOSIS — Z08 ENCOUNTER FOR FOLLOW-UP EXAMINATION AFTER COMPLETED TREATMENT FOR MALIGNANT NEOPLASM: ICD-10-CM

## 2019-10-16 PROCEDURE — 76536 US EXAM OF HEAD AND NECK: CPT

## 2019-10-18 ENCOUNTER — OFFICE VISIT (OUTPATIENT)
Dept: SURGICAL ONCOLOGY | Facility: CLINIC | Age: 74
End: 2019-10-18
Payer: COMMERCIAL

## 2019-10-18 ENCOUNTER — OFFICE VISIT (OUTPATIENT)
Dept: OBGYN CLINIC | Facility: MEDICAL CENTER | Age: 74
End: 2019-10-18
Payer: COMMERCIAL

## 2019-10-18 VITALS
TEMPERATURE: 63 F | RESPIRATION RATE: 16 BRPM | DIASTOLIC BLOOD PRESSURE: 80 MMHG | BODY MASS INDEX: 47.11 KG/M2 | WEIGHT: 256 LBS | HEART RATE: 99 BPM | SYSTOLIC BLOOD PRESSURE: 130 MMHG | HEIGHT: 62 IN

## 2019-10-18 VITALS
HEART RATE: 76 BPM | BODY MASS INDEX: 45.95 KG/M2 | DIASTOLIC BLOOD PRESSURE: 94 MMHG | SYSTOLIC BLOOD PRESSURE: 173 MMHG | HEIGHT: 62 IN

## 2019-10-18 DIAGNOSIS — M17.11 PRIMARY OSTEOARTHRITIS OF RIGHT KNEE: ICD-10-CM

## 2019-10-18 DIAGNOSIS — Z85.850 ENCOUNTER FOR FOLLOW-UP SURVEILLANCE OF THYROID CANCER: Primary | ICD-10-CM

## 2019-10-18 DIAGNOSIS — Z85.850 HISTORY OF THYROID CANCER: ICD-10-CM

## 2019-10-18 DIAGNOSIS — Z08 ENCOUNTER FOR FOLLOW-UP SURVEILLANCE OF THYROID CANCER: Primary | ICD-10-CM

## 2019-10-18 DIAGNOSIS — M17.12 PRIMARY OSTEOARTHRITIS OF LEFT KNEE: Primary | ICD-10-CM

## 2019-10-18 PROCEDURE — 20610 DRAIN/INJ JOINT/BURSA W/O US: CPT | Performed by: ORTHOPAEDIC SURGERY

## 2019-10-18 PROCEDURE — 99214 OFFICE O/P EST MOD 30 MIN: CPT | Performed by: SURGERY

## 2019-10-18 RX ORDER — HYALURONATE SODIUM 10 MG/ML
20 SYRINGE (ML) INTRAARTICULAR
Status: COMPLETED | OUTPATIENT
Start: 2019-10-18 | End: 2019-10-18

## 2019-10-18 RX ADMIN — Medication 20 MG: at 09:56

## 2019-10-18 NOTE — PROGRESS NOTES
Assessment/Plan:  1  Primary osteoarthritis of left knee    2  Primary osteoarthritis of right knee      No orders of the defined types were placed in this encounter  · 3rd of 3 Bilateral Euflexxa injections provided and tolerated well  · Patient to schedule in one 4-6 weeks for bilateral pes anserine steroid injections  · Continue with ice and analgesics as needed  Return in about 6 weeks (around 11/29/2019)  I answered all of the patient's questions during the visit and provided education of the patient's condition during the visit  The patient verbalized understanding of the information given and agrees with the plan  This note was dictated using [employer] software  It may contain errors including improperly dictated words  Please contact physician directly for any questions  Subjective   Chief Complaint:   Chief Complaint   Patient presents with    Left Knee - Follow-up    Right Knee - Follow-up       CSI pes anserine - 8/22/19 with some benefit  Bilateral Euflexxa 10/2019      Inocente Hoffmann is a 69 y o  female who presents for 2nd bilateral Euflexxa injections  She is s/p 2 bilateral Euflexxa injections and already feels that she has improved significantly up to about 80% in both knees  She continues to have more discomfort in her right knee compared to the left  She does use tylenol with benefit  She denies past knee surgery  Review of Systems  ROS:    See HPI for musculoskeletal review     All other systems reviewed are negative     History:  Past Medical History:   Diagnosis Date    Asthma     Breathing difficulty     Bronchitis     Depression     Fracture of arm     Fracture of carpal bone     Hyperlipidemia     Hypertension     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[facility] [address] ) 12/16/2016    Shortness of breath     Thyroid nodule     Thyroid nodule     Thyroid nodule      Past Surgical History:   Procedure Laterality Date    HEMORRHOID SURGERY      NE THYROID LOBECTOMY,UNILAT Right 2016    Procedure: HEMITHYROIDECTOMY;  Surgeon: Johnathan Brown MD;  [facility];  Service: Surgical Oncology    MA THYROIDECTOMY POST PREV THYR SURG Left 2016    Procedure:  COMPLETION THYROIDECTOMY, FLEXIBLE LARYNGOSCOPY;  Surgeon: Johnathan Brown MD;  Location: [facility];  Service: Surgical Oncology    THYROIDECTOMY, PARTIAL Left      Social History   Social History     Substance and Sexual Activity   Alcohol Use No    Comment: occasional glass of wine     Social History     Substance and Sexual Activity   Drug Use No     Social History     Tobacco Use   Smoking Status Former Smoker    Packs/day: 0 25    Years: 4 00    Pack years: 1 00    Types: Cigarettes    Last attempt to quit: 6/15/1981    Years since quittin 3   Smokeless Tobacco Never Used   Tobacco Comment    quit 40 yrs ago (Never a smoker per [employer])     Family History:   Family History   Adopted: Yes   Problem Relation Age of Onset    Hypertension Family     Kidney disease Family     Hypertension Mother        Meds/Allergies     (Not in a hospital admission)  Allergies   Allergen Reactions    Adhesive [Medical Tape] Rash          Objective     BP (!) 173/94   Pulse 76   Ht 5' 2" (1 575 m)   LMP  (LMP Unknown)   BMI 45 95 kg/m²      PE:  AAOx 3  WDWN  Hearing intact, no drainage from eyes  no audible wheezing  no abdominal distension  LE compartments soft, skin intact    Ortho Exam:  bilateral Knee:   No erythema  no swelling  no effusion  no warmth  AROM: full 5-100 bilaterally   Stable to varus/valgus stress    Imaging Studies: I have personally reviewed pertinent reports         Large joint arthrocentesis: R knee  Date/Time: 10/18/2019 9:56 AM  Consent given by: patient  Site marked: site marked  Timeout: Immediately prior to procedure a time out was called to verify the correct patient, procedure, equipment, support staff and site/side marked as required   Supporting Documentation  Indications: pain   Procedure Details  Location: knee - R knee  Needle size: 22 G  Approach: anterolateral  Medications administered: 20 mg Sodium Hyaluronate 20 MG/2ML    Patient tolerance: patient tolerated the procedure well with no immediate complications  Dressing:  Sterile dressing applied    Large joint arthrocentesis: L knee  Date/Time: 10/18/2019 9:56 AM  Consent given by: patient  Supporting Documentation  Indications: pain   Procedure Details  Location: knee - L knee  Needle size: 22 G  Ultrasound guidance: no  Medications administered: 20 mg Sodium Hyaluronate 20 MG/2ML    Patient tolerance: patient tolerated the procedure well with no immediate complications  Dressing:  Sterile dressing applied            Scribe Attestation    I,:    am acting as a scribe while in the presence of the attending physician :        I,:    personally performed the services described in this documentation    as scribed in my presence :

## 2019-10-18 NOTE — LETTER
October 18, 2019     [facility] [address] [address]    Patient: Destin Winkler   YOB: 1945   Date of Visit: 10/18/2019       Dear Dr Anel Brooks:    Thank you for referring Maria Luisa Lal to me for evaluation  Below are my notes for this consultation  If you have questions, please do not hesitate to call me  I look forward to following your patient along with you  Sincerely,        Vance Marin MD        CC: MD Dejan Talbot MD Elma Bolster, MD Robertha Ned, MD  10/18/2019  3:39 PM  Sign at close encounter     Surgical Oncology Follow Up       [facility]  [address] [facility]  1945  [id number]  [facility]  [address] [facility] [address]    Chief Complaint   Patient presents with    Follow-up     6 month follow up  Assessment/Plan:    History of stage I thyroid cancer now in ED  Plan on follow-up in 1 year with blood work and ultrasound at that time  No problem-specific Assessment & Plan notes found for this encounter  Diagnoses and all orders for this visit:    Encounter for follow-up surveillance of thyroid cancer    History of thyroid cancer        Advance Care Planning/Advance Directives:  Discussed disease status, cancer treatment plans and/or cancer treatment goals with the patient  History of thyroid cancer    2/5/2016 Biopsy     Right thyroid biopsy    [place] III        2/25/2016 Surgery     Right pierre thyroidectomy (Dr Nicky Merlos)    Thyroid, right lobe (11 gram), lobectomy:  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, 3 distinct foci, measuring 0 5, 1 5 and 1 7 in greatest dimensions    All confined to the thyroid    Stage I - T1b pNX      5/4/2016 Biopsy     Left anterior lymph node biopsy    A -B -C   Lymph Node, left anterior jugular chain inferior (Thin-prep, smears and cell block preparations):  Specimen consists of unremarkable-appearing follicular cells in small groups and flat sheets associated with scant colloid  6/7/2016 Surgery     Completion thyroidectomy     Left thyroid remnant, left completion thyroidectomy:             - Follicular adenoma with oncocytic features   - No dysplasia or malignancy is identified  History of Present Illness:  Primitivo Bernard is a 70-year-old woman here for follow-up/surveillance   -Interval History:  She has a history of stage I thyroid cancer and status post pierre then completion thyroidectomy  She is doing well and has no new complaints to report  She had blood work and ultrasound done in anticipation of today's visit  Review of Systems:  Review of Systems   Constitutional: Negative  HENT: Negative  Eyes: Negative  Respiratory: Negative  Cardiovascular: Negative  Gastrointestinal: Negative  Endocrine: Negative  Genitourinary: Negative  Musculoskeletal: Negative  Skin: Negative  Allergic/Immunologic: Negative  Neurological: Negative  Hematological: Negative  Psychiatric/Behavioral: Negative          Patient Active Problem List   Diagnosis    History of thyroid cancer    GERD without esophagitis    Hyperlipidemia    Hypertension    Hypothyroidism, postsurgical    Impaired fasting glucose    Insomnia    Restrictive lung disease    Acute non-recurrent frontal sinusitis    Obstructive sleep apnea    Hypersomnia    Morbid obesity with BMI of 40 0-44 9, adult (HCC)    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HCC)    Humerus lesion, left    Acute shoulder bursitis, right    Lower extremity edema    Encounter for follow-up surveillance of thyroid cancer    Primary osteoarthritis of left knee    Primary osteoarthritis of right knee    Pes anserine bursitis     Past Medical History:   Diagnosis Date    Asthma     Breathing difficulty     Bronchitis     Depression     Fracture of arm     Fracture of carpal bone     Hyperlipidemia     Hypertension     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HCC) 2016    Shortness of breath     Thyroid nodule     Thyroid nodule     Thyroid nodule      Past Surgical History:   Procedure Laterality Date    HEMORRHOID SURGERY      RI THYROID LOBECTOMY,UNILAT Right 2016    Procedure: HEMITHYROIDECTOMY;  Surgeon: Neo Ramos MD;  Location:  MAIN OR;  Service: Surgical Oncology    RI THYROIDECTOMY POST PREV THYR SURG Left 2016    Procedure:  COMPLETION THYROIDECTOMY, FLEXIBLE LARYNGOSCOPY;  Surgeon: Neo Ramos MD;  [facility];  Service: Surgical Oncology    THYROIDECTOMY, PARTIAL Left      Family History   Adopted: Yes   Problem Relation Age of Onset    Hypertension Family     Kidney disease Family     Hypertension Mother      Social History     Socioeconomic History    Marital status: /Civil Union     Spouse name: Not on file    Number of children: Not on file    Years of education: Not on file    Highest education level: Not on file   Occupational History    Not on file   Social Needs    Financial resource strain: Not on file    Food insecurity:     Worry: Not on file     Inability: Not on file    Transportation needs:     Medical: Not on file     Non-medical: Not on file   Tobacco Use    Smoking status: Former Smoker     Packs/day: 0 25     Years: 4 00     Pack years: 1 00     Types: Cigarettes     Last attempt to quit: 6/15/1981     Years since quittin 3    Smokeless tobacco: Never Used    Tobacco comment: quit 40 yrs ago (Never a smoker per [employer])   Substance and Sexual Activity    Alcohol use: No     Comment: occasional glass of wine    Drug use: No    Sexual activity: Never   Lifestyle    Physical activity:     Days per week: Not on file     Minutes per session: Not on file    Stress: Not on file   Relationships    Social connections: Talks on phone: Not on file     Gets together: Not on file     Attends [employer] service: Not on file     Active member of club or organization: Not on file     Attends meetings of clubs or organizations: Not on file     Relationship status: Not on file    Intimate partner violence:     Fear of current or ex partner: Not on file     Emotionally abused: Not on file     Physically abused: Not on file     Forced sexual activity: Not on file   Other Topics Concern    Not on file   Social History Narrative    Occasional caffeine consumption       Current Outpatient Medications:     albuterol (VENTOLIN HFA) 90 mcg/act inhaler, Inhale 2 puffs every 4 (four) hours as needed for wheezing, Disp: 3 Inhaler, Rfl: 0    amLODIPine (NORVASC) 5 mg tablet, Take 1 tablet (5 mg total) by mouth daily, Disp: 90 tablet, Rfl: 1    diclofenac sodium (VOLTAREN) 1 %, Apply 2 g topically 4 (four) times a day as needed (knee pain), Disp: 1 Tube, Rfl: 1    fluticasone-vilanterol (BREO ELLIPTA) 100-25 mcg/inh inhaler, Inhale 1 puff daily Rinse mouth after use , Disp: 3 Inhaler, Rfl: 1    furosemide (LASIX) 20 mg tablet, Take 1 tablet (20 mg total) by mouth every other day, Disp: 90 tablet, Rfl: 1    levothyroxine 175 mcg tablet, Take 1 tablet (175 mcg total) by mouth daily, Disp: 90 tablet, Rfl: 3    lisinopril-hydrochlorothiazide (PRINZIDE,ZESTORETIC) 20-12 5 MG per tablet, Take 1 tablet by mouth 2 (two) times a day, Disp: 180 tablet, Rfl: 1    nebivolol (BYSTOLIC) 5 mg tablet, Take 1 tablet (5 mg total) by mouth daily, Disp: 90 tablet, Rfl: 3    omeprazole (PriLOSEC) 40 MG capsule, Take 1 capsule (40 mg total) by mouth daily, Disp: 90 capsule, Rfl: 1    potassium chloride (KLOR-CON M20) 20 mEq tablet, Take 1 tablet (20 mEq total) by mouth 2 (two) times a day, Disp: 180 tablet, Rfl: 3    pravastatin (PRAVACHOL) 20 mg tablet, Take 1 tablet (20 mg total) by mouth daily, Disp: 90 tablet, Rfl: 1    venlafaxine (EFFEXOR) 37 5 mg tablet, Take 1 tablet (37 5 mg total) by mouth daily, Disp: 90 tablet, Rfl: 1  No current facility-administered medications for this visit  Allergies   Allergen Reactions    Adhesive [Medical Tape] Rash     Vitals:    10/18/19 1512   BP: 130/80   Pulse: 99   Resp: 16   Temp: (!) 63 °F (17 2 °C)       Physical Exam   Constitutional: She is oriented to person, place, and time  She appears well-developed and well-nourished  HENT:   Head: Normocephalic and atraumatic  Right Ear: External ear normal    Left Ear: External ear normal    Eyes: Pupils are equal, round, and reactive to light  EOM are normal    Neck: Normal range of motion  Neck supple  No JVD present  No spinous process tenderness present  No tracheal deviation, no edema, no erythema and normal range of motion present  No thyroid mass and no thyromegaly present  Cardiovascular: Normal rate, regular rhythm and normal heart sounds  Pulmonary/Chest: Effort normal and breath sounds normal    Abdominal: Soft  Bowel sounds are normal    Musculoskeletal: Normal range of motion  Lymphadenopathy:     She has no cervical adenopathy  Neurological: She is alert and oriented to person, place, and time  Skin: Skin is warm and dry  Psychiatric: She has a normal mood and affect  Her behavior is normal  Judgment and thought content normal          Results:  Labs:  postsurgical; Papilla    Ref Range & Units 10/3/19  8:29 AM   Thyroglobulin-BALJIT 1 5 - 38 5 ng/mL 2 2    Comment: According to the [facility] of Clinical Biochemistry, the   reference interval for Thyroglobulin (TG) should be related to   euthyroid patients and not for patients who underwent thyroidectomy  TG reference intervals for these patients depend on the residual   mass of the thyroid tissue left after surgery  Establishing a   post-operative baseline is recommended     The assay limit of quantitation is 0 1 ng/mL   Thyroglobulin measured by [facility] Immunometric Assay Narrative     Performed at: [facility] [address]  [id number]  : Rehan Titus MD, Phone:  [phone]      Specimen Collected: 10/03/19  8:29 AM           Lab Flowsheet      Order Details      View Encounter      Lab and Collection Details      Routing      Result History               Result Notes for Thyroglobulin        Thyroglobulin   Order: [id number]   Status:  Final result   Visible to patient:  Yes (MyChart)   Next appt:  10/22/2019 at 01:00 PM in Endocrinology Ana Hardin MD)   Dx:  Hypothyroidism, postsurgical; Papilla    Ref Range & Units 10/3/19  8:29 AM   Thyroglobulin Ab 0 0 - 0 9 IU/mL <1 0    Comment: Thyroglobulin Antibody measured by [facility] Method[facility]             Imaging    Us Head Neck Lymph Node Mapping    Result Date: 10/16/2019  Narrative: NECK ULTRASOUND INDICATION:     Z08: Encounter for follow-up examination after completed treatment for malignant neoplasm  COMPARISON:  Neck sonogram 3/15/2019 FINDINGS: Ultrasound of the thyroidectomy bed and cervical lymph node chains was performed with a high frequency linear transducer  There is no suspicion of recurrent mass in the thyroidectomy bed  Lymph nodes maintain normal morphologic contour, echogenicity and short axis dimensions of less than 0 7 cm  No evidence for microcalcification or focal nodularity  Impression: No evidence of recurrent or metastatic disease  Workstation performed: [id number]     I reviewed the above laboratory and imaging data  Discussion/Summary:  History of stage I thyroid cancer, presently without evidence of disease recurrence  Plan on follow-up in 1 year with blood work and ultrasound at that time

## 2019-10-18 NOTE — PROGRESS NOTES
Surgical Oncology Follow Up       [facility]  CANCER [facility] SURGICAL ONCOLOGY [facility] [id number]    Bob Noel [facility]  1945  [id number]  [facility] SURGICAL ONCOLOGY [place]  [place] [address]    Chief Complaint   Patient presents with    Follow-up     6 month follow up  Assessment/Plan:    History of stage I thyroid cancer now in ED  Plan on follow-up in 1 year with blood work and ultrasound at that time  No problem-specific Assessment & Plan notes found for this encounter  Diagnoses and all orders for this visit:    Encounter for follow-up surveillance of thyroid cancer    History of thyroid cancer        Advance Care Planning/Advance Directives:  Discussed disease status, cancer treatment plans and/or cancer treatment goals with the patient  History of thyroid cancer    2/5/2016 Biopsy     Right thyroid biopsy    [place] III        2/25/2016 Surgery     Right pierre thyroidectomy (Dr Gildardo Hoyos)    Thyroid, right lobe (11 gram), lobectomy:  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, 3 distinct foci, measuring 0 5, 1 5 and 1 7 in greatest dimensions  All confined to the thyroid    Stage I - T1b pNX      5/4/2016 Biopsy     Left anterior lymph node biopsy    A -B -C   Lymph Node, left anterior jugular chain inferior (Thin-prep, smears and cell block preparations):  Specimen consists of unremarkable-appearing follicular cells in small groups and flat sheets associated with scant colloid  6/7/2016 Surgery     Completion thyroidectomy     Left thyroid remnant, left completion thyroidectomy:             - Follicular adenoma with oncocytic features   - No dysplasia or malignancy is identified           History of Present Illness:  Liza Grant is a 70-year-old woman here for follow-up/surveillance   -Interval History:  She has a history of stage I thyroid cancer and status post pierre then completion thyroidectomy  She is doing well and has no new complaints to report  She had blood work and ultrasound done in anticipation of today's visit  Review of Systems:  Review of Systems   Constitutional: Negative  HENT: Negative  Eyes: Negative  Respiratory: Negative  Cardiovascular: Negative  Gastrointestinal: Negative  Endocrine: Negative  Genitourinary: Negative  Musculoskeletal: Negative  Skin: Negative  Allergic/Immunologic: Negative  Neurological: Negative  Hematological: Negative  Psychiatric/Behavioral: Negative          Patient Active Problem List   Diagnosis    History of thyroid cancer    GERD without esophagitis    Hyperlipidemia    Hypertension    Hypothyroidism, postsurgical    Impaired fasting glucose    Insomnia    Restrictive lung disease    Acute non-recurrent frontal sinusitis    Obstructive sleep apnea    Hypersomnia    Morbid obesity with BMI of 40 0-44 9, adult (HCC)    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HCC)    Humerus lesion, left    Acute shoulder bursitis, right    Lower extremity edema    Encounter for follow-up surveillance of thyroid cancer    Primary osteoarthritis of left knee    Primary osteoarthritis of right knee    Pes anserine bursitis     Past Medical History:   Diagnosis Date    Asthma     Breathing difficulty     Bronchitis     Depression     Fracture of arm     Fracture of carpal bone     Hyperlipidemia     Hypertension     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[facility] [address] ) 12/16/2016    Shortness of breath     Thyroid nodule     Thyroid nodule     Thyroid nodule      Past Surgical History:   Procedure Laterality Date    HEMORRHOID SURGERY      AL THYROID LOBECTOMY,UNILAT Right 2/25/2016    Procedure: HEMITHYROIDECTOMY;  Surgeon: Rod Blanco MD;  [facility];  Service: Surgical Oncology    AL THYROIDECTOMY POST PREV THYR SURG Left 6/6/2016    Procedure:  COMPLETION THYROIDECTOMY, FLEXIBLE LARYNGOSCOPY;  Surgeon: Whit Mcallister MD;  [facility];  Service: Surgical Oncology    THYROIDECTOMY, PARTIAL Left      Family History   Adopted: Yes   Problem Relation Age of Onset    Hypertension Family     Kidney disease Family     Hypertension Mother      Social History     Socioeconomic History    Marital status: /Civil Union     Spouse name: Not on file    Number of children: Not on file    Years of education: Not on file    Highest education level: Not on file   Occupational History    Not on file   Social Needs    Financial resource strain: Not on file    Food insecurity:     Worry: Not on file     Inability: Not on file    Transportation needs:     Medical: Not on file     Non-medical: Not on file   Tobacco Use    Smoking status: Former Smoker     Packs/day: 0 25     Years: 4 00     Pack years: 1 00     Types: Cigarettes     Last attempt to quit: 6/15/1981     Years since quittin 3    Smokeless tobacco: Never Used    Tobacco comment: quit 40 yrs ago (Never a smoker per [employer])   Substance and Sexual Activity    Alcohol use: No     Comment: occasional glass of wine    Drug use: No    Sexual activity: Never   Lifestyle    Physical activity:     Days per week: Not on file     Minutes per session: Not on file    Stress: Not on file   Relationships    Social connections:     Talks on phone: Not on file     Gets together: Not on file     Attends Islam service: Not on file     Active member of club or organization: Not on file     Attends meetings of clubs or organizations: Not on file     Relationship status: Not on file    Intimate partner violence:     Fear of current or ex partner: Not on file     Emotionally abused: Not on file     Physically abused: Not on file     Forced sexual activity: Not on file   Other Topics Concern    Not on file   Social History Narrative    Occasional caffeine consumption       Current Outpatient Medications:     albuterol (VENTOLIN HFA) 90 mcg/act inhaler, Inhale 2 puffs every 4 (four) hours as needed for wheezing, Disp: 3 Inhaler, Rfl: 0    amLODIPine (NORVASC) 5 mg tablet, Take 1 tablet (5 mg total) by mouth daily, Disp: 90 tablet, Rfl: 1    diclofenac sodium (VOLTAREN) 1 %, Apply 2 g topically 4 (four) times a day as needed (knee pain), Disp: 1 Tube, Rfl: 1    fluticasone-vilanterol (BREO ELLIPTA) 100-25 mcg/inh inhaler, Inhale 1 puff daily Rinse mouth after use , Disp: 3 Inhaler, Rfl: 1    furosemide (LASIX) 20 mg tablet, Take 1 tablet (20 mg total) by mouth every other day, Disp: 90 tablet, Rfl: 1    levothyroxine 175 mcg tablet, Take 1 tablet (175 mcg total) by mouth daily, Disp: 90 tablet, Rfl: 3    lisinopril-hydrochlorothiazide (PRINZIDE,ZESTORETIC) 20-12 5 MG per tablet, Take 1 tablet by mouth 2 (two) times a day, Disp: 180 tablet, Rfl: 1    nebivolol (BYSTOLIC) 5 mg tablet, Take 1 tablet (5 mg total) by mouth daily, Disp: 90 tablet, Rfl: 3    omeprazole (PriLOSEC) 40 MG capsule, Take 1 capsule (40 mg total) by mouth daily, Disp: 90 capsule, Rfl: 1    potassium chloride (KLOR-CON M20) 20 mEq tablet, Take 1 tablet (20 mEq total) by mouth 2 (two) times a day, Disp: 180 tablet, Rfl: 3    pravastatin (PRAVACHOL) 20 mg tablet, Take 1 tablet (20 mg total) by mouth daily, Disp: 90 tablet, Rfl: 1    venlafaxine (EFFEXOR) 37 5 mg tablet, Take 1 tablet (37 5 mg total) by mouth daily, Disp: 90 tablet, Rfl: 1  No current facility-administered medications for this visit  Allergies   Allergen Reactions    Adhesive [Medical Tape] Rash     Vitals:    10/18/19 1512   BP: 130/80   Pulse: 99   Resp: 16   Temp: (!) 63 °F (17 2 °C)       Physical Exam   Constitutional: She is oriented to person, place, and time  She appears well-developed and well-nourished  HENT:   Head: Normocephalic and atraumatic  Right Ear: External ear normal    Left Ear: External ear normal    Eyes: Pupils are equal, round, and reactive to light   EOM are normal    Neck: Normal range of motion  Neck supple  No JVD present  No spinous process tenderness present  No tracheal deviation, no edema, no erythema and normal range of motion present  No thyroid mass and no thyromegaly present  Cardiovascular: Normal rate, regular rhythm and normal heart sounds  Pulmonary/Chest: Effort normal and breath sounds normal    Abdominal: Soft  Bowel sounds are normal    Musculoskeletal: Normal range of motion  Lymphadenopathy:     She has no cervical adenopathy  Neurological: She is alert and oriented to person, place, and time  Skin: Skin is warm and dry  Psychiatric: She has a normal mood and affect  Her behavior is normal  Judgment and thought content normal          Results:  Labs:  postsurgical; Papilla    Ref Range & Units 10/3/19  8:29 AM   Thyroglobulin-BALJIT 1 5 - 38 5 ng/mL 2 2    Comment: According to the [facility] of Clinical Biochemistry, the   reference interval for Thyroglobulin (TG) should be related to   euthyroid patients and not for patients who underwent thyroidectomy  TG reference intervals for these patients depend on the residual   mass of the thyroid tissue left after surgery  Establishing a   post-operative baseline is recommended  The assay limit of quantitation is 0 1 ng/mL   Thyroglobulin measured by [facility] Immunometric Assay      Narrative     Performed at: [address] [place] [address]  [id number]  : Genia Zamudio MD, Phone:  [phone]      Specimen Collected: 10/03/19  8:29 AM           Lab Flowsheet      Order Details      View Encounter      Lab and Collection Details      Routing      Result History               Result Notes for Thyroglobulin        Thyroglobulin   Order: [id number]   Status:  Final result   Visible to patient:  Yes (MyChart)   Next appt:  10/22/2019 at 01:00 PM in Endocrinology Geovanna Davila MD)   Dx:  Hypothyroidism, postsurgical; Papilla        Ref Range & Units 10/3/19  8:29 AM   Thyroglobulin Ab 0 0 - 0 9 IU/mL <1 0    Comment: Thyroglobulin Antibody measured by [facility] Methodology             Imaging    Us Head Neck Lymph Node Mapping    Result Date: 10/16/2019  Narrative: NECK ULTRASOUND INDICATION:     Z08: Encounter for follow-up examination after completed treatment for malignant neoplasm  COMPARISON:  Neck sonogram 3/15/2019 FINDINGS: Ultrasound of the thyroidectomy bed and cervical lymph node chains was performed with a high frequency linear transducer  There is no suspicion of recurrent mass in the thyroidectomy bed  Lymph nodes maintain normal morphologic contour, echogenicity and short axis dimensions of less than 0 7 cm  No evidence for microcalcification or focal nodularity  Impression: No evidence of recurrent or metastatic disease  Workstation performed: [id number]     I reviewed the above laboratory and imaging data  Discussion/Summary:  History of stage I thyroid cancer, presently without evidence of disease recurrence  Plan on follow-up in 1 year with blood work and ultrasound at that time

## 2019-10-22 ENCOUNTER — OFFICE VISIT (OUTPATIENT)
Dept: ENDOCRINOLOGY | Facility: CLINIC | Age: 74
End: 2019-10-22
Payer: COMMERCIAL

## 2019-10-22 VITALS
HEIGHT: 62 IN | BODY MASS INDEX: 47.37 KG/M2 | DIASTOLIC BLOOD PRESSURE: 90 MMHG | WEIGHT: 257.4 LBS | HEART RATE: 60 BPM | SYSTOLIC BLOOD PRESSURE: 136 MMHG

## 2019-10-22 DIAGNOSIS — E89.0 HYPOTHYROIDISM, POSTSURGICAL: ICD-10-CM

## 2019-10-22 DIAGNOSIS — R73.01 IMPAIRED FASTING GLUCOSE: Primary | ICD-10-CM

## 2019-10-22 DIAGNOSIS — C73 THYROID CANCER (HCC): ICD-10-CM

## 2019-10-22 PROCEDURE — 99214 OFFICE O/P EST MOD 30 MIN: CPT | Performed by: INTERNAL MEDICINE

## 2019-10-22 RX ORDER — LEVOTHYROXINE SODIUM 175 UG/1
175 TABLET ORAL DAILY
Qty: 90 TABLET | Refills: 3 | Status: SHIPPED | OUTPATIENT
Start: 2019-10-22 | End: 2020-02-17 | Stop reason: SDUPTHER

## 2019-10-22 RX ORDER — PHENOL 1.4 %
AEROSOL, SPRAY (ML) MUCOUS MEMBRANE
COMMUNITY

## 2019-10-22 NOTE — PROGRESS NOTES
Lanie Collazo 69 y o  female MRN: [id number]    Encounter: [id number]      Assessment/Plan     Problem List Items Addressed This Visit        Endocrine    Hypothyroidism, postsurgical     TSH at goal-continue levothyroxine at current dose         Relevant Medications    levothyroxine 175 mcg tablet    Other Relevant Orders    TSH, 3rd generation Lab Collect    T4, free Lab Collect    Impaired fasting glucose - Primary     Fasting glucose was high-counseled on dietary and lifestyle modifications and weight loss  Relevant Orders    Comprehensive metabolic panel Lab Collect    HEMOGLOBIN A1C W/ EAG ESTIMATION Lab Collect    Thyroid cancer ([facility] [address] )     Neck ultrasound did not show any abnormal lymph nodes or masses-thyroglobulin has trended up a little-will set up for Thyrogen stimulated TG level as well as a whole-body scan         Relevant Medications    levothyroxine 175 mcg tablet    Other Relevant Orders    NM thyrogen stimulated follow up wbs    NM thyrogen stimulated T G  Thyroglobulin w/ab Lab Collect        CC:  Thyroid cancer    History of Present Illness      HPI:  69 e-mail with history of thyroid cancer, postsurgical hypothyroidism and impaired fasting glucose here for  For thyroid cancer she underwent completion thyroidectomy in 2016-underwent radioactive iodine ablation in 2018 with post ablation scan showing uptake in the neck only  She is currently on LT5 175  Mcg daily and takes regularly and properly  -generally feels okay except for fatigue and lower extremity edema    Review of Systems   Constitutional: Positive for fatigue  Negative for unexpected weight change  HENT: Negative for trouble swallowing  Respiratory: Positive for shortness of breath  Negative for cough  Cardiovascular: Positive for leg swelling  Negative for palpitations  Gastrointestinal: Negative for constipation, diarrhea, nausea and vomiting  Musculoskeletal: Positive for arthralgias   Negative for gait problem  Skin: Negative for wound  Psychiatric/Behavioral: Negative for confusion and sleep disturbance  The patient is not nervous/anxious  All other systems reviewed and are negative        Historical Information   Past Medical History:   Diagnosis Date    Asthma     Breathing difficulty     Bronchitis     Depression     Fracture of arm     Fracture of carpal bone     Hyperlipidemia     Hypertension     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    Shortness of breath     Thyroid nodule     Thyroid nodule     Thyroid nodule      Past Surgical History:   Procedure Laterality Date    HEMORRHOID SURGERY      CO THYROID LOBECTOMY,UNILAT Right 2016    Procedure: HEMITHYROIDECTOMY;  Surgeon: Tonya Lesch, MD;  [facility];  Service: Surgical Oncology    CO THYROIDECTOMY POST PREV THYR SURG Left 2016    Procedure:  COMPLETION THYROIDECTOMY, FLEXIBLE LARYNGOSCOPY;  Surgeon: Tonya Lesch, MD;  [facility];  Service: Surgical Oncology    THYROIDECTOMY, PARTIAL Left      Social History   Social History     Substance and Sexual Activity   Alcohol Use No    Comment: occasional glass of wine     Social History     Substance and Sexual Activity   Drug Use No     Social History     Tobacco Use   Smoking Status Former Smoker    Packs/day: 0 25    Years: 4 00    Pack years: 1 00    Types: Cigarettes    Last attempt to quit: 6/15/1981    Years since quittin 3   Smokeless Tobacco Never Used   Tobacco Comment    quit 40 yrs ago (Never a smoker per [employer])     Family History:   Family History   Adopted: Yes   Problem Relation Age of Onset    Hypertension Family     Kidney disease Family     Hypertension Mother        Meds/Allergies   Current Outpatient Medications   Medication Sig Dispense Refill    albuterol (VENTOLIN HFA) 90 mcg/act inhaler Inhale 2 puffs every 4 (four) hours as needed for wheezing 3 Inhaler 0    amLODIPine (NORVASC) 5 mg tablet Take 1 tablet (5 mg total) by mouth daily 90 tablet 1    diclofenac sodium (VOLTAREN) 1 % Apply 2 g topically 4 (four) times a day as needed (knee pain) 1 Tube 1    fluticasone-vilanterol (BREO ELLIPTA) 100-25 mcg/inh inhaler Inhale 1 puff daily Rinse mouth after use  3 Inhaler 1    furosemide (LASIX) 20 mg tablet Take 1 tablet (20 mg total) by mouth every other day 90 tablet 1    levothyroxine 175 mcg tablet Take 1 tablet (175 mcg total) by mouth daily 90 tablet 3    lisinopril-hydrochlorothiazide (PRINZIDE,ZESTORETIC) 20-12 5 MG per tablet Take 1 tablet by mouth 2 (two) times a day 180 tablet 1    Melatonin 10 MG TABS Take by mouth      nebivolol (BYSTOLIC) 5 mg tablet Take 1 tablet (5 mg total) by mouth daily 90 tablet 3    omeprazole (PriLOSEC) 40 MG capsule Take 1 capsule (40 mg total) by mouth daily 90 capsule 1    potassium chloride (KLOR-CON M20) 20 mEq tablet Take 1 tablet (20 mEq total) by mouth 2 (two) times a day 180 tablet 3    pravastatin (PRAVACHOL) 20 mg tablet Take 1 tablet (20 mg total) by mouth daily 90 tablet 1    venlafaxine (EFFEXOR) 37 5 mg tablet Take 1 tablet (37 5 mg total) by mouth daily 90 tablet 1     No current facility-administered medications for this visit  Allergies   Allergen Reactions    Adhesive [Medical Tape] Rash       Objective   Vitals: Blood pressure 136/90, pulse 60, height 5' 2" (1 575 m), weight 117 kg (257 lb 6 4 oz), not currently breastfeeding  Physical Exam   Constitutional: She is oriented to person, place, and time  She appears well-developed and well-nourished  No distress  HENT:   Head: Normocephalic and atraumatic  Eyes: EOM are normal  No scleral icterus  Neck: Normal range of motion  Neck supple  Anterior neck surgical scar-no masses   Cardiovascular: Normal rate, regular rhythm and normal heart sounds  No murmur heard  Pulmonary/Chest: Effort normal and breath sounds normal  No respiratory distress  She has no wheezes  She has no rales  Abdominal: Soft  Bowel sounds are normal  She exhibits no distension  There is no tenderness  There is no guarding  Musculoskeletal: Normal range of motion  She exhibits no edema or deformity  Lymphadenopathy:     She has no cervical adenopathy  Neurological: She is alert and oriented to person, place, and time  Skin: Skin is warm and dry  Psychiatric: She has a normal mood and affect  Her behavior is normal  Judgment and thought content normal        The history was obtained from the review of the chart, patient  Lab Results:   Lab Results   Component Value Date/Time    TSH 3RD GENERATON 0 542 10/03/2019 08:29 AM    TSH 3RD GENERATON 0 567 03/11/2019 03:43 PM    Free T4 1 26 10/03/2019 08:29 AM    Free T4 1 32 03/11/2019 03:43 PM    Thyroglobulin Ab <1 0 10/03/2019 08:29 AM    Thyroglobulin Ab <1 0 03/11/2019 03:43 PM    Thyroglobulin Ab <1 0 12/03/2018 12:37 PM     Imaging Studies:  Results for orders placed during the hospital encounter of 10/16/19   US head neck lymph node mapping    Impression No evidence of recurrent or metastatic disease  Workstation performed: [id number]            I have personally reviewed pertinent reports  Portions of the record may have been created with voice recognition software  Occasional wrong word or "sound a like" substitutions may have occurred due to the inherent limitations of voice recognition software  Read the chart carefully and recognize, using context, where substitutions have occurred

## 2019-10-23 NOTE — ASSESSMENT & PLAN NOTE
Neck ultrasound did not show any abnormal lymph nodes or masses-thyroglobulin has trended up a little-will set up for Thyrogen stimulated TG level as well as a whole-body scan

## 2019-10-31 ENCOUNTER — CLINICAL SUPPORT (OUTPATIENT)
Dept: FAMILY MEDICINE CLINIC | Facility: CLINIC | Age: 74
End: 2019-10-31
Payer: COMMERCIAL

## 2019-10-31 DIAGNOSIS — Z23 FLU VACCINE NEED: Primary | ICD-10-CM

## 2019-10-31 PROCEDURE — G0008 ADMIN INFLUENZA VIRUS VAC: HCPCS | Performed by: FAMILY MEDICINE

## 2019-10-31 PROCEDURE — 90662 IIV NO PRSV INCREASED AG IM: CPT | Performed by: FAMILY MEDICINE

## 2019-11-04 ENCOUNTER — HOSPITAL ENCOUNTER (OUTPATIENT)
Dept: RADIOLOGY | Age: 74
Discharge: HOME | End: 2019-11-04

## 2019-11-04 DIAGNOSIS — C73 MALIGNANT NEOPLASM OF THYROID GLAND (HCC): ICD-10-CM

## 2019-11-13 DIAGNOSIS — E78.49 OTHER HYPERLIPIDEMIA: ICD-10-CM

## 2019-11-13 DIAGNOSIS — E78.2 MIXED HYPERLIPIDEMIA: Primary | ICD-10-CM

## 2019-11-13 RX ORDER — PRAVASTATIN SODIUM 20 MG
TABLET ORAL
Qty: 90 TABLET | Refills: 3 | Status: SHIPPED | OUTPATIENT
Start: 2019-11-13 | End: 2020-06-22 | Stop reason: SDUPTHER

## 2019-12-03 DIAGNOSIS — Z12.39 SCREENING BREAST EXAMINATION: Primary | ICD-10-CM

## 2019-12-12 ENCOUNTER — OFFICE VISIT (OUTPATIENT)
Dept: OBGYN CLINIC | Facility: MEDICAL CENTER | Age: 74
End: 2019-12-12
Payer: COMMERCIAL

## 2019-12-12 VITALS
BODY MASS INDEX: 45.64 KG/M2 | SYSTOLIC BLOOD PRESSURE: 130 MMHG | HEART RATE: 84 BPM | DIASTOLIC BLOOD PRESSURE: 78 MMHG | HEIGHT: 62 IN | WEIGHT: 248 LBS

## 2019-12-12 DIAGNOSIS — M17.12 PRIMARY OSTEOARTHRITIS OF LEFT KNEE: Primary | ICD-10-CM

## 2019-12-12 DIAGNOSIS — M17.11 PRIMARY OSTEOARTHRITIS OF RIGHT KNEE: ICD-10-CM

## 2019-12-12 PROCEDURE — 99213 OFFICE O/P EST LOW 20 MIN: CPT | Performed by: ORTHOPAEDIC SURGERY

## 2019-12-12 NOTE — PROGRESS NOTES
Assessment/Plan:  1  Primary osteoarthritis of left knee    2  Primary osteoarthritis of right knee      No orders of the defined types were placed in this encounter  Monitor bilateral knees  Patient aware bilateral knee steroid injections and/or pes anserine bursa injections are an option at this time if needed  She is also aware to call 1 month before she is due for her next set of Euflexxa injections which will be mid April if she would like to repeat these  Return if symptoms worsen or fail to improve  I answered all of the patient's questions during the visit and provided education of the patient's condition during the visit  The patient verbalized understanding of the information given and agrees with the plan  This note was dictated using [employer] software  It may contain errors including improperly dictated words  Please contact physician directly for any questions  Subjective   Chief Complaint:   Chief Complaint   Patient presents with    Left Knee - Follow-up    Right Knee - Follow-up       Erika Londono is a 69 y o  female who presents for follow up for bilateral knee arthritis  She had received bilateral knee euflexxa injections with her last 1 on 10/18  These were helpful  She continues to have some mild medial discomfort  She is happy with where she is currently  HPI    Review of Systems  ROS:    See HPI for musculoskeletal review     All other systems reviewed are negative     History:  Past Medical History:   Diagnosis Date    Asthma     Breathing difficulty     Bronchitis     Depression     Fracture of arm     Fracture of carpal bone     Hyperlipidemia     Hypertension     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[facility] [address] ) 12/16/2016    Shortness of breath     Thyroid nodule     Thyroid nodule     Thyroid nodule      Past Surgical History:   Procedure Laterality Date    HEMORRHOID SURGERY      IN THYROID LOBECTOMY,UNILAT Right 2/25/2016    Procedure: HEMITHYROIDECTOMY; Surgeon: Cal Miranda MD;  [facility];  Service: Surgical Oncology    DC THYROIDECTOMY POST PREV THYR SURG Left 2016    Procedure:  COMPLETION THYROIDECTOMY, FLEXIBLE LARYNGOSCOPY;  Surgeon: Cal Miranda MD;  [facility];  Service: Surgical Oncology    THYROIDECTOMY, PARTIAL Left      Social History   Social History     Substance and Sexual Activity   Alcohol Use No    Comment: occasional glass of wine     Social History     Substance and Sexual Activity   Drug Use No     Social History     Tobacco Use   Smoking Status Former Smoker    Packs/day: 0 25    Years: 4 00    Pack years: 1 00    Types: Cigarettes    Last attempt to quit: 6/15/1981    Years since quittin 5   Smokeless Tobacco Never Used   Tobacco Comment    quit 40 yrs ago (Never a smoker per [employer])     Family History:   Family History   Adopted: Yes   Problem Relation Age of Onset    Hypertension Family     Kidney disease Family     Hypertension Mother        Current Outpatient Medications on File Prior to Visit   Medication Sig Dispense Refill    albuterol (VENTOLIN HFA) 90 mcg/act inhaler Inhale 2 puffs every 4 (four) hours as needed for wheezing 3 Inhaler 0    amLODIPine (NORVASC) 5 mg tablet Take 1 tablet (5 mg total) by mouth daily 90 tablet 1    diclofenac sodium (VOLTAREN) 1 % Apply 2 g topically 4 (four) times a day as needed (knee pain) 1 Tube 1    fluticasone-vilanterol (BREO ELLIPTA) 100-25 mcg/inh inhaler Inhale 1 puff daily Rinse mouth after use   3 Inhaler 1    furosemide (LASIX) 20 mg tablet Take 1 tablet (20 mg total) by mouth every other day 90 tablet 1    levothyroxine 175 mcg tablet Take 1 tablet (175 mcg total) by mouth daily 90 tablet 3    lisinopril-hydrochlorothiazide (PRINZIDE,ZESTORETIC) 20-12 5 MG per tablet Take 1 tablet by mouth 2 (two) times a day 180 tablet 1    Melatonin 10 MG TABS Take by mouth      nebivolol (BYSTOLIC) 5 mg tablet Take 1 tablet (5 mg total) by mouth daily 90 tablet 3    omeprazole (PriLOSEC) 40 MG capsule Take 1 capsule (40 mg total) by mouth daily 90 capsule 1    potassium chloride (KLOR-CON M20) 20 mEq tablet Take 1 tablet (20 mEq total) by mouth 2 (two) times a day 180 tablet 3    pravastatin (PRAVACHOL) 20 mg tablet Take 1 tablet (20 mg total) by mouth daily 90 tablet 1    pravastatin (PRAVACHOL) 20 mg tablet TAKE 1 TABLET DAILY 90 tablet 3    venlafaxine (EFFEXOR) 37 5 mg tablet Take 1 tablet (37 5 mg total) by mouth daily 90 tablet 1     No current facility-administered medications on file prior to visit        Allergies   Allergen Reactions    Adhesive [Medical Tape] Rash        Objective     /78   Pulse 84   Ht 5' 2" (1 575 m)   Wt 112 kg (248 lb)   LMP  (LMP Unknown)   BMI 45 36 kg/m²      PE:  AAOx 3  WDWN  Hearing intact, no drainage from eyes  no audible wheezing  no abdominal distension  LE compartments soft, skin intact    Ortho Exam:  bilateral Knee:   AROM: 5-110

## 2020-01-27 ENCOUNTER — HOSPITAL ENCOUNTER (OUTPATIENT)
Dept: RADIOLOGY | Age: 75
Discharge: HOME | End: 2020-01-27
Payer: COMMERCIAL

## 2020-01-27 DIAGNOSIS — C73 THYROID CANCER (HCC): ICD-10-CM

## 2020-01-27 PROCEDURE — 96372 THER/PROPH/DIAG INJ SC/IM: CPT

## 2020-01-27 RX ADMIN — THYROTROPIN ALFA 0.9 MG: 0.9 INJECTION, POWDER, FOR SOLUTION INTRAMUSCULAR at 09:15

## 2020-01-28 ENCOUNTER — HOSPITAL ENCOUNTER (OUTPATIENT)
Dept: RADIOLOGY | Age: 75
Discharge: HOME | End: 2020-01-28
Payer: COMMERCIAL

## 2020-01-28 PROCEDURE — 96372 THER/PROPH/DIAG INJ SC/IM: CPT

## 2020-01-28 RX ADMIN — THYROTROPIN ALFA 0.9 MG: 0.9 INJECTION, POWDER, FOR SOLUTION INTRAMUSCULAR at 09:20

## 2020-01-29 ENCOUNTER — HOSPITAL ENCOUNTER (OUTPATIENT)
Dept: RADIOLOGY | Facility: HOSPITAL | Age: 75
Discharge: HOME | End: 2020-01-29
Payer: COMMERCIAL

## 2020-01-29 PROCEDURE — 78018 THYROID MET IMAGING BODY: CPT

## 2020-01-29 PROCEDURE — A9509 IODINE I-123 SOD IODIDE MIL: HCPCS

## 2020-01-31 ENCOUNTER — TELEPHONE (OUTPATIENT)
Dept: ENDOCRINOLOGY | Facility: CLINIC | Age: 75
End: 2020-01-31

## 2020-01-31 ENCOUNTER — LAB (OUTPATIENT)
Dept: LAB | Facility: MEDICAL CENTER | Age: 75
End: 2020-01-31
Payer: COMMERCIAL

## 2020-01-31 DIAGNOSIS — C73 THYROID CANCER (HCC): ICD-10-CM

## 2020-01-31 DIAGNOSIS — E89.0 HYPOTHYROIDISM, POSTSURGICAL: ICD-10-CM

## 2020-01-31 LAB
T4 FREE SERPL-MCNC: 1.75 NG/DL (ref 0.76–1.46)
TSH SERPL DL<=0.05 MIU/L-ACNC: 31.9 UIU/ML (ref 0.36–3.74)

## 2020-01-31 PROCEDURE — 36415 COLL VENOUS BLD VENIPUNCTURE: CPT

## 2020-01-31 PROCEDURE — 84439 ASSAY OF FREE THYROXINE: CPT

## 2020-01-31 PROCEDURE — 84443 ASSAY THYROID STIM HORMONE: CPT

## 2020-01-31 PROCEDURE — 86800 THYROGLOBULIN ANTIBODY: CPT

## 2020-01-31 PROCEDURE — 84432 ASSAY OF THYROGLOBULIN: CPT

## 2020-01-31 NOTE — TELEPHONE ENCOUNTER
----- Message from Prasanna Christianson MD sent at 1/31/2020  2:16 PM EST -----  Please call the patient regarding labs -tsh high , likely sec to the recent thyrogen injection - she should have upcoming f/u

## 2020-01-31 NOTE — RESULT ENCOUNTER NOTE
Please call the patient regarding labs -tsh high , likely sec to the recent thyrogen injection - she should have upcoming f/u

## 2020-02-01 LAB
THYROGLOB AB SERPL-ACNC: <1 IU/ML (ref 0–0.9)
THYROGLOB SERPL-MCNC: 13.4 NG/ML (ref 1.5–38.5)

## 2020-02-04 ENCOUNTER — OFFICE VISIT (OUTPATIENT)
Dept: ENDOCRINOLOGY | Facility: CLINIC | Age: 75
End: 2020-02-04
Payer: COMMERCIAL

## 2020-02-04 VITALS
SYSTOLIC BLOOD PRESSURE: 138 MMHG | HEIGHT: 62 IN | BODY MASS INDEX: 47.48 KG/M2 | HEART RATE: 63 BPM | DIASTOLIC BLOOD PRESSURE: 100 MMHG | WEIGHT: 258 LBS

## 2020-02-04 DIAGNOSIS — C73 THYROID CANCER (HCC): ICD-10-CM

## 2020-02-04 DIAGNOSIS — R73.01 IMPAIRED FASTING GLUCOSE: ICD-10-CM

## 2020-02-04 DIAGNOSIS — E89.0 HYPOTHYROIDISM, POSTSURGICAL: Primary | ICD-10-CM

## 2020-02-04 PROCEDURE — 3080F DIAST BP >= 90 MM HG: CPT | Performed by: INTERNAL MEDICINE

## 2020-02-04 PROCEDURE — 3008F BODY MASS INDEX DOCD: CPT | Performed by: INTERNAL MEDICINE

## 2020-02-04 PROCEDURE — 1160F RVW MEDS BY RX/DR IN RCRD: CPT | Performed by: INTERNAL MEDICINE

## 2020-02-04 PROCEDURE — 3075F SYST BP GE 130 - 139MM HG: CPT | Performed by: INTERNAL MEDICINE

## 2020-02-04 PROCEDURE — 4040F PNEUMOC VAC/ADMIN/RCVD: CPT | Performed by: INTERNAL MEDICINE

## 2020-02-04 PROCEDURE — 99214 OFFICE O/P EST MOD 30 MIN: CPT | Performed by: INTERNAL MEDICINE

## 2020-02-04 PROCEDURE — 1036F TOBACCO NON-USER: CPT | Performed by: INTERNAL MEDICINE

## 2020-02-04 NOTE — ASSESSMENT & PLAN NOTE
Status post total thyroidectomy in 2016 and radioactive iodine ablation in 2018-recent workup has shown a Thyrogen stimulated thyroglobulin of 13  Whole-body scan showed some uptake in the neck of questionable clinical significance  For now will monitor  Repeat TG/antibody in the next 2 months    Also will get a neck CT scan

## 2020-02-04 NOTE — PROGRESS NOTES
Lanie Collazo 69 y o  female MRN: [id number]    Encounter: [id number]      Assessment/Plan     Problem List Items Addressed This Visit        Endocrine    Hypothyroidism, postsurgical - Primary     Recent TSH is high however that is secondary to labs being drawn after thyrogen stimulation  Continue levothyroxine at current dose         Relevant Orders    T4, free Lab Collect    TSH, 3rd generation Lab Collect    Impaired fasting glucose    Relevant Orders    Comprehensive metabolic panel Lab Collect    HEMOGLOBIN A1C W/ EAG ESTIMATION Lab Collect    Thyroid cancer [facility])     Status post total thyroidectomy in 2016 and radioactive iodine ablation in 2018-recent workup has shown a Thyrogen stimulated thyroglobulin of 13  Whole-body scan showed some uptake in the neck of questionable clinical significance  For now will monitor  Repeat TG/antibody in the next 2 months  Also will get a neck CT scan         Relevant Orders    Thyroglobulin w/ab Lab Collect    CT soft tissue neck wo contrast        CC:   Postsurgical hypothyroidism and thyroid cancer     History of Present Illness      HPI:  72-year-old female with thyroid cancer, postsurgical hypothyroidism and impaired fasting glucose here for follow-up  For thyroid cancer she underwent completion thyroidectomy in 2016-underwent radioactive iodine ablation in 2018 with post ablation scan showing uptake in the neck only  She is currently on LT4 175  Mcg daily and takes regularly and properly   Complains of fatigue, lower extremity edema and  arthralgias  Review of Systems   Constitutional: Negative for fatigue and unexpected weight change  Eyes: Negative for visual disturbance  Respiratory: Positive for shortness of breath  Negative for cough  Cardiovascular: Positive for leg swelling  Negative for palpitations  Gastrointestinal: Negative for constipation, diarrhea, nausea and vomiting  Musculoskeletal: Positive for arthralgias   Negative for gait problem  Skin: Negative for rash and wound  Psychiatric/Behavioral: Negative for sleep disturbance  All other systems reviewed and are negative        Historical Information   Past Medical History:   Diagnosis Date    Asthma     Breathing difficulty     Bronchitis     Depression     Fracture of arm     Fracture of carpal bone     Hyperlipidemia     Hypertension     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[facility] [address] ) 2016    Shortness of breath     Thyroid nodule     Thyroid nodule     Thyroid nodule      Past Surgical History:   Procedure Laterality Date    HEMORRHOID SURGERY      NM THYROID LOBECTOMY,UNILAT Right 2016    Procedure: HEMITHYROIDECTOMY;  Surgeon: Jaleel Owen MD;  [facility];  Service: Surgical Oncology    NM THYROIDECTOMY POST PREV THYR SURG Left 2016    Procedure:  COMPLETION THYROIDECTOMY, FLEXIBLE LARYNGOSCOPY;  Surgeon: Jaleel Owen MD;  Location: [facility];  Service: Surgical Oncology    THYROIDECTOMY, PARTIAL Left      Social History   Social History     Substance and Sexual Activity   Alcohol Use No    Comment: occasional glass of wine     Social History     Substance and Sexual Activity   Drug Use No     Social History     Tobacco Use   Smoking Status Former Smoker    Packs/day: 0 25    Years: 4 00    Pack years: 1 00    Types: Cigarettes    Last attempt to quit: 6/15/1981    Years since quittin 6   Smokeless Tobacco Never Used   Tobacco Comment    quit 40 yrs ago (Never a smoker per [employer])     Family History:   Family History   Adopted: Yes   Problem Relation Age of Onset    Hypertension Family     Kidney disease Family     Hypertension Mother        Meds/Allergies   Current Outpatient Medications   Medication Sig Dispense Refill    albuterol (VENTOLIN HFA) 90 mcg/act inhaler Inhale 2 puffs every 4 (four) hours as needed for wheezing 3 Inhaler 0    amLODIPine (NORVASC) 5 mg tablet Take 1 tablet (5 mg total) by mouth daily 90 tablet 1    diclofenac sodium (VOLTAREN) 1 % Apply 2 g topically 4 (four) times a day as needed (knee pain) 1 Tube 1    fluticasone-vilanterol (BREO ELLIPTA) 100-25 mcg/inh inhaler Inhale 1 puff daily Rinse mouth after use  3 Inhaler 1    furosemide (LASIX) 20 mg tablet Take 1 tablet (20 mg total) by mouth every other day 90 tablet 1    levothyroxine 175 mcg tablet Take 1 tablet (175 mcg total) by mouth daily 90 tablet 3    lisinopril-hydrochlorothiazide (PRINZIDE,ZESTORETIC) 20-12 5 MG per tablet Take 1 tablet by mouth 2 (two) times a day 180 tablet 1    Melatonin 10 MG TABS Take by mouth      nebivolol (BYSTOLIC) 5 mg tablet Take 1 tablet (5 mg total) by mouth daily 90 tablet 3    omeprazole (PriLOSEC) 40 MG capsule Take 1 capsule (40 mg total) by mouth daily 90 capsule 1    potassium chloride (KLOR-CON M20) 20 mEq tablet Take 1 tablet (20 mEq total) by mouth 2 (two) times a day 180 tablet 3    pravastatin (PRAVACHOL) 20 mg tablet Take 1 tablet (20 mg total) by mouth daily 90 tablet 1    venlafaxine (EFFEXOR) 37 5 mg tablet Take 1 tablet (37 5 mg total) by mouth daily 90 tablet 1    pravastatin (PRAVACHOL) 20 mg tablet TAKE 1 TABLET DAILY 90 tablet 3     No current facility-administered medications for this visit  Allergies   Allergen Reactions    Adhesive [Medical Tape] Rash       Objective   Vitals: Blood pressure 138/100, pulse 63, height 5' 2" (1 575 m), weight 117 kg (258 lb), not currently breastfeeding  Physical Exam   Constitutional: She is oriented to person, place, and time  She appears well-developed and well-nourished  No distress  HENT:   Head: Normocephalic and atraumatic  Eyes: EOM are normal  No scleral icterus  Neck: Normal range of motion  Neck supple  Anterior neck surgical scar-no masses   Cardiovascular: Normal rate, regular rhythm and normal heart sounds  No murmur heard  Pulmonary/Chest: Effort normal and breath sounds normal  No respiratory distress   She has no wheezes  She has no rales  Abdominal: Soft  Bowel sounds are normal  She exhibits no distension  There is no tenderness  There is no guarding  Musculoskeletal: Normal range of motion  She exhibits no edema or deformity  Lymphadenopathy:     She has no cervical adenopathy  Neurological: She is alert and oriented to person, place, and time  Skin: Skin is warm and dry  Psychiatric: She has a normal mood and affect  Her behavior is normal  Thought content normal    Vitals reviewed  The history was obtained from the review of the chart, patient  Lab Results:   Lab Results   Component Value Date/Time    TSH 3RD Billy [address]r 31 900 (H) 01/31/2020 08:53 AM    TSH 3RD BETZY 0 542 10/03/2019 08:29 AM    TSH 3RD BETZY 0 567 03/11/2019 03:43 PM    Free T4 1 75 (H) 01/31/2020 08:53 AM    Free T4 1 26 10/03/2019 08:29 AM    Free T4 1 32 03/11/2019 03:43 PM    Thyroglobulin Ab <1 0 01/31/2020 08:53 AM    Thyroglobulin Ab <1 0 10/03/2019 08:29 AM    Thyroglobulin Ab <1 0 03/11/2019 03:43 PM     Imaging Studies:  Results for orders placed during the hospital encounter of 10/16/19   US head neck lymph node mapping    Impression No evidence of recurrent or metastatic disease  Workstation performed: [id number]       Study Result     I-123 WHOLE BODY SCAN WITH THYROGEN STIMULATION FOR TUMOR LOCALIZATION     INDICATION: Thyroid cancer C73: Malignant neoplasm of thyroid gland     COMPARISON:  10/3/2018      RADIOPHARMACEUTICAL: 1 5 mCi I-123     TECHNIQUE: The patient was injected with 0 9  mg of Thyrogen IM on 2 consecutive days 1/27/2020 and 1/28/2020 by appropriately trained radiology staff  There were no immediate complications  Whole body images were acquired in the [facility] projections      FINDINGS:     Whole body images demonstrate only very subtle activity in the lower neck/thoracic inlet level  This is of questionable clinical significant is at this time    Otherwise no focal neck lesions to suggest iliana metastases      Physiologic bowel uptake is noted  There are no lesions which are suspicious for distant metastases       IMPRESSION:  1  Very subtle activity in the lower neck/thoracic inlet level, of questionable clinical significance at this time  Follow-up whole-body scan may be considered in 6 months to evaluate for any progression, if clinically warranted              I have personally reviewed pertinent reports  Portions of the record may have been created with voice recognition software  Occasional wrong word or "sound a like" substitutions may have occurred due to the inherent limitations of voice recognition software  Read the chart carefully and recognize, using context, where substitutions have occurred

## 2020-02-04 NOTE — ASSESSMENT & PLAN NOTE
Recent TSH is high however that is secondary to labs being drawn after thyrogen stimulation    Continue levothyroxine at current dose

## 2020-02-17 ENCOUNTER — OFFICE VISIT (OUTPATIENT)
Dept: FAMILY MEDICINE CLINIC | Facility: CLINIC | Age: 75
End: 2020-02-17
Payer: COMMERCIAL

## 2020-02-17 VITALS
WEIGHT: 258 LBS | BODY MASS INDEX: 47.48 KG/M2 | TEMPERATURE: 97.3 F | DIASTOLIC BLOOD PRESSURE: 88 MMHG | SYSTOLIC BLOOD PRESSURE: 138 MMHG | HEIGHT: 62 IN

## 2020-02-17 DIAGNOSIS — K21.9 GASTROESOPHAGEAL REFLUX DISEASE, ESOPHAGITIS PRESENCE NOT SPECIFIED: ICD-10-CM

## 2020-02-17 DIAGNOSIS — F32.A DEPRESSION, UNSPECIFIED DEPRESSION TYPE: ICD-10-CM

## 2020-02-17 DIAGNOSIS — I10 ESSENTIAL HYPERTENSION: ICD-10-CM

## 2020-02-17 DIAGNOSIS — E78.49 OTHER HYPERLIPIDEMIA: ICD-10-CM

## 2020-02-17 DIAGNOSIS — C73 THYROID CANCER (HCC): Primary | ICD-10-CM

## 2020-02-17 DIAGNOSIS — M87.9 BONE INFARCT (HCC): ICD-10-CM

## 2020-02-17 DIAGNOSIS — L30.9 DERMATITIS: ICD-10-CM

## 2020-02-17 DIAGNOSIS — R60.9 EDEMA, UNSPECIFIED TYPE: ICD-10-CM

## 2020-02-17 DIAGNOSIS — E66.01 MORBID OBESITY WITH BMI OF 40.0-44.9, ADULT (HCC): ICD-10-CM

## 2020-02-17 DIAGNOSIS — Z00.00 MEDICARE ANNUAL WELLNESS VISIT, SUBSEQUENT: ICD-10-CM

## 2020-02-17 DIAGNOSIS — M17.0 PRIMARY OSTEOARTHRITIS OF BOTH KNEES: ICD-10-CM

## 2020-02-17 DIAGNOSIS — Z23 ENCOUNTER FOR IMMUNIZATION: ICD-10-CM

## 2020-02-17 DIAGNOSIS — E89.0 HYPOTHYROIDISM, POSTSURGICAL: ICD-10-CM

## 2020-02-17 DIAGNOSIS — J45.909 ASTHMA, UNSPECIFIED ASTHMA SEVERITY, UNSPECIFIED WHETHER COMPLICATED, UNSPECIFIED WHETHER PERSISTENT: ICD-10-CM

## 2020-02-17 PROCEDURE — 99214 OFFICE O/P EST MOD 30 MIN: CPT | Performed by: FAMILY MEDICINE

## 2020-02-17 PROCEDURE — G0439 PPPS, SUBSEQ VISIT: HCPCS | Performed by: FAMILY MEDICINE

## 2020-02-17 PROCEDURE — 1160F RVW MEDS BY RX/DR IN RCRD: CPT | Performed by: FAMILY MEDICINE

## 2020-02-17 PROCEDURE — 3008F BODY MASS INDEX DOCD: CPT | Performed by: FAMILY MEDICINE

## 2020-02-17 PROCEDURE — 1170F FXNL STATUS ASSESSED: CPT | Performed by: FAMILY MEDICINE

## 2020-02-17 PROCEDURE — 1125F AMNT PAIN NOTED PAIN PRSNT: CPT | Performed by: FAMILY MEDICINE

## 2020-02-17 PROCEDURE — 90670 PCV13 VACCINE IM: CPT | Performed by: FAMILY MEDICINE

## 2020-02-17 PROCEDURE — 4040F PNEUMOC VAC/ADMIN/RCVD: CPT | Performed by: FAMILY MEDICINE

## 2020-02-17 PROCEDURE — G0009 ADMIN PNEUMOCOCCAL VACCINE: HCPCS | Performed by: FAMILY MEDICINE

## 2020-02-17 PROCEDURE — 1036F TOBACCO NON-USER: CPT | Performed by: FAMILY MEDICINE

## 2020-02-17 PROCEDURE — 3075F SYST BP GE 130 - 139MM HG: CPT | Performed by: FAMILY MEDICINE

## 2020-02-17 PROCEDURE — 3079F DIAST BP 80-89 MM HG: CPT | Performed by: FAMILY MEDICINE

## 2020-02-17 RX ORDER — FUROSEMIDE 20 MG/1
20 TABLET ORAL EVERY OTHER DAY
Qty: 90 TABLET | Refills: 1 | Status: SHIPPED | OUTPATIENT
Start: 2020-02-17 | End: 2020-06-15 | Stop reason: SDUPTHER

## 2020-02-17 RX ORDER — AMLODIPINE BESYLATE 5 MG/1
5 TABLET ORAL DAILY
Qty: 90 TABLET | Refills: 1 | Status: SHIPPED | OUTPATIENT
Start: 2020-02-17 | End: 2020-06-22 | Stop reason: SDUPTHER

## 2020-02-17 RX ORDER — TRIAMCINOLONE ACETONIDE 1 MG/G
CREAM TOPICAL 2 TIMES DAILY
Qty: 45 G | Refills: 2 | Status: SHIPPED | OUTPATIENT
Start: 2020-02-17 | End: 2020-06-22 | Stop reason: SDUPTHER

## 2020-02-17 RX ORDER — ALBUTEROL SULFATE 90 UG/1
2 AEROSOL, METERED RESPIRATORY (INHALATION) EVERY 4 HOURS PRN
Qty: 3 INHALER | Refills: 0 | Status: SHIPPED | OUTPATIENT
Start: 2020-02-17 | End: 2020-02-25 | Stop reason: SDUPTHER

## 2020-02-17 RX ORDER — LEVOTHYROXINE SODIUM 175 UG/1
175 TABLET ORAL DAILY
Qty: 90 TABLET | Refills: 1 | Status: SHIPPED | OUTPATIENT
Start: 2020-02-17 | End: 2020-06-22 | Stop reason: SDUPTHER

## 2020-02-17 RX ORDER — PRAVASTATIN SODIUM 20 MG
20 TABLET ORAL DAILY
Qty: 90 TABLET | Refills: 1 | Status: SHIPPED | OUTPATIENT
Start: 2020-02-17 | End: 2020-06-22 | Stop reason: SDUPTHER

## 2020-02-17 RX ORDER — LISINOPRIL AND HYDROCHLOROTHIAZIDE 20; 12.5 MG/1; MG/1
1 TABLET ORAL 2 TIMES DAILY
Qty: 180 TABLET | Refills: 1 | Status: SHIPPED | OUTPATIENT
Start: 2020-02-17 | End: 2020-06-22 | Stop reason: SDUPTHER

## 2020-02-17 RX ORDER — VENLAFAXINE 37.5 MG/1
37.5 TABLET ORAL DAILY
Qty: 90 TABLET | Refills: 1 | Status: SHIPPED | OUTPATIENT
Start: 2020-02-17 | End: 2020-08-27 | Stop reason: SDUPTHER

## 2020-02-17 RX ORDER — OMEPRAZOLE 40 MG/1
40 CAPSULE, DELAYED RELEASE ORAL DAILY
Qty: 90 CAPSULE | Refills: 1 | Status: SHIPPED | OUTPATIENT
Start: 2020-02-17 | End: 2020-06-22 | Stop reason: SDUPTHER

## 2020-02-17 RX ORDER — FLUTICASONE FUROATE AND VILANTEROL 100; 25 UG/1; UG/1
1 POWDER RESPIRATORY (INHALATION) DAILY
Qty: 3 INHALER | Refills: 1 | Status: SHIPPED | OUTPATIENT
Start: 2020-02-17 | End: 2020-06-15

## 2020-02-17 NOTE — PROGRESS NOTES
Assessment and Plan:     Problem List Items Addressed This Visit        Endocrine    Hypothyroidism, postsurgical       Cardiovascular and Mediastinum    Hypertension       Other    Hyperlipidemia      Other Visit Diagnoses     Primary osteoarthritis of both knees        Asthma, unspecified asthma severity, unspecified whether complicated, unspecified whether persistent        Edema, unspecified type        Gastroesophageal reflux disease, esophagitis presence not specified        Depression, unspecified depression type               Preventive health issues were discussed with patient, and age appropriate screening tests were ordered as noted in patient's After Visit Summary  Personalized health advice and appropriate referrals for health education or preventive services given if needed, as noted in patient's After Visit Summary  History of Present Illness:     Patient presents for Welcome to Medicare visit       Patient Care Team:  Cadence Benson DO as PCP - Mauricio Dowell MD as PCP - Endocrinology (Endocrinology)  Cristobal Varma MD Rich Gamma, MD as Surgeon (Surgical Oncology)     Review of Systems:     Review of Systems   Problem List:     Patient Active Problem List   Diagnosis    History of thyroid cancer    GERD without esophagitis    Hyperlipidemia    Hypertension    Hypothyroidism, postsurgical    Impaired fasting glucose    Insomnia    Restrictive lung disease    Acute non-recurrent frontal sinusitis    Obstructive sleep apnea    Hypersomnia    Morbid obesity with BMI of 40 0-44 9, adult ([facility] [address] )    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[address] )    Humerus lesion, left    Acute shoulder bursitis, right    Lower extremity edema    Encounter for follow-up surveillance of thyroid cancer    Thyroid cancer ([facility] [address] )    Primary osteoarthritis of left knee    Primary osteoarthritis of right knee    Pes anserine bursitis      Past Medical and Surgical History:     Past Medical History: Diagnosis Date    Asthma     Breathing difficulty     Bronchitis     Depression     Fracture of arm     Fracture of carpal bone     Hyperlipidemia     Hypertension     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HCC) 2016    Shortness of breath     Thyroid nodule     Thyroid nodule     Thyroid nodule      Past Surgical History:   Procedure Laterality Date    HEMORRHOID SURGERY      UT THYROID LOBECTOMY,UNILAT Right 2016    Procedure: HEMITHYROIDECTOMY;  Surgeon: Graham Amaya MD;  Location:  MAIN OR;  Service: Surgical Oncology    UT THYROIDECTOMY POST PREV THYR SURG Left 2016    Procedure:  COMPLETION THYROIDECTOMY, FLEXIBLE LARYNGOSCOPY;  Surgeon: Graham Amaya MD;  [facility];  Service: Surgical Oncology    THYROIDECTOMY, PARTIAL Left       Family History:     Family History   Adopted: Yes   Problem Relation Age of Onset    Hypertension Family     Kidney disease Family     Hypertension Mother       Social History:     E-Cigarette/Vaping    E-Cigarette Use Never User      E-Cigarette/Vaping Substances    Nicotine No     THC No     CBD No     Flavoring No     Other No     Unknown No      Social History     Socioeconomic History    Marital status: /Civil Union     Spouse name: None    Number of children: None    Years of education: None    Highest education level: None   Occupational History    None   Social Needs    Financial resource strain: None    Food insecurity:     Worry: None     Inability: None    Transportation needs:     Medical: None     Non-medical: None   Tobacco Use    Smoking status: Former Smoker     Packs/day: 0 25     Years: 4 00     Pack years: 1 00     Types: Cigarettes     Last attempt to quit: 6/15/1981     Years since quittin 7    Smokeless tobacco: Never Used    Tobacco comment: quit 40 yrs ago (Never a smoker per [employer])   Substance and Sexual Activity    Alcohol use: No     Comment: occasional glass of wine    Drug use: No    Sexual activity: Never   Lifestyle    Physical activity:     Days per week: None     Minutes per session: None    Stress: None   Relationships    Social connections:     Talks on phone: None     Gets together: None     Attends Denominational service: None     Active member of club or organization: None     Attends meetings of clubs or organizations: None     Relationship status: None    Intimate partner violence:     Fear of current or ex partner: None     Emotionally abused: None     Physically abused: None     Forced sexual activity: None   Other Topics Concern    None   Social History Narrative    Occasional caffeine consumption      Medications and Allergies:     Current Outpatient Medications   Medication Sig Dispense Refill    albuterol (VENTOLIN HFA) 90 mcg/act inhaler Inhale 2 puffs every 4 (four) hours as needed for wheezing 3 Inhaler 0    amLODIPine (NORVASC) 5 mg tablet Take 1 tablet (5 mg total) by mouth daily 90 tablet 1    diclofenac sodium (VOLTAREN) 1 % Apply 2 g topically 4 (four) times a day as needed (knee pain) 1 Tube 1    fluticasone-vilanterol (BREO ELLIPTA) 100-25 mcg/inh inhaler Inhale 1 puff daily Rinse mouth after use   3 Inhaler 1    furosemide (LASIX) 20 mg tablet Take 1 tablet (20 mg total) by mouth every other day 90 tablet 1    levothyroxine 175 mcg tablet Take 1 tablet (175 mcg total) by mouth daily 90 tablet 3    lisinopril-hydrochlorothiazide (PRINZIDE,ZESTORETIC) 20-12 5 MG per tablet Take 1 tablet by mouth 2 (two) times a day 180 tablet 1    Melatonin 10 MG TABS Take by mouth      nebivolol (BYSTOLIC) 5 mg tablet Take 1 tablet (5 mg total) by mouth daily 90 tablet 3    omeprazole (PriLOSEC) 40 MG capsule Take 1 capsule (40 mg total) by mouth daily 90 capsule 1    potassium chloride (KLOR-CON M20) 20 mEq tablet Take 1 tablet (20 mEq total) by mouth 2 (two) times a day 180 tablet 3    pravastatin (PRAVACHOL) 20 mg tablet Take 1 tablet (20 mg total) by mouth daily 90 tablet 1    venlafaxine (EFFEXOR) 37 5 mg tablet Take 1 tablet (37 5 mg total) by mouth daily 90 tablet 1    pravastatin (PRAVACHOL) 20 mg tablet TAKE 1 TABLET DAILY 90 tablet 3     No current facility-administered medications for this visit  Allergies   Allergen Reactions    Adhesive [Medical Tape] Rash      Immunizations:     Immunization History   Administered Date(s) Administered     Influenza (IM) Preservative Free 1945    INFLUENZA 10/21/2007    Influenza Split High Dose Preservative Free IM 10/25/2012, 09/25/2013, 10/03/2014, 09/25/2015, 11/07/2017    Influenza TIV (IM) 10/22/2003, 10/10/2008    Influenza, high dose seasonal 0 5 mL 11/12/2018, 10/31/2019    Pneumococcal Polysaccharide PPV23 08/26/2013, 08/20/2018    Td (adult), adsorbed 08/07/1987, 10/22/1997, 08/26/2013    Zoster 08/26/2013      Health Maintenance:         Topic Date Due    CRC Screening: Colonoscopy  02/17/2021 (Originally 1945)    Hepatitis C Screening  Completed         Topic Date Due    DTaP,Tdap,and Td Vaccines (1 - Tdap) 09/21/1958    Pneumococcal Vaccine: 65+ Years (2 of 2 - PCV13) 08/20/2019      Medicare Screening Tests and Risk Assessments:     Royce Preciado is here for her Subsequent Wellness visit  Health Risk Assessment:   Patient rates overall health as fair  Patient feels that their physical health rating is slightly worse  Eyesight was rated as same  Hearing was rated as same  Patient feels that their emotional and mental health rating is same  Pain experienced in the last 7 days has been some  Patient's pain rating has been 2/10  Patient states that she has experienced no weight loss or gain in last 6 months  Depression Screening:   PHQ-2 Score: 0  PHQ-9 Score: 0      Fall Risk Screening: In the past year, patient has experienced: no history of falling in past year      Urinary Incontinence Screening:   Patient has not leaked urine accidently in the last six months       Home Safety:  Patient does not have trouble with stairs inside or outside of their home  Patient has working smoke alarms and has working carbon monoxide detector  Home safety hazards include: none  Nutrition:   Current diet is Unhealthy  Medications:   Patient is not currently taking any over-the-counter supplements  Patient is able to manage medications  Activities of Daily Living (ADLs)/Instrumental Activities of Daily Living (IADLs):   Walk and transfer into and out of bed and chair?: Yes  Dress and groom yourself?: Yes    Bathe or shower yourself?: Yes    Feed yourself?  Yes  Do your laundry/housekeeping?: Yes  Manage your money, pay your bills and track your expenses?: Yes  Make your own meals?: Yes    Do your own shopping?: Yes    Previous Hospitalizations:   Any hospitalizations or ED visits within the last 12 months?: No      Advance Care Planning:   Living will: No    Durable POA for healthcare: No    Advanced directive: No      Cognitive Screening:   Provider or family/friend/caregiver concerned regarding cognition?: No    PREVENTIVE SCREENINGS      Cardiovascular Screening:    General: Screening Not Indicated, History Lipid Disorder, Risks and Benefits Discussed and Screening Current      Diabetes Screening:     General: Screening Current and Risks and Benefits Discussed      Colorectal Cancer Screening:     General: Screening Current      Breast Cancer Screening:     General: Risks and Benefits Discussed and Patient Declines      Cervical Cancer Screening:    General: Screening Not Indicated, Patient Declines and Risks and Benefits Discussed      Osteoporosis Screening:    General: Risks and Benefits Discussed      Abdominal Aortic Aneurysm (AAA) Screening:        General: Risks and Benefits Discussed and Screening Not Indicated      Lung Cancer Screening:     General: Risks and Benefits Discussed and Screening Not Indicated      Hepatitis C Screening:    General: Screening Current and Risks and Benefits Discussed    Other Counseling Topics:   Calcium and vitamin D intake and regular weightbearing exercise       No exam data present     Physical Exam:     /88 (BP Location: Right arm, Patient Position: Sitting, Cuff Size: Large)   Temp (!) 97 3 °F (36 3 °C) (Tympanic)   Ht 5' 2" (1 575 m)   Wt 117 kg (258 lb)   LMP  (LMP Unknown)   BMI 47 19 kg/m²     Physical Exam    Gee Ruiz DO

## 2020-02-17 NOTE — PATIENT INSTRUCTIONS
Medicare Preventive Visit Patient Instructions  Thank you for completing your Welcome to Medicare Visit or Medicare Annual Wellness Visit today  Your next wellness visit will be due in one year (2/17/2021)  The screening/preventive services that you may require over the next 5-10 years are detailed below  Some tests may not apply to you based off risk factors and/or age  Screening tests ordered at today's visit but not completed yet may show as past due  Also, please note that scanned in results may not display below  Preventive Screenings:  Service Recommendations Previous Testing/Comments   Colorectal Cancer Screening  * Colonoscopy    * Fecal Occult Blood Test (FOBT)/Fecal Immunochemical Test (FIT)  * Fecal DNA/Cologuard Test  * Flexible Sigmoidoscopy Age: 54-65 years old   Colonoscopy: every 10 years (may be performed more frequently if at higher risk)  OR  FOBT/FIT: every 1 year  OR  Cologuard: every 3 years  OR  Sigmoidoscopy: every 5 years  Screening may be recommended earlier than age 48 if at higher risk for colorectal cancer  Also, an individualized decision between you and your healthcare provider will decide whether screening between the ages of 74-80 would be appropriate  Colonoscopy: Not on file  FOBT/FIT: Not on file  Cologuard: Not on file  Sigmoidoscopy: Not on file    Screening Current     Breast Cancer Screening Age: 36 years old  Frequency: every 1-2 years  Not required if history of left and right mastectomy Mammogram: Not on file       Cervical Cancer Screening Between the ages of 21-29, pap smear recommended once every 3 years  Between the ages of 33-67, can perform pap smear with HPV co-testing every 5 years     Recommendations may differ for women with a history of total hysterectomy, cervical cancer, or abnormal pap smears in past  Pap Smear: Not on file    Screening Not Indicated   Hepatitis C Screening Once for adults born between 1945 and 1965  More frequently in patients at high risk for Hepatitis C Hep C Antibody: 03/11/2019    Screening Current   Diabetes Screening 1-2 times per year if you're at risk for diabetes or have pre-diabetes Fasting glucose: 145 mg/dL   A1C: 5 9 %    Screening Current   Cholesterol Screening Once every 5 years if you don't have a lipid disorder  May order more often based on risk factors  Lipid panel: 10/03/2019    Screening Not Indicated  History Lipid Disorder     Other Preventive Screenings Covered by Medicare:  1  Abdominal Aortic Aneurysm (AAA) Screening: covered once if your at risk  You're considered to be at risk if you have a family history of AAA  2  Lung Cancer Screening: covers low dose CT scan once per year if you meet all of the following conditions: (1) Age 50-69; (2) No signs or symptoms of lung cancer; (3) Current smoker or have quit smoking within the last 15 years; (4) You have a tobacco smoking history of at least 30 pack years (packs per day multiplied by number of years you smoked); (5) You get a written order from a healthcare provider  3  Glaucoma Screening: covered annually if you're considered high risk: (1) You have diabetes OR (2) Family history of glaucoma OR (3)  aged 48 and older OR (3)  American aged 72 and older  3  Osteoporosis Screening: covered every 2 years if you meet one of the following conditions: (1) You're estrogen deficient and at risk for osteoporosis based off medical history and other findings; (2) Have a vertebral abnormality; (3) On glucocorticoid therapy for more than 3 months; (4) Have primary hyperparathyroidism; (5) On osteoporosis medications and need to assess response to drug therapy  · Last bone density test (DXA Scan): 09/03/2013  5  HIV Screening: covered annually if you're between the age of 12-76  Also covered annually if you are younger than 13 and older than 72 with risk factors for HIV infection   For pregnant patients, it is covered up to 3 times per pregnancy  Immunizations:  Immunization Recommendations   Influenza Vaccine Annual influenza vaccination during flu season is recommended for all persons aged >= 6 months who do not have contraindications   Pneumococcal Vaccine (Prevnar and Pneumovax)  * Prevnar = PCV13  * Pneumovax = PPSV23   Adults 25-60 years old: 1-3 doses may be recommended based on certain risk factors  Adults 72 years old: Prevnar (PCV13) vaccine recommended followed by Pneumovax (PPSV23) vaccine  If already received PPSV23 since turning 65, then PCV13 recommended at least one year after PPSV23 dose  Hepatitis B Vaccine 3 dose series if at intermediate or high risk (ex: diabetes, end stage renal disease, liver disease)   Tetanus (Td) Vaccine - COST NOT COVERED BY MEDICARE PART B Following completion of primary series, a booster dose should be given every 10 years to maintain immunity against tetanus  Td may also be given as tetanus wound prophylaxis  Tdap Vaccine - COST NOT COVERED BY MEDICARE PART B Recommended at least once for all adults  For pregnant patients, recommended with each pregnancy  Shingles Vaccine (Shingrix) - COST NOT COVERED BY MEDICARE PART B  2 shot series recommended in those aged 48 and above     Health Maintenance Due:      Topic Date Due    CRC Screening: Colonoscopy  02/17/2021 (Originally 1945)    Hepatitis C Screening  Completed     Immunizations Due:      Topic Date Due    DTaP,Tdap,and Td Vaccines (1 - Tdap) 09/21/1956    Pneumococcal Vaccine: 65+ Years (2 of 2 - PCV13) 08/20/2019     Advance Directives   What are advance directives? Advance directives are legal documents that state your wishes and plans for medical care  These plans are made ahead of time in case you lose your ability to make decisions for yourself  Advance directives can apply to any medical decision, such as the treatments you want, and if you want to donate organs  What are the types of advance directives?   There are many types of advance directives, and each state has rules about how to use them  You may choose a combination of any of the following:  · Living will: This is a written record of the treatment you want  You can also choose which treatments you do not want, which to limit, and which to stop at a certain time  This includes surgery, medicine, IV fluid, and tube feedings  · Durable power of  for healthcare [facility]): This is a written record that states who you want to make healthcare choices for you when you are unable to make them for yourself  This person, called a proxy, is usually a family member or a friend  You may choose more than 1 proxy  · Do not resuscitate (DNR) order:  A DNR order is used in case your heart stops beating or you stop breathing  It is a request not to have certain forms of treatment, such as CPR  A DNR order may be included in other types of advance directives  · Medical directive: This covers the care that you want if you are in a coma, near death, or unable to make decisions for yourself  You can list the treatments you want for each condition  Treatment may include pain medicine, surgery, blood transfusions, dialysis, IV or tube feedings, and a ventilator (breathing machine)  · Values history: This document has questions about your views, beliefs, and how you feel and think about life  This information can help others choose the care that you would choose  Why are advance directives important? An advance directive helps you control your care  Although spoken wishes may be used, it is better to have your wishes written down  Spoken wishes can be misunderstood, or not followed  Treatments may be given even if you do not want them  An advance directive may make it easier for your family to make difficult choices about your care     Weight Management   Why it is important to manage your weight:  Being overweight increases your risk of health conditions such as heart disease, high blood pressure, type 2 diabetes, and certain types of cancer  It can also increase your risk for osteoarthritis, sleep apnea, and other respiratory problems  Aim for a slow, steady weight loss  Even a small amount of weight loss can lower your risk of health problems  How to lose weight safely:  A safe and healthy way to lose weight is to eat fewer calories and get regular exercise  You can lose up about 1 pound a week by decreasing the number of calories you eat by 500 calories each day  Healthy meal plan for weight management:  A healthy meal plan includes a variety of foods, contains fewer calories, and helps you stay healthy  A healthy meal plan includes the following:  · Eat whole-grain foods more often  A healthy meal plan should contain fiber  Fiber is the part of grains, fruits, and vegetables that is not broken down by your body  Whole-grain foods are healthy and provide extra fiber in your diet  Some examples of whole-grain foods are whole-wheat breads and pastas, oatmeal, brown rice, and bulgur  · Eat a variety of vegetables every day  Include dark, leafy greens such as spinach, kale, laura greens, and mustard greens  Eat yellow and orange vegetables such as carrots, sweet potatoes, and winter squash  · Eat a variety of fruits every day  Choose fresh or canned fruit (canned in its own juice or light syrup) instead of juice  Fruit juice has very little or no fiber  · Eat low-fat dairy foods  Drink fat-free (skim) milk or 1% milk  Eat fat-free yogurt and low-fat cottage cheese  Try low-fat cheeses such as mozzarella and other reduced-fat cheeses  · Choose meat and other protein foods that are low in fat  Choose beans or other legumes such as split peas or lentils  Choose fish, skinless poultry (chicken or turkey), or lean cuts of red meat (beef or pork)  Before you cook meat or poultry, cut off any visible fat  · Use less fat and oil  Try baking foods instead of frying them   Add less fat, such as margarine, sour cream, regular salad dressing and mayonnaise to foods  Eat fewer high-fat foods  Some examples of high-fat foods include french fries, doughnuts, ice cream, and cakes  · Eat fewer sweets  Limit foods and drinks that are high in sugar  This includes candy, cookies, regular soda, and sweetened drinks  Exercise:  Exercise at least 30 minutes per day on most days of the week  Some examples of exercise include walking, biking, dancing, and swimming  You can also fit in more physical activity by taking the stairs instead of the elevator or parking farther away from stores  Ask your healthcare provider about the best exercise plan for you  © Copyright [employer] 2018 Information is for End User's use only and may not be sold, redistributed or otherwise used for commercial purposes   All illustrations and images included in CareNotes® are the copyrighted property of A D A M , Inc  or [address] [place] [address]

## 2020-02-17 NOTE — PROGRESS NOTES
Assessment/Plan:  Patient will be going for labs in the near future  Patient will have evaluation for hypothyroidism  Patient will be going for CAT scan of the neck for further evaluation from nuclear medicine scan  Patient blood pressure stable at this time  Mood stable  Cholesterol stable  Continue with current list of medications  Patient will use triamcinolone cream for dermatitis on face  Diagnoses and all orders for this visit:    Thyroid cancer ([facility] [address] )    Essential hypertension  -     amLODIPine (NORVASC) 5 mg tablet; Take 1 tablet (5 mg total) by mouth daily  -     lisinopril-hydrochlorothiazide (PRINZIDE,ZESTORETIC) 20-12 5 MG per tablet; Take 1 tablet by mouth 2 (two) times a day    Primary osteoarthritis of both knees  -     diclofenac sodium (VOLTAREN) 1 %; Apply 2 g topically 4 (four) times a day as needed (knee pain)    Asthma, unspecified asthma severity, unspecified whether complicated, unspecified whether persistent  -     fluticasone-vilanterol (BREO ELLIPTA) 100-25 mcg/inh inhaler; Inhale 1 puff daily Rinse mouth after use  -     albuterol (Ventolin HFA) 90 mcg/act inhaler; Inhale 2 puffs every 4 (four) hours as needed for wheezing    Edema, unspecified type  -     furosemide (LASIX) 20 mg tablet; Take 1 tablet (20 mg total) by mouth every other day    Hypothyroidism, postsurgical  -     levothyroxine 175 mcg tablet; Take 1 tablet (175 mcg total) by mouth daily    Gastroesophageal reflux disease, esophagitis presence not specified  -     omeprazole (PriLOSEC) 40 MG capsule; Take 1 capsule (40 mg total) by mouth daily    Other hyperlipidemia  -     pravastatin (PRAVACHOL) 20 mg tablet; Take 1 tablet (20 mg total) by mouth daily    Depression, unspecified depression type  -     venlafaxine (EFFEXOR) 37 5 mg tablet;  Take 1 tablet (37 5 mg total) by mouth daily    Medicare annual wellness visit, subsequent    Encounter for immunization  -     PNEUMOCOCCAL CONJUGATE VACCINE 13-VALENT LESS THAN 5Y0  (Prevnar 13)    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[address] )    Morbid obesity with BMI of 40 0-44 9, adult (HCC)            Subjective:        Patient ID: Bassam Arnold is a 69 y o  female  Patient follow-up on thyroid cancer as well as hypertension hyperlipidemia asthma hypothyroidism  Patient did have nuclear scan done which shows subtle uptake in the lower neck/thoracic region  Patient going for CAT scan in the near future  Patient with chronic shortness of breath  No new chest pain  No problems with urination or defecation  Patient is having reaction to sleep CPAP mask  Patient has use hydrocortisone with some improvement  The following portions of the patient's history were reviewed and updated as appropriate: allergies, current medications, past family history, past medical history, past social history, past surgical history and problem list       Review of Systems   Constitutional: Negative  HENT: Negative  Eyes: Negative  Respiratory: Negative  Cardiovascular: Negative  Gastrointestinal: Negative  Endocrine: Negative  Genitourinary: Negative  Musculoskeletal: Negative  Skin: Positive for rash  Allergic/Immunologic: Negative  Neurological: Negative  Hematological: Negative  Psychiatric/Behavioral: Negative  Objective:      BMI Counseling: Body mass index is 47 19 kg/m²  The BMI is above normal  Nutrition recommendations include decreasing portion sizes  Exercise recommendations include moderate physical activity 150 minutes/week  /88 (BP Location: Right arm, Patient Position: Sitting, Cuff Size: Large)   Temp (!) 97 3 °F (36 3 °C) (Tympanic)   Ht 5' 2" (1 575 m)   Wt 117 kg (258 lb)   LMP  (LMP Unknown)   BMI 47 19 kg/m²          Physical Exam   Constitutional: She appears well-developed and well-nourished  No distress  HENT:   Head: Normocephalic     Right Ear: External ear normal    Left Ear: External ear normal  Mouth/Throat: Oropharynx is clear and moist  No oropharyngeal exudate  Eyes: Pupils are equal, round, and reactive to light  EOM are normal  Right eye exhibits no discharge  Left eye exhibits no discharge  No scleral icterus  Neck: Normal range of motion  Neck supple  No thyromegaly present  Cardiovascular: Normal rate, regular rhythm, normal heart sounds and intact distal pulses  Exam reveals no gallop and no friction rub  No murmur heard  Pulmonary/Chest: Effort normal and breath sounds normal  No respiratory distress  She has no wheezes  She has no rales  She exhibits no tenderness  Abdominal: Soft  Bowel sounds are normal  She exhibits no distension  There is no tenderness  There is no rebound and no guarding  Musculoskeletal: Normal range of motion  She exhibits no edema or tenderness  Lymphadenopathy:     She has no cervical adenopathy  Neurological: She is alert  No cranial nerve deficit  She exhibits normal muscle tone  Coordination normal    Skin: Skin is warm and dry  Rash noted  She is not diaphoretic  No erythema  No pallor  Erythematous rash on malodor aspects of face and naris  Psychiatric: She has a normal mood and affect  Her behavior is normal  Judgment and thought content normal    Nursing note and vitals reviewed

## 2020-02-25 DIAGNOSIS — J45.909 ASTHMA, UNSPECIFIED ASTHMA SEVERITY, UNSPECIFIED WHETHER COMPLICATED, UNSPECIFIED WHETHER PERSISTENT: ICD-10-CM

## 2020-02-25 RX ORDER — ALBUTEROL SULFATE 90 UG/1
2 AEROSOL, METERED RESPIRATORY (INHALATION) EVERY 4 HOURS PRN
Qty: 3 INHALER | Refills: 0 | Status: SHIPPED | OUTPATIENT
Start: 2020-02-25 | End: 2020-04-24

## 2020-02-28 DIAGNOSIS — J01.10 ACUTE NON-RECURRENT FRONTAL SINUSITIS: Primary | ICD-10-CM

## 2020-02-28 RX ORDER — ALBUTEROL SULFATE 90 UG/1
2 AEROSOL, METERED RESPIRATORY (INHALATION) EVERY 6 HOURS PRN
Qty: 18 G | Refills: 3
Start: 2020-02-28 | End: 2020-06-15

## 2020-02-28 NOTE — TELEPHONE ENCOUNTER
Patient spouse broght in today the correct Ventolin medication which is HFA Aersol 90 mcg actuation

## 2020-03-12 ENCOUNTER — TELEPHONE (OUTPATIENT)
Dept: ENDOCRINOLOGY | Facility: CLINIC | Age: 75
End: 2020-03-12

## 2020-03-12 ENCOUNTER — LAB (OUTPATIENT)
Dept: LAB | Facility: MEDICAL CENTER | Age: 75
End: 2020-03-12
Payer: COMMERCIAL

## 2020-03-12 DIAGNOSIS — R73.01 IMPAIRED FASTING GLUCOSE: ICD-10-CM

## 2020-03-12 DIAGNOSIS — C73 THYROID CANCER (HCC): ICD-10-CM

## 2020-03-12 DIAGNOSIS — E89.0 HYPOTHYROIDISM, POSTSURGICAL: ICD-10-CM

## 2020-03-12 LAB
T4 FREE SERPL-MCNC: 1.56 NG/DL (ref 0.76–1.46)
TSH SERPL DL<=0.05 MIU/L-ACNC: 0.77 UIU/ML (ref 0.36–3.74)

## 2020-03-12 PROCEDURE — 84443 ASSAY THYROID STIM HORMONE: CPT

## 2020-03-12 PROCEDURE — 36415 COLL VENOUS BLD VENIPUNCTURE: CPT

## 2020-03-12 PROCEDURE — 84432 ASSAY OF THYROGLOBULIN: CPT

## 2020-03-12 PROCEDURE — 84439 ASSAY OF FREE THYROXINE: CPT

## 2020-03-12 PROCEDURE — 86800 THYROGLOBULIN ANTIBODY: CPT

## 2020-03-12 NOTE — TELEPHONE ENCOUNTER
----- Message from Abby Washburn MD sent at 3/12/2020  3:56 PM EDT -----  Please call the patient regarding labs - TSH okay-continue levothyroxine at current dose

## 2020-03-13 LAB
THYROGLOB AB SERPL-ACNC: <1 IU/ML (ref 0–0.9)
THYROGLOB SERPL-MCNC: 2.4 NG/ML (ref 1.5–38.5)

## 2020-03-16 ENCOUNTER — TELEPHONE (OUTPATIENT)
Dept: ENDOCRINOLOGY | Facility: CLINIC | Age: 75
End: 2020-03-16

## 2020-03-16 NOTE — TELEPHONE ENCOUNTER
----- Message from Teofilo Lee MD sent at 3/16/2020 10:45 AM EDT -----  Please call the patient regarding labs - thyroglobulin mildly elevated but stable-will continue to monitor

## 2020-03-16 NOTE — RESULT ENCOUNTER NOTE
Please call the patient regarding labs - thyroglobulin mildly elevated but stable-will continue to monitor

## 2020-04-24 DIAGNOSIS — J45.909 ASTHMA, UNSPECIFIED ASTHMA SEVERITY, UNSPECIFIED WHETHER COMPLICATED, UNSPECIFIED WHETHER PERSISTENT: ICD-10-CM

## 2020-06-08 ENCOUNTER — TELEPHONE (OUTPATIENT)
Dept: OBGYN CLINIC | Facility: HOSPITAL | Age: 75
End: 2020-06-08

## 2020-06-08 DIAGNOSIS — M17.12 PRIMARY OSTEOARTHRITIS OF LEFT KNEE: Primary | ICD-10-CM

## 2020-06-09 DIAGNOSIS — M17.11 PRIMARY OSTEOARTHRITIS OF RIGHT KNEE: Primary | ICD-10-CM

## 2020-06-12 ENCOUNTER — TELEPHONE (OUTPATIENT)
Dept: ENDOCRINOLOGY | Facility: CLINIC | Age: 75
End: 2020-06-12

## 2020-06-12 ENCOUNTER — HOSPITAL ENCOUNTER (OUTPATIENT)
Dept: CT IMAGING | Facility: HOSPITAL | Age: 75
Discharge: HOME | End: 2020-06-12
Attending: INTERNAL MEDICINE
Payer: COMMERCIAL

## 2020-06-12 DIAGNOSIS — C73 THYROID CANCER (HCC): ICD-10-CM

## 2020-06-12 PROCEDURE — 70490 CT SOFT TISSUE NECK W/O DYE: CPT

## 2020-06-15 ENCOUNTER — OFFICE VISIT (OUTPATIENT)
Dept: CARDIOLOGY CLINIC | Facility: CLINIC | Age: 75
End: 2020-06-15
Payer: COMMERCIAL

## 2020-06-15 VITALS
WEIGHT: 256.8 LBS | HEART RATE: 62 BPM | TEMPERATURE: 99.5 F | SYSTOLIC BLOOD PRESSURE: 140 MMHG | BODY MASS INDEX: 47.26 KG/M2 | DIASTOLIC BLOOD PRESSURE: 90 MMHG | HEIGHT: 62 IN | OXYGEN SATURATION: 99 %

## 2020-06-15 DIAGNOSIS — R60.0 LOWER EXTREMITY EDEMA: ICD-10-CM

## 2020-06-15 DIAGNOSIS — I10 ESSENTIAL HYPERTENSION: Primary | ICD-10-CM

## 2020-06-15 DIAGNOSIS — E78.2 MIXED HYPERLIPIDEMIA: ICD-10-CM

## 2020-06-15 DIAGNOSIS — R60.9 EDEMA, UNSPECIFIED TYPE: ICD-10-CM

## 2020-06-15 PROCEDURE — 3080F DIAST BP >= 90 MM HG: CPT | Performed by: INTERNAL MEDICINE

## 2020-06-15 PROCEDURE — 3077F SYST BP >= 140 MM HG: CPT | Performed by: INTERNAL MEDICINE

## 2020-06-15 PROCEDURE — 4040F PNEUMOC VAC/ADMIN/RCVD: CPT | Performed by: INTERNAL MEDICINE

## 2020-06-15 PROCEDURE — 99214 OFFICE O/P EST MOD 30 MIN: CPT | Performed by: INTERNAL MEDICINE

## 2020-06-15 PROCEDURE — 3008F BODY MASS INDEX DOCD: CPT | Performed by: INTERNAL MEDICINE

## 2020-06-15 PROCEDURE — 1036F TOBACCO NON-USER: CPT | Performed by: INTERNAL MEDICINE

## 2020-06-15 PROCEDURE — 1160F RVW MEDS BY RX/DR IN RCRD: CPT | Performed by: INTERNAL MEDICINE

## 2020-06-15 RX ORDER — NEBIVOLOL 5 MG/1
5 TABLET ORAL DAILY
Qty: 90 TABLET | Refills: 3 | Status: SHIPPED | OUTPATIENT
Start: 2020-06-15 | End: 2021-02-23 | Stop reason: SDUPTHER

## 2020-06-15 RX ORDER — FUROSEMIDE 20 MG/1
20 TABLET ORAL DAILY PRN
Qty: 90 TABLET | Refills: 3 | Status: SHIPPED | OUTPATIENT
Start: 2020-06-15 | End: 2021-07-12 | Stop reason: SDUPTHER

## 2020-06-15 RX ORDER — POTASSIUM CHLORIDE 20 MEQ/1
20 TABLET, EXTENDED RELEASE ORAL 2 TIMES DAILY
Qty: 180 TABLET | Refills: 3 | Status: SHIPPED | OUTPATIENT
Start: 2020-06-15 | End: 2021-07-12 | Stop reason: SDUPTHER

## 2020-06-16 ENCOUNTER — TELEPHONE (OUTPATIENT)
Dept: ENDOCRINOLOGY | Facility: CLINIC | Age: 75
End: 2020-06-16

## 2020-06-22 ENCOUNTER — OFFICE VISIT (OUTPATIENT)
Dept: FAMILY MEDICINE CLINIC | Facility: CLINIC | Age: 75
End: 2020-06-22
Payer: COMMERCIAL

## 2020-06-22 VITALS
BODY MASS INDEX: 47.48 KG/M2 | SYSTOLIC BLOOD PRESSURE: 140 MMHG | DIASTOLIC BLOOD PRESSURE: 92 MMHG | HEIGHT: 62 IN | WEIGHT: 258 LBS | TEMPERATURE: 97.4 F

## 2020-06-22 DIAGNOSIS — M17.0 PRIMARY OSTEOARTHRITIS OF BOTH KNEES: ICD-10-CM

## 2020-06-22 DIAGNOSIS — C73 THYROID CANCER (HCC): Primary | ICD-10-CM

## 2020-06-22 DIAGNOSIS — E89.0 HYPOTHYROIDISM, POSTSURGICAL: ICD-10-CM

## 2020-06-22 DIAGNOSIS — K21.9 GASTROESOPHAGEAL REFLUX DISEASE, ESOPHAGITIS PRESENCE NOT SPECIFIED: ICD-10-CM

## 2020-06-22 DIAGNOSIS — E78.49 OTHER HYPERLIPIDEMIA: ICD-10-CM

## 2020-06-22 DIAGNOSIS — J98.4 RESTRICTIVE LUNG DISEASE: ICD-10-CM

## 2020-06-22 DIAGNOSIS — I10 ESSENTIAL HYPERTENSION: ICD-10-CM

## 2020-06-22 DIAGNOSIS — L30.9 DERMATITIS: ICD-10-CM

## 2020-06-22 DIAGNOSIS — E78.2 MIXED HYPERLIPIDEMIA: ICD-10-CM

## 2020-06-22 DIAGNOSIS — K21.9 GERD WITHOUT ESOPHAGITIS: ICD-10-CM

## 2020-06-22 DIAGNOSIS — R73.01 IMPAIRED FASTING GLUCOSE: ICD-10-CM

## 2020-06-22 PROCEDURE — 3008F BODY MASS INDEX DOCD: CPT | Performed by: FAMILY MEDICINE

## 2020-06-22 PROCEDURE — 3077F SYST BP >= 140 MM HG: CPT | Performed by: FAMILY MEDICINE

## 2020-06-22 PROCEDURE — 1036F TOBACCO NON-USER: CPT | Performed by: FAMILY MEDICINE

## 2020-06-22 PROCEDURE — 3080F DIAST BP >= 90 MM HG: CPT | Performed by: FAMILY MEDICINE

## 2020-06-22 PROCEDURE — 99214 OFFICE O/P EST MOD 30 MIN: CPT | Performed by: FAMILY MEDICINE

## 2020-06-22 PROCEDURE — 1160F RVW MEDS BY RX/DR IN RCRD: CPT | Performed by: FAMILY MEDICINE

## 2020-06-22 PROCEDURE — 4040F PNEUMOC VAC/ADMIN/RCVD: CPT | Performed by: FAMILY MEDICINE

## 2020-06-22 RX ORDER — LISINOPRIL AND HYDROCHLOROTHIAZIDE 20; 12.5 MG/1; MG/1
1 TABLET ORAL 2 TIMES DAILY
Qty: 180 TABLET | Refills: 1 | Status: SHIPPED | OUTPATIENT
Start: 2020-06-22 | End: 2021-02-08 | Stop reason: SDUPTHER

## 2020-06-22 RX ORDER — PRAVASTATIN SODIUM 20 MG
20 TABLET ORAL DAILY
Qty: 90 TABLET | Refills: 1 | Status: SHIPPED | OUTPATIENT
Start: 2020-06-22 | End: 2021-01-05 | Stop reason: SDUPTHER

## 2020-06-22 RX ORDER — PRAVASTATIN SODIUM 20 MG
20 TABLET ORAL DAILY
Qty: 90 TABLET | Refills: 3 | Status: SHIPPED | OUTPATIENT
Start: 2020-06-22 | End: 2021-07-04 | Stop reason: ALTCHOICE

## 2020-06-22 RX ORDER — LEVOTHYROXINE SODIUM 175 UG/1
175 TABLET ORAL DAILY
Qty: 90 TABLET | Refills: 1 | Status: SHIPPED | OUTPATIENT
Start: 2020-06-22 | End: 2021-01-05 | Stop reason: SDUPTHER

## 2020-06-22 RX ORDER — AMLODIPINE BESYLATE 5 MG/1
5 TABLET ORAL DAILY
Qty: 90 TABLET | Refills: 1 | Status: SHIPPED | OUTPATIENT
Start: 2020-06-22 | End: 2020-08-03 | Stop reason: SDUPTHER

## 2020-06-22 RX ORDER — OMEPRAZOLE 40 MG/1
40 CAPSULE, DELAYED RELEASE ORAL DAILY
Qty: 90 CAPSULE | Refills: 1 | Status: SHIPPED | OUTPATIENT
Start: 2020-06-22 | End: 2021-01-05 | Stop reason: SDUPTHER

## 2020-06-22 RX ORDER — TRIAMCINOLONE ACETONIDE 1 MG/G
CREAM TOPICAL 2 TIMES DAILY
Qty: 45 G | Refills: 2 | Status: SHIPPED | OUTPATIENT
Start: 2020-06-22 | End: 2021-01-05

## 2020-06-22 RX ORDER — FLUTICASONE FUROATE AND VILANTEROL 100; 25 UG/1; UG/1
1 POWDER RESPIRATORY (INHALATION) DAILY
Qty: 3 INHALER | Refills: 1 | Status: SHIPPED | OUTPATIENT
Start: 2020-06-22 | End: 2021-01-05

## 2020-06-26 ENCOUNTER — OFFICE VISIT (OUTPATIENT)
Dept: SLEEP CENTER | Facility: CLINIC | Age: 75
End: 2020-06-26
Payer: COMMERCIAL

## 2020-06-26 ENCOUNTER — TELEPHONE (OUTPATIENT)
Dept: SLEEP CENTER | Facility: CLINIC | Age: 75
End: 2020-06-26

## 2020-06-26 VITALS
SYSTOLIC BLOOD PRESSURE: 126 MMHG | DIASTOLIC BLOOD PRESSURE: 62 MMHG | BODY MASS INDEX: 46.93 KG/M2 | HEIGHT: 62 IN | WEIGHT: 255 LBS | HEART RATE: 62 BPM

## 2020-06-26 DIAGNOSIS — K21.9 GERD WITHOUT ESOPHAGITIS: ICD-10-CM

## 2020-06-26 DIAGNOSIS — J98.4 RESTRICTIVE LUNG DISEASE: ICD-10-CM

## 2020-06-26 DIAGNOSIS — R45.86 MOOD DISTURBANCE: ICD-10-CM

## 2020-06-26 DIAGNOSIS — E66.01 MORBID OBESITY WITH BMI OF 40.0-44.9, ADULT (HCC): ICD-10-CM

## 2020-06-26 DIAGNOSIS — I10 ESSENTIAL HYPERTENSION: ICD-10-CM

## 2020-06-26 DIAGNOSIS — G47.33 OBSTRUCTIVE SLEEP APNEA: Primary | ICD-10-CM

## 2020-06-26 PROCEDURE — 1160F RVW MEDS BY RX/DR IN RCRD: CPT | Performed by: INTERNAL MEDICINE

## 2020-06-26 PROCEDURE — 3074F SYST BP LT 130 MM HG: CPT | Performed by: INTERNAL MEDICINE

## 2020-06-26 PROCEDURE — 3078F DIAST BP <80 MM HG: CPT | Performed by: INTERNAL MEDICINE

## 2020-06-26 PROCEDURE — 4040F PNEUMOC VAC/ADMIN/RCVD: CPT | Performed by: INTERNAL MEDICINE

## 2020-06-26 PROCEDURE — 1036F TOBACCO NON-USER: CPT | Performed by: INTERNAL MEDICINE

## 2020-06-26 PROCEDURE — 99214 OFFICE O/P EST MOD 30 MIN: CPT | Performed by: INTERNAL MEDICINE

## 2020-06-26 PROCEDURE — 3008F BODY MASS INDEX DOCD: CPT | Performed by: INTERNAL MEDICINE

## 2020-07-02 ENCOUNTER — OFFICE VISIT (OUTPATIENT)
Dept: OBGYN CLINIC | Facility: MEDICAL CENTER | Age: 75
End: 2020-07-02
Payer: COMMERCIAL

## 2020-07-02 VITALS
BODY MASS INDEX: 47.32 KG/M2 | SYSTOLIC BLOOD PRESSURE: 142 MMHG | DIASTOLIC BLOOD PRESSURE: 83 MMHG | WEIGHT: 258.7 LBS | HEART RATE: 77 BPM

## 2020-07-02 DIAGNOSIS — M70.50 PES ANSERINE BURSITIS: ICD-10-CM

## 2020-07-02 DIAGNOSIS — M17.11 PRIMARY OSTEOARTHRITIS OF RIGHT KNEE: Primary | ICD-10-CM

## 2020-07-02 PROCEDURE — 4040F PNEUMOC VAC/ADMIN/RCVD: CPT | Performed by: ORTHOPAEDIC SURGERY

## 2020-07-02 PROCEDURE — 20610 DRAIN/INJ JOINT/BURSA W/O US: CPT | Performed by: ORTHOPAEDIC SURGERY

## 2020-07-02 PROCEDURE — 1036F TOBACCO NON-USER: CPT | Performed by: ORTHOPAEDIC SURGERY

## 2020-07-02 PROCEDURE — 99213 OFFICE O/P EST LOW 20 MIN: CPT | Performed by: ORTHOPAEDIC SURGERY

## 2020-07-02 PROCEDURE — 1160F RVW MEDS BY RX/DR IN RCRD: CPT | Performed by: ORTHOPAEDIC SURGERY

## 2020-07-02 PROCEDURE — 3079F DIAST BP 80-89 MM HG: CPT | Performed by: ORTHOPAEDIC SURGERY

## 2020-07-02 PROCEDURE — 3077F SYST BP >= 140 MM HG: CPT | Performed by: ORTHOPAEDIC SURGERY

## 2020-07-02 RX ORDER — METHYLPREDNISOLONE ACETATE 40 MG/ML
1 INJECTION, SUSPENSION INTRA-ARTICULAR; INTRALESIONAL; INTRAMUSCULAR; SOFT TISSUE
Status: COMPLETED | OUTPATIENT
Start: 2020-07-02 | End: 2020-07-02

## 2020-07-02 RX ORDER — HYALURONATE SODIUM 10 MG/ML
20 SYRINGE (ML) INTRAARTICULAR
Status: COMPLETED | OUTPATIENT
Start: 2020-07-02 | End: 2020-07-02

## 2020-07-02 RX ORDER — LIDOCAINE HYDROCHLORIDE 10 MG/ML
1 INJECTION, SOLUTION INFILTRATION; PERINEURAL
Status: COMPLETED | OUTPATIENT
Start: 2020-07-02 | End: 2020-07-02

## 2020-07-02 RX ORDER — DICLOFENAC SODIUM 75 MG/1
75 TABLET, DELAYED RELEASE ORAL 2 TIMES DAILY PRN
Qty: 180 TABLET | Refills: 1 | Status: SHIPPED | OUTPATIENT
Start: 2020-07-02 | End: 2020-11-16 | Stop reason: SDUPTHER

## 2020-07-02 RX ADMIN — METHYLPREDNISOLONE ACETATE 1 ML: 40 INJECTION, SUSPENSION INTRA-ARTICULAR; INTRALESIONAL; INTRAMUSCULAR; SOFT TISSUE at 12:28

## 2020-07-02 RX ADMIN — LIDOCAINE HYDROCHLORIDE 1 ML: 10 INJECTION, SOLUTION INFILTRATION; PERINEURAL at 12:27

## 2020-07-02 RX ADMIN — Medication 20 MG: at 12:27

## 2020-07-02 RX ADMIN — LIDOCAINE HYDROCHLORIDE 1 ML: 10 INJECTION, SOLUTION INFILTRATION; PERINEURAL at 12:28

## 2020-07-02 RX ADMIN — METHYLPREDNISOLONE ACETATE 1 ML: 40 INJECTION, SUSPENSION INTRA-ARTICULAR; INTRALESIONAL; INTRAMUSCULAR; SOFT TISSUE at 12:27

## 2020-07-02 NOTE — PROGRESS NOTES
Assessment/Plan:  1  Primary osteoarthritis of right knee    2  Pes anserine bursitis      Orders Placed This Encounter   Procedures    Large joint arthrocentesis    Large joint arthrocentesis    Large joint arthrocentesis       Bilateral knee pain - 1st right knee Euflexxa  · The patient was provided with bilateral pes anserine steroid injections  · The patient was provided with right knee Euflexxa injection  · Patient tolerated the procedures well  · Follow up in one week  · PT, then home exercises  ROM encouraged  · Recommended weight loss  Patient declined Rx for dietician  · Diclofenac as needed for pain control  Medication warnings were reviewed with the patient  She is aware not take with any other NSAIDs  Return in about 1 week (around 7/9/2020)  I answered all of the patient's questions during the visit and provided education of the patient's condition during the visit  The patient verbalized understanding of the information given and agrees with the plan  This note was dictated using [employer] software  It may contain errors including improperly dictated words  Please contact physician directly for any questions  Subjective   Chief Complaint:   Chief Complaint   Patient presents with    Right Knee - Follow-up       Rey Melton is a 69 y o  female who presents for follow up for 1st right knee Euflexxa injection  Today she complains of right medial and lateral anterior knee pain and left anteromedial knee pain  She rates her pain 8/10 and greater at times  Weight bearing aggravates while sitting alleviates  She will use Aleve with mild relief  She has had visco-supplement in past with benefit  She has had steroid injections in past with benefit  Review of Systems  ROS:    See HPI for musculoskeletal review     All other systems reviewed are negative     History:  Past Medical History:   Diagnosis Date    Asthma     Breathing difficulty     Bronchitis     Depression  Fracture of arm     Fracture of carpal bone     Hyperlipidemia     Hypertension     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    Shortness of breath     Thyroid nodule     Thyroid nodule     Thyroid nodule      Past Surgical History:   Procedure Laterality Date    HEMORRHOID SURGERY      [place] THYROID LOBECTOMY,UNILAT Right 2016    Procedure: HEMITHYROIDECTOMY;  Surgeon: Madeleine Pak MD;  Location: [facility];  Service: Surgical Oncology    [place] THYROIDECTOMY POST PREV THYR SURG Left 2016    Procedure:  COMPLETION THYROIDECTOMY, FLEXIBLE LARYNGOSCOPY;  Surgeon: Madeleine Pak MD;  Location: [facility];  Service: Surgical Oncology    THYROIDECTOMY, PARTIAL Left      Social History   Social History     Substance and Sexual Activity   Alcohol Use No    Comment: occasional glass of wine     Social History     Substance and Sexual Activity   Drug Use No     Social History     Tobacco Use   Smoking Status Former Smoker    Packs/day: 0 25    Years: 4 00    Pack years: 1 00    Types: Cigarettes    Last attempt to quit: 6/15/1981    Years since quittin 0   Smokeless Tobacco Former User   Tobacco Comment    quit 36 yrs ago (Never a smoker per [employer])     Family History:   Family History   Adopted: Yes   Problem Relation Age of Onset    Hypertension Family     Kidney disease Family     Hypertension Mother        Meds/Allergies     (Not in a hospital admission)  Allergies   Allergen Reactions    Adhesive [Medical Tape] Rash          Objective     /83   Pulse 77   Wt 117 kg (258 lb 11 2 oz)   LMP  (LMP Unknown)   BMI 47 32 kg/m²      PE:  AAOx 3  WDWN  Hearing intact, no drainage from eyes  no audible wheezing  no abdominal distension  LE compartments soft, skin intact    Ortho Exam:  bilateral Knee:   No erythema  no swelling  no effusion  no warmth  AROM: R knee: , L knee: 0-110  +TTP over pes anserine bursa B/L      Large joint arthrocentesis: R knee  Date/Time: 7/2/2020 12:27 PM  Consent given by: patient  Site marked: site marked  Timeout: Immediately prior to procedure a time out was called to verify the correct patient, procedure, equipment, support staff and site/side marked as required   Supporting Documentation  Indications: pain   Procedure Details  Location: knee - R knee  Preparation: Patient was prepped and draped in the usual sterile fashion  Needle size: 22 G  Ultrasound guidance: no  Approach: anterolateral  Medications administered: 20 mg Sodium Hyaluronate 20 MG/2ML    Patient tolerance: patient tolerated the procedure well with no immediate complications  Dressing:  Sterile dressing applied    Large joint arthrocentesis: R pes anserine bursa  Date/Time: 7/2/2020 12:27 PM  Consent given by: patient  Site marked: site marked  Timeout: Immediately prior to procedure a time out was called to verify the correct patient, procedure, equipment, support staff and site/side marked as required   Supporting Documentation  Indications: pain   Procedure Details  Location: knee - R pes anserine bursa  Needle size: 22 G  Ultrasound guidance: no  Approach: anterolateral  Medications administered: 1 mL lidocaine 1 %; 1 mL methylPREDNISolone acetate 40 mg/mL    Patient tolerance: patient tolerated the procedure well with no immediate complications  Dressing:  Sterile dressing applied    Large joint arthrocentesis: L pes anserine bursa  Date/Time: 7/2/2020 12:28 PM  Consent given by: patient  Site marked: site marked  Timeout: Immediately prior to procedure a time out was called to verify the correct patient, procedure, equipment, support staff and site/side marked as required   Supporting Documentation  Indications: pain   Procedure Details  Location: knee - L pes anserine bursa  Needle size: 22 G  Ultrasound guidance: no  Approach: anterolateral  Medications administered: 1 mL lidocaine 1 %; 1 mL methylPREDNISolone acetate 40 mg/mL    Patient tolerance: patient tolerated the procedure well with no immediate complications  Dressing:  Sterile dressing applied              Scribe Attestation    I,:   Shala Vallecillo am acting as a scribe while in the presence of the attending physician :        I,:   Maxwell Skinner, DO personally performed the services described in this documentation    as scribed in my presence :

## 2020-07-09 ENCOUNTER — OFFICE VISIT (OUTPATIENT)
Dept: OBGYN CLINIC | Facility: MEDICAL CENTER | Age: 75
End: 2020-07-09
Payer: COMMERCIAL

## 2020-07-09 VITALS
DIASTOLIC BLOOD PRESSURE: 85 MMHG | SYSTOLIC BLOOD PRESSURE: 155 MMHG | BODY MASS INDEX: 47.48 KG/M2 | HEIGHT: 62 IN | HEART RATE: 62 BPM | TEMPERATURE: 98 F | WEIGHT: 258 LBS

## 2020-07-09 DIAGNOSIS — M70.50 PES ANSERINE BURSITIS: ICD-10-CM

## 2020-07-09 DIAGNOSIS — M17.12 PRIMARY OSTEOARTHRITIS OF LEFT KNEE: Primary | ICD-10-CM

## 2020-07-09 PROCEDURE — 3008F BODY MASS INDEX DOCD: CPT | Performed by: PHYSICIAN ASSISTANT

## 2020-07-09 PROCEDURE — 20610 DRAIN/INJ JOINT/BURSA W/O US: CPT | Performed by: PHYSICIAN ASSISTANT

## 2020-07-09 PROCEDURE — 3077F SYST BP >= 140 MM HG: CPT | Performed by: PHYSICIAN ASSISTANT

## 2020-07-09 PROCEDURE — 4040F PNEUMOC VAC/ADMIN/RCVD: CPT | Performed by: PHYSICIAN ASSISTANT

## 2020-07-09 PROCEDURE — 99213 OFFICE O/P EST LOW 20 MIN: CPT | Performed by: PHYSICIAN ASSISTANT

## 2020-07-09 PROCEDURE — 3079F DIAST BP 80-89 MM HG: CPT | Performed by: PHYSICIAN ASSISTANT

## 2020-07-09 PROCEDURE — 1036F TOBACCO NON-USER: CPT | Performed by: PHYSICIAN ASSISTANT

## 2020-07-09 RX ORDER — HYALURONATE SODIUM 10 MG/ML
20 SYRINGE (ML) INTRAARTICULAR
Status: COMPLETED | OUTPATIENT
Start: 2020-07-09 | End: 2020-07-09

## 2020-07-09 RX ADMIN — Medication 20 MG: at 12:31

## 2020-07-09 NOTE — PROGRESS NOTES
Assessment/Plan:  1  Primary osteoarthritis of left knee    2  Pes anserine bursitis      No orders of the defined types were placed in this encounter  · Patient received right knee Euflexxa injection 2/3 in the office  Tolerated the procedure well  Advised to apply ice and avoid strenuous activity for 1-2 days as needed  · Continue diclofenac bid prn pain  · Patient will begin PT once she has finished with the injection series  Return in about 1 week (around 7/16/2020) for right knee Euflexxa 3/3  I answered all of the patient's questions during the visit and provided education of the patient's condition during the visit  The patient verbalized understanding of the information given and agrees with the plan  This note was dictated using [employer] software  It may contain errors including improperly dictated words  Please contact physician directly for any questions  Subjective   Chief Complaint:   Chief Complaint   Patient presents with    Right Knee - Follow-up       HPI  Radhika Benavides is a 69 y o  female who presents for follow up for right knee osteoarthritis  Patient is here for right knee Euflexxa 2/3 injection  Patient had bilateral pes anserine bursa injections last week but does not know how much they helped  She still feels some soreness on the medial aspect of the knees  Patient notes mild improvement in her right knee pain today  Pain is on the lateral aspect of the knee  Patient is taking diclofenac for pain as needed and she feels it helps  She has not started PT yet, she wants to wait till after the injeciton series is done  Denies new inury, numbness, or tingling  Review of Systems  ROS:    See HPI for musculoskeletal review     All other systems reviewed are negative     History:  Past Medical History:   Diagnosis Date    Asthma     Breathing difficulty     Bronchitis     Depression     Fracture of arm     Fracture of carpal bone     Hyperlipidemia     Hypertension     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[facility] [address] ) 2016    Shortness of breath     Thyroid nodule     Thyroid nodule     Thyroid nodule      Past Surgical History:   Procedure Laterality Date    HEMORRHOID SURGERY      MN THYROID LOBECTOMY,UNILAT Right 2016    Procedure: HEMITHYROIDECTOMY;  Surgeon: Solitario Medina MD;  [facility];  Service: Surgical Oncology    MN THYROIDECTOMY POST PREV THYR SURG Left 2016    Procedure:  COMPLETION THYROIDECTOMY, FLEXIBLE LARYNGOSCOPY;  Surgeon: Solitario Medina MD;  [facility];  Service: Surgical Oncology    THYROIDECTOMY, PARTIAL Left      Social History   Social History     Substance and Sexual Activity   Alcohol Use No    Comment: occasional glass of wine     Social History     Substance and Sexual Activity   Drug Use No     Social History     Tobacco Use   Smoking Status Former Smoker    Packs/day: 0 25    Years: 4 00    Pack years: 1 00    Types: Cigarettes    Last attempt to quit: 6/15/1981    Years since quittin 0   Smokeless Tobacco Former User   Tobacco Comment    quit 40 yrs ago (Never a smoker per [employer])     Family History:   Family History   Adopted: Yes   Problem Relation Age of Onset    Hypertension Family     Kidney disease Family     Hypertension Mother        Current Outpatient Medications on File Prior to Visit   Medication Sig Dispense Refill    amLODIPine (NORVASC) 5 mg tablet Take 1 tablet (5 mg total) by mouth daily 90 tablet 1    diclofenac (VOLTAREN) 75 mg EC tablet Take 1 tablet (75 mg total) by mouth 2 (two) times a day as needed (pain) Take with food  180 tablet 1    diclofenac sodium (VOLTAREN) 1 % Apply 2 g topically 4 (four) times a day as needed (knee pain) 1 Tube 1    fluticasone-vilanterol (BREO ELLIPTA) 100-25 mcg/inh inhaler Inhale 1 puff daily Rinse mouth after use   3 Inhaler 1    furosemide (LASIX) 20 mg tablet Take 1 tablet (20 mg total) by mouth daily as needed (edema) 90 tablet 3    levothyroxine 175 mcg tablet Take 1 tablet (175 mcg total) by mouth daily 90 tablet 1    lisinopril-hydrochlorothiazide (PRINZIDE,ZESTORETIC) 20-12 5 MG per tablet Take 1 tablet by mouth 2 (two) times a day 180 tablet 1    Melatonin 10 MG TABS Take by mouth      nebivolol (BYSTOLIC) 5 mg tablet Take 1 tablet (5 mg total) by mouth daily 90 tablet 3    omeprazole (PriLOSEC) 40 MG capsule Take 1 capsule (40 mg total) by mouth daily 90 capsule 1    potassium chloride (Klor-Con M20) 20 mEq tablet Take 1 tablet (20 mEq total) by mouth 2 (two) times a day 180 tablet 3    pravastatin (PRAVACHOL) 20 mg tablet Take 1 tablet (20 mg total) by mouth daily 90 tablet 3    pravastatin (PRAVACHOL) 20 mg tablet Take 1 tablet (20 mg total) by mouth daily 90 tablet 1    triamcinolone (KENALOG) 0 1 % cream Apply topically 2 (two) times a day 45 g 2    venlafaxine (EFFEXOR) 37 5 mg tablet Take 1 tablet (37 5 mg total) by mouth daily 90 tablet 1    VENTOLIN  (90 Base) MCG/ACT inhaler INHALE 2 PUFFS BY MOUTH EVERY 4 HOURS AS NEEDED FOR WHEEZING (Patient not taking: Reported on 6/15/2020) 54 g 0     No current facility-administered medications on file prior to visit        Allergies   Allergen Reactions    Adhesive [Medical Tape] Rash        Objective     /85   Pulse 62   Temp 98 °F (36 7 °C)   Ht 5' 2" (1 575 m)   Wt 117 kg (258 lb)   LMP  (LMP Unknown)   BMI 47 19 kg/m²      PE:  AAOx 3  WDWN  Hearing intact, no drainage from eyes  no audible wheezing  no abdominal distension  LE compartments soft, skin intact    Ortho Exam:  right Knee:   No erythema  no swelling  no effusion  no warmth  +TTP over medial and lateral joint lines  AROM: 10- 110  Stable to varus/valgus stress    Large joint arthrocentesis: R knee  Date/Time: 7/9/2020 12:31 PM  Consent given by: patient  Site marked: site marked  Timeout: Immediately prior to procedure a time out was called to verify the correct patient, procedure, equipment, support staff and site/side marked as required   Supporting Documentation  Indications: pain   Procedure Details  Location: knee - R knee  Preparation: Patient was prepped and draped in the usual sterile fashion  Needle size: 22 G  Ultrasound guidance: no  Approach: anterolateral  Medications administered: 20 mg Sodium Hyaluronate 20 MG/2ML    Patient tolerance: patient tolerated the procedure well with no immediate complications  Dressing:  Sterile dressing applied

## 2020-07-16 ENCOUNTER — OFFICE VISIT (OUTPATIENT)
Dept: OBGYN CLINIC | Facility: MEDICAL CENTER | Age: 75
End: 2020-07-16
Payer: COMMERCIAL

## 2020-07-16 VITALS
TEMPERATURE: 98.1 F | HEART RATE: 79 BPM | BODY MASS INDEX: 47.01 KG/M2 | SYSTOLIC BLOOD PRESSURE: 143 MMHG | WEIGHT: 257 LBS | DIASTOLIC BLOOD PRESSURE: 81 MMHG

## 2020-07-16 DIAGNOSIS — M17.11 PRIMARY OSTEOARTHRITIS OF RIGHT KNEE: Primary | ICD-10-CM

## 2020-07-16 DIAGNOSIS — M70.50 PES ANSERINE BURSITIS: ICD-10-CM

## 2020-07-16 DIAGNOSIS — M17.12 PRIMARY OSTEOARTHRITIS OF LEFT KNEE: ICD-10-CM

## 2020-07-16 PROCEDURE — 3077F SYST BP >= 140 MM HG: CPT | Performed by: ORTHOPAEDIC SURGERY

## 2020-07-16 PROCEDURE — 1160F RVW MEDS BY RX/DR IN RCRD: CPT | Performed by: ORTHOPAEDIC SURGERY

## 2020-07-16 PROCEDURE — 1036F TOBACCO NON-USER: CPT | Performed by: ORTHOPAEDIC SURGERY

## 2020-07-16 PROCEDURE — 3079F DIAST BP 80-89 MM HG: CPT | Performed by: ORTHOPAEDIC SURGERY

## 2020-07-16 PROCEDURE — 99213 OFFICE O/P EST LOW 20 MIN: CPT | Performed by: ORTHOPAEDIC SURGERY

## 2020-07-16 PROCEDURE — 4040F PNEUMOC VAC/ADMIN/RCVD: CPT | Performed by: ORTHOPAEDIC SURGERY

## 2020-07-16 PROCEDURE — 20610 DRAIN/INJ JOINT/BURSA W/O US: CPT | Performed by: ORTHOPAEDIC SURGERY

## 2020-07-16 RX ORDER — HYALURONATE SODIUM 10 MG/ML
20 SYRINGE (ML) INTRAARTICULAR
Status: COMPLETED | OUTPATIENT
Start: 2020-07-16 | End: 2020-07-16

## 2020-07-16 RX ADMIN — Medication 20 MG: at 12:28

## 2020-07-16 NOTE — PROGRESS NOTES
Assessment/Plan:  1  Primary osteoarthritis of right knee    2  Pes anserine bursitis    3  Primary osteoarthritis of left knee      Orders Placed This Encounter   Procedures    Large joint arthrocentesis: R knee     · Patient received right knee Euflexxa 3/3 injection in the office today  Tolerated the procedure well  Advised to apply ice and avoid strenuous activity for 1-2 days as needed  · Continue diclofenac as needed  · Patient is going to set up outpatient PT today  · She is planning for left knee visco injections as well once it is approved  She will follow up in 2 months to check on her right knee and bilateral pes bursa  Return in about 2 months (around 9/16/2020) for right knee, bilat pes bursa  I answered all of the patient's questions during the visit and provided education of the patient's condition during the visit  The patient verbalized understanding of the information given and agrees with the plan  This note was dictated using [employer] software  It may contain errors including improperly dictated words  Please contact physician directly for any questions  Subjective   Chief Complaint:   Chief Complaint   Patient presents with    Right Knee - Follow-up       HPI  Savanna Davis is a 69 y o  female who presents for follow up for bilateral knee osteoarthritis  Patient is here for bilat knee Euflexxa 3/3 injections  Patient notes improvement in her pain after the second injection  She is taking diclofenac as needed for pain  She is swimming for exercises  She is planning on starting outpatient PT now that she completed her visco series  Review of Systems  ROS:    See HPI for musculoskeletal review     All other systems reviewed are negative     History:  Past Medical History:   Diagnosis Date    Asthma     Breathing difficulty     Bronchitis     Depression     Fracture of arm     Fracture of carpal bone     Hyperlipidemia     Hypertension     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[facility] [address] ) 2016    Shortness of breath     Thyroid nodule     Thyroid nodule     Thyroid nodule      Past Surgical History:   Procedure Laterality Date    HEMORRHOID SURGERY      AR THYROID LOBECTOMY,UNILAT Right 2016    Procedure: HEMITHYROIDECTOMY;  Surgeon: Rafael Fleischer, MD;  [facility];  Service: Surgical Oncology    AR THYROIDECTOMY POST PREV THYR SURG Left 2016    Procedure:  COMPLETION THYROIDECTOMY, FLEXIBLE LARYNGOSCOPY;  Surgeon: Rafael Fleischer, MD;  [facility];  Service: Surgical Oncology    THYROIDECTOMY, PARTIAL Left      Social History   Social History     Substance and Sexual Activity   Alcohol Use No    Comment: occasional glass of wine     Social History     Substance and Sexual Activity   Drug Use No     Social History     Tobacco Use   Smoking Status Former Smoker    Packs/day: 0 25    Years: 4 00    Pack years: 1 00    Types: Cigarettes    Last attempt to quit: 6/15/1981    Years since quittin 1   Smokeless Tobacco Former User   Tobacco Comment    quit 40 yrs ago (Never a smoker per [employer])     Family History:   Family History   Adopted: Yes   Problem Relation Age of Onset    Hypertension Family     Kidney disease Family     Hypertension Mother        Current Outpatient Medications on File Prior to Visit   Medication Sig Dispense Refill    amLODIPine (NORVASC) 5 mg tablet Take 1 tablet (5 mg total) by mouth daily 90 tablet 1    diclofenac (VOLTAREN) 75 mg EC tablet Take 1 tablet (75 mg total) by mouth 2 (two) times a day as needed (pain) Take with food  180 tablet 1    diclofenac sodium (VOLTAREN) 1 % Apply 2 g topically 4 (four) times a day as needed (knee pain) 1 Tube 1    fluticasone-vilanterol (BREO ELLIPTA) 100-25 mcg/inh inhaler Inhale 1 puff daily Rinse mouth after use   3 Inhaler 1    furosemide (LASIX) 20 mg tablet Take 1 tablet (20 mg total) by mouth daily as needed (edema) 90 tablet 3    levothyroxine 175 mcg tablet Take 1 tablet (175 mcg total) by mouth daily 90 tablet 1    lisinopril-hydrochlorothiazide (PRINZIDE,ZESTORETIC) 20-12 5 MG per tablet Take 1 tablet by mouth 2 (two) times a day 180 tablet 1    Melatonin 10 MG TABS Take by mouth      nebivolol (BYSTOLIC) 5 mg tablet Take 1 tablet (5 mg total) by mouth daily 90 tablet 3    omeprazole (PriLOSEC) 40 MG capsule Take 1 capsule (40 mg total) by mouth daily 90 capsule 1    potassium chloride (Klor-Con M20) 20 mEq tablet Take 1 tablet (20 mEq total) by mouth 2 (two) times a day 180 tablet 3    pravastatin (PRAVACHOL) 20 mg tablet Take 1 tablet (20 mg total) by mouth daily 90 tablet 3    pravastatin (PRAVACHOL) 20 mg tablet Take 1 tablet (20 mg total) by mouth daily 90 tablet 1    triamcinolone (KENALOG) 0 1 % cream Apply topically 2 (two) times a day 45 g 2    venlafaxine (EFFEXOR) 37 5 mg tablet Take 1 tablet (37 5 mg total) by mouth daily 90 tablet 1    VENTOLIN  (90 Base) MCG/ACT inhaler INHALE 2 PUFFS BY MOUTH EVERY 4 HOURS AS NEEDED FOR WHEEZING (Patient not taking: Reported on 6/15/2020) 54 g 0     No current facility-administered medications on file prior to visit        Allergies   Allergen Reactions    Adhesive [Medical Tape] Rash        Objective     /81   Pulse 79   Temp 98 1 °F (36 7 °C)   Wt 117 kg (257 lb)   LMP  (LMP Unknown)   BMI 47 01 kg/m²      PE:  AAOx 3  WDWN  Hearing intact, no drainage from eyes  no audible wheezing  no abdominal distension  LE compartments soft, skin intact    Ortho Exam:  right Knee:   No erythema  no swelling  no effusion  no warmth  AROM: full  Stable to varus/valgus stress    Large joint arthrocentesis: R knee  Date/Time: 7/16/2020 12:28 PM  Consent given by: patient  Supporting Documentation  Indications: pain   Procedure Details  Location: knee - R knee  Preparation: Patient was prepped and draped in the usual sterile fashion  Needle size: 22 G  Ultrasound guidance: no  Approach: anterolateral  Medications administered: 20 mg Sodium Hyaluronate 20 MG/2ML    Patient tolerance: patient tolerated the procedure well with no immediate complications  Dressing:  Sterile dressing applied

## 2020-07-23 ENCOUNTER — EVALUATION (OUTPATIENT)
Dept: PHYSICAL THERAPY | Facility: MEDICAL CENTER | Age: 75
End: 2020-07-23
Payer: COMMERCIAL

## 2020-07-23 DIAGNOSIS — M17.12 PRIMARY OSTEOARTHRITIS OF LEFT KNEE: ICD-10-CM

## 2020-07-23 DIAGNOSIS — M17.11 PRIMARY OSTEOARTHRITIS OF RIGHT KNEE: Primary | ICD-10-CM

## 2020-07-23 DIAGNOSIS — M70.50 PES ANSERINE BURSITIS: ICD-10-CM

## 2020-07-23 PROCEDURE — 97140 MANUAL THERAPY 1/> REGIONS: CPT | Performed by: PHYSICAL THERAPIST

## 2020-07-23 PROCEDURE — 97161 PT EVAL LOW COMPLEX 20 MIN: CPT | Performed by: PHYSICAL THERAPIST

## 2020-07-23 PROCEDURE — 97112 NEUROMUSCULAR REEDUCATION: CPT | Performed by: PHYSICAL THERAPIST

## 2020-07-23 NOTE — PROGRESS NOTES
PT Evaluation     Today's date: 2020  Patient name: Princess Lee  : 1945  MRN: [id number]  Referring provider: Ritika Cifuentes DO  Dx:   Encounter Diagnosis     ICD-10-CM    1  Primary osteoarthritis of right knee M17 11 Ambulatory referral to Physical Therapy   2  Primary osteoarthritis of left knee M17 12    3  Pes anserine bursitis M70 50 Ambulatory referral to Physical Therapy       Start Time: 49  Stop Time: 122  Total time in clinic (min): 50 minutes    Assessment  Assessment details: Princess Lee is a pleasant 69 y o  female who presents with B/L knee pain  The patient's greatest concerns are concern at no signs of improvement, wanting to avoid surgery, fear of not being able to keep active and future ill health (and wanting to prevent it)  No further referral appears necessary at this time based upon examination results  Primary movement impairment diagnosis of patellar hypomobility resulting in pathoanatomical symptoms of knee pain and limiting her ability to care for self, don/doff socks, exercise or recreation, get out of a chair, perform household chores, sit, sleep, squat to  objects from the floor, stand and walk  She demonstrated poor quad recruitment with quad set  Post mobilizations she was able to demonstrate improved quad set however still did not attain full TKE  Pt was instructed on a HEP that focused on quad sets  Pt verbalized and demonstrated understanding of HEP and agreeable to PT POC  Primary Impairments:  1) Patellar hypomobility : address with mobilizations   2) Impaired quad recruitment : address with improving patellar mobility and strengthening of the quads  3) Impaired knee ROM : address with improving patellar mobility   4) Pain     Etiologic factors include none recalled by the patient      Impairments: abnormal gait, abnormal muscle firing, abnormal muscle tone, abnormal or restricted ROM, abnormal movement, activity intolerance, difficulty understanding, impaired balance, impaired physical strength, lacks appropriate home exercise program, pain with function, weight-bearing intolerance and poor body mechanics    Symptom irritability: moderateBarriers to therapy: High co-pay   Understanding of Dx/Px/POC: good   Prognosis: good  Prognosis details: Positive prognostic indicators include positive attitude toward recovery, good understanding of diagnosis and treatment plan options and absence of observed red flags  Negative prognostic indicators include chronicity of symptoms, depression, hypertension, high symptom irritability and obesity  Goals  ST  Independent with HEP in 2 weeks  2  Pt will have verbal report of improvement in symptoms by >/=25% in 2 weeks     LT  Pt will improve FOTO score by >/=9 points in 6 weeks  2  Pt will improve FOTO score to >/=58 by visit # 14  3  Pt will be able to ascend/descend stairs reciprocally by time of d/c   4  Pt will be able to sleep through the night with 1 or less disturbances of knee pain by time of d/c   5  Pt will be able to get in and out of her pool with little to no difficulty by time of d/c   6  Pt will be able to perform light activities around her home with little difficulty by time of d/c       Plan  Plan details: Prognosis above is given PT services 2x/week tapering to 1x/week over the next 2 months and home program adherence    Patient would benefit from: skilled physical therapy  Planned modality interventions: thermotherapy: hydrocollator packs and cryotherapy  Planned therapy interventions: activity modification, joint mobilization, manual therapy, motor coordination training, neuromuscular re-education, patient education, self care, therapeutic activities, therapeutic exercise, graded activity, home exercise program, behavior modification, strengthening, stretching, functional ROM exercises, balance and gait training  Frequency: 2x week  Plan of Care beginning date: 7/23/2020  Treatment plan discussed with: patient        Subjective Evaluation    History of Present Illness  Mechanism of injury: Monica Bernheim is a 69 y o  female who presents for follow up for bilateral knee osteoarthritis for at least the last 2 years  Patient received bilat knee Euflexxa 3/3 injections on 7/16/2020  Patient notes improvement in her pain after the second injection  She has difficulty with the steps, bending down to get stuff off the floor, she frequently gets cramps which sometimes she will feel for days, standing for prolonged periods, occasionally she does have difficulty sleeping  The more she moves the better she feels  She is  and she has to work at home and notes that it is difficult having to sit for prolonged periods of time  Her main concerns are avoiding a total knee replacement and managing her pain conservatively  Pain  Quality: cramping and dull ache    Social Support  L[facility] in: [facility]    Treatments  Previous treatment: injection treatment  Patient Goals  Patient goals for therapy: decreased pain  Patient goal: be able to go up and down stairs, be able to get rid of the cramping in her legs, be able to get in and out of the pool,         Objective     Tenderness   Left Knee   Tenderness in the ITB, lateral joint line, medial joint line and pes anserinus  No tenderness in the inferior fat pad, inferior patella, lateral patella, patellar tendon and quadriceps tendon  Right Knee   Tenderness in the medial joint line  No tenderness in the inferior fat pad, inferior patella, ITB, lateral joint line, lateral patella, medial patella, patellar tendon, quadriceps tendon and superior patella       Active Range of Motion   Left Knee   Flexion: 120 degrees   Extension: -15 degrees   Extensor lag: -20 degrees     Right Knee   Flexion: 108 degrees   Extension: -25 degrees   Extensor lag: -28 degrees     Mobility   Patellar Mobility:   Left Knee Hypomobile: left medial, left lateral, left superior and left inferior    Right Knee   Hypomobile: medial, lateral, superior and inferior     Strength/Myotome Testing     Left Knee   Flexion: 4- (pain hamstring cramp)  Extension: 4  Quadriceps contraction: poor    Right Knee   Flexion: 4- (pain hamstring cramp)  Extension: 4  Quadriceps contraction: poor    Additional Strength Details  Minimal to no quad recruitment, required verbal cues in order to decrease glute activation     LE myotomes : WNL     Ambulation     Ambulation: Stairs     Additional Stairs Ambulation Details  Did not assess however patient reports non-reciprocal gait pattern with steps with ascending/descending     Observational Gait   Gait: antalgic   Decreased walking speed and stride length     Left foot contact pattern: foot flat  Right foot contact pattern: foot flat  Base of support: increased    General Comments:      Knee Comments  5TSTS: 14seconds   Observations:   Pt demonstrated valgus movements during STS              Precautions: ALLERGY TO MEDICAL TAPE, asthma, depression, HTN, hx of thyroid carcinoma,     ** Increased time spent on proper HEP instruction **    Manuals 7/23            Patellar mobility all directions L/R KB gr 4             Ext ROM R > L                                       Neuro Re-Ed             Quad sets c 6s holds  x20 ea             Supine clams c TB c 6s holds                                                                              Ther Ex             Bike              Heel slides             Prone hangs             Supine SLR flexion                                                                 Ther Activity                                       Gait Training                                       Modalities             MH PRN

## 2020-07-27 ENCOUNTER — OFFICE VISIT (OUTPATIENT)
Dept: PHYSICAL THERAPY | Facility: MEDICAL CENTER | Age: 75
End: 2020-07-27
Payer: COMMERCIAL

## 2020-07-27 ENCOUNTER — TELEPHONE (OUTPATIENT)
Dept: OBGYN CLINIC | Facility: MEDICAL CENTER | Age: 75
End: 2020-07-27

## 2020-07-27 DIAGNOSIS — M17.12 PRIMARY OSTEOARTHRITIS OF LEFT KNEE: ICD-10-CM

## 2020-07-27 DIAGNOSIS — M70.50 PES ANSERINE BURSITIS: ICD-10-CM

## 2020-07-27 DIAGNOSIS — M17.11 PRIMARY OSTEOARTHRITIS OF RIGHT KNEE: Primary | ICD-10-CM

## 2020-07-27 PROCEDURE — 97110 THERAPEUTIC EXERCISES: CPT | Performed by: PHYSICAL THERAPIST

## 2020-07-27 PROCEDURE — 97140 MANUAL THERAPY 1/> REGIONS: CPT | Performed by: PHYSICAL THERAPIST

## 2020-07-27 PROCEDURE — 97112 NEUROMUSCULAR REEDUCATION: CPT | Performed by: PHYSICAL THERAPIST

## 2020-07-27 NOTE — PROGRESS NOTES
Daily Note     Today's date: 2020  Patient name: Jesica Schulte  : 1945  MRN: [id number]  Referring provider: Rebecca Weiner DO  Dx:   Encounter Diagnosis     ICD-10-CM    1  Primary osteoarthritis of right knee M17 11    2  Primary osteoarthritis of left knee M17 12    3  Pes anserine bursitis M70 50                   Subjective: Patient reports that she has been doing the exercises 2x/day without difficulty  She notes that her R knee is stiffer than her L, and her R knee causes her more pain than her L  She also notes some swelling in the back of her knee that she can feel when doing her quad sets  She reports that she has had a history of recurrent R knee swelling as a teenager that needed to be drained at times  She does have some posterior knee pain, but she reports that this is not constant and only when performing exercises  Objective: See treatment diary below      Assessment: Patient continues to demonstrate significant limitations in bilateral EXT ROM along with bilateral patellar hypomobility  Positive response to patellar mobilizations with slight improvement in superior/inferior glide  Focused interventions to be performed on RLE due to R knee causing more functional limitations  Patient was educated to perform HEP of QS and strap gastroc stretch bilaterally  Mild swelling noted in R posterior knee today  No visible calf swelling compared to contralateral side  Patient was educated to contact physician if symptoms worsen  She agreed and verbalized understanding  Patient would benefit from continued PT to improve knee ROM and strength to be able to ascend/descend stairs  Plan: Continue per plan of care        Precautions: ALLERGY TO MEDICAL TAPE, asthma, depression, HTN, hx of thyroid carcinoma,         Manuals            Patellar mobility all directions L/R KB gr 4  KP Gr  4           Ext ROM R > L                                       Neuro Re-Ed Quad sets c 6s holds  x20 ea  x30 ea           Supine clams   20 OTB                                                                            Ther Ex             Bike   5'           Heel slides  15           Prone hangs  NV           Supine SLR flexion  NV           Seated HS stretch  4x20:           Strap gastroc stretch  4x20:                                      Ther Activity                                       Gait Training                                       Modalities              PRN

## 2020-07-27 NOTE — TELEPHONE ENCOUNTER
I called patient to schedule a 2 month follow up with Renae THOMAS  No answer so I left a message for patient to give us a call back to schedule appt

## 2020-07-29 ENCOUNTER — TELEPHONE (OUTPATIENT)
Dept: OBGYN CLINIC | Facility: MEDICAL CENTER | Age: 75
End: 2020-07-29

## 2020-07-30 ENCOUNTER — OFFICE VISIT (OUTPATIENT)
Dept: OBGYN CLINIC | Facility: MEDICAL CENTER | Age: 75
End: 2020-07-30
Payer: COMMERCIAL

## 2020-07-30 ENCOUNTER — OFFICE VISIT (OUTPATIENT)
Dept: PHYSICAL THERAPY | Facility: MEDICAL CENTER | Age: 75
End: 2020-07-30
Payer: COMMERCIAL

## 2020-07-30 VITALS
HEART RATE: 79 BPM | WEIGHT: 255.8 LBS | DIASTOLIC BLOOD PRESSURE: 80 MMHG | BODY MASS INDEX: 47.07 KG/M2 | HEIGHT: 62 IN | TEMPERATURE: 98.7 F | SYSTOLIC BLOOD PRESSURE: 140 MMHG

## 2020-07-30 DIAGNOSIS — M17.12 PRIMARY OSTEOARTHRITIS OF LEFT KNEE: Primary | ICD-10-CM

## 2020-07-30 DIAGNOSIS — M17.11 PRIMARY OSTEOARTHRITIS OF RIGHT KNEE: Primary | ICD-10-CM

## 2020-07-30 DIAGNOSIS — M17.12 PRIMARY OSTEOARTHRITIS OF LEFT KNEE: ICD-10-CM

## 2020-07-30 DIAGNOSIS — M70.50 PES ANSERINE BURSITIS: ICD-10-CM

## 2020-07-30 DIAGNOSIS — G89.29 CHRONIC PAIN OF LEFT KNEE: ICD-10-CM

## 2020-07-30 DIAGNOSIS — M25.562 CHRONIC PAIN OF LEFT KNEE: ICD-10-CM

## 2020-07-30 PROCEDURE — 4040F PNEUMOC VAC/ADMIN/RCVD: CPT | Performed by: ORTHOPAEDIC SURGERY

## 2020-07-30 PROCEDURE — 97112 NEUROMUSCULAR REEDUCATION: CPT | Performed by: PHYSICAL THERAPIST

## 2020-07-30 PROCEDURE — 1160F RVW MEDS BY RX/DR IN RCRD: CPT | Performed by: ORTHOPAEDIC SURGERY

## 2020-07-30 PROCEDURE — 97140 MANUAL THERAPY 1/> REGIONS: CPT | Performed by: PHYSICAL THERAPIST

## 2020-07-30 PROCEDURE — 20610 DRAIN/INJ JOINT/BURSA W/O US: CPT | Performed by: ORTHOPAEDIC SURGERY

## 2020-07-30 PROCEDURE — 1036F TOBACCO NON-USER: CPT | Performed by: ORTHOPAEDIC SURGERY

## 2020-07-30 PROCEDURE — 3079F DIAST BP 80-89 MM HG: CPT | Performed by: ORTHOPAEDIC SURGERY

## 2020-07-30 PROCEDURE — 3077F SYST BP >= 140 MM HG: CPT | Performed by: ORTHOPAEDIC SURGERY

## 2020-07-30 PROCEDURE — 3008F BODY MASS INDEX DOCD: CPT | Performed by: ORTHOPAEDIC SURGERY

## 2020-07-30 PROCEDURE — 97110 THERAPEUTIC EXERCISES: CPT | Performed by: PHYSICAL THERAPIST

## 2020-07-30 RX ORDER — HYALURONATE SODIUM 10 MG/ML
20 SYRINGE (ML) INTRAARTICULAR
Status: COMPLETED | OUTPATIENT
Start: 2020-07-30 | End: 2020-07-30

## 2020-07-30 RX ADMIN — Medication 20 MG: at 13:00

## 2020-07-30 NOTE — PROGRESS NOTES
Assessment/Plan:  1  Primary osteoarthritis of left knee    2  Chronic pain of left knee      Orders Placed This Encounter   Procedures    Large joint arthrocentesis: L knee     Received L knee first euflexxa injection today  Patient knows to ice and avoid strenuous activity for 1-2 days if needed  Continue diclofenac prn pain  Continue PT    Return in about 1 week (around 8/6/2020)  I answered all of the patient's questions during the visit and provided education of the patient's condition during the visit  The patient verbalized understanding of the information given and agrees with the plan  This note was dictated using [employer] software  It may contain errors including improperly dictated words  Please contact physician directly for any questions  Subjective   Chief Complaint:   Chief Complaint   Patient presents with    Left Knee - Follow-up       HPI  Savanna Davis is a 69 y o  female who presents for follow up for L knee pain, arthritis  She is here today for her first Euflexxa injection L knee  She recently finished a euflexxa series for her R knee which helped  Since her right knee is feeling better she is noticing the pain in her left knee more  She has been able to recently participate in PT which helping as well  She takes Diclofenac as needed  She does not need a refill at this time  Review of Systems  ROS:    See HPI for musculoskeletal review     All other systems reviewed are negative     History:  Past Medical History:   Diagnosis Date    Asthma     Breathing difficulty     Bronchitis     Depression     Fracture of arm     Fracture of carpal bone     Hyperlipidemia     Hypertension     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[facility] [address] ) 12/16/2016    Shortness of breath     Thyroid nodule     Thyroid nodule     Thyroid nodule      Past Surgical History:   Procedure Laterality Date    HEMORRHOID SURGERY      [place] THYROID LOBECTOMY,UNILAT Right 2/25/2016    Procedure: HEMITHYROIDECTOMY;  Surgeon: Conrad Griffith MD;  [facility];  Service: Surgical Oncology    [place] THYROIDECTOMY POST PREV THYR SURG Left 2016    Procedure:  COMPLETION THYROIDECTOMY, FLEXIBLE LARYNGOSCOPY;  Surgeon: Conrad Griffith MD;  [facility];  Service: Surgical Oncology    THYROIDECTOMY, PARTIAL Left      Social History   Social History     Substance and Sexual Activity   Alcohol Use No    Comment: occasional glass of wine     Social History     Substance and Sexual Activity   Drug Use No     Social History     Tobacco Use   Smoking Status Former Smoker    Packs/day: 0 25    Years: 4 00    Pack years: 1 00    Types: Cigarettes    Last attempt to quit: 6/15/1981    Years since quittin 1   Smokeless Tobacco Former User   Tobacco Comment    quit 40 yrs ago (Never a smoker per [employer])     Family History:   Family History   Adopted: Yes   Problem Relation Age of Onset    Hypertension Family     Kidney disease Family     Hypertension Mother        Current Outpatient Medications on File Prior to Visit   Medication Sig Dispense Refill    amLODIPine (NORVASC) 5 mg tablet Take 1 tablet (5 mg total) by mouth daily 90 tablet 1    diclofenac (VOLTAREN) 75 mg EC tablet Take 1 tablet (75 mg total) by mouth 2 (two) times a day as needed (pain) Take with food  180 tablet 1    diclofenac sodium (VOLTAREN) 1 % Apply 2 g topically 4 (four) times a day as needed (knee pain) 1 Tube 1    fluticasone-vilanterol (BREO ELLIPTA) 100-25 mcg/inh inhaler Inhale 1 puff daily Rinse mouth after use   3 Inhaler 1    furosemide (LASIX) 20 mg tablet Take 1 tablet (20 mg total) by mouth daily as needed (edema) 90 tablet 3    levothyroxine 175 mcg tablet Take 1 tablet (175 mcg total) by mouth daily 90 tablet 1    lisinopril-hydrochlorothiazide (PRINZIDE,ZESTORETIC) 20-12 5 MG per tablet Take 1 tablet by mouth 2 (two) times a day 180 tablet 1    Melatonin 10 MG TABS Take by mouth      nebivolol (BYSTOLIC) 5 mg tablet Take 1 tablet (5 mg total) by mouth daily 90 tablet 3    omeprazole (PriLOSEC) 40 MG capsule Take 1 capsule (40 mg total) by mouth daily 90 capsule 1    potassium chloride (Klor-Con M20) 20 mEq tablet Take 1 tablet (20 mEq total) by mouth 2 (two) times a day 180 tablet 3    pravastatin (PRAVACHOL) 20 mg tablet Take 1 tablet (20 mg total) by mouth daily 90 tablet 3    pravastatin (PRAVACHOL) 20 mg tablet Take 1 tablet (20 mg total) by mouth daily 90 tablet 1    triamcinolone (KENALOG) 0 1 % cream Apply topically 2 (two) times a day 45 g 2    venlafaxine (EFFEXOR) 37 5 mg tablet Take 1 tablet (37 5 mg total) by mouth daily 90 tablet 1    VENTOLIN  (90 Base) MCG/ACT inhaler INHALE 2 PUFFS BY MOUTH EVERY 4 HOURS AS NEEDED FOR WHEEZING (Patient not taking: Reported on 6/15/2020) 54 g 0     No current facility-administered medications on file prior to visit        Allergies   Allergen Reactions    Adhesive [Medical Tape] Rash        Objective     /80   Pulse 79   Temp 98 7 °F (37 1 °C)   Ht 5' 2" (1 575 m)   Wt 116 kg (255 lb 12 8 oz)   LMP  (LMP Unknown)   BMI 46 79 kg/m²      PE:  AAOx 3  WDWN  Hearing intact, no drainage from eyes  no audible wheezing  no abdominal distension  LE compartments soft, skin intact    Ortho Exam:  left Knee:   No erythema  no swelling  no effusion  no warmth  AROM: 0-110    Imaging Studies: I have personally reviewed pertinent films in PACS   I reviewed previous XRs  XR bilateral knee:   R knee XR:  Moderate to severe DJD  L knee XR:  Severe DJD    Large joint arthrocentesis: L knee  Date/Time: 7/30/2020 1:00 PM  Consent given by: patient  Site marked: site marked  Timeout: Immediately prior to procedure a time out was called to verify the correct patient, procedure, equipment, support staff and site/side marked as required   Supporting Documentation  Indications: pain   Procedure Details  Location: knee - L knee  Preparation: Patient was prepped and draped in the usual sterile fashion  Needle size: 22 G  Ultrasound guidance: no  Approach: anterolateral  Medications administered: 20 mg Sodium Hyaluronate 20 MG/2ML    Patient tolerance: patient tolerated the procedure well with no immediate complications  Dressing:  Sterile dressing applied

## 2020-07-30 NOTE — PROGRESS NOTES
Daily Note     Today's date: 2020  Patient name: Jesica Schulte  : 1945  MRN: [id number]  Referring provider: Rebecca Weiner DO  Dx:   Encounter Diagnosis     ICD-10-CM    1  Primary osteoarthritis of right knee M17 11    2  Primary osteoarthritis of left knee M17 12    3  Pes anserine bursitis M70 50                   Subjective: Patient reports that her R knee is continuing to cause her more pain than her L knee  However, she feels that the exercises help decrease the pain  She will be receiving her 1st injection in her L knee after PT session today  Objective: See treatment diary below    R knee AROM (long sit): -6 deg    Assessment: Held recumbent bike today due to patient's co-morbidity of asthma  QS ability improved today along with improvement in R knee EXT ROM  Focused session on treating R knee due to patient's report of R knee being source of primary functional limitation  Session limited due to another appointment  Patient would benefit from continued PT to improve knee ROM and quad/hip girdle strength to return to PLOF  Plan: Continue per plan of care  Precautions: ALLERGY TO MEDICAL TAPE, asthma, depression, HTN, hx of thyroid carcinoma,         Manuals           Patellar mobility all directions L/R KB gr 4  KP Gr  4 KP Gr  4          Ext ROM R > L                                       Neuro Re-Ed             Quad sets c 6s holds  x20 ea  x30 ea x30 ea          Supine clams   20 OTB 20 OTB          Supine hip ADD isometrics   20                                                              Ther Ex             Bike   5' 1'          Heel slides  15 20          Prone hangs  NV           Supine SLR flexion  NV 5          Seated HS stretch  4x20: NV          Strap gastroc stretch  4x20: 4x30:                                     Ther Activity                                       Gait Training                                       Modalities              PRN

## 2020-08-03 ENCOUNTER — OFFICE VISIT (OUTPATIENT)
Dept: PHYSICAL THERAPY | Facility: MEDICAL CENTER | Age: 75
End: 2020-08-03
Payer: COMMERCIAL

## 2020-08-03 DIAGNOSIS — M17.12 PRIMARY OSTEOARTHRITIS OF LEFT KNEE: ICD-10-CM

## 2020-08-03 DIAGNOSIS — I10 ESSENTIAL HYPERTENSION: ICD-10-CM

## 2020-08-03 DIAGNOSIS — M17.11 PRIMARY OSTEOARTHRITIS OF RIGHT KNEE: Primary | ICD-10-CM

## 2020-08-03 DIAGNOSIS — M70.50 PES ANSERINE BURSITIS: ICD-10-CM

## 2020-08-03 PROCEDURE — 97110 THERAPEUTIC EXERCISES: CPT | Performed by: PHYSICAL THERAPIST

## 2020-08-03 PROCEDURE — 97112 NEUROMUSCULAR REEDUCATION: CPT | Performed by: PHYSICAL THERAPIST

## 2020-08-03 PROCEDURE — 97140 MANUAL THERAPY 1/> REGIONS: CPT | Performed by: PHYSICAL THERAPIST

## 2020-08-03 RX ORDER — AMLODIPINE BESYLATE 5 MG/1
5 TABLET ORAL 2 TIMES DAILY
Qty: 180 TABLET | Refills: 1 | Status: SHIPPED | OUTPATIENT
Start: 2020-08-03 | End: 2020-10-19 | Stop reason: SDUPTHER

## 2020-08-03 NOTE — TELEPHONE ENCOUNTER
Patient called in stating her prescription for Amlodipine 5 mg was changed to taking it two times a day and she needs a new scripts send to her [facility]  Please review and sign

## 2020-08-03 NOTE — PROGRESS NOTES
Daily Note     Today's date: 8/3/2020  Patient name: Pao Martins  : 1945  MRN: [id number]  Referring provider: Patrice Metcalf DO  Dx:   Encounter Diagnosis     ICD-10-CM    1  Primary osteoarthritis of right knee  M17 11    2  Primary osteoarthritis of left knee  M17 12    3  Pes anserine bursitis  M70 50                   Subjective: Patient reports that she had her injection in her L knee on Thursday ()  She reports that both knees are feeling better since beginning PT with less pain  She is still going up the stairs 1 step at a time, and her R knee is stiffer than her L knee  Objective: See treatment diary below    R knee AROM (long sit): -4 deg    Assessment: Focused session today on R knee pain due to R knee being primary source of functional limitation  Patient was educated to perform HEP bilaterally  Patient demonstrated slight improvements in R knee EXT ROM today along with minor improvements in superior/inferior patellar mobility  She continues to present with significant limitations in R medial/lateral patellar mobility  Patient demonstrated good control with SLR progression today  Patient would benefit from continued PT to improve knee ROM and strength to return to PLOF  Plan: Continue per plan of care        Precautions: ALLERGY TO MEDICAL TAPE, asthma, depression, HTN, hx of thyroid carcinoma,         Manuals  8/3         Patellar mobility all directions L/R KB gr 4  KP Gr  4 KP Gr  4 KP Gr  4         Ext ROM R > L                                       Neuro Re-Ed             Quad sets c 6s holds  x20 ea  x30 ea x30 ea x30 ea         Supine clams   20 OTB 20 OTB 30 OTB         Supine hip ADD isometrics   20 30                                                             Ther Ex             Bike   5' 1' 5'         Heel slides  15 20 30         Prone hangs  NV           Supine SLR flexion  NV 5 2x5         Seated HS stretch  4x20: NV          Strap gastroc stretch  4x20: 4x30: 4x30:                                    Ther Activity                                       Gait Training                                       Modalities              PRN

## 2020-08-06 ENCOUNTER — OFFICE VISIT (OUTPATIENT)
Dept: OBGYN CLINIC | Facility: MEDICAL CENTER | Age: 75
End: 2020-08-06
Payer: COMMERCIAL

## 2020-08-06 ENCOUNTER — OFFICE VISIT (OUTPATIENT)
Dept: PHYSICAL THERAPY | Facility: MEDICAL CENTER | Age: 75
End: 2020-08-06
Payer: COMMERCIAL

## 2020-08-06 VITALS
TEMPERATURE: 98.5 F | HEIGHT: 62 IN | HEART RATE: 84 BPM | SYSTOLIC BLOOD PRESSURE: 132 MMHG | WEIGHT: 254 LBS | BODY MASS INDEX: 46.74 KG/M2 | DIASTOLIC BLOOD PRESSURE: 78 MMHG

## 2020-08-06 DIAGNOSIS — M25.561 CHRONIC PAIN OF RIGHT KNEE: ICD-10-CM

## 2020-08-06 DIAGNOSIS — M70.50 PES ANSERINE BURSITIS: ICD-10-CM

## 2020-08-06 DIAGNOSIS — M17.12 ARTHRITIS OF LEFT KNEE: Primary | ICD-10-CM

## 2020-08-06 DIAGNOSIS — M25.562 CHRONIC PAIN OF LEFT KNEE: ICD-10-CM

## 2020-08-06 DIAGNOSIS — G89.29 CHRONIC PAIN OF RIGHT KNEE: ICD-10-CM

## 2020-08-06 DIAGNOSIS — G89.29 CHRONIC PAIN OF LEFT KNEE: ICD-10-CM

## 2020-08-06 DIAGNOSIS — M17.12 PRIMARY OSTEOARTHRITIS OF LEFT KNEE: ICD-10-CM

## 2020-08-06 DIAGNOSIS — M17.11 PRIMARY OSTEOARTHRITIS OF RIGHT KNEE: Primary | ICD-10-CM

## 2020-08-06 DIAGNOSIS — M17.11 ARTHRITIS OF RIGHT KNEE: ICD-10-CM

## 2020-08-06 PROCEDURE — 1036F TOBACCO NON-USER: CPT | Performed by: ORTHOPAEDIC SURGERY

## 2020-08-06 PROCEDURE — 1160F RVW MEDS BY RX/DR IN RCRD: CPT | Performed by: ORTHOPAEDIC SURGERY

## 2020-08-06 PROCEDURE — 97112 NEUROMUSCULAR REEDUCATION: CPT | Performed by: PHYSICAL THERAPIST

## 2020-08-06 PROCEDURE — 97140 MANUAL THERAPY 1/> REGIONS: CPT | Performed by: PHYSICAL THERAPIST

## 2020-08-06 PROCEDURE — 3008F BODY MASS INDEX DOCD: CPT | Performed by: ORTHOPAEDIC SURGERY

## 2020-08-06 PROCEDURE — 99213 OFFICE O/P EST LOW 20 MIN: CPT | Performed by: ORTHOPAEDIC SURGERY

## 2020-08-06 PROCEDURE — 3075F SYST BP GE 130 - 139MM HG: CPT | Performed by: ORTHOPAEDIC SURGERY

## 2020-08-06 PROCEDURE — 3078F DIAST BP <80 MM HG: CPT | Performed by: ORTHOPAEDIC SURGERY

## 2020-08-06 PROCEDURE — 4040F PNEUMOC VAC/ADMIN/RCVD: CPT | Performed by: ORTHOPAEDIC SURGERY

## 2020-08-06 PROCEDURE — 20610 DRAIN/INJ JOINT/BURSA W/O US: CPT | Performed by: ORTHOPAEDIC SURGERY

## 2020-08-06 PROCEDURE — 97110 THERAPEUTIC EXERCISES: CPT | Performed by: PHYSICAL THERAPIST

## 2020-08-06 RX ORDER — HYALURONATE SODIUM 10 MG/ML
20 SYRINGE (ML) INTRAARTICULAR
Status: COMPLETED | OUTPATIENT
Start: 2020-08-06 | End: 2020-08-06

## 2020-08-06 RX ADMIN — Medication 20 MG: at 16:22

## 2020-08-06 NOTE — PROGRESS NOTES
Assessment/Plan:  1  Arthritis of left knee    2  Chronic pain of left knee    3  Chronic pain of right knee    4  Arthritis of right knee      Orders Placed This Encounter   Procedures    Large joint arthrocentesis: L knee     Received L knee euflexxa injection #2 today  Patient knows to ice and avoid strenuous activity for 1-2 days if needed  Diclofenac PO and gel prn pain  Home exercises    Return in about 1 week (around 8/13/2020)  I answered all of the patient's questions during the visit and provided education of the patient's condition during the visit  The patient verbalized understanding of the information given and agrees with the plan  This note was dictated using [employer] software  It may contain errors including improperly dictated words  Please contact physician directly for any questions  Subjective   Chief Complaint:   Chief Complaint   Patient presents with    Left Knee - Follow-up       HPI  Henny Araujo is a 69 y o  female who presents for follow up for L knee arthritis  She is here today for her 2nd euflexxa injection  She has been taking diclofenac po and gel as needed for pain control  She feels a little better after her first injection last week  Review of Systems  ROS:    See HPI for musculoskeletal review     All other systems reviewed are negative     History:  Past Medical History:   Diagnosis Date    Asthma     Breathing difficulty     Bronchitis     Depression     Fracture of arm     Fracture of carpal bone     Hyperlipidemia     Hypertension     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[facility] [address] ) 12/16/2016    Shortness of breath     Thyroid nodule     Thyroid nodule     Thyroid nodule      Past Surgical History:   Procedure Laterality Date    HEMORRHOID SURGERY      [place] THYROID LOBECTOMY,UNILAT Right 2/25/2016    Procedure: HEMITHYROIDECTOMY;  Surgeon: Alexa Zuluaga MD;  [facility];  Service: Surgical Oncology    [place] THYROIDECTOMY POST PREV THYR SURG Left 2016    Procedure:  COMPLETION THYROIDECTOMY, FLEXIBLE LARYNGOSCOPY;  Surgeon: Eric Perez MD;  [facility];  Service: Surgical Oncology    THYROIDECTOMY, PARTIAL Left      Social History   Social History     Substance and Sexual Activity   Alcohol Use No    Comment: occasional glass of wine     Social History     Substance and Sexual Activity   Drug Use No     Social History     Tobacco Use   Smoking Status Former Smoker    Packs/day: 0 25    Years: 4 00    Pack years: 1 00    Types: Cigarettes    Last attempt to quit: 6/15/1981    Years since quittin 1   Smokeless Tobacco Former User   Tobacco Comment    quit 40 yrs ago (Never a smoker per [employer])     Family History:   Family History   Adopted: Yes   Problem Relation Age of Onset    Hypertension Family     Kidney disease Family     Hypertension Mother        Current Outpatient Medications on File Prior to Visit   Medication Sig Dispense Refill    amLODIPine (NORVASC) 5 mg tablet Take 1 tablet (5 mg total) by mouth 2 (two) times a day 180 tablet 1    diclofenac (VOLTAREN) 75 mg EC tablet Take 1 tablet (75 mg total) by mouth 2 (two) times a day as needed (pain) Take with food  180 tablet 1    diclofenac sodium (VOLTAREN) 1 % Apply 2 g topically 4 (four) times a day as needed (knee pain) 1 Tube 1    fluticasone-vilanterol (BREO ELLIPTA) 100-25 mcg/inh inhaler Inhale 1 puff daily Rinse mouth after use   3 Inhaler 1    furosemide (LASIX) 20 mg tablet Take 1 tablet (20 mg total) by mouth daily as needed (edema) 90 tablet 3    levothyroxine 175 mcg tablet Take 1 tablet (175 mcg total) by mouth daily 90 tablet 1    lisinopril-hydrochlorothiazide (PRINZIDE,ZESTORETIC) 20-12 5 MG per tablet Take 1 tablet by mouth 2 (two) times a day 180 tablet 1    Melatonin 10 MG TABS Take by mouth      nebivolol (BYSTOLIC) 5 mg tablet Take 1 tablet (5 mg total) by mouth daily 90 tablet 3    omeprazole (PriLOSEC) 40 MG capsule Take 1 capsule (40 mg total) by mouth daily 90 capsule 1    potassium chloride (Klor-Con M20) 20 mEq tablet Take 1 tablet (20 mEq total) by mouth 2 (two) times a day 180 tablet 3    pravastatin (PRAVACHOL) 20 mg tablet Take 1 tablet (20 mg total) by mouth daily 90 tablet 3    pravastatin (PRAVACHOL) 20 mg tablet Take 1 tablet (20 mg total) by mouth daily 90 tablet 1    triamcinolone (KENALOG) 0 1 % cream Apply topically 2 (two) times a day 45 g 2    venlafaxine (EFFEXOR) 37 5 mg tablet Take 1 tablet (37 5 mg total) by mouth daily 90 tablet 1    VENTOLIN  (90 Base) MCG/ACT inhaler INHALE 2 PUFFS BY MOUTH EVERY 4 HOURS AS NEEDED FOR WHEEZING (Patient not taking: Reported on 6/15/2020) 54 g 0     No current facility-administered medications on file prior to visit        Allergies   Allergen Reactions    Adhesive [Medical Tape] Rash        Objective     /78   Pulse 84   Temp 98 5 °F (36 9 °C)   Ht 5' 2" (1 575 m)   Wt 115 kg (254 lb)   LMP  (LMP Unknown)   BMI 46 46 kg/m²      PE:  AAOx 3  WDWN  Hearing intact, no drainage from eyes  no audible wheezing  no abdominal distension  LE compartments soft, skin intact    Ortho Exam:  left Knee:   No erythema  no swelling  no effusion  no warmth  AROM: 0-115    Large joint arthrocentesis: L knee  Date/Time: 8/6/2020 4:22 PM  Consent given by: patient  Site marked: site marked  Timeout: Immediately prior to procedure a time out was called to verify the correct patient, procedure, equipment, support staff and site/side marked as required   Supporting Documentation  Indications: pain   Procedure Details  Location: knee - L knee  Preparation: Patient was prepped and draped in the usual sterile fashion  Needle size: 22 G  Ultrasound guidance: no  Approach: anterolateral  Medications administered: 20 mg Sodium Hyaluronate 20 MG/2ML    Patient tolerance: patient tolerated the procedure well with no immediate complications  Dressing:  Sterile dressing applied

## 2020-08-06 NOTE — PROGRESS NOTES
Daily Note     Today's date: 2020  Patient name: Aldair Amaya  : 1945  MRN: [id number]  Referring provider: Jessika Ayala DO  Dx:   Encounter Diagnosis     ICD-10-CM    1  Primary osteoarthritis of right knee  M17 11    2  Primary osteoarthritis of left knee  M17 12    3  Pes anserine bursitis  M70 50                   Subjective: Patient reports that she felt pretty good after last session with no significant soreness or pain  However, she developed increased pain in her R knee on Monday afternoon after being in her pool  She reports that she was having difficulty walking and standing  The pain has since subsided  She reports that her R knee is continuing to be more painful than the L knee  She is going for her 2nd injection in her L knee today  Objective: See treatment diary below      R knee AROM (long sit): -3 deg      Assessment: Continued to focus session on treating R knee due to R knee being current primary source of functional limitation  Held SLR due to soreness in RLE after last session  All exercises performed in a pain-free range  Patient would benefit from continued PT to improve knee ROM and strength to maximize functional mobility  Plan: Continue per plan of care        Precautions: ALLERGY TO MEDICAL TAPE, asthma, depression, HTN, hx of thyroid carcinoma,         Manuals 7/23 7/27 7/30 8/3 8/6        Patellar mobility all directions L/R KB gr 4  KP Gr  4 KP Gr  4 KP Gr  4 KP Gr  4        Ext ROM R > L                                       Neuro Re-Ed             Quad sets c 6s holds  x20 ea  x30 ea x30 ea x30 ea x30 ea        Supine clams   20 OTB 20 OTB 30 OTB 30 pink        Supine hip ADD isometrics   20 30 30                                                            Ther Ex             Bike   5' 1' 5' 5'        Heel slides  15 20 30 30        Prone hangs  NV           Supine SLR flexion  NV 5 2x5 np        Seated HS stretch  4x20: NV  4x30:        Strap gastroc stretch  4x20: 4x30: 4x30: 4x30:                                   Ther Activity                                       Gait Training                                       Modalities              PRN

## 2020-08-10 ENCOUNTER — OFFICE VISIT (OUTPATIENT)
Dept: PHYSICAL THERAPY | Facility: MEDICAL CENTER | Age: 75
End: 2020-08-10
Payer: COMMERCIAL

## 2020-08-10 DIAGNOSIS — M17.12 PRIMARY OSTEOARTHRITIS OF LEFT KNEE: ICD-10-CM

## 2020-08-10 DIAGNOSIS — M70.50 PES ANSERINE BURSITIS: ICD-10-CM

## 2020-08-10 DIAGNOSIS — M17.11 PRIMARY OSTEOARTHRITIS OF RIGHT KNEE: Primary | ICD-10-CM

## 2020-08-10 PROCEDURE — 97112 NEUROMUSCULAR REEDUCATION: CPT | Performed by: PHYSICAL THERAPIST

## 2020-08-10 PROCEDURE — 97140 MANUAL THERAPY 1/> REGIONS: CPT | Performed by: PHYSICAL THERAPIST

## 2020-08-10 PROCEDURE — 97110 THERAPEUTIC EXERCISES: CPT | Performed by: PHYSICAL THERAPIST

## 2020-08-10 NOTE — PROGRESS NOTES
Daily Note     Today's date: 8/10/2020  Patient name: Madyson Oconnor  : 1945  MRN: [id number]  Referring provider: Aniya Nation DO  Dx:   Encounter Diagnosis     ICD-10-CM    1  Primary osteoarthritis of right knee  M17 11    2  Primary osteoarthritis of left knee  M17 12    3  Pes anserine bursitis  M70 50                   Subjective: Patient reports that her knee felt better after last session, and she did not have the soreness that she had last week  Objective: See treatment diary below      Assessment: Patient continues to present with significant medial/lateral patellar hypomobility  Improvement in superior/inferior patellar glide post-manual interventions  Continued to hold on SLR due to soreness earlier last week with addition of this intervention  Patient would benefit from continued PT to improve knee ROM and strength for stair climbing  Plan: Continue per plan of care  Precautions: ALLERGY TO MEDICAL TAPE, asthma, depression, HTN, hx of thyroid carcinoma,         Manuals 7/23 7/27 7/30 8/3 8/6 8/10       Patellar mobility all directions L/R KB gr 4  KP Gr  4 KP Gr  4 KP Gr  4 KP Gr  4 KP Gr  4       Ext ROM R > L                                       Neuro Re-Ed             Quad sets c 6s holds  x20 ea  x30 ea x30 ea x30 ea x30 ea x30 ea       Supine clams   20 OTB 20 OTB 30 OTB 30 pink 30 pink       Supine hip ADD isometrics   20 30 30 30                                                           Ther Ex             Bike   5' 1' 5' 5' 4'       Heel slides  15 20 30 30 30       Prone hangs  NV           Supine SLR flexion  NV 5 2x5 np np       Seated HS stretch  4x20: NV  4x30: 4x30:       Strap gastroc stretch  4x20: 4x30: 4x30: 4x30: 4x30:                                  Ther Activity                                       Gait Training                                       Modalities             MH PRN

## 2020-08-12 ENCOUNTER — TELEPHONE (OUTPATIENT)
Dept: OBGYN CLINIC | Facility: MEDICAL CENTER | Age: 75
End: 2020-08-12

## 2020-08-13 ENCOUNTER — TELEPHONE (OUTPATIENT)
Dept: OBGYN CLINIC | Facility: MEDICAL CENTER | Age: 75
End: 2020-08-13

## 2020-08-13 ENCOUNTER — OFFICE VISIT (OUTPATIENT)
Dept: OBGYN CLINIC | Facility: MEDICAL CENTER | Age: 75
End: 2020-08-13
Payer: COMMERCIAL

## 2020-08-13 ENCOUNTER — OFFICE VISIT (OUTPATIENT)
Dept: PHYSICAL THERAPY | Facility: MEDICAL CENTER | Age: 75
End: 2020-08-13
Payer: COMMERCIAL

## 2020-08-13 VITALS
DIASTOLIC BLOOD PRESSURE: 75 MMHG | TEMPERATURE: 98.7 F | WEIGHT: 258.3 LBS | HEART RATE: 64 BPM | SYSTOLIC BLOOD PRESSURE: 130 MMHG | BODY MASS INDEX: 47.24 KG/M2

## 2020-08-13 DIAGNOSIS — M17.12 PRIMARY OSTEOARTHRITIS OF LEFT KNEE: ICD-10-CM

## 2020-08-13 DIAGNOSIS — M17.11 PRIMARY OSTEOARTHRITIS OF RIGHT KNEE: Primary | ICD-10-CM

## 2020-08-13 DIAGNOSIS — M70.50 PES ANSERINE BURSITIS: ICD-10-CM

## 2020-08-13 DIAGNOSIS — M17.12 PRIMARY OSTEOARTHRITIS OF LEFT KNEE: Primary | ICD-10-CM

## 2020-08-13 PROCEDURE — 1036F TOBACCO NON-USER: CPT | Performed by: ORTHOPAEDIC SURGERY

## 2020-08-13 PROCEDURE — 3075F SYST BP GE 130 - 139MM HG: CPT | Performed by: ORTHOPAEDIC SURGERY

## 2020-08-13 PROCEDURE — 20610 DRAIN/INJ JOINT/BURSA W/O US: CPT | Performed by: ORTHOPAEDIC SURGERY

## 2020-08-13 PROCEDURE — 3078F DIAST BP <80 MM HG: CPT | Performed by: ORTHOPAEDIC SURGERY

## 2020-08-13 PROCEDURE — 4040F PNEUMOC VAC/ADMIN/RCVD: CPT | Performed by: ORTHOPAEDIC SURGERY

## 2020-08-13 PROCEDURE — 97140 MANUAL THERAPY 1/> REGIONS: CPT | Performed by: PHYSICAL THERAPIST

## 2020-08-13 PROCEDURE — 97110 THERAPEUTIC EXERCISES: CPT | Performed by: PHYSICAL THERAPIST

## 2020-08-13 RX ORDER — HYALURONATE SODIUM 10 MG/ML
20 SYRINGE (ML) INTRAARTICULAR
Status: COMPLETED | OUTPATIENT
Start: 2020-08-13 | End: 2020-08-13

## 2020-08-13 RX ADMIN — Medication 20 MG: at 13:24

## 2020-08-13 NOTE — PROGRESS NOTES
Daily Note     Today's date: 2020  Patient name: Wilmer Abbasi  : 1945  MRN: [id number]  Referring provider: Leroy Allen DO  Dx:   Encounter Diagnosis     ICD-10-CM    1  Primary osteoarthritis of right knee  M17 11    2  Primary osteoarthritis of left knee  M17 12    3  Pes anserine bursitis  M70 50                   Subjective: Patient reports that she is continuing to notice improvements with decreased pain in her R knee  She still has some pain at times in the back and sides of her R knee that is not constant  She reports that she has been doing her exercises but not always 2x/day  Objective: See treatment diary below      Assessment: Continued to focus treatment on R knee due to R knee being primary source of functional limitation  Decreased time on recumbent bike due to asthma  Patient continues to demonstrate improved superior/inferior mobility of the patella after manual interventions  Able to progress resistance for proximal stability interventions, which demonstrates good progress toward goals  Patient would benefit from continued PT to improve ROM and strength in the knees to return to PLOF  Plan: Continue per plan of care        Precautions: ALLERGY TO MEDICAL TAPE, asthma, depression, HTN, hx of thyroid carcinoma,         Manuals 7/23 7/27 7/30 8/3 8/6 8/10 8/13      Patellar mobility all directions L/R KB gr 4  KP Gr  4 KP Gr  4 KP Gr  4 KP Gr  4 KP Gr  4 KP Gr  4      Ext ROM R > L                                       Neuro Re-Ed             Quad sets c 6s holds  x20 ea  x30 ea x30 ea x30 ea x30 ea x30 ea x30       Supine clams   20 OTB 20 OTB 30 OTB 30 pink 30 pink 30 green       Supine hip ADD isometrics   20 30 30 30 30                                                          Ther Ex             Bike   5' 1' 5' 5' 4' 3'      Heel slides  15 20 30 30 30 30      Prone hangs  NV           Supine SLR flexion  NV 5 2x5 np np np      Seated HS stretch  4x20: NV  4x30: 4x30: 4x30:      Strap gastroc stretch  4x20: 4x30: 4x30: 4x30: 4x30: 4x30:                                 Ther Activity                                       Gait Training                                       Modalities              PRN

## 2020-08-13 NOTE — PROGRESS NOTES
Assessment/Plan:  1  Primary osteoarthritis of left knee      Orders Placed This Encounter   Procedures    Large joint arthrocentesis       · Patient received left knee Euflexxa injection 3/3 in the office today  Tolerated the procedure well  Advised to apply ice and avoid strenuous activity for 1-2 days as needed  · Continue diclofenac prn pain  · Continue PT then transition to home exercises  · Patient plans to follow up in 3 months for bilat knee CSI  Return in about 3 months (around 11/13/2020) for bilat knee CSI  I answered all of the patient's questions during the visit and provided education of the patient's condition during the visit  The patient verbalized understanding of the information given and agrees with the plan  This note was dictated using [employer] software  It may contain errors including improperly dictated words  Please contact physician directly for any questions  Subjective   Chief Complaint:   Chief Complaint   Patient presents with    Left Knee - Follow-up       HPI  Henny Arajuo is a 69 y o  female who presents for follow up for left knee osteoarthritis  Patient is here for left knee Euflexxa injection 3/3  Patient notes mild improvement in her left knee pain after the first two injections  She is taking diclofenac as needed for pain  She has been attending outpatient PT 2/week for her right knee  Denies new injury, numbness, or tingling  Review of Systems  ROS:    See HPI for musculoskeletal review     All other systems reviewed are negative     History:  Past Medical History:   Diagnosis Date    Asthma     Breathing difficulty     Bronchitis     Depression     Fracture of arm     Fracture of carpal bone     Hyperlipidemia     Hypertension     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[facility] [address] ) 12/16/2016    Shortness of breath     Thyroid nodule     Thyroid nodule     Thyroid nodule      Past Surgical History:   Procedure Laterality Date    HEMORRHOID SURGERY  CO THYROID LOBECTOMY,UNILAT Right 2016    Procedure: HEMITHYROIDECTOMY;  Surgeon: Madeleine Pak MD;  Location: [facility];  Service: Surgical Oncology    CO THYROIDECTOMY POST PREV THYR SURG Left 2016    Procedure:  COMPLETION THYROIDECTOMY, FLEXIBLE LARYNGOSCOPY;  Surgeon: Madeleine Pak MD;  Location: [facility];  Service: Surgical Oncology    THYROIDECTOMY, PARTIAL Left      Social History   Social History     Substance and Sexual Activity   Alcohol Use No    Comment: occasional glass of wine     Social History     Substance and Sexual Activity   Drug Use No     Social History     Tobacco Use   Smoking Status Former Smoker    Packs/day: 0 25    Years: 4 00    Pack years: 1 00    Types: Cigarettes    Last attempt to quit: 6/15/1981    Years since quittin 1   Smokeless Tobacco Former User   Tobacco Comment    quit 40 yrs ago (Never a smoker per [employer])     Family History:   Family History   Adopted: Yes   Problem Relation Age of Onset    Hypertension Family     Kidney disease Family     Hypertension Mother        Current Outpatient Medications on File Prior to Visit   Medication Sig Dispense Refill    amLODIPine (NORVASC) 5 mg tablet Take 1 tablet (5 mg total) by mouth 2 (two) times a day 180 tablet 1    diclofenac (VOLTAREN) 75 mg EC tablet Take 1 tablet (75 mg total) by mouth 2 (two) times a day as needed (pain) Take with food  180 tablet 1    diclofenac sodium (VOLTAREN) 1 % Apply 2 g topically 4 (four) times a day as needed (knee pain) 1 Tube 1    diclofenac sodium (VOLTAREN) 1 % Apply 4 g topically 4 (four) times a day as needed (knee pain) 1 Tube 2    fluticasone-vilanterol (BREO ELLIPTA) 100-25 mcg/inh inhaler Inhale 1 puff daily Rinse mouth after use   3 Inhaler 1    furosemide (LASIX) 20 mg tablet Take 1 tablet (20 mg total) by mouth daily as needed (edema) 90 tablet 3    levothyroxine 175 mcg tablet Take 1 tablet (175 mcg total) by mouth daily 90 tablet 1    lisinopril-hydrochlorothiazide (PRINZIDE,ZESTORETIC) 20-12 5 MG per tablet Take 1 tablet by mouth 2 (two) times a day 180 tablet 1    Melatonin 10 MG TABS Take by mouth      nebivolol (BYSTOLIC) 5 mg tablet Take 1 tablet (5 mg total) by mouth daily 90 tablet 3    omeprazole (PriLOSEC) 40 MG capsule Take 1 capsule (40 mg total) by mouth daily 90 capsule 1    potassium chloride (Klor-Con M20) 20 mEq tablet Take 1 tablet (20 mEq total) by mouth 2 (two) times a day 180 tablet 3    pravastatin (PRAVACHOL) 20 mg tablet Take 1 tablet (20 mg total) by mouth daily 90 tablet 3    pravastatin (PRAVACHOL) 20 mg tablet Take 1 tablet (20 mg total) by mouth daily 90 tablet 1    triamcinolone (KENALOG) 0 1 % cream Apply topically 2 (two) times a day 45 g 2    venlafaxine (EFFEXOR) 37 5 mg tablet Take 1 tablet (37 5 mg total) by mouth daily 90 tablet 1    VENTOLIN  (90 Base) MCG/ACT inhaler INHALE 2 PUFFS BY MOUTH EVERY 4 HOURS AS NEEDED FOR WHEEZING (Patient not taking: Reported on 6/15/2020) 54 g 0     No current facility-administered medications on file prior to visit        Allergies   Allergen Reactions    Adhesive [Medical Tape] Rash        Objective     /75   Pulse 64   Temp 98 7 °F (37 1 °C)   Wt 117 kg (258 lb 4 8 oz)   LMP  (LMP Unknown)   BMI 47 24 kg/m²      PE:  AAOx 3  WDWN  Hearing intact, no drainage from eyes  no audible wheezing  no abdominal distension  LE compartments soft, skin intact    Ortho Exam:  left Knee:   No erythema  no swelling  no effusion  no warmth  +TTP over lateral joint line  AROM: 0- 120  Stable to varus/valgus stress    Large joint arthrocentesis: L knee  Date/Time: 8/13/2020 1:24 PM  Consent given by: patient  Site marked: site marked  Timeout: Immediately prior to procedure a time out was called to verify the correct patient, procedure, equipment, support staff and site/side marked as required   Supporting Documentation  Indications: pain   Procedure Details  Location: knee - L knee  Preparation: Patient was prepped and draped in the usual sterile fashion  Needle size: 22 G  Ultrasound guidance: no  Approach: anterolateral  Medications administered: 20 mg Sodium Hyaluronate 20 MG/2ML    Patient tolerance: patient tolerated the procedure well with no immediate complications  Dressing:  Sterile dressing applied

## 2020-08-13 NOTE — TELEPHONE ENCOUNTER
Prior-auth for Diclofenac Sodium gel has been submitted electronically via CoverMyMeds  Elli Charles is now pending  I will notify patient of outcome  She understands, it will either be approved or denied  It is currently now available over the counter

## 2020-08-17 ENCOUNTER — OFFICE VISIT (OUTPATIENT)
Dept: PHYSICAL THERAPY | Facility: MEDICAL CENTER | Age: 75
End: 2020-08-17
Payer: COMMERCIAL

## 2020-08-17 DIAGNOSIS — M70.50 PES ANSERINE BURSITIS: ICD-10-CM

## 2020-08-17 DIAGNOSIS — M17.12 PRIMARY OSTEOARTHRITIS OF LEFT KNEE: ICD-10-CM

## 2020-08-17 DIAGNOSIS — M17.11 PRIMARY OSTEOARTHRITIS OF RIGHT KNEE: Primary | ICD-10-CM

## 2020-08-17 PROCEDURE — 97110 THERAPEUTIC EXERCISES: CPT | Performed by: PHYSICAL THERAPIST

## 2020-08-17 NOTE — PROGRESS NOTES
Daily Note     Today's date: 2020  Patient name: Chely Patton  : 1945  MRN: [id number]  Referring provider: Inessa Langston DO  Dx:   Encounter Diagnosis     ICD-10-CM    1  Primary osteoarthritis of right knee  M17 11    2  Primary osteoarthritis of left knee  M17 12    3  Pes anserine bursitis  M70 50                   Subjective: Patient reports that the front of her knee is feeling better, but she continues to have pain in the back of her R knee that is worsened with standing and walking  Objective: See treatment diary below      Assessment: Patient continues to demonstrate small pocket of fluid in R posterior knee that may be consistent with Baker's cyst  Patient demonstrated no RLE swelling, no calf swelling, no redness, and no warmth  She was educated to contact physician if she notices increased swelling/redness/warmth/significant pain  She agreed and verbalized understanding  All exercises performed in a pain-free range, and patient demonstrated good technique with all exercises  Patient would benefit from continued PT to improve knee ROM and strength to maximize function  Plan: Continue per plan of care        Precautions: ALLERGY TO MEDICAL TAPE, asthma, depression, HTN, hx of thyroid carcinoma,         Manuals 7/23 7/27 7/30 8/3 8/6 8/10 8/13 8/17     Patellar mobility all directions L/R KB gr 4  KP Gr  4 KP Gr  4 KP Gr  4 KP Gr  4 KP Gr  4 KP Gr  4 KP Gr  4     Ext ROM R > L                                       Neuro Re-Ed             Quad sets c 6s holds  x20 ea  x30 ea x30 ea x30 ea x30 ea x30 ea x30  x30     Supine clams   20 OTB 20 OTB 30 OTB 30 pink 30 pink 30 green  30 green     Supine hip ADD isometrics   20 30 30 30 30 30                                                         Ther Ex             Bike   5' 1' 5' 5' 4' 3' 5'     Heel slides  15 20 30 30 30 30 30     Prone hangs  NV           Supine SLR flexion  NV 5 2x5 np np np np     Seated HS stretch  4x20: NV 4x30: 4x30: 4x30: 4x30:     Strap gastroc stretch  4x20: 4x30: 4x30: 4x30: 4x30: 4x30: 4x30:                                Ther Activity                                       Gait Training                                       Modalities              PRN

## 2020-08-20 ENCOUNTER — APPOINTMENT (OUTPATIENT)
Dept: PHYSICAL THERAPY | Facility: MEDICAL CENTER | Age: 75
End: 2020-08-20
Payer: COMMERCIAL

## 2020-08-24 ENCOUNTER — APPOINTMENT (OUTPATIENT)
Dept: PHYSICAL THERAPY | Facility: MEDICAL CENTER | Age: 75
End: 2020-08-24
Payer: COMMERCIAL

## 2020-08-27 ENCOUNTER — OFFICE VISIT (OUTPATIENT)
Dept: PHYSICAL THERAPY | Facility: MEDICAL CENTER | Age: 75
End: 2020-08-27
Payer: COMMERCIAL

## 2020-08-27 DIAGNOSIS — F32.A DEPRESSION, UNSPECIFIED DEPRESSION TYPE: ICD-10-CM

## 2020-08-27 DIAGNOSIS — M17.12 PRIMARY OSTEOARTHRITIS OF LEFT KNEE: ICD-10-CM

## 2020-08-27 DIAGNOSIS — M17.11 PRIMARY OSTEOARTHRITIS OF RIGHT KNEE: Primary | ICD-10-CM

## 2020-08-27 DIAGNOSIS — M70.50 PES ANSERINE BURSITIS: ICD-10-CM

## 2020-08-27 PROCEDURE — 97140 MANUAL THERAPY 1/> REGIONS: CPT | Performed by: PHYSICAL THERAPIST

## 2020-08-27 PROCEDURE — 97110 THERAPEUTIC EXERCISES: CPT | Performed by: PHYSICAL THERAPIST

## 2020-08-27 RX ORDER — VENLAFAXINE 37.5 MG/1
37.5 TABLET ORAL DAILY
Qty: 90 TABLET | Refills: 1 | Status: SHIPPED | OUTPATIENT
Start: 2020-08-27 | End: 2021-01-05 | Stop reason: SDUPTHER

## 2020-08-27 NOTE — PROGRESS NOTES
Daily Note     Today's date: 2020  Patient name: Marilynn Chacon  : 1945  MRN: [id number]  Referring provider: James Bennett DO  Dx:   Encounter Diagnosis     ICD-10-CM    1  Primary osteoarthritis of right knee  M17 11    2  Primary osteoarthritis of left knee  M17 12    3  Pes anserine bursitis  M70 50            Addendum from 20: Patient has been discharged from PT due to inactivity  Subjective: Patient reports that she has been having pain in the front of both of her knees along with pain in the back of her R thigh radiating to her R knee when she extends her knee to stand  The pain is worsened with WB activity  She reports that she would like to decrease to 1x/week due to work schedule  She purchased a [place] so she can perform an elliptical movement while sitting at work  Objective: See treatment diary below      Assessment: Performed patellar mobs bilaterally today due to patient report of bilateral knee pain  Patient continues to demonstrate significant medial/lateral patellar hypomobility  However, superior/inferior patellar mobility improved after manual interventions  No change in knee ROM since last visit  In addition, swelling in R posterior knee remains the same as last session  All exercises were performed in a pain-free range  Continued to hold on WB exercises due to increased symptoms during WB activity  Patient would benefit from continued PT to address impairments to improve ability to ambulate and stair climb  Plan: Decrease frequency to 1x/week due to work schedule       Precautions: ALLERGY TO MEDICAL TAPE, asthma, depression, HTN, hx of thyroid carcinoma,         Manuals  8/3 8/6 8/10 8/13 8/17 8/27    Patellar mobility all directions L/R KB gr 4  KP Gr  4 KP Gr  4 KP Gr  4 KP Gr  4 KP Gr  4 KP Gr  4 KP Gr  4 KP Gr  4    Ext ROM R > L                                       Neuro Re-Ed             Quad sets c 6s holds  x20 ea  x30 ea x30 ea x30 ea x30 ea x30 ea x30  x30 x30    Supine clams   20 OTB 20 OTB 30 OTB 30 pink 30 pink 30 green  30 green 30 green    Supine hip ADD isometrics   20 30 30 30 30 30 30                                                        Ther Ex             Bike   5' 1' 5' 5' 4' 3' 5' np    Heel slides  15 20 30 30 30 30 30 30    Prone hangs  NV           Supine SLR flexion  NV 5 2x5 np np np np np    Seated HS stretch  4x20: NV  4x30: 4x30: 4x30: 4x30: 4x30:    Strap gastroc stretch  4x20: 4x30: 4x30: 4x30: 4x30: 4x30: 4x30: 4x30:                               Ther Activity                                       Gait Training                                       Modalities             MH PRN

## 2020-08-31 ENCOUNTER — APPOINTMENT (OUTPATIENT)
Dept: PHYSICAL THERAPY | Facility: MEDICAL CENTER | Age: 75
End: 2020-08-31
Payer: COMMERCIAL

## 2020-10-15 ENCOUNTER — APPOINTMENT (OUTPATIENT)
Dept: LAB | Facility: MEDICAL CENTER | Age: 75
End: 2020-10-15
Payer: COMMERCIAL

## 2020-10-15 ENCOUNTER — TRANSCRIBE ORDERS (OUTPATIENT)
Dept: ADMINISTRATIVE | Facility: HOSPITAL | Age: 75
End: 2020-10-15

## 2020-10-15 DIAGNOSIS — Z85.850 ENCOUNTER FOR FOLLOW-UP SURVEILLANCE OF THYROID CANCER: ICD-10-CM

## 2020-10-15 DIAGNOSIS — R73.01 IMPAIRED FASTING GLUCOSE: ICD-10-CM

## 2020-10-15 DIAGNOSIS — Z08 ENCOUNTER FOR FOLLOW-UP SURVEILLANCE OF THYROID CANCER: ICD-10-CM

## 2020-10-15 DIAGNOSIS — R73.01 IMPAIRED FASTING GLUCOSE: Primary | ICD-10-CM

## 2020-10-15 LAB
ALBUMIN SERPL BCP-MCNC: 4 G/DL (ref 3.5–5)
ALP SERPL-CCNC: 65 U/L (ref 46–116)
ALT SERPL W P-5'-P-CCNC: 36 U/L (ref 12–78)
ANION GAP SERPL CALCULATED.3IONS-SCNC: 7 MMOL/L (ref 4–13)
AST SERPL W P-5'-P-CCNC: 20 U/L (ref 5–45)
BILIRUB SERPL-MCNC: 0.68 MG/DL (ref 0.2–1)
BUN SERPL-MCNC: 15 MG/DL (ref 5–25)
CALCIUM SERPL-MCNC: 9.1 MG/DL (ref 8.3–10.1)
CHLORIDE SERPL-SCNC: 102 MMOL/L (ref 100–108)
CO2 SERPL-SCNC: 30 MMOL/L (ref 21–32)
CREAT SERPL-MCNC: 0.88 MG/DL (ref 0.6–1.3)
EST. AVERAGE GLUCOSE BLD GHB EST-MCNC: 134 MG/DL
GFR SERPL CREATININE-BSD FRML MDRD: 65 ML/MIN/1.73SQ M
GLUCOSE P FAST SERPL-MCNC: 164 MG/DL (ref 65–99)
HBA1C MFR BLD: 6.3 %
POTASSIUM SERPL-SCNC: 3.7 MMOL/L (ref 3.5–5.3)
PROT SERPL-MCNC: 7.8 G/DL (ref 6.4–8.2)
SODIUM SERPL-SCNC: 139 MMOL/L (ref 136–145)

## 2020-10-15 PROCEDURE — 84432 ASSAY OF THYROGLOBULIN: CPT

## 2020-10-15 PROCEDURE — 86800 THYROGLOBULIN ANTIBODY: CPT

## 2020-10-15 PROCEDURE — 83036 HEMOGLOBIN GLYCOSYLATED A1C: CPT

## 2020-10-15 PROCEDURE — 36415 COLL VENOUS BLD VENIPUNCTURE: CPT

## 2020-10-15 PROCEDURE — 80053 COMPREHEN METABOLIC PANEL: CPT

## 2020-10-16 ENCOUNTER — HOSPITAL ENCOUNTER (OUTPATIENT)
Dept: ULTRASOUND IMAGING | Facility: HOSPITAL | Age: 75
Discharge: HOME | End: 2020-10-16
Attending: SURGERY
Payer: COMMERCIAL

## 2020-10-16 DIAGNOSIS — Z85.850 ENCOUNTER FOR FOLLOW-UP SURVEILLANCE OF THYROID CANCER: ICD-10-CM

## 2020-10-16 DIAGNOSIS — Z08 ENCOUNTER FOR FOLLOW-UP SURVEILLANCE OF THYROID CANCER: ICD-10-CM

## 2020-10-16 PROCEDURE — 76536 US EXAM OF HEAD AND NECK: CPT

## 2020-10-19 DIAGNOSIS — I10 ESSENTIAL HYPERTENSION: ICD-10-CM

## 2020-10-19 LAB — SCAN RESULT: NORMAL

## 2020-10-19 RX ORDER — AMLODIPINE BESYLATE 5 MG/1
5 TABLET ORAL 2 TIMES DAILY
Qty: 180 TABLET | Refills: 1 | Status: SHIPPED | OUTPATIENT
Start: 2020-10-19 | End: 2021-01-05 | Stop reason: SDUPTHER

## 2020-10-22 ENCOUNTER — TELEPHONE (OUTPATIENT)
Dept: SURGICAL ONCOLOGY | Facility: CLINIC | Age: 75
End: 2020-10-22

## 2020-10-23 ENCOUNTER — OFFICE VISIT (OUTPATIENT)
Dept: SURGICAL ONCOLOGY | Facility: CLINIC | Age: 75
End: 2020-10-23
Payer: COMMERCIAL

## 2020-10-23 VITALS
HEIGHT: 62 IN | SYSTOLIC BLOOD PRESSURE: 150 MMHG | TEMPERATURE: 97.3 F | BODY MASS INDEX: 47.48 KG/M2 | DIASTOLIC BLOOD PRESSURE: 90 MMHG | RESPIRATION RATE: 16 BRPM | HEART RATE: 63 BPM | WEIGHT: 258 LBS

## 2020-10-23 DIAGNOSIS — Z85.850 ENCOUNTER FOR FOLLOW-UP SURVEILLANCE OF THYROID CANCER: Primary | ICD-10-CM

## 2020-10-23 DIAGNOSIS — Z78.9 NEED FOR FOLLOW-UP BY SOCIAL WORKER: ICD-10-CM

## 2020-10-23 DIAGNOSIS — Z85.850 HISTORY OF THYROID CANCER: ICD-10-CM

## 2020-10-23 DIAGNOSIS — Z08 ENCOUNTER FOR FOLLOW-UP SURVEILLANCE OF THYROID CANCER: Primary | ICD-10-CM

## 2020-10-23 PROCEDURE — 99214 OFFICE O/P EST MOD 30 MIN: CPT | Performed by: SURGERY

## 2020-10-26 ENCOUNTER — PATIENT OUTREACH (OUTPATIENT)
Dept: CASE MANAGEMENT | Facility: HOSPITAL | Age: 75
End: 2020-10-26

## 2020-11-04 ENCOUNTER — VBI (OUTPATIENT)
Dept: ADMINISTRATIVE | Facility: OTHER | Age: 75
End: 2020-11-04

## 2020-11-16 ENCOUNTER — OFFICE VISIT (OUTPATIENT)
Dept: OBGYN CLINIC | Facility: MEDICAL CENTER | Age: 75
End: 2020-11-16
Payer: COMMERCIAL

## 2020-11-16 VITALS
HEART RATE: 69 BPM | SYSTOLIC BLOOD PRESSURE: 128 MMHG | DIASTOLIC BLOOD PRESSURE: 72 MMHG | TEMPERATURE: 97.9 F | BODY MASS INDEX: 46.09 KG/M2 | WEIGHT: 252 LBS

## 2020-11-16 DIAGNOSIS — M70.50 PES ANSERINE BURSITIS: ICD-10-CM

## 2020-11-16 DIAGNOSIS — M25.561 CHRONIC PAIN OF RIGHT KNEE: ICD-10-CM

## 2020-11-16 DIAGNOSIS — M25.562 CHRONIC PAIN OF LEFT KNEE: ICD-10-CM

## 2020-11-16 DIAGNOSIS — G89.29 CHRONIC PAIN OF LEFT KNEE: ICD-10-CM

## 2020-11-16 DIAGNOSIS — M17.11 ARTHRITIS OF RIGHT KNEE: ICD-10-CM

## 2020-11-16 DIAGNOSIS — G89.29 CHRONIC PAIN OF RIGHT KNEE: ICD-10-CM

## 2020-11-16 DIAGNOSIS — M17.11 PRIMARY OSTEOARTHRITIS OF RIGHT KNEE: Primary | ICD-10-CM

## 2020-11-16 DIAGNOSIS — M17.12 ARTHRITIS OF LEFT KNEE: ICD-10-CM

## 2020-11-16 PROCEDURE — 3078F DIAST BP <80 MM HG: CPT | Performed by: PHYSICIAN ASSISTANT

## 2020-11-16 PROCEDURE — 20610 DRAIN/INJ JOINT/BURSA W/O US: CPT | Performed by: PHYSICIAN ASSISTANT

## 2020-11-16 PROCEDURE — 99214 OFFICE O/P EST MOD 30 MIN: CPT | Performed by: PHYSICIAN ASSISTANT

## 2020-11-16 PROCEDURE — 1036F TOBACCO NON-USER: CPT | Performed by: PHYSICIAN ASSISTANT

## 2020-11-16 PROCEDURE — 3074F SYST BP LT 130 MM HG: CPT | Performed by: PHYSICIAN ASSISTANT

## 2020-11-16 RX ORDER — LIDOCAINE HYDROCHLORIDE 10 MG/ML
1 INJECTION, SOLUTION INFILTRATION; PERINEURAL
Status: COMPLETED | OUTPATIENT
Start: 2020-11-16 | End: 2020-11-16

## 2020-11-16 RX ORDER — DICLOFENAC SODIUM 75 MG/1
75 TABLET, DELAYED RELEASE ORAL 2 TIMES DAILY PRN
Qty: 180 TABLET | Refills: 1 | Status: SHIPPED | OUTPATIENT
Start: 2020-11-16 | End: 2021-03-15 | Stop reason: SDUPTHER

## 2020-11-16 RX ORDER — METHYLPREDNISOLONE ACETATE 40 MG/ML
2 INJECTION, SUSPENSION INTRA-ARTICULAR; INTRALESIONAL; INTRAMUSCULAR; SOFT TISSUE
Status: COMPLETED | OUTPATIENT
Start: 2020-11-16 | End: 2020-11-16

## 2020-11-16 RX ORDER — LIDOCAINE HYDROCHLORIDE 10 MG/ML
3 INJECTION, SOLUTION INFILTRATION; PERINEURAL
Status: COMPLETED | OUTPATIENT
Start: 2020-11-16 | End: 2020-11-16

## 2020-11-16 RX ORDER — METHYLPREDNISOLONE ACETATE 40 MG/ML
1 INJECTION, SUSPENSION INTRA-ARTICULAR; INTRALESIONAL; INTRAMUSCULAR; SOFT TISSUE
Status: COMPLETED | OUTPATIENT
Start: 2020-11-16 | End: 2020-11-16

## 2020-11-16 RX ADMIN — METHYLPREDNISOLONE ACETATE 2 ML: 40 INJECTION, SUSPENSION INTRA-ARTICULAR; INTRALESIONAL; INTRAMUSCULAR; SOFT TISSUE at 16:32

## 2020-11-16 RX ADMIN — LIDOCAINE HYDROCHLORIDE 3 ML: 10 INJECTION, SOLUTION INFILTRATION; PERINEURAL at 16:32

## 2020-11-16 RX ADMIN — LIDOCAINE HYDROCHLORIDE 1 ML: 10 INJECTION, SOLUTION INFILTRATION; PERINEURAL at 16:32

## 2020-11-16 RX ADMIN — METHYLPREDNISOLONE ACETATE 1 ML: 40 INJECTION, SUSPENSION INTRA-ARTICULAR; INTRALESIONAL; INTRAMUSCULAR; SOFT TISSUE at 16:32

## 2020-12-15 ENCOUNTER — VBI (OUTPATIENT)
Dept: ADMINISTRATIVE | Facility: OTHER | Age: 75
End: 2020-12-15

## 2021-01-05 ENCOUNTER — OFFICE VISIT (OUTPATIENT)
Dept: FAMILY MEDICINE CLINIC | Facility: CLINIC | Age: 76
End: 2021-01-05
Payer: COMMERCIAL

## 2021-01-05 VITALS
HEIGHT: 62 IN | OXYGEN SATURATION: 97 % | HEART RATE: 68 BPM | WEIGHT: 250 LBS | BODY MASS INDEX: 46.01 KG/M2 | RESPIRATION RATE: 22 BRPM | TEMPERATURE: 97.8 F | SYSTOLIC BLOOD PRESSURE: 132 MMHG | DIASTOLIC BLOOD PRESSURE: 80 MMHG

## 2021-01-05 DIAGNOSIS — D49.2 SKIN NEOPLASM: ICD-10-CM

## 2021-01-05 DIAGNOSIS — F32.A DEPRESSION, UNSPECIFIED DEPRESSION TYPE: ICD-10-CM

## 2021-01-05 DIAGNOSIS — Z12.39 SCREENING BREAST EXAMINATION: ICD-10-CM

## 2021-01-05 DIAGNOSIS — Z12.12 SCREENING FOR COLORECTAL CANCER: ICD-10-CM

## 2021-01-05 DIAGNOSIS — C73 THYROID CANCER (HCC): ICD-10-CM

## 2021-01-05 DIAGNOSIS — I10 ESSENTIAL HYPERTENSION: Primary | ICD-10-CM

## 2021-01-05 DIAGNOSIS — M87.9 BONE INFARCT (HCC): ICD-10-CM

## 2021-01-05 DIAGNOSIS — K21.00 GASTROESOPHAGEAL REFLUX DISEASE WITH ESOPHAGITIS WITHOUT HEMORRHAGE: ICD-10-CM

## 2021-01-05 DIAGNOSIS — Z12.11 SCREENING FOR COLORECTAL CANCER: ICD-10-CM

## 2021-01-05 DIAGNOSIS — J98.4 RESTRICTIVE LUNG DISEASE: ICD-10-CM

## 2021-01-05 DIAGNOSIS — E89.0 HYPOTHYROIDISM, POSTSURGICAL: ICD-10-CM

## 2021-01-05 DIAGNOSIS — E78.2 MIXED HYPERLIPIDEMIA: ICD-10-CM

## 2021-01-05 DIAGNOSIS — E78.49 OTHER HYPERLIPIDEMIA: ICD-10-CM

## 2021-01-05 DIAGNOSIS — J45.909 ASTHMA, UNSPECIFIED ASTHMA SEVERITY, UNSPECIFIED WHETHER COMPLICATED, UNSPECIFIED WHETHER PERSISTENT: ICD-10-CM

## 2021-01-05 DIAGNOSIS — Z23 ENCOUNTER FOR IMMUNIZATION: ICD-10-CM

## 2021-01-05 DIAGNOSIS — L30.9 DERMATITIS: ICD-10-CM

## 2021-01-05 PROCEDURE — 99214 OFFICE O/P EST MOD 30 MIN: CPT | Performed by: FAMILY MEDICINE

## 2021-01-05 RX ORDER — PRAVASTATIN SODIUM 20 MG
20 TABLET ORAL DAILY
Qty: 90 TABLET | Refills: 1 | Status: SHIPPED | OUTPATIENT
Start: 2021-01-05 | End: 2021-02-23

## 2021-01-05 RX ORDER — MOMETASONE FUROATE 1 MG/G
CREAM TOPICAL DAILY
Qty: 45 G | Refills: 3 | Status: SHIPPED | OUTPATIENT
Start: 2021-01-05 | End: 2021-07-12 | Stop reason: SDUPTHER

## 2021-01-05 RX ORDER — AMLODIPINE BESYLATE 5 MG/1
5 TABLET ORAL 2 TIMES DAILY
Qty: 180 TABLET | Refills: 1 | Status: SHIPPED | OUTPATIENT
Start: 2021-01-05 | End: 2021-06-01 | Stop reason: SDUPTHER

## 2021-01-05 RX ORDER — MOMETASONE FUROATE 1 MG/ML
SOLUTION TOPICAL DAILY
Qty: 60 ML | Refills: 0 | Status: SHIPPED | OUTPATIENT
Start: 2021-01-05 | End: 2021-02-23

## 2021-01-05 RX ORDER — LEVOTHYROXINE SODIUM 175 UG/1
175 TABLET ORAL DAILY
Qty: 90 TABLET | Refills: 1 | Status: SHIPPED | OUTPATIENT
Start: 2021-01-05 | End: 2021-07-12 | Stop reason: SDUPTHER

## 2021-01-05 RX ORDER — VENLAFAXINE 37.5 MG/1
37.5 TABLET ORAL DAILY
Qty: 90 TABLET | Refills: 1 | Status: SHIPPED | OUTPATIENT
Start: 2021-01-05 | End: 2021-03-11 | Stop reason: SDUPTHER

## 2021-01-05 RX ORDER — OMEPRAZOLE 40 MG/1
40 CAPSULE, DELAYED RELEASE ORAL DAILY
Qty: 90 CAPSULE | Refills: 1 | Status: SHIPPED | OUTPATIENT
Start: 2021-01-05 | End: 2021-02-08 | Stop reason: SDUPTHER

## 2021-01-05 RX ORDER — ALBUTEROL SULFATE 90 UG/1
2 AEROSOL, METERED RESPIRATORY (INHALATION) EVERY 4 HOURS PRN
Qty: 54 G | Refills: 1 | Status: SHIPPED | OUTPATIENT
Start: 2021-01-05 | End: 2022-01-25 | Stop reason: SDUPTHER

## 2021-01-05 NOTE — PROGRESS NOTES
Assessment/Plan: patient will stop Breo due to cost   Patient use mometasone for dermatitis  Patient be set up for removal of lesion on right anterior chest wall  Labs reviewed  Blood pressure and hyperlipidemia stable at this time  Patient will follow-up with specialist per routine  Follow-up in 6 months or as needed  Patient changed Advair  Diagnoses and all orders for this visit:    Essential hypertension  -     amLODIPine (NORVASC) 5 mg tablet; Take 1 tablet (5 mg total) by mouth 2 (two) times a day    Screening breast examination  -     Mammo screening bilateral w cad; Future    Encounter for immunization    Screening for colorectal cancer  -     [place]; Future    Restrictive lung disease  -     fluticasone-salmeterol (Advair Diskus) 250-50 mcg/dose inhaler; Inhale 1 puff 2 (two) times a day Rinse mouth after use  Skin neoplasm    Thyroid cancer (HCC)    Mixed hyperlipidemia    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HCC)    Body mass index (BMI) 45 0-49 9, adult (HCC)    Dermatitis  -     mometasone (ELOCON) 0 1 % cream; Apply topically daily  -     mometasone (ELOCON) 0 1 % lotion; Apply topically daily    Depression, unspecified depression type  -     venlafaxine (EFFEXOR) 37 5 mg tablet; Take 1 tablet (37 5 mg total) by mouth daily    Other hyperlipidemia  -     pravastatin (PRAVACHOL) 20 mg tablet; Take 1 tablet (20 mg total) by mouth daily    Hypothyroidism, postsurgical  -     levothyroxine 175 mcg tablet; Take 1 tablet (175 mcg total) by mouth daily    Gastroesophageal reflux disease with esophagitis without hemorrhage  -     omeprazole (PriLOSEC) 40 MG capsule; Take 1 capsule (40 mg total) by mouth daily    Asthma, unspecified asthma severity, unspecified whether complicated, unspecified whether persistent  -     albuterol (Ventolin HFA) 90 mcg/act inhaler;  Inhale 2 puffs every 4 (four) hours as needed for wheezing or shortness of breath    Other orders  -     Diclofenac Sodium (VOLTAREN) 1 % Subjective:        Patient ID: Dmitry Morgan is a 70 y o  female  Patient follow-up on asthma/restrictive lung disease hypertension hyperlipidemia and thyroid cancer  Patient does have spread of thyroid cancer to her left eye  Patient also with some rash and irritation of face due to CPAP machine  Kenalog cream is not working well enough  Patient also with skin lesion on right upper breast   Patient has been on Breo  Patient with some shortness of breath  No chest pain  The following portions of the patient's history were reviewed and updated as appropriate: allergies, current medications, past family history, past medical history, past social history, past surgical history and problem list       Review of Systems   Constitutional: Negative  HENT: Negative  Eyes: Negative  Respiratory: Positive for shortness of breath  Cardiovascular: Negative  Gastrointestinal: Negative  Endocrine: Negative  Genitourinary: Negative  Musculoskeletal: Negative  Skin: Positive for color change  Allergic/Immunologic: Negative  Neurological: Negative  Hematological: Negative  Psychiatric/Behavioral: Negative  Objective:      BMI Counseling: Body mass index is 45 73 kg/m²  The BMI is above normal  Nutrition recommendations include decreasing portion sizes  Exercise recommendations include moderate physical activity 150 minutes/week  /80   Pulse 68   Temp 97 8 °F (36 6 °C) (Tympanic)   Resp 22   Ht 5' 2" (1 575 m)   Wt 113 kg (250 lb)   LMP  (LMP Unknown)   SpO2 97%   BMI 45 73 kg/m²          Physical Exam  Vitals signs and nursing note reviewed  Constitutional:       General: She is not in acute distress  Appearance: Normal appearance  She is not ill-appearing, toxic-appearing or diaphoretic  HENT:      Head: Normocephalic and atraumatic        Right Ear: Tympanic membrane, ear canal and external ear normal  There is no impacted cerumen  Left Ear: Tympanic membrane, ear canal and external ear normal  There is no impacted cerumen  Nose: Nose normal  No congestion or rhinorrhea  Mouth/Throat:      Mouth: Mucous membranes are moist       Pharynx: No oropharyngeal exudate or posterior oropharyngeal erythema  Eyes:      General: No scleral icterus  Right eye: No discharge  Left eye: No discharge  Extraocular Movements: Extraocular movements intact  Conjunctiva/sclera: Conjunctivae normal       Pupils: Pupils are equal, round, and reactive to light  Neck:      Musculoskeletal: Normal range of motion and neck supple  No neck rigidity or muscular tenderness  Vascular: No carotid bruit  Cardiovascular:      Rate and Rhythm: Normal rate and regular rhythm  Pulses: Normal pulses  Heart sounds: Normal heart sounds  No murmur  No friction rub  No gallop  Pulmonary:      Effort: Pulmonary effort is normal  No respiratory distress  Breath sounds: Normal breath sounds  No stridor  No wheezing, rhonchi or rales  Chest:      Chest wall: No tenderness  Abdominal:      General: Abdomen is flat  Bowel sounds are normal  There is no distension  Palpations: Abdomen is soft  Tenderness: There is no abdominal tenderness  There is no guarding or rebound  Musculoskeletal:         General: Tenderness present  No swelling, deformity or signs of injury  Right lower leg: No edema  Left lower leg: No edema  Lymphadenopathy:      Cervical: No cervical adenopathy  Skin:     General: Skin is warm and dry  Capillary Refill: Capillary refill takes less than 2 seconds  Coloration: Skin is not jaundiced  Findings: No bruising, erythema, lesion or rash  Comments: Irregular lesion on right upper chest wall  Patient with erythema bilateral face and scalp anteriorly  Neurological:      General: No focal deficit present        Mental Status: She is alert and oriented to person, place, and time  Cranial Nerves: No cranial nerve deficit  Sensory: No sensory deficit  Motor: No weakness  Coordination: Coordination normal       Gait: Gait normal    Psychiatric:         Mood and Affect: Mood normal          Behavior: Behavior normal          Thought Content:  Thought content normal          Judgment: Judgment normal

## 2021-01-19 ENCOUNTER — PROCEDURE VISIT (OUTPATIENT)
Dept: FAMILY MEDICINE CLINIC | Facility: CLINIC | Age: 76
End: 2021-01-19
Payer: COMMERCIAL

## 2021-01-19 VITALS
SYSTOLIC BLOOD PRESSURE: 128 MMHG | TEMPERATURE: 100 F | WEIGHT: 251.8 LBS | BODY MASS INDEX: 46.05 KG/M2 | DIASTOLIC BLOOD PRESSURE: 78 MMHG

## 2021-01-19 DIAGNOSIS — U07.1 COVID-19: ICD-10-CM

## 2021-01-19 DIAGNOSIS — J01.10 ACUTE NON-RECURRENT FRONTAL SINUSITIS: Primary | ICD-10-CM

## 2021-01-19 PROBLEM — B34.9 VIRAL SYNDROME: Status: ACTIVE | Noted: 2021-01-19

## 2021-01-19 PROCEDURE — 3078F DIAST BP <80 MM HG: CPT | Performed by: FAMILY MEDICINE

## 2021-01-19 PROCEDURE — 3074F SYST BP LT 130 MM HG: CPT | Performed by: FAMILY MEDICINE

## 2021-01-19 PROCEDURE — 99213 OFFICE O/P EST LOW 20 MIN: CPT | Performed by: FAMILY MEDICINE

## 2021-01-19 PROCEDURE — 87635 SARS-COV-2 COVID-19 AMP PRB: CPT | Performed by: FAMILY MEDICINE

## 2021-01-19 NOTE — PROGRESS NOTES
Assessment/Plan:  Note for patient to remain out of work for the next 2 days  Patient will rest and increase fluids use Tylenol as needed  Other supportive care as needed  Patient tested for COVID-19 at this time  Diagnoses and all orders for this visit:    Acute non-recurrent frontal sinusitis  -     Novel Coronavirus (COVID-19), PCR [facility] Collected in Office    COVID-19  -     Novel Coronavirus (COVID-19), PCR [facility] Collected in Office            Subjective:        Patient ID: Gali Hutchison is a 70 y o  female  Patient is here with sore throat rhinorrhea cough over the past 3 days  Patient did feel feverish today  No dysuria or hematuria  No change in bowel habits  No nausea vomiting  Patient's  is sick  No treatment use  Patient stays home and does not go  The following portions of the patient's history were reviewed and updated as appropriate: allergies, current medications, past family history, past medical history, past social history, past surgical history and problem list       Review of Systems   Constitutional: Positive for fever  HENT: Positive for congestion, postnasal drip, rhinorrhea, sinus pressure and sinus pain  Eyes: Negative  Respiratory: Negative  Cardiovascular: Negative  Gastrointestinal: Negative  Endocrine: Negative  Genitourinary: Negative  Musculoskeletal: Negative  Skin: Negative  Allergic/Immunologic: Negative  Neurological: Negative  Hematological: Negative  Psychiatric/Behavioral: Negative  Objective:               /78   Temp 100 °F (37 8 °C) (Tympanic)   Wt 114 kg (251 lb 12 8 oz)   LMP  (LMP Unknown)   BMI 46 05 kg/m²          Physical Exam  Vitals signs and nursing note reviewed  Constitutional:       General: She is not in acute distress  Appearance: Normal appearance  She is not ill-appearing, toxic-appearing or diaphoretic  HENT:      Head: Normocephalic and atraumatic  Right Ear: Tympanic membrane, ear canal and external ear normal  There is no impacted cerumen  Left Ear: Tympanic membrane, ear canal and external ear normal  There is no impacted cerumen  Nose: Nose normal  No congestion or rhinorrhea  Mouth/Throat:      Mouth: Mucous membranes are moist       Pharynx: No oropharyngeal exudate or posterior oropharyngeal erythema  Eyes:      General: No scleral icterus  Right eye: No discharge  Left eye: No discharge  Extraocular Movements: Extraocular movements intact  Conjunctiva/sclera: Conjunctivae normal       Pupils: Pupils are equal, round, and reactive to light  Neck:      Musculoskeletal: Normal range of motion and neck supple  No neck rigidity or muscular tenderness  Vascular: No carotid bruit  Cardiovascular:      Rate and Rhythm: Normal rate and regular rhythm  Pulses: Normal pulses  Heart sounds: Normal heart sounds  No murmur  No friction rub  No gallop  Pulmonary:      Effort: Pulmonary effort is normal  No respiratory distress  Breath sounds: Normal breath sounds  No stridor  No wheezing, rhonchi or rales  Chest:      Chest wall: No tenderness  Musculoskeletal: Normal range of motion  General: No swelling, tenderness, deformity or signs of injury  Right lower leg: No edema  Left lower leg: No edema  Lymphadenopathy:      Cervical: No cervical adenopathy  Skin:     General: Skin is warm and dry  Capillary Refill: Capillary refill takes less than 2 seconds  Coloration: Skin is not jaundiced  Findings: No bruising, erythema, lesion or rash  Neurological:      General: No focal deficit present  Mental Status: She is alert and oriented to person, place, and time  Cranial Nerves: No cranial nerve deficit  Sensory: No sensory deficit  Motor: No weakness        Coordination: Coordination normal       Gait: Gait normal    Psychiatric: Mood and Affect: Mood normal          Behavior: Behavior normal          Thought Content:  Thought content normal          Judgment: Judgment normal

## 2021-01-19 NOTE — LETTER
January 19, 2021         Patient: Angie Thornton   YOB: 1945   Date of Visit: 1/19/2021       To Whom it May Concern:    Abran Gomez is under my professional care  She was seen in my office on 1/19/2021  She can return to work on 01/22/2021  If you have any questions or concerns, please don't hesitate to call           Sincerely,              Mandeep Caludio, DO

## 2021-01-21 ENCOUNTER — TELEPHONE (OUTPATIENT)
Dept: FAMILY MEDICINE CLINIC | Facility: CLINIC | Age: 76
End: 2021-01-21

## 2021-01-21 ENCOUNTER — TELEMEDICINE (OUTPATIENT)
Dept: FAMILY MEDICINE CLINIC | Facility: CLINIC | Age: 76
End: 2021-01-21
Payer: COMMERCIAL

## 2021-01-21 VITALS — WEIGHT: 251 LBS | HEIGHT: 62 IN | BODY MASS INDEX: 46.19 KG/M2

## 2021-01-21 DIAGNOSIS — U07.1 COVID-19: Primary | ICD-10-CM

## 2021-01-21 LAB — SARS-COV-2 RNA RESP QL NAA+PROBE: POSITIVE

## 2021-01-21 PROCEDURE — 3008F BODY MASS INDEX DOCD: CPT | Performed by: FAMILY MEDICINE

## 2021-01-21 PROCEDURE — 1160F RVW MEDS BY RX/DR IN RCRD: CPT | Performed by: FAMILY MEDICINE

## 2021-01-21 PROCEDURE — 1036F TOBACCO NON-USER: CPT | Performed by: FAMILY MEDICINE

## 2021-01-21 PROCEDURE — 99213 OFFICE O/P EST LOW 20 MIN: CPT | Performed by: FAMILY MEDICINE

## 2021-01-21 NOTE — TELEPHONE ENCOUNTER
SPOKE TO PT REGARDING COVID TEST  HAS A VIRTUAL VISIT TODAY     ----- Message from Ladarius Eden DO sent at 1/21/2021  7:33 AM EST -----  Call patient  COVID test positive    Set up virtual visit

## 2021-01-21 NOTE — PROGRESS NOTES
COVID-19 Virtual Visit     Assessment/Plan:    Problem List Items Addressed This Visit        Other    COVID-19 - Primary         Disposition:     I recommended continued isolation until at least 24 hours have passed since recovery defined as resolution of fever without the use of fever-reducing medications AND improvement in COVID symptoms AND 10 days have passed since onset of symptoms (or 10 days have passed since date of first positive viral diagnostic test for asymptomatic patients)  Patient meets criteria for Casirivimab/Imdevimab infusion  They were counseled in regards to risks, benefits, and side effects of this infusion  Casirivimab and imdevimab are investigational medicines used to treat mild to moderate symptoms of COVID-19 in non-hospitalized adults and adolescents (15years of age and older who weigh at least 80 pounds (40 kg)), and who are at high risk for developing severe COVID-19 symptoms or the need for hospitalization  Casirivimab and imdevimab are investigational because they are still being studied  There is limited information known about the safety and effectiveness of using casirivimab and imdevimab to treat people with COVID-19  The FDA has authorized the emergency use of casirivimab and imdevimab for the treatment of COVID-19 under an Emergency Use Authorization (EUA)  Possible side effects of casirivimab and imdevimab: Allergic reactions can happen during and after infusion with casirivimab and imdevimab  Possible reactions include: fever, chills, nausea, headache, shortness of breath, low blood pressure, wheezing, swelling of your lips, face, or throat, rash including hives, itching, muscle aches, and dizziness  The side effects of getting any medicine by vein may include brief pain, bleeding, bruising of the skin, soreness, swelling, and possible infection at the infusion site  These are not all the possible side effects of casirivimab and imdevimab   Not a lot of people have been given casirivimab and imdevimab  Serious and unexpected side effects may happen  Casirivimab and imdevimab are still being studied so it is possible that all of the risks are not known at this time  It is possible that casirivimab and imdevimab could interfere with your body's own ability to fight off a future infection of SARS-CoV-2  Similarly, casirivimab and imdevimab may reduce your body's immune response to a vaccine for SARS-CoV-2  Specific studies have not been conducted to address these possible risks  Emergency Use Authorization:    The [facility] FDA has made casirivimab and imdevimab available under an emergency access mechanism called an EUA  The EUA is supported by a  of Health and Human Service ([facility]) declaration that circumstances exist to justify the emergency use of drugs and biological products during the COVID-19 pandemic  Casirivimab and imdevimab have not undergone the same type of review as an FDA-approved or cleared product  The FDA may issue an EUA when certain criteria are met, which includes that there are no adequate, approved, available alternatives  In addition, the FDA decision is based on the totality of scientific evidence available showing that it is reasonable to believe that the product meets certain criteria for safety, performance, and labeling and may be effective in treatment of patients during the COVID-19 pandemic  All of these criteria must be met to allow for the product to be used in the treatment of patients during the COVID-19 pandemic  The EUA for casirivimab and imdevimab is in effect for the duration of the COVID-19 declaration justifying emergency use of these products, unless terminated or revoked (after which the products may no longer be used)       Regarding COVID-19 Vaccination:    Currently there is no data or safety or efficacy of COVID-19 vaccination in persons who received monoclonal antibodies as part of COVID-19 treatment  Treatment should be deferred for at least 90 days to avoid interference of the treatment with vaccine-induced immune responses (this is based on estimated half-life of therapies and evidence suggesting reinfection is uncommon within 90 days of initial infection)  The patient consents to proceed with casirivimab and imdevimab infusion  I have spent 15 minutes directly with the patient  Greater than 50% of this time was spent in counseling/coordination of care regarding: instructions for management and patient and family education  Guidance given overall  Patient may use vitamin cocktail as well as increasing fluids and rest and using Tylenol as needed  Patient in agreement to going for monoclonal antibody infusion  Note for patient to remain out of work for the next 2 weeks    Encounter provider Dayton Lee DO    Provider located at [address] [place] [id number]    Recent Visits  Date Type Provider Dept   01/21/21 Telephone [address]   01/21/21 [address] recent visits within past 7 days and meeting all other requirements     Future Appointments  No visits were found meeting these conditions  Showing future appointments within next 150 days and meeting all other requirements      This virtual check-in was done via [employer] and patient was informed that this is a secure, HIPAA-compliant platform  She agrees to proceed  Patient agrees to participate in a virtual check in via telephone or video visit instead of presenting to the office to address urgent/immediate medical needs  Patient is aware this is a billable service  After connecting through [facility], the patient was identified by name and date of birth   Erwin Burnham was informed that this was a telemedicine visit and that the exam was being conducted confidentially over secure lines  My office door was closed  No one else was in the room  Dmitry Morgan acknowledged consent and understanding of privacy and security of the telemedicine visit  I informed the patient that I have reviewed her record in Epic and presented the opportunity for her to ask any questions regarding the visit today  The patient agreed to participate  Subjective:   Dmitry Morgan is a 70 y o  female who has been screened for COVID-19  Symptom change since last report: unchanged  Patient's symptoms include chills, fatigue, malaise, nasal congestion, rhinorrhea, sore throat, cough, chest tightness, myalgias and headache  Patient denies fever, anosmia, loss of taste, shortness of breath, abdominal pain, nausea, vomiting and diarrhea  Primitivo Bernard has been staying home and has isolated themselves in her home  She is taking care to not share personal items and is cleaning all surfaces that are touched often, like counters, tabletops, and doorknobs using household cleaning sprays or wipes  She is wearing a mask when she leaves her room       Date of symptom onset: 1/17/2021  Date of positive COVID-19 PCR: 1/19/2021    Lab Results   Component Value Date    SARSCOV2 Positive (A) 01/19/2021     Past Medical History:   Diagnosis Date    Asthma     Breathing difficulty     Bronchitis     Depression     Fracture of arm     Fracture of carpal bone     Hyperlipidemia     Hypertension     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[facility] [address] ) 12/16/2016    Shortness of breath     Thyroid nodule     Thyroid nodule     Thyroid nodule      Past Surgical History:   Procedure Laterality Date    HEMORRHOID SURGERY      CA THYROID LOBECTOMY,UNILAT Right 2/25/2016    Procedure: HEMITHYROIDECTOMY;  Surgeon: Graham Amaya MD;  Location: [facility];  Service: Surgical Oncology    CA THYROIDECTOMY POST PREV THYR SURG Left 6/6/2016    Procedure:  COMPLETION THYROIDECTOMY, FLEXIBLE LARYNGOSCOPY;  Surgeon: Fanny Gonzalez MD;  Location: [facility] OR;  Service: Surgical Oncology    THYROIDECTOMY, PARTIAL Left      Current Outpatient Medications   Medication Sig Dispense Refill    albuterol (Ventolin HFA) 90 mcg/act inhaler Inhale 2 puffs every 4 (four) hours as needed for wheezing or shortness of breath 54 g 1    amLODIPine (NORVASC) 5 mg tablet Take 1 tablet (5 mg total) by mouth 2 (two) times a day 180 tablet 1    diclofenac (VOLTAREN) 75 mg EC tablet Take 1 tablet (75 mg total) by mouth 2 (two) times a day as needed (pain) Take with food   180 tablet 1    diclofenac sodium (VOLTAREN) 1 % Apply 2 g topically 4 (four) times a day as needed (knee pain) 1 Tube 1    diclofenac sodium (VOLTAREN) 1 % Apply 4 g topically 4 (four) times a day as needed (knee pain) 1 Tube 2    Diclofenac Sodium (VOLTAREN) 1 %       furosemide (LASIX) 20 mg tablet Take 1 tablet (20 mg total) by mouth daily as needed (edema) 90 tablet 3    levothyroxine 175 mcg tablet Take 1 tablet (175 mcg total) by mouth daily 90 tablet 1    lisinopril-hydrochlorothiazide (PRINZIDE,ZESTORETIC) 20-12 5 MG per tablet Take 1 tablet by mouth 2 (two) times a day 180 tablet 1    Melatonin 10 MG TABS Take by mouth      mometasone (ELOCON) 0 1 % cream Apply topically daily 45 g 3    mometasone (ELOCON) 0 1 % lotion Apply topically daily 60 mL 0    nebivolol (BYSTOLIC) 5 mg tablet Take 1 tablet (5 mg total) by mouth daily 90 tablet 3    omeprazole (PriLOSEC) 40 MG capsule Take 1 capsule (40 mg total) by mouth daily 90 capsule 1    potassium chloride (Klor-Con M20) 20 mEq tablet Take 1 tablet (20 mEq total) by mouth 2 (two) times a day 180 tablet 3    pravastatin (PRAVACHOL) 20 mg tablet Take 1 tablet (20 mg total) by mouth daily 90 tablet 3    pravastatin (PRAVACHOL) 20 mg tablet Take 1 tablet (20 mg total) by mouth daily 90 tablet 1    venlafaxine (EFFEXOR) 37 5 mg tablet Take 1 tablet (37 5 mg total) by mouth daily 90 tablet 1    fluticasone-salmeterol (Advair Diskus) 250-50 mcg/dose inhaler Inhale 1 puff 2 (two) times a day Rinse mouth after use  (Patient not taking: Reported on 1/19/2021) 3 Inhaler 3     No current facility-administered medications for this visit  Allergies   Allergen Reactions    Adhesive [Medical Tape] Rash       Review of Systems   Constitutional: Positive for chills and fatigue  Negative for fever  HENT: Positive for congestion, rhinorrhea and sore throat  Eyes: Negative  Respiratory: Positive for cough and chest tightness  Negative for shortness of breath  Cardiovascular: Negative  Gastrointestinal: Negative  Negative for abdominal pain, diarrhea, nausea and vomiting  Endocrine: Negative  Genitourinary: Negative  Musculoskeletal: Positive for myalgias  Skin: Negative  Allergic/Immunologic: Negative  Neurological: Positive for headaches  Hematological: Negative  Psychiatric/Behavioral: Negative  Objective:    Vitals:    01/21/21 1016   Weight: 114 kg (251 lb)   Height: 5' 2" (1 575 m)       Physical Exam  Constitutional:       General: She is not in acute distress  Appearance: Normal appearance  She is not ill-appearing, toxic-appearing or diaphoretic  HENT:      Head: Atraumatic  Right Ear: External ear normal       Left Ear: External ear normal       Nose: Nose normal    Pulmonary:      Effort: Pulmonary effort is normal    Neurological:      Mental Status: She is alert  VIRTUAL VISIT DISCLAIMER    Sarah Driscoll acknowledges that she has consented to an online visit or consultation  She understands that the online visit is based solely on information provided by her, and that, in the absence of a face-to-face physical evaluation by the physician, the diagnosis she receives is both limited and provisional in terms of accuracy and completeness  This is not intended to replace a full medical face-to-face evaluation by the physician   Sara Ponce Vale understands and accepts these terms

## 2021-01-22 ENCOUNTER — HOSPITAL ENCOUNTER (OUTPATIENT)
Dept: INFUSION CENTER | Facility: HOSPITAL | Age: 76
Discharge: HOME | End: 2021-01-22

## 2021-01-22 ENCOUNTER — TELEPHONE (OUTPATIENT)
Dept: FAMILY MEDICINE CLINIC | Facility: CLINIC | Age: 76
End: 2021-01-22

## 2021-01-22 RX ORDER — ACETAMINOPHEN 325 MG/1
650 TABLET ORAL ONCE AS NEEDED
Status: CANCELLED | OUTPATIENT
Start: 2021-01-22

## 2021-01-22 RX ORDER — ALBUTEROL SULFATE 90 UG/1
3 AEROSOL, METERED RESPIRATORY (INHALATION) ONCE AS NEEDED
Status: CANCELLED | OUTPATIENT
Start: 2021-01-22

## 2021-01-22 RX ORDER — SODIUM CHLORIDE 9 MG/ML
20 INJECTION, SOLUTION INTRAVENOUS ONCE
Status: CANCELLED | OUTPATIENT
Start: 2021-01-22

## 2021-01-22 NOTE — TELEPHONE ENCOUNTER
Pt is scheduled for infusion today at 1  Tried to call to let her know, asked her to call back and I will give her directions      Called pt to let her know the appt for infusion  She asked me if her ins will cover and my answer was I didn't know she could call her ins co   She stated she can't have any bills and asked me to cancel the appt

## 2021-01-28 ENCOUNTER — TELEPHONE (OUTPATIENT)
Dept: FAMILY MEDICINE CLINIC | Facility: CLINIC | Age: 76
End: 2021-01-28

## 2021-01-28 NOTE — TELEPHONE ENCOUNTER
----- Message from Mina Fox DO sent at 1/28/2021  3:01 PM EST -----  Regarding: FW: Visit Follow-Up Question  Contact: [phone]    Call patient  Start Tessalon Perles 200 mg 3 times daily for cough  Dispense 30 tablets with no refills  Patient may set up virtual visit if desired  ----- Message -----  From: Suzan Russell  Sent: 1/28/2021   1:15 PM EST  To: Mina Fox DO  Subject: FW: Visit Follow-Up Question                       ----- Message -----  From: Judy Sahu  Sent: 1/28/2021  10:58 AM EST  To: [facility]  Subject: Visit Follow-Up Question                         Since I did not get to go for the infusion for COVID  What steps do I need to follow? Is there anything you can prescribe for the cough? Do I need to make a follow up visit? I am still very tired and my chest hurts from coughing and I have no appetite  No fever  Nose varies from runny to nothing  Blood pressure as been 135/67 to 144/74

## 2021-01-29 ENCOUNTER — TELEPHONE (OUTPATIENT)
Dept: FAMILY MEDICINE CLINIC | Facility: CLINIC | Age: 76
End: 2021-01-29

## 2021-01-29 DIAGNOSIS — R05.9 COUGH: Primary | ICD-10-CM

## 2021-01-29 RX ORDER — GUAIFENESIN AND CODEINE PHOSPHATE 100; 10 MG/5ML; MG/5ML
5 SOLUTION ORAL EVERY 4 HOURS PRN
Qty: 120 ML | Refills: 1
Start: 2021-01-29 | End: 2021-07-12

## 2021-01-29 NOTE — TELEPHONE ENCOUNTER
Luana Flores MD  [facility] [place] ([facility] [place] ) [address] [place], [place] [address] [address]  [phone]   Feb 26, 2019 10:20 AM CST  (Arrive by 10:05 AM)  Return Visit with Irma Bae MD  [facility] [place] ([facility] [place] ) [address] [place] [address]  [place] [id number] [phone]      }   Placed order for patient and left message for patient to call back

## 2021-01-29 NOTE — TELEPHONE ENCOUNTER
Pt still with HA and cough  States the cough meds are not strong enough  Would like something stronger   Send to [employer] [facility] [place] [facility] [place]  Thank you

## 2021-02-08 DIAGNOSIS — K21.00 GASTROESOPHAGEAL REFLUX DISEASE WITH ESOPHAGITIS WITHOUT HEMORRHAGE: ICD-10-CM

## 2021-02-08 DIAGNOSIS — I10 ESSENTIAL HYPERTENSION: ICD-10-CM

## 2021-02-08 RX ORDER — LISINOPRIL AND HYDROCHLOROTHIAZIDE 20; 12.5 MG/1; MG/1
1 TABLET ORAL 2 TIMES DAILY
Qty: 180 TABLET | Refills: 0 | Status: SHIPPED | OUTPATIENT
Start: 2021-02-08 | End: 2021-07-12 | Stop reason: SDUPTHER

## 2021-02-08 RX ORDER — OMEPRAZOLE 40 MG/1
40 CAPSULE, DELAYED RELEASE ORAL DAILY
Qty: 90 CAPSULE | Refills: 0 | Status: SHIPPED | OUTPATIENT
Start: 2021-02-08 | End: 2021-07-12 | Stop reason: SDUPTHER

## 2021-02-19 ENCOUNTER — OFFICE VISIT (OUTPATIENT)
Dept: FAMILY MEDICINE CLINIC | Facility: CLINIC | Age: 76
End: 2021-02-19
Payer: COMMERCIAL

## 2021-02-19 VITALS — OXYGEN SATURATION: 97 %

## 2021-02-19 DIAGNOSIS — J41.0 SIMPLE CHRONIC BRONCHITIS (HCC): ICD-10-CM

## 2021-02-19 DIAGNOSIS — J01.10 ACUTE NON-RECURRENT FRONTAL SINUSITIS: Primary | ICD-10-CM

## 2021-02-19 PROCEDURE — 99213 OFFICE O/P EST LOW 20 MIN: CPT | Performed by: FAMILY MEDICINE

## 2021-02-19 PROCEDURE — 3725F SCREEN DEPRESSION PERFORMED: CPT | Performed by: FAMILY MEDICINE

## 2021-02-19 RX ORDER — AMOXICILLIN AND CLAVULANATE POTASSIUM 875; 125 MG/1; MG/1
1 TABLET, FILM COATED ORAL EVERY 12 HOURS SCHEDULED
Qty: 14 TABLET | Refills: 0 | Status: SHIPPED | OUTPATIENT
Start: 2021-02-19 | End: 2021-02-26

## 2021-02-19 RX ORDER — METHYLPREDNISOLONE 4 MG/1
TABLET ORAL
Qty: 21 EACH | Refills: 0 | Status: SHIPPED | OUTPATIENT
Start: 2021-02-19 | End: 2021-07-12

## 2021-02-19 NOTE — PROGRESS NOTES
Assessment/Plan:  Patient use Ventolin as directed  Patient will use Augmentin as directed with food  Patient use Medrol Dosepak as directed  Patient will be out of work for the next 3 weeks  Diagnoses and all orders for this visit:    Acute non-recurrent frontal sinusitis  -     amoxicillin-clavulanate (AUGMENTIN) 875-125 mg per tablet; Take 1 tablet by mouth every 12 (twelve) hours for 7 days  -     methylPREDNISolone 4 MG tablet therapy pack; Use as directed on package    Simple chronic bronchitis (HCC)            Subjective:        Patient ID: Inocente Hoffmann is a 70 y o  female  Patient status post testing positive for COVID mid January  Patient still with cough  Patient's cough is dry  Patient with sinus pain and pressure along with sore throat  Patient with rhinorrhea and postnasal drip also  Patient with some tightness in her chest over the past week and half on a daily basis  Patient also fatigue  Patient is working        The following portions of the patient's history were reviewed and updated as appropriate: allergies, current medications, past family history, past medical history, past social history, past surgical history and problem list       Review of Systems   Constitutional: Positive for fatigue  Negative for fever  HENT: Positive for congestion, postnasal drip, rhinorrhea, sinus pressure, sinus pain and sore throat  Eyes: Negative  Respiratory: Positive for cough and chest tightness  Cardiovascular: Negative  Gastrointestinal: Negative  Endocrine: Negative  Genitourinary: Negative  Musculoskeletal: Negative  Skin: Negative  Allergic/Immunologic: Negative  Neurological: Negative  Hematological: Negative  Psychiatric/Behavioral: Negative  Objective:               LMP  (LMP Unknown)   SpO2 97%          Physical Exam  Vitals signs and nursing note reviewed  Constitutional:       General: She is not in acute distress  Appearance: Normal appearance  She is not ill-appearing, toxic-appearing or diaphoretic  HENT:      Head: Normocephalic and atraumatic  Right Ear: Tympanic membrane, ear canal and external ear normal  There is no impacted cerumen  Left Ear: Tympanic membrane, ear canal and external ear normal  There is no impacted cerumen  Nose: Nose normal  No congestion or rhinorrhea  Mouth/Throat:      Mouth: Mucous membranes are moist       Pharynx: Oropharyngeal exudate and posterior oropharyngeal erythema present  Eyes:      General: No scleral icterus  Right eye: No discharge  Left eye: No discharge  Extraocular Movements: Extraocular movements intact  Conjunctiva/sclera: Conjunctivae normal       Pupils: Pupils are equal, round, and reactive to light  Neck:      Musculoskeletal: Normal range of motion and neck supple  No neck rigidity or muscular tenderness  Vascular: No carotid bruit  Cardiovascular:      Rate and Rhythm: Normal rate and regular rhythm  Pulses: Normal pulses  Heart sounds: Normal heart sounds  No murmur  No friction rub  No gallop  Pulmonary:      Effort: Pulmonary effort is normal  No respiratory distress  Breath sounds: No stridor  Wheezing present  No rhonchi or rales  Chest:      Chest wall: No tenderness  Musculoskeletal: Normal range of motion  General: No swelling, tenderness, deformity or signs of injury  Right lower leg: No edema  Left lower leg: No edema  Lymphadenopathy:      Cervical: No cervical adenopathy  Skin:     General: Skin is warm and dry  Capillary Refill: Capillary refill takes less than 2 seconds  Coloration: Skin is not jaundiced  Findings: No bruising, erythema, lesion or rash  Neurological:      General: No focal deficit present  Mental Status: She is alert and oriented to person, place, and time  Cranial Nerves: No cranial nerve deficit        Sensory: No sensory deficit  Motor: No weakness  Coordination: Coordination normal       Gait: Gait normal    Psychiatric:         Mood and Affect: Mood normal          Behavior: Behavior normal          Thought Content:  Thought content normal          Judgment: Judgment normal

## 2021-02-23 ENCOUNTER — OFFICE VISIT (OUTPATIENT)
Dept: CARDIOLOGY CLINIC | Facility: CLINIC | Age: 76
End: 2021-02-23
Payer: COMMERCIAL

## 2021-02-23 VITALS
HEART RATE: 58 BPM | TEMPERATURE: 97.1 F | RESPIRATION RATE: 18 BRPM | SYSTOLIC BLOOD PRESSURE: 144 MMHG | HEIGHT: 62 IN | BODY MASS INDEX: 44.46 KG/M2 | DIASTOLIC BLOOD PRESSURE: 92 MMHG | WEIGHT: 241.6 LBS

## 2021-02-23 DIAGNOSIS — E78.2 MIXED HYPERLIPIDEMIA: ICD-10-CM

## 2021-02-23 DIAGNOSIS — I10 ESSENTIAL HYPERTENSION: Primary | ICD-10-CM

## 2021-02-23 DIAGNOSIS — R60.0 LOWER EXTREMITY EDEMA: ICD-10-CM

## 2021-02-23 PROCEDURE — 99213 OFFICE O/P EST LOW 20 MIN: CPT | Performed by: INTERNAL MEDICINE

## 2021-02-23 PROCEDURE — 93000 ELECTROCARDIOGRAM COMPLETE: CPT | Performed by: INTERNAL MEDICINE

## 2021-02-23 RX ORDER — NEBIVOLOL 5 MG/1
5 TABLET ORAL DAILY
Qty: 90 TABLET | Refills: 3 | Status: SHIPPED | OUTPATIENT
Start: 2021-02-23 | End: 2021-11-16 | Stop reason: SDUPTHER

## 2021-02-23 NOTE — PROGRESS NOTES
Cardiology Follow Up    Misty Maloney [facility] ([place], [place])  1945  [id number]  [address] [address] [phone]     Reason for visit:  Follow-up for hypertension hyperlipidemia as well as lower extremity edema      1  Essential hypertension  POCT ECG   2  Mixed hyperlipidemia     3  Lower extremity edema         Interval History: The patient contracted COVID last month  She does have GAINES since then    She denies chest pain  She does get occ  Palpitations  She denies edema  She gets occasional lightheadedness         Patient Active Problem List   Diagnosis    History of thyroid cancer    GERD without esophagitis    Hyperlipidemia    Hypertension    Hypothyroidism, postsurgical    Impaired fasting glucose    Insomnia    Restrictive lung disease    Acute non-recurrent frontal sinusitis    Obstructive sleep apnea    Hypersomnia    Morbid obesity with BMI of 40 0-44 9, adult (HCC)    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HCC)    Humerus lesion, left    Acute shoulder bursitis, right    Lower extremity edema    Encounter for follow-up surveillance of thyroid cancer    Thyroid cancer ([facility] [address] )    Primary osteoarthritis of left knee    Primary osteoarthritis of right knee    Pes anserine bursitis    Dermatitis    Skin neoplasm    Viral syndrome    COVID-19    Simple chronic bronchitis (HCC)     Past Medical History:   Diagnosis Date    Asthma     Breathing difficulty     Bronchitis     Depression     Fracture of arm     Fracture of carpal bone     Hyperlipidemia     Hypertension     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[facility] [address] ) 12/16/2016    Shortness of breath     Thyroid nodule     Thyroid nodule     Thyroid nodule      Social History     Socioeconomic History    Marital status: /Civil Union     Spouse name: Not on file    Number of children: Not on file    Years of education: Not on file   [employer] education level: Not on file   Occupational History    Not on file   Social Needs    Financial resource strain: Not on file    Food insecurity     Worry: Not on file     Inability: Not on file    Transportation needs     Medical: Not on file     Non-medical: Not on file   Tobacco Use    Smoking status: Former Smoker     Packs/day: 0 25     Years: 4 00     Pack years: 1 00     Types: Cigarettes     Quit date: 6/15/1981     Years since quittin 7    Smokeless tobacco: Former User    Tobacco comment: quit 40 yrs ago (Never a smoker per [employer])   Substance and Sexual Activity    Alcohol use: No     Comment: occasional glass of wine    Drug use: No    Sexual activity: Never   Lifestyle    Physical activity     Days per week: Not on file     Minutes per session: Not on file    Stress: Not on file   Relationships    Social connections     Talks on phone: Not on file     Gets together: Not on file     Attends Holiness service: Not on file     Active member of club or organization: Not on file     Attends meetings of clubs or organizations: Not on file     Relationship status: Not on file    Intimate partner violence     Fear of current or ex partner: Not on file     Emotionally abused: Not on file     Physically abused: Not on file     Forced sexual activity: Not on file   Other Topics Concern    Not on file   Social History Narrative    Occasional caffeine consumption      Family History   Adopted: Yes   Problem Relation Age of Onset    Hypertension Family     Kidney disease Family     Hypertension Mother      Past Surgical History:   Procedure Laterality Date    HEMORRHOID SURGERY      ME THYROID LOBECTOMY,UNILAT Right 2016    Procedure: HEMITHYROIDECTOMY;  Surgeon: Adriana Adam MD;  Location:  MAIN OR;  Service: Surgical Oncology    ME THYROIDECTOMY POST PREV THYR SURG Left 2016    Procedure:  COMPLETION THYROIDECTOMY, FLEXIBLE LARYNGOSCOPY;  Surgeon: Adriana Adam MD;  [facility];  Service: Surgical Oncology    THYROIDECTOMY, PARTIAL Left        Current Outpatient Medications:     albuterol (Ventolin HFA) 90 mcg/act inhaler, Inhale 2 puffs every 4 (four) hours as needed for wheezing or shortness of breath, Disp: 54 g, Rfl: 1    amLODIPine (NORVASC) 5 mg tablet, Take 1 tablet (5 mg total) by mouth 2 (two) times a day, Disp: 180 tablet, Rfl: 1    amoxicillin-clavulanate (AUGMENTIN) 875-125 mg per tablet, Take 1 tablet by mouth every 12 (twelve) hours for 7 days, Disp: 14 tablet, Rfl: 0    diclofenac (VOLTAREN) 75 mg EC tablet, Take 1 tablet (75 mg total) by mouth 2 (two) times a day as needed (pain) Take with food  , Disp: 180 tablet, Rfl: 1    diclofenac sodium (VOLTAREN) 1 %, Apply 4 g topically 4 (four) times a day as needed (knee pain), Disp: 1 Tube, Rfl: 2    furosemide (LASIX) 20 mg tablet, Take 1 tablet (20 mg total) by mouth daily as needed (edema), Disp: 90 tablet, Rfl: 3    guaifenesin-codeine (GUAIFENESIN AC) 100-10 MG/5ML liquid, Take 5 mL by mouth every 4 (four) hours as needed for cough, Disp: 120 mL, Rfl: 1    levothyroxine 175 mcg tablet, Take 1 tablet (175 mcg total) by mouth daily, Disp: 90 tablet, Rfl: 1    lisinopril-hydrochlorothiazide (PRINZIDE,ZESTORETIC) 20-12 5 MG per tablet, Take 1 tablet by mouth 2 (two) times a day, Disp: 180 tablet, Rfl: 0    Melatonin 10 MG TABS, Take by mouth, Disp: , Rfl:     methylPREDNISolone 4 MG tablet therapy pack, Use as directed on package, Disp: 21 each, Rfl: 0    mometasone (ELOCON) 0 1 % cream, Apply topically daily, Disp: 45 g, Rfl: 3    nebivolol (BYSTOLIC) 5 mg tablet, Take 1 tablet (5 mg total) by mouth daily, Disp: 90 tablet, Rfl: 3    omeprazole (PriLOSEC) 40 MG capsule, Take 1 capsule (40 mg total) by mouth daily, Disp: 90 capsule, Rfl: 0    potassium chloride (Klor-Con M20) 20 mEq tablet, Take 1 tablet (20 mEq total) by mouth 2 (two) times a day, Disp: 180 tablet, Rfl: 3    pravastatin (PRAVACHOL) 20 mg tablet, Take 1 tablet (20 mg total) by mouth daily, Disp: 90 tablet, Rfl: 3    venlafaxine (EFFEXOR) 37 5 mg tablet, Take 1 tablet (37 5 mg total) by mouth daily, Disp: 90 tablet, Rfl: 1  Allergies   Allergen Reactions    Adhesive [Medical Tape] Rash     Review of Systems:  Review of Systems   Constitutional: Positive for fatigue  Negative for activity change, appetite change and unexpected weight change  Respiratory: Positive for cough, shortness of breath and wheezing  Negative for chest tightness  Cardiovascular: Positive for palpitations  Negative for chest pain and leg swelling  Gastrointestinal: Positive for diarrhea  Negative for abdominal pain, blood in stool and constipation  Genitourinary: Negative for frequency and hematuria  Musculoskeletal: Positive for arthralgias  Negative for back pain, gait problem and joint swelling  Neurological: Positive for dizziness and light-headedness  Negative for headaches  Physical Exam:  Vitals:    02/23/21 1315   BP: 144/92   Pulse: 58   Resp: 18   Temp: (!) 97 1 °F (36 2 °C)   Weight: 110 kg (241 lb 9 6 oz)   Height: 5' 2" (1 575 m)       Physical Exam  Constitutional:       General: She is not in acute distress  Appearance: She is obese  She is not ill-appearing  HENT:      Head: Normocephalic and atraumatic  Mouth/Throat:      Mouth: Mucous membranes are moist       Pharynx: No oropharyngeal exudate or posterior oropharyngeal erythema  Eyes:      General: No scleral icterus  Conjunctiva/sclera: Conjunctivae normal    Neck:      Musculoskeletal: Neck supple  Thyroid: No thyroid mass or thyromegaly  Vascular: Normal carotid pulses  No carotid bruit or JVD  Cardiovascular:      Rate and Rhythm: Regular rhythm  Bradycardia present  Pulses: Normal pulses  Heart sounds: No murmur  No friction rub  No gallop  Pulmonary:      Breath sounds: Decreased breath sounds present  No wheezing, rhonchi or rales  Abdominal:      Palpations: Abdomen is soft  There is no hepatomegaly, splenomegaly or mass  Tenderness: There is no abdominal tenderness  Musculoskeletal:         General: No swelling  Discussion/Summary:  1  Hypertension  Blood pressure mildly elevated today on nebivolol 5 mg daily, losartan/HCTZ 20/12 5 daily and amlodipine 5 mg BID  Blood pressure readings at home have been generally favorable  Continue same  2  Mixed hyperlipidemia  Patient on pravastatin 20 mg daily  LDL cholesterol 83 but that was back in October of 2019  Will check FLP and SGOT with BMP in April  3  Lower extremity edema  Patient has not had to use furosemide for some time and has no evidence for this at this time      FU 9 months    Trupti Maradiaga MD

## 2021-03-01 ENCOUNTER — OFFICE VISIT (OUTPATIENT)
Dept: OBGYN CLINIC | Facility: MEDICAL CENTER | Age: 76
End: 2021-03-01
Payer: COMMERCIAL

## 2021-03-01 VITALS
TEMPERATURE: 98.4 F | BODY MASS INDEX: 44.48 KG/M2 | SYSTOLIC BLOOD PRESSURE: 147 MMHG | WEIGHT: 243.2 LBS | HEART RATE: 86 BPM | DIASTOLIC BLOOD PRESSURE: 84 MMHG

## 2021-03-01 DIAGNOSIS — M17.11 PRIMARY OSTEOARTHRITIS OF RIGHT KNEE: Primary | ICD-10-CM

## 2021-03-01 DIAGNOSIS — M17.12 PRIMARY OSTEOARTHRITIS OF LEFT KNEE: ICD-10-CM

## 2021-03-01 PROCEDURE — 20610 DRAIN/INJ JOINT/BURSA W/O US: CPT | Performed by: PHYSICIAN ASSISTANT

## 2021-03-01 RX ORDER — HYALURONATE SODIUM 10 MG/ML
20 SYRINGE (ML) INTRAARTICULAR
Status: COMPLETED | OUTPATIENT
Start: 2021-03-01 | End: 2021-03-01

## 2021-03-01 RX ADMIN — Medication 20 MG: at 12:15

## 2021-03-01 NOTE — PROGRESS NOTES
Assessment/Plan:  1  Primary osteoarthritis of right knee    2  Primary osteoarthritis of left knee      No orders of the defined types were placed in this encounter  ·   Euflexxa 1/3  Was administered to the bilateral knees in the office today  Patient tolerated this well  She was instructed to avoid strenuous activity for the next 1-2 days  She was instructed to apply ice to the knees tonight and tomorrow for about 20 minutes  ·   Continue diclofenac as needed for pain  ·  patient will present next week for Euflexxa 2/3 B/L knees    Return in about 1 week (around 3/8/2021)  I answered all of the patient's questions during the visit and provided education of the patient's condition during the visit  The patient verbalized understanding of the information given and agrees with the plan  This note was dictated using [employer] software  It may contain errors including improperly dictated words  Please contact physician directly for any questions  Subjective   Chief Complaint:   Chief Complaint   Patient presents with    Left Knee - Follow-up    Right Knee - Follow-up       \[facility]\""  Marya Omer is a 70 y o  female who presents for follow up for bilateral knee pain due to osteoarthritis  Patient had right knee steroid injection on 11/16/2020 and left  pes anserine bursa injection and had relief from the injections  She states she is having constant achy generalized bilateral knee pain, worse on the right  She does state occasional instability  Pain is worse with walking and going up and down steps  Patient is taking diclofenac 75 mg as needed pain with relief  She also has been doing home exercises daily  Review of Systems  ROS:    See HPI for musculoskeletal review     All other systems reviewed are negative     History:  Past Medical History:   Diagnosis Date    Asthma     Breathing difficulty     Bronchitis     Depression     Fracture of arm     Fracture of carpal bone     Hyperlipidemia     Hypertension     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[facility] [address] 2016    Shortness of breath     Thyroid nodule     Thyroid nodule     Thyroid nodule      Past Surgical History:   Procedure Laterality Date    HEMORRHOID SURGERY      WA THYROID LOBECTOMY,UNILAT Right 2016    Procedure: HEMITHYROIDECTOMY;  Surgeon: Noemy Bianchi MD;  [facility]: [facility];  Service: Surgical Oncology    WA THYROIDECTOMY POST PREV THYR SURG Left 2016    Procedure:  COMPLETION THYROIDECTOMY, FLEXIBLE LARYNGOSCOPY;  Surgeon: Noemy Biacnhi MD;  [facility]: [facility];  Service: Surgical Oncology    THYROIDECTOMY, PARTIAL Left      Social History   Social History     Substance and Sexual Activity   Alcohol Use No    Comment: occasional glass of wine     Social History     Substance and Sexual Activity   Drug Use No     Social History     Tobacco Use   Smoking Status Former Smoker    Packs/day: 0 25    Years: 4 00    Pack years: 1 00    Types: Cigarettes    Quit date: 6/15/1981    Years since quittin 7   Smokeless Tobacco Former User   Tobacco Comment    quit 36 yrs ago (Never a smoker per [employer])     Family History:   Family History   Adopted: Yes   Problem Relation Age of Onset    Hypertension Family     Kidney disease Family     Hypertension Mother        Current Outpatient Medications on File Prior to Visit   Medication Sig Dispense Refill    albuterol (Ventolin HFA) 90 mcg/act inhaler Inhale 2 puffs every 4 (four) hours as needed for wheezing or shortness of breath 54 g 1    amLODIPine (NORVASC) 5 mg tablet Take 1 tablet (5 mg total) by mouth 2 (two) times a day 180 tablet 1    diclofenac (VOLTAREN) 75 mg EC tablet Take 1 tablet (75 mg total) by mouth 2 (two) times a day as needed (pain) Take with food   180 tablet 1    diclofenac sodium (VOLTAREN) 1 % Apply 4 g topically 4 (four) times a day as needed (knee pain) 1 Tube 2    furosemide (LASIX) 20 mg tablet Take 1 tablet (20 mg total) by mouth daily as needed (edema) 90 tablet 3    guaifenesin-codeine (GUAIFENESIN AC) 100-10 MG/5ML liquid Take 5 mL by mouth every 4 (four) hours as needed for cough 120 mL 1    levothyroxine 175 mcg tablet Take 1 tablet (175 mcg total) by mouth daily 90 tablet 1    lisinopril-hydrochlorothiazide (PRINZIDE,ZESTORETIC) 20-12 5 MG per tablet Take 1 tablet by mouth 2 (two) times a day 180 tablet 0    Melatonin 10 MG TABS Take by mouth      methylPREDNISolone 4 MG tablet therapy pack Use as directed on package 21 each 0    mometasone (ELOCON) 0 1 % cream Apply topically daily 45 g 3    nebivolol (BYSTOLIC) 5 mg tablet Take 1 tablet (5 mg total) by mouth daily 90 tablet 3    omeprazole (PriLOSEC) 40 MG capsule Take 1 capsule (40 mg total) by mouth daily 90 capsule 0    potassium chloride (Klor-Con M20) 20 mEq tablet Take 1 tablet (20 mEq total) by mouth 2 (two) times a day 180 tablet 3    pravastatin (PRAVACHOL) 20 mg tablet Take 1 tablet (20 mg total) by mouth daily 90 tablet 3    venlafaxine (EFFEXOR) 37 5 mg tablet Take 1 tablet (37 5 mg total) by mouth daily 90 tablet 1     No current facility-administered medications on file prior to visit        Allergies   Allergen Reactions    Adhesive [Medical Tape] Rash        Objective     /84   Pulse 86   Temp 98 4 °F (36 9 °C)   Wt 110 kg (243 lb 3 2 oz)   LMP  (LMP Unknown)   BMI 44 48 kg/m²      PE:  AAOx 3  WDWN  Hearing intact, no drainage from eyes  no audible wheezing  no abdominal distension  LE compartments soft, skin intact    Ortho Exam:  bilateral Knee:   No erythema  no swelling  no warmth  TTP medial and lateral joint lines  AROM: 0-120 B/L  Stable to varus/valgus stress    Large joint arthrocentesis: bilateral knee  Universal Protocol:  Consent given by: patient  Timeout called at: 3/1/2021 12:14 PM   Patient understanding: patient states understanding of the procedure being performed  Site marked: the operative site was marked  Patient identity confirmed: verbally with patient    Supporting Documentation  Indications: pain   Procedure Details  Location: knee - bilateral knee  Preparation: Patient was prepped and draped in the usual sterile fashion  Needle size: 22 G  Ultrasound guidance: no  Approach: anterolateral    Medications (Right): 20 mg Sodium Hyaluronate 20 MG/2MLMedications (Left): 20 mg Sodium Hyaluronate 20 MG/2ML   Patient tolerance: patient tolerated the procedure well with no immediate complications  Dressing:  Sterile dressing applied

## 2021-03-08 ENCOUNTER — OFFICE VISIT (OUTPATIENT)
Dept: OBGYN CLINIC | Facility: MEDICAL CENTER | Age: 76
End: 2021-03-08
Payer: COMMERCIAL

## 2021-03-08 VITALS
BODY MASS INDEX: 44.63 KG/M2 | WEIGHT: 242.5 LBS | SYSTOLIC BLOOD PRESSURE: 137 MMHG | DIASTOLIC BLOOD PRESSURE: 80 MMHG | HEART RATE: 78 BPM | TEMPERATURE: 98.1 F | HEIGHT: 62 IN

## 2021-03-08 DIAGNOSIS — M70.51 PES ANSERINUS BURSITIS OF RIGHT KNEE: ICD-10-CM

## 2021-03-08 DIAGNOSIS — M17.0 BILATERAL PRIMARY OSTEOARTHRITIS OF KNEE: Primary | ICD-10-CM

## 2021-03-08 PROCEDURE — 3008F BODY MASS INDEX DOCD: CPT | Performed by: PHYSICIAN ASSISTANT

## 2021-03-08 PROCEDURE — 20610 DRAIN/INJ JOINT/BURSA W/O US: CPT | Performed by: PHYSICIAN ASSISTANT

## 2021-03-08 PROCEDURE — 99214 OFFICE O/P EST MOD 30 MIN: CPT | Performed by: PHYSICIAN ASSISTANT

## 2021-03-08 RX ORDER — HYALURONATE SODIUM 10 MG/ML
20 SYRINGE (ML) INTRAARTICULAR
Status: COMPLETED | OUTPATIENT
Start: 2021-03-08 | End: 2021-03-08

## 2021-03-08 RX ORDER — LIDOCAINE HYDROCHLORIDE 10 MG/ML
1 INJECTION, SOLUTION INFILTRATION; PERINEURAL
Status: COMPLETED | OUTPATIENT
Start: 2021-03-08 | End: 2021-03-08

## 2021-03-08 RX ORDER — METHYLPREDNISOLONE ACETATE 40 MG/ML
1 INJECTION, SUSPENSION INTRA-ARTICULAR; INTRALESIONAL; INTRAMUSCULAR; SOFT TISSUE
Status: COMPLETED | OUTPATIENT
Start: 2021-03-08 | End: 2021-03-08

## 2021-03-08 RX ADMIN — METHYLPREDNISOLONE ACETATE 1 ML: 40 INJECTION, SUSPENSION INTRA-ARTICULAR; INTRALESIONAL; INTRAMUSCULAR; SOFT TISSUE at 12:27

## 2021-03-08 RX ADMIN — Medication 20 MG: at 12:27

## 2021-03-08 RX ADMIN — LIDOCAINE HYDROCHLORIDE 1 ML: 10 INJECTION, SOLUTION INFILTRATION; PERINEURAL at 12:27

## 2021-03-08 NOTE — PROGRESS NOTES
Assessment/Plan:  1  Bilateral primary osteoarthritis of knee      Orders Placed This Encounter   Procedures    Large joint arthrocentesis    Large joint arthrocentesis       · Patient received bilateral knee Euflexxa injections  2/3 and right knee pes anserine bursa steroid injection in the office today  Tolerated procedures well  Advised to apply ice and avoid strenuous activity for 1-2 days as needed  · Continue diclofenac tabs as needed for pain  · Continue activity as tolerated  Return in about 1 week (around 3/15/2021) for bilateral knee Euflexxa 3/3 and left pes bursa CSI  I answered all of the patient's questions during the visit and provided education of the patient's condition during the visit  The patient verbalized understanding of the information given and agrees with the plan  This note was dictated using [employer] software  It may contain errors including improperly dictated words  Please contact physician directly for any questions  Subjective   Chief Complaint:   Chief Complaint   Patient presents with    Left Knee - Follow-up    Right Knee - Follow-up       HPI  Reggie Patel is a 70 y o  female who presents for follow up for bilateral knee OA and pes bursitis  Patient is here for bilateral knee Euflexxa injections 2/3  Patient reports no change in her knee pain since last week  She does report pain and tenderness over the medial aspect of her knees  She reports difficulty sleeping due to this pain in her knees  She is requesting repeat pes bursa injections  She is taking diclofenac tabs as needed for pain  She is not doing PT or home exercises  Review of Systems  ROS:    See HPI for musculoskeletal review     All other systems reviewed are negative     History:  Past Medical History:   Diagnosis Date    Asthma     Breathing difficulty     Bronchitis     Depression     Fracture of arm     Fracture of carpal bone     Hyperlipidemia     Hypertension     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[facility] [address] ) 2016    Shortness of breath     Thyroid nodule     Thyroid nodule     Thyroid nodule      Past Surgical History:   Procedure Laterality Date    HEMORRHOID SURGERY      ME THYROID LOBECTOMY,UNILAT Right 2016    Procedure: HEMITHYROIDECTOMY;  Surgeon: Mauro Andujar MD;  [facility];  Service: Surgical Oncology    ME THYROIDECTOMY POST PREV THYR SURG Left 2016    Procedure:  COMPLETION THYROIDECTOMY, FLEXIBLE LARYNGOSCOPY;  Surgeon: Mauro Andujar MD;  [facility];  Service: Surgical Oncology    THYROIDECTOMY, PARTIAL Left      Social History   Social History     Substance and Sexual Activity   Alcohol Use No    Comment: occasional glass of wine     Social History     Substance and Sexual Activity   Drug Use No     Social History     Tobacco Use   Smoking Status Former Smoker    Packs/day: 0 25    Years: 4 00    Pack years: 1 00    Types: Cigarettes    Quit date: 6/15/1981    Years since quittin 7   Smokeless Tobacco Former User   Tobacco Comment    quit 36 yrs ago (Never a smoker per [employer])     Family History:   Family History   Adopted: Yes   Problem Relation Age of Onset    Hypertension Family     Kidney disease Family     Hypertension Mother        Current Outpatient Medications on File Prior to Visit   Medication Sig Dispense Refill    albuterol (Ventolin HFA) 90 mcg/act inhaler Inhale 2 puffs every 4 (four) hours as needed for wheezing or shortness of breath 54 g 1    amLODIPine (NORVASC) 5 mg tablet Take 1 tablet (5 mg total) by mouth 2 (two) times a day 180 tablet 1    diclofenac (VOLTAREN) 75 mg EC tablet Take 1 tablet (75 mg total) by mouth 2 (two) times a day as needed (pain) Take with food   180 tablet 1    diclofenac sodium (VOLTAREN) 1 % Apply 4 g topically 4 (four) times a day as needed (knee pain) 1 Tube 2    furosemide (LASIX) 20 mg tablet Take 1 tablet (20 mg total) by mouth daily as needed (edema) 90 tablet 3  guaifenesin-codeine (GUAIFENESIN AC) 100-10 MG/5ML liquid Take 5 mL by mouth every 4 (four) hours as needed for cough 120 mL 1    levothyroxine 175 mcg tablet Take 1 tablet (175 mcg total) by mouth daily 90 tablet 1    lisinopril-hydrochlorothiazide (PRINZIDE,ZESTORETIC) 20-12 5 MG per tablet Take 1 tablet by mouth 2 (two) times a day 180 tablet 0    Melatonin 10 MG TABS Take by mouth      methylPREDNISolone 4 MG tablet therapy pack Use as directed on package 21 each 0    mometasone (ELOCON) 0 1 % cream Apply topically daily 45 g 3    nebivolol (BYSTOLIC) 5 mg tablet Take 1 tablet (5 mg total) by mouth daily 90 tablet 3    omeprazole (PriLOSEC) 40 MG capsule Take 1 capsule (40 mg total) by mouth daily 90 capsule 0    potassium chloride (Klor-Con M20) 20 mEq tablet Take 1 tablet (20 mEq total) by mouth 2 (two) times a day 180 tablet 3    pravastatin (PRAVACHOL) 20 mg tablet Take 1 tablet (20 mg total) by mouth daily 90 tablet 3    venlafaxine (EFFEXOR) 37 5 mg tablet Take 1 tablet (37 5 mg total) by mouth daily 90 tablet 1     No current facility-administered medications on file prior to visit  Allergies   Allergen Reactions    Adhesive [Medical Tape] Rash        Objective     /80   Pulse 78   Temp 98 1 °F (36 7 °C)   Ht 5' 2" (1 575 m)   Wt 110 kg (242 lb 8 oz)   LMP  (LMP Unknown)   BMI 44 35 kg/m²      PE:  AAOx 3  WDWN  Hearing intact, no drainage from eyes  no audible wheezing  no abdominal distension  LE compartments soft, skin intact    Ortho Exam:  right Knee:   No erythema  no swelling  no effusion  no warmth  +TTP over pes anserine bursa  AROM: 3- 120  Stable to varus/valgus stress    Left Knee:   No erythema  no swelling  no effusion  no warmth  +TTP over pes anserine bursa  AROM: 3- 120  Stable to varus/valgus stress      Large joint arthrocentesis: bilateral knee  Universal Protocol:  Consent: Verbal consent obtained    Risks and benefits: risks, benefits and alternatives were discussed  Consent given by: patient  Site marked: the operative site was marked  Supporting Documentation  Indications: pain   Procedure Details  Location: knee - bilateral knee  Preparation: Patient was prepped and draped in the usual sterile fashion  Needle size: 22 G  Ultrasound guidance: no  Approach: anterolateral    Medications (Right): 20 mg Sodium Hyaluronate 20 MG/2MLMedications (Left): 20 mg Sodium Hyaluronate 20 MG/2ML   Patient tolerance: patient tolerated the procedure well with no immediate complications  Dressing:  Sterile dressing applied    Large joint arthrocentesis: R pes anserine bursa  Universal Protocol:  Consent: Verbal consent obtained    Risks and benefits: risks, benefits and alternatives were discussed  Consent given by: patient  Site marked: the operative site was marked  Supporting Documentation  Indications: pain   Procedure Details  Location: knee - R pes anserine bursa  Preparation: Patient was prepped and draped in the usual sterile fashion  Needle size: 22 G  Ultrasound guidance: no  Approach: medial  Medications administered: 1 mL lidocaine 1 %; 1 mL methylPREDNISolone acetate 40 mg/mL    Patient tolerance: patient tolerated the procedure well with no immediate complications  Dressing:  Sterile dressing applied

## 2021-03-10 DIAGNOSIS — Z23 ENCOUNTER FOR IMMUNIZATION: ICD-10-CM

## 2021-03-11 DIAGNOSIS — F32.A DEPRESSION, UNSPECIFIED DEPRESSION TYPE: ICD-10-CM

## 2021-03-12 RX ORDER — VENLAFAXINE 37.5 MG/1
37.5 TABLET ORAL DAILY
Qty: 90 TABLET | Refills: 0 | Status: SHIPPED | OUTPATIENT
Start: 2021-03-12 | End: 2021-06-22 | Stop reason: SDUPTHER

## 2021-03-15 ENCOUNTER — OFFICE VISIT (OUTPATIENT)
Dept: OBGYN CLINIC | Facility: MEDICAL CENTER | Age: 76
End: 2021-03-15
Payer: COMMERCIAL

## 2021-03-15 VITALS — DIASTOLIC BLOOD PRESSURE: 82 MMHG | HEART RATE: 79 BPM | SYSTOLIC BLOOD PRESSURE: 139 MMHG | TEMPERATURE: 98.6 F

## 2021-03-15 DIAGNOSIS — M17.11 PRIMARY OSTEOARTHRITIS OF RIGHT KNEE: ICD-10-CM

## 2021-03-15 DIAGNOSIS — M70.50 PES ANSERINE BURSITIS: ICD-10-CM

## 2021-03-15 DIAGNOSIS — M17.0 BILATERAL PRIMARY OSTEOARTHRITIS OF KNEE: Primary | ICD-10-CM

## 2021-03-15 PROCEDURE — 20610 DRAIN/INJ JOINT/BURSA W/O US: CPT | Performed by: PHYSICIAN ASSISTANT

## 2021-03-15 PROCEDURE — 3079F DIAST BP 80-89 MM HG: CPT | Performed by: PHYSICIAN ASSISTANT

## 2021-03-15 PROCEDURE — 99213 OFFICE O/P EST LOW 20 MIN: CPT | Performed by: PHYSICIAN ASSISTANT

## 2021-03-15 PROCEDURE — 1160F RVW MEDS BY RX/DR IN RCRD: CPT | Performed by: PHYSICIAN ASSISTANT

## 2021-03-15 PROCEDURE — 3075F SYST BP GE 130 - 139MM HG: CPT | Performed by: PHYSICIAN ASSISTANT

## 2021-03-15 RX ORDER — HYALURONATE SODIUM 10 MG/ML
20 SYRINGE (ML) INTRAARTICULAR
Status: COMPLETED | OUTPATIENT
Start: 2021-03-15 | End: 2021-03-15

## 2021-03-15 RX ORDER — METHYLPREDNISOLONE ACETATE 40 MG/ML
1 INJECTION, SUSPENSION INTRA-ARTICULAR; INTRALESIONAL; INTRAMUSCULAR; SOFT TISSUE
Status: COMPLETED | OUTPATIENT
Start: 2021-03-15 | End: 2021-03-15

## 2021-03-15 RX ORDER — DICLOFENAC SODIUM 75 MG/1
75 TABLET, DELAYED RELEASE ORAL 2 TIMES DAILY PRN
Qty: 180 TABLET | Refills: 1 | Status: SHIPPED | OUTPATIENT
Start: 2021-03-15 | End: 2021-06-17 | Stop reason: SDUPTHER

## 2021-03-15 RX ORDER — LIDOCAINE HYDROCHLORIDE 10 MG/ML
1 INJECTION, SOLUTION INFILTRATION; PERINEURAL
Status: COMPLETED | OUTPATIENT
Start: 2021-03-15 | End: 2021-03-15

## 2021-03-15 RX ADMIN — Medication 20 MG: at 13:08

## 2021-03-15 RX ADMIN — LIDOCAINE HYDROCHLORIDE 1 ML: 10 INJECTION, SOLUTION INFILTRATION; PERINEURAL at 13:08

## 2021-03-15 RX ADMIN — METHYLPREDNISOLONE ACETATE 1 ML: 40 INJECTION, SUSPENSION INTRA-ARTICULAR; INTRALESIONAL; INTRAMUSCULAR; SOFT TISSUE at 13:08

## 2021-03-15 NOTE — PROGRESS NOTES
Assessment/Plan:  1  Bilateral primary osteoarthritis of knee    2  Primary osteoarthritis of right knee    3  Pes anserine bursitis      Orders Placed This Encounter   Procedures    Large joint arthrocentesis    Large joint arthrocentesis       · Patient received bilateral knee Euflexxa injections and left knee pes bursa steroid injection in the office today  Tolerated the procedure well  Advised to apply ice and avoid strenuous activity for 1-2 days as needed  · Continue diclofenac and diclofenac gel as needed  Refills provided today  · Continue activity as tolerated  · Patient is happy with alternating CSI and VS and would like to continue this every 3 months  Return in about 3 months (around 6/15/2021) for bilateral knee CSI  I answered all of the patient's questions during the visit and provided education of the patient's condition during the visit  The patient verbalized understanding of the information given and agrees with the plan  This note was dictated using [employer] software  It may contain errors including improperly dictated words  Please contact physician directly for any questions  Subjective   Chief Complaint:   Chief Complaint   Patient presents with    Left Knee - Follow-up    Right Knee - Follow-up       HPI  Johanna Diego is a 70 y o  female who presents for follow up for bilateral knee OA  Patient is here for bilateral knee Euflexxa injections 3/3 and left pes bursa steroid injection  Patient notes improvement over the right pes bursa which was injected last week  She is not noticing any improvement in her knee pain yet  She is taking diclofenac tabs as needed for pain  Review of Systems  ROS:    See HPI for musculoskeletal review     All other systems reviewed are negative     History:  Past Medical History:   Diagnosis Date    Asthma     Breathing difficulty     Bronchitis     Depression     Fracture of arm     Fracture of carpal bone     Hyperlipidemia     Hypertension     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[facility] [address] ) 2016    Shortness of breath     Thyroid nodule     Thyroid nodule     Thyroid nodule      Past Surgical History:   Procedure Laterality Date    HEMORRHOID SURGERY      DE THYROID LOBECTOMY,UNILAT Right 2016    Procedure: HEMITHYROIDECTOMY;  Surgeon: Juanita Wade MD;  Location:  MAIN OR;  Service: Surgical Oncology    DE THYROIDECTOMY POST PREV THYR SURG Left 2016    Procedure:  COMPLETION THYROIDECTOMY, FLEXIBLE LARYNGOSCOPY;  Surgeon: Juanita Wade MD;  Location: [facility];  Service: Surgical Oncology    THYROIDECTOMY, PARTIAL Left      Social History   Social History     Substance and Sexual Activity   Alcohol Use No    Comment: occasional glass of wine     Social History     Substance and Sexual Activity   Drug Use No     Social History     Tobacco Use   Smoking Status Former Smoker    Packs/day: 0 25    Years: 4 00    Pack years: 1 00    Types: Cigarettes    Quit date: 6/15/1981    Years since quittin 7   Smokeless Tobacco Former User   Tobacco Comment    quit 36 yrs ago (Never a smoker per [employer])     Family History:   Family History   Adopted: Yes   Problem Relation Age of Onset    Hypertension Family     Kidney disease Family     Hypertension Mother        Current Outpatient Medications on File Prior to Visit   Medication Sig Dispense Refill    albuterol (Ventolin HFA) 90 mcg/act inhaler Inhale 2 puffs every 4 (four) hours as needed for wheezing or shortness of breath 54 g 1    amLODIPine (NORVASC) 5 mg tablet Take 1 tablet (5 mg total) by mouth 2 (two) times a day 180 tablet 1    furosemide (LASIX) 20 mg tablet Take 1 tablet (20 mg total) by mouth daily as needed (edema) 90 tablet 3    guaifenesin-codeine (GUAIFENESIN AC) 100-10 MG/5ML liquid Take 5 mL by mouth every 4 (four) hours as needed for cough 120 mL 1    levothyroxine 175 mcg tablet Take 1 tablet (175 mcg total) by mouth daily 90 tablet 1    lisinopril-hydrochlorothiazide (PRINZIDE,ZESTORETIC) 20-12 5 MG per tablet Take 1 tablet by mouth 2 (two) times a day 180 tablet 0    Melatonin 10 MG TABS Take by mouth      methylPREDNISolone 4 MG tablet therapy pack Use as directed on package 21 each 0    mometasone (ELOCON) 0 1 % cream Apply topically daily 45 g 3    nebivolol (BYSTOLIC) 5 mg tablet Take 1 tablet (5 mg total) by mouth daily 90 tablet 3    omeprazole (PriLOSEC) 40 MG capsule Take 1 capsule (40 mg total) by mouth daily 90 capsule 0    potassium chloride (Klor-Con M20) 20 mEq tablet Take 1 tablet (20 mEq total) by mouth 2 (two) times a day 180 tablet 3    pravastatin (PRAVACHOL) 20 mg tablet Take 1 tablet (20 mg total) by mouth daily 90 tablet 3    venlafaxine (EFFEXOR) 37 5 mg tablet Take 1 tablet (37 5 mg total) by mouth daily 90 tablet 0    [DISCONTINUED] diclofenac (VOLTAREN) 75 mg EC tablet Take 1 tablet (75 mg total) by mouth 2 (two) times a day as needed (pain) Take with food  180 tablet 1    [DISCONTINUED] diclofenac sodium (VOLTAREN) 1 % Apply 4 g topically 4 (four) times a day as needed (knee pain) 1 Tube 2     No current facility-administered medications on file prior to visit  Allergies   Allergen Reactions    Adhesive [Medical Tape] Rash        Objective     /82   Pulse 79   Temp 98 6 °F (37 °C)   LMP  (LMP Unknown)      PE:  AAOx 3  WDWN  Hearing intact, no drainage from eyes  no audible wheezing  no abdominal distension  LE compartments soft, skin intact    Ortho Exam:  right Knee:   No erythema  no swelling  no effusion  no warmth  No TTP  AROM: 5- 115  Stable to varus/valgus stress    Left Knee:   No erythema  no swelling  no effusion  no warmth  +TTP over pes anserine bursa  AROM: 3- 115  Stable to varus/valgus stress    Large joint arthrocentesis: bilateral knee  Universal Protocol:  Consent: Verbal consent obtained    Risks and benefits: risks, benefits and alternatives were discussed  Consent given by: patient  Site marked: the operative site was marked  Supporting Documentation  Indications: pain   Procedure Details  Location: knee - bilateral knee  Preparation: Patient was prepped and draped in the usual sterile fashion  Needle size: 22 G  Ultrasound guidance: no  Approach: anterolateral    Medications (Right): 20 mg Sodium Hyaluronate 20 MG/2MLMedications (Left): 20 mg Sodium Hyaluronate 20 MG/2ML   Patient tolerance: patient tolerated the procedure well with no immediate complications  Dressing:  Sterile dressing applied    Large joint arthrocentesis: L pes anserine bursa  Universal Protocol:  Consent: Verbal consent obtained    Risks and benefits: risks, benefits and alternatives were discussed  Consent given by: patient  Site marked: the operative site was marked  Supporting Documentation  Indications: pain   Procedure Details  Location: knee - L pes anserine bursa  Preparation: Patient was prepped and draped in the usual sterile fashion  Needle size: 22 G  Ultrasound guidance: no  Approach: anteromedial  Medications administered: 1 mL lidocaine 1 %; 1 mL methylPREDNISolone acetate 40 mg/mL    Patient tolerance: patient tolerated the procedure well with no immediate complications  Dressing:  Sterile dressing applied

## 2021-03-26 ENCOUNTER — IMMUNIZATIONS (OUTPATIENT)
Dept: FAMILY MEDICINE CLINIC | Facility: HOSPITAL | Age: 76
End: 2021-03-26

## 2021-03-26 DIAGNOSIS — Z23 ENCOUNTER FOR IMMUNIZATION: Primary | ICD-10-CM

## 2021-03-26 PROCEDURE — 91301 SARS-COV-2 / COVID-19 MRNA VACCINE (MODERNA) 100 MCG: CPT

## 2021-03-26 PROCEDURE — 0011A SARS-COV-2 / COVID-19 MRNA VACCINE (MODERNA) 100 MCG: CPT

## 2021-03-30 RX ORDER — MOMETASONE FUROATE 1 MG/ML
1 SOLUTION TOPICAL DAILY
COMMUNITY
End: 2021-07-12

## 2021-03-31 ENCOUNTER — TELEPHONE (OUTPATIENT)
Dept: OBGYN CLINIC | Facility: MEDICAL CENTER | Age: 76
End: 2021-03-31

## 2021-03-31 NOTE — TELEPHONE ENCOUNTER
Patient called to let us know that the insurance is not accepting the script for diclofenac 1% gel and that a prior auth needs to be done  She said that kevin has done it in the past  Please advise

## 2021-03-31 NOTE — TELEPHONE ENCOUNTER
Called & made patient aware of approval letter from her insurance plan   Patient acknowledged & was thankful

## 2021-04-28 ENCOUNTER — IMMUNIZATIONS (OUTPATIENT)
Dept: FAMILY MEDICINE CLINIC | Facility: HOSPITAL | Age: 76
End: 2021-04-28

## 2021-04-28 DIAGNOSIS — Z23 ENCOUNTER FOR IMMUNIZATION: Primary | ICD-10-CM

## 2021-04-28 PROCEDURE — 91301 SARS-COV-2 / COVID-19 MRNA VACCINE (MODERNA) 100 MCG: CPT

## 2021-04-28 PROCEDURE — 0012A SARS-COV-2 / COVID-19 MRNA VACCINE (MODERNA) 100 MCG: CPT

## 2021-05-06 NOTE — TELEPHONE ENCOUNTER
Patient called in regards medication Diclofenac Sodium (VOLTAREN) 1 %  She received the letter that it was approved but she never received the medication  [facility] (Mail Service)

## 2021-05-07 DIAGNOSIS — M17.0 BILATERAL PRIMARY OSTEOARTHRITIS OF KNEE: ICD-10-CM

## 2021-05-07 NOTE — TELEPHONE ENCOUNTER
Prior-auth has already been approved yet it seems patient had not received her Diclofenac through her preferred mail order pharmacy  Can you please try sending it again to the same mail order pharmacy on file? Thank you!

## 2021-05-18 ENCOUNTER — VBI (OUTPATIENT)
Dept: ADMINISTRATIVE | Facility: OTHER | Age: 76
End: 2021-05-18

## 2021-06-01 DIAGNOSIS — I10 ESSENTIAL HYPERTENSION: ICD-10-CM

## 2021-06-01 RX ORDER — AMLODIPINE BESYLATE 5 MG/1
5 TABLET ORAL 2 TIMES DAILY
Qty: 180 TABLET | Refills: 1 | Status: SHIPPED | OUTPATIENT
Start: 2021-06-01 | End: 2021-07-12 | Stop reason: SDUPTHER

## 2021-06-17 ENCOUNTER — OFFICE VISIT (OUTPATIENT)
Dept: OBGYN CLINIC | Facility: MEDICAL CENTER | Age: 76
End: 2021-06-17
Payer: COMMERCIAL

## 2021-06-17 VITALS
BODY MASS INDEX: 44.63 KG/M2 | SYSTOLIC BLOOD PRESSURE: 127 MMHG | HEART RATE: 79 BPM | HEIGHT: 62 IN | TEMPERATURE: 97.7 F | DIASTOLIC BLOOD PRESSURE: 84 MMHG | WEIGHT: 242.5 LBS

## 2021-06-17 DIAGNOSIS — M17.12 PRIMARY OSTEOARTHRITIS OF LEFT KNEE: ICD-10-CM

## 2021-06-17 DIAGNOSIS — M70.50 PES ANSERINE BURSITIS: ICD-10-CM

## 2021-06-17 DIAGNOSIS — M17.0 BILATERAL PRIMARY OSTEOARTHRITIS OF KNEE: Primary | ICD-10-CM

## 2021-06-17 DIAGNOSIS — M70.51 PES ANSERINUS BURSITIS OF RIGHT KNEE: ICD-10-CM

## 2021-06-17 DIAGNOSIS — M17.11 PRIMARY OSTEOARTHRITIS OF RIGHT KNEE: ICD-10-CM

## 2021-06-17 PROCEDURE — 20610 DRAIN/INJ JOINT/BURSA W/O US: CPT | Performed by: ORTHOPAEDIC SURGERY

## 2021-06-17 PROCEDURE — 3074F SYST BP LT 130 MM HG: CPT | Performed by: ORTHOPAEDIC SURGERY

## 2021-06-17 PROCEDURE — 3008F BODY MASS INDEX DOCD: CPT | Performed by: ORTHOPAEDIC SURGERY

## 2021-06-17 PROCEDURE — 3079F DIAST BP 80-89 MM HG: CPT | Performed by: ORTHOPAEDIC SURGERY

## 2021-06-17 PROCEDURE — 1036F TOBACCO NON-USER: CPT | Performed by: ORTHOPAEDIC SURGERY

## 2021-06-17 PROCEDURE — 1160F RVW MEDS BY RX/DR IN RCRD: CPT | Performed by: ORTHOPAEDIC SURGERY

## 2021-06-17 PROCEDURE — 99213 OFFICE O/P EST LOW 20 MIN: CPT | Performed by: ORTHOPAEDIC SURGERY

## 2021-06-17 RX ORDER — DICLOFENAC SODIUM 75 MG/1
75 TABLET, DELAYED RELEASE ORAL 2 TIMES DAILY PRN
Qty: 180 TABLET | Refills: 0 | Status: SHIPPED | OUTPATIENT
Start: 2021-06-17 | End: 2022-07-26 | Stop reason: SDUPTHER

## 2021-06-17 RX ORDER — METHYLPREDNISOLONE ACETATE 40 MG/ML
2 INJECTION, SUSPENSION INTRA-ARTICULAR; INTRALESIONAL; INTRAMUSCULAR; SOFT TISSUE
Status: COMPLETED | OUTPATIENT
Start: 2021-06-17 | End: 2021-06-17

## 2021-06-17 RX ORDER — LIDOCAINE HYDROCHLORIDE 10 MG/ML
3 INJECTION, SOLUTION INFILTRATION; PERINEURAL
Status: COMPLETED | OUTPATIENT
Start: 2021-06-17 | End: 2021-06-17

## 2021-06-17 RX ORDER — LIDOCAINE HYDROCHLORIDE 10 MG/ML
1 INJECTION, SOLUTION INFILTRATION; PERINEURAL
Status: COMPLETED | OUTPATIENT
Start: 2021-06-17 | End: 2021-06-17

## 2021-06-17 RX ORDER — METHYLPREDNISOLONE ACETATE 40 MG/ML
1 INJECTION, SUSPENSION INTRA-ARTICULAR; INTRALESIONAL; INTRAMUSCULAR; SOFT TISSUE
Status: COMPLETED | OUTPATIENT
Start: 2021-06-17 | End: 2021-06-17

## 2021-06-17 RX ADMIN — LIDOCAINE HYDROCHLORIDE 3 ML: 10 INJECTION, SOLUTION INFILTRATION; PERINEURAL at 10:50

## 2021-06-17 RX ADMIN — METHYLPREDNISOLONE ACETATE 1 ML: 40 INJECTION, SUSPENSION INTRA-ARTICULAR; INTRALESIONAL; INTRAMUSCULAR; SOFT TISSUE at 11:07

## 2021-06-17 RX ADMIN — METHYLPREDNISOLONE ACETATE 2 ML: 40 INJECTION, SUSPENSION INTRA-ARTICULAR; INTRALESIONAL; INTRAMUSCULAR; SOFT TISSUE at 11:07

## 2021-06-17 RX ADMIN — LIDOCAINE HYDROCHLORIDE 3 ML: 10 INJECTION, SOLUTION INFILTRATION; PERINEURAL at 11:07

## 2021-06-17 RX ADMIN — LIDOCAINE HYDROCHLORIDE 1 ML: 10 INJECTION, SOLUTION INFILTRATION; PERINEURAL at 11:07

## 2021-06-17 RX ADMIN — METHYLPREDNISOLONE ACETATE 2 ML: 40 INJECTION, SUSPENSION INTRA-ARTICULAR; INTRALESIONAL; INTRAMUSCULAR; SOFT TISSUE at 10:50

## 2021-06-17 NOTE — PROGRESS NOTES
Assessment/Plan:  1  Bilateral primary osteoarthritis of knee    2  Pes anserinus bursitis of right knee    3  Primary osteoarthritis of left knee    4  Primary osteoarthritis of right knee    5  Pes anserine bursitis      Orders Placed This Encounter   Procedures    Large joint arthrocentesis: L knee    Large joint arthrocentesis: R knee    Large joint arthrocentesis: R pes anserine bursa    Injection procedure prior authorization     · Provided with Lidocaine and methylprednisone injections to the bilateral knees and R pes anserine bursa for relief of pain and inflammation  Patient tolerated treatments well  ·  Prescription for oral diclofenac provided  · Will follow up before July 15th for L pes anserine bursa injection  · Will follow up in 3 months (approx Sept 15th) for re-evaluation and  Visco injections      Return in 3 months (on 9/17/2021) for Re-evaluation  I answered all of the patient's questions during the visit and provided education of the patient's condition during the visit  The patient verbalized understanding of the information given and agrees with the plan  This note was dictated using [employer] software  It may contain errors including improperly dictated words  Please contact physician directly for any questions  Subjective   Chief Complaint: No chief complaint on file  HPI  Belinda Dover is a 70 y o  female who presents for follow up for follow-up of primary osteoarthritis of the bilateral knees, as well as bilateral pes anserine bursitis  She was last seen in regards to this issue on 3/8/2021, at which time she completed a course of viscosupplementation injections, and was provided with CS injection to the left pes anserine bursa  Patient states that she had significant relief following these injections, unfortunately her symptoms began to return approximately 2-3 weeks ago    On today's presentation, she reports continued medial knee pain of the bilateral knees that is achy at rest but sharp with certain motions  She denies any locking, or mechanical symptoms  Review of Systems  ROS:    See HPI for musculoskeletal review     All other systems reviewed are negative     History:  Past Medical History:   Diagnosis Date    Asthma     Breathing difficulty     Bronchitis     Depression     Fracture of arm     Fracture of carpal bone     Hyperlipidemia     Hypertension     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    Shortness of breath     Thyroid nodule     Thyroid nodule     Thyroid nodule      Past Surgical History:   Procedure Laterality Date    HEMORRHOID SURGERY      ID THYROID LOBECTOMY,UNILAT Right 2016    Procedure: HEMITHYROIDECTOMY;  Surgeon: Lavell Castro MD;  Location:  MAIN OR;  Service: Surgical Oncology    ID THYROIDECTOMY POST PREV THYR SURG Left 2016    Procedure:  COMPLETION THYROIDECTOMY, FLEXIBLE LARYNGOSCOPY;  Surgeon: Lavell Castro MD;  Location: [facility];  Service: Surgical Oncology    THYROIDECTOMY, PARTIAL Left      Social History   Social History     Substance and Sexual Activity   Alcohol Use No    Comment: occasional glass of wine     Social History     Substance and Sexual Activity   Drug Use No     Social History     Tobacco Use   Smoking Status Former Smoker    Packs/day: 0 25    Years: 4 00    Pack years: 1 00    Types: Cigarettes    Quit date: 6/15/1981    Years since quittin 0   Smokeless Tobacco Former User   Tobacco Comment    quit 36 yrs ago (Never a smoker per [employer])     Family History:   Family History   Adopted: Yes   Problem Relation Age of Onset    Hypertension Family     Kidney disease Family     Hypertension Mother        Current Outpatient Medications on File Prior to Visit   Medication Sig Dispense Refill    albuterol (Ventolin HFA) 90 mcg/act inhaler Inhale 2 puffs every 4 (four) hours as needed for wheezing or shortness of breath 54 g 1    amLODIPine (NORVASC) 5 mg tablet Take 1 tablet (5 mg total) by mouth 2 (two) times a day 180 tablet 1    Diclofenac Sodium (VOLTAREN) 1 % Apply 4 g topically 4 (four) times a day as needed (pain) 150 g 1    furosemide (LASIX) 20 mg tablet Take 1 tablet (20 mg total) by mouth daily as needed (edema) 90 tablet 3    guaifenesin-codeine (GUAIFENESIN AC) 100-10 MG/5ML liquid Take 5 mL by mouth every 4 (four) hours as needed for cough 120 mL 1    levothyroxine 175 mcg tablet Take 1 tablet (175 mcg total) by mouth daily 90 tablet 1    lisinopril-hydrochlorothiazide (PRINZIDE,ZESTORETIC) 20-12 5 MG per tablet Take 1 tablet by mouth 2 (two) times a day 180 tablet 0    Melatonin 10 MG TABS Take by mouth      methylPREDNISolone 4 MG tablet therapy pack Use as directed on package 21 each 0    mometasone (ELOCON) 0 1 % cream Apply topically daily 45 g 3    mometasone (ELOCON) 0 1 % lotion Apply 1 application topically daily      nebivolol (BYSTOLIC) 5 mg tablet Take 1 tablet (5 mg total) by mouth daily 90 tablet 3    omeprazole (PriLOSEC) 40 MG capsule Take 1 capsule (40 mg total) by mouth daily 90 capsule 0    potassium chloride (Klor-Con M20) 20 mEq tablet Take 1 tablet (20 mEq total) by mouth 2 (two) times a day 180 tablet 3    pravastatin (PRAVACHOL) 20 mg tablet Take 1 tablet (20 mg total) by mouth daily 90 tablet 3    venlafaxine (EFFEXOR) 37 5 mg tablet Take 1 tablet (37 5 mg total) by mouth daily 90 tablet 0    [DISCONTINUED] diclofenac (VOLTAREN) 75 mg EC tablet Take 1 tablet (75 mg total) by mouth 2 (two) times a day as needed (pain) Take with food  180 tablet 1     No current facility-administered medications on file prior to visit       Allergies   Allergen Reactions    Adhesive [Medical Tape] Rash        Objective     /84   Pulse 79   Temp 97 7 °F (36 5 °C)   Ht 5' 2" (1 575 m)   Wt 110 kg (242 lb 8 oz)   LMP  (LMP Unknown)   BMI 44 35 kg/m²      PE:  AAOx 3  WDWN  Hearing intact, no drainage from eyes  no audible wheezing  no abdominal distension  LE compartments soft, skin intact    Ortho Exam:  bilateral Knee:   No erythema  no swelling  no effusion  no warmth  AROM: 5-100  Stable to varus/valgus stress   TTP over medial joint line,  TTP over pes anserine bursa  -   Juan's sign    Imaging Studies:  No new imaging reviewed this visit      Large joint arthrocentesis: L knee  Universal Protocol:  Consent: Verbal consent obtained  Consent given by: patient  Timeout called at: 6/17/2021 10:54 AM   Patient understanding: patient states understanding of the procedure being performed  Patient identity confirmed: verbally with patient    Supporting Documentation  Indications: pain and joint swelling   Procedure Details  Location: knee - L knee  Needle size: 22 G  Ultrasound guidance: no  Approach: anterolateral  Medications administered: 3 mL lidocaine 1 %; 2 mL methylPREDNISolone acetate 40 mg/mL    Patient tolerance: patient tolerated the procedure well with no immediate complications  Dressing:  Sterile dressing applied    Large joint arthrocentesis: R knee  Universal Protocol:  Consent: Verbal consent obtained  Consent given by: patient  Timeout called at: 6/17/2021 10:54 AM   Patient understanding: patient states understanding of the procedure being performed  Patient identity confirmed: verbally with patient    Supporting Documentation  Indications: pain and joint swelling   Procedure Details  Location: knee - R knee  Needle size: 22 G  Ultrasound guidance: no  Approach: anterolateral  Medications administered: 3 mL lidocaine 1 %; 2 mL methylPREDNISolone acetate 40 mg/mL    Patient tolerance: patient tolerated the procedure well with no immediate complications  Dressing:  Sterile dressing applied    Large joint arthrocentesis: R pes anserine bursa  Universal Protocol:  Consent: Verbal consent obtained    Consent given by: patient  Timeout called at: 6/17/2021 10:54 AM   Supporting Documentation  Indications: pain Procedure Details  Location: knee - R pes anserine bursa  Needle size: 22 G  Ultrasound guidance: no  Approach: medial  Medications administered: 1 mL lidocaine 1 %; 1 mL methylPREDNISolone acetate 40 mg/mL          Scribe Attestation    I,:  Yaya Shaw am acting as a scribe while in the presence of the attending physician :       I,:  Francia Laughter, DO personally performed the services described in this documentation    as scribed in my presence :

## 2021-06-22 DIAGNOSIS — F32.A DEPRESSION, UNSPECIFIED DEPRESSION TYPE: ICD-10-CM

## 2021-06-22 RX ORDER — VENLAFAXINE 37.5 MG/1
37.5 TABLET ORAL DAILY
Qty: 90 TABLET | Refills: 0 | Status: SHIPPED | OUTPATIENT
Start: 2021-06-22 | End: 2021-06-22 | Stop reason: SDUPTHER

## 2021-06-22 RX ORDER — VENLAFAXINE 37.5 MG/1
37.5 TABLET ORAL DAILY
Qty: 90 TABLET | Refills: 0 | Status: SHIPPED | OUTPATIENT
Start: 2021-06-22 | End: 2021-07-12 | Stop reason: SDUPTHER

## 2021-06-24 ENCOUNTER — TELEPHONE (OUTPATIENT)
Dept: FAMILY MEDICINE CLINIC | Facility: CLINIC | Age: 76
End: 2021-06-24

## 2021-06-24 DIAGNOSIS — E89.0 HYPOTHYROIDISM, POSTSURGICAL: Primary | ICD-10-CM

## 2021-06-24 DIAGNOSIS — I10 ESSENTIAL HYPERTENSION: ICD-10-CM

## 2021-06-24 DIAGNOSIS — R73.01 IMPAIRED FASTING GLUCOSE: ICD-10-CM

## 2021-06-24 DIAGNOSIS — C73 THYROID CANCER (HCC): ICD-10-CM

## 2021-06-24 DIAGNOSIS — E66.01 MORBID OBESITY WITH BMI OF 40.0-44.9, ADULT (HCC): ICD-10-CM

## 2021-06-24 DIAGNOSIS — E78.2 MIXED HYPERLIPIDEMIA: ICD-10-CM

## 2021-06-24 NOTE — TELEPHONE ENCOUNTER
Patient called to ask if any routine labs needed to be ordered in addition to what other doctors will be ordering for follow up  Dr Jose Cruz Fernandez recommended CMP,CBC, TSH and lipid profile  Spoke with patient, who stated that other doctor is ordering some of same labs and did not want duplicates  Have placed all other orders

## 2021-07-01 ENCOUNTER — OFFICE VISIT (OUTPATIENT)
Dept: OBGYN CLINIC | Facility: MEDICAL CENTER | Age: 76
End: 2021-07-01
Payer: COMMERCIAL

## 2021-07-01 ENCOUNTER — APPOINTMENT (OUTPATIENT)
Dept: LAB | Facility: MEDICAL CENTER | Age: 76
End: 2021-07-01
Payer: COMMERCIAL

## 2021-07-01 VITALS
SYSTOLIC BLOOD PRESSURE: 137 MMHG | HEIGHT: 62 IN | BODY MASS INDEX: 44.68 KG/M2 | WEIGHT: 242.8 LBS | DIASTOLIC BLOOD PRESSURE: 84 MMHG | HEART RATE: 94 BPM

## 2021-07-01 DIAGNOSIS — E78.2 MIXED HYPERLIPIDEMIA: ICD-10-CM

## 2021-07-01 DIAGNOSIS — I10 ESSENTIAL HYPERTENSION: ICD-10-CM

## 2021-07-01 DIAGNOSIS — E66.01 MORBID OBESITY WITH BMI OF 40.0-44.9, ADULT (HCC): ICD-10-CM

## 2021-07-01 DIAGNOSIS — E89.0 HYPOTHYROIDISM, POSTSURGICAL: ICD-10-CM

## 2021-07-01 DIAGNOSIS — C73 THYROID CANCER (HCC): ICD-10-CM

## 2021-07-01 DIAGNOSIS — R73.01 IMPAIRED FASTING GLUCOSE: ICD-10-CM

## 2021-07-01 DIAGNOSIS — M70.52 PES ANSERINUS BURSITIS OF LEFT KNEE: Primary | ICD-10-CM

## 2021-07-01 LAB
ALBUMIN SERPL BCP-MCNC: 3.9 G/DL (ref 3.5–5)
ALP SERPL-CCNC: 63 U/L (ref 46–116)
ALT SERPL W P-5'-P-CCNC: 33 U/L (ref 12–78)
ANION GAP SERPL CALCULATED.3IONS-SCNC: 6 MMOL/L (ref 4–13)
AST SERPL W P-5'-P-CCNC: 17 U/L (ref 5–45)
BASOPHILS # BLD AUTO: 0.02 THOUSANDS/ΜL (ref 0–0.1)
BASOPHILS NFR BLD AUTO: 0 % (ref 0–1)
BILIRUB SERPL-MCNC: 0.58 MG/DL (ref 0.2–1)
BUN SERPL-MCNC: 21 MG/DL (ref 5–25)
CALCIUM SERPL-MCNC: 9.3 MG/DL (ref 8.3–10.1)
CHLORIDE SERPL-SCNC: 102 MMOL/L (ref 100–108)
CHOLEST SERPL-MCNC: 229 MG/DL (ref 50–200)
CO2 SERPL-SCNC: 28 MMOL/L (ref 21–32)
CREAT SERPL-MCNC: 0.81 MG/DL (ref 0.6–1.3)
EOSINOPHIL # BLD AUTO: 0.09 THOUSAND/ΜL (ref 0–0.61)
EOSINOPHIL NFR BLD AUTO: 1 % (ref 0–6)
ERYTHROCYTE [DISTWIDTH] IN BLOOD BY AUTOMATED COUNT: 13 % (ref 11.6–15.1)
GFR SERPL CREATININE-BSD FRML MDRD: 72 ML/MIN/1.73SQ M
GLUCOSE P FAST SERPL-MCNC: 145 MG/DL (ref 65–99)
HCT VFR BLD AUTO: 45.7 % (ref 34.8–46.1)
HDLC SERPL-MCNC: 55 MG/DL
HGB BLD-MCNC: 15.8 G/DL (ref 11.5–15.4)
IMM GRANULOCYTES # BLD AUTO: 0.05 THOUSAND/UL (ref 0–0.2)
IMM GRANULOCYTES NFR BLD AUTO: 1 % (ref 0–2)
LDLC SERPL CALC-MCNC: 137 MG/DL (ref 0–100)
LYMPHOCYTES # BLD AUTO: 2.09 THOUSANDS/ΜL (ref 0.6–4.47)
LYMPHOCYTES NFR BLD AUTO: 32 % (ref 14–44)
MCH RBC QN AUTO: 33.9 PG (ref 26.8–34.3)
MCHC RBC AUTO-ENTMCNC: 34.6 G/DL (ref 31.4–37.4)
MCV RBC AUTO: 98 FL (ref 82–98)
MONOCYTES # BLD AUTO: 0.55 THOUSAND/ΜL (ref 0.17–1.22)
MONOCYTES NFR BLD AUTO: 8 % (ref 4–12)
NEUTROPHILS # BLD AUTO: 3.83 THOUSANDS/ΜL (ref 1.85–7.62)
NEUTS SEG NFR BLD AUTO: 58 % (ref 43–75)
NONHDLC SERPL-MCNC: 174 MG/DL
NRBC BLD AUTO-RTO: 0 /100 WBCS
PLATELET # BLD AUTO: 178 THOUSANDS/UL (ref 149–390)
PMV BLD AUTO: 10.5 FL (ref 8.9–12.7)
POTASSIUM SERPL-SCNC: 4.2 MMOL/L (ref 3.5–5.3)
PROT SERPL-MCNC: 7.9 G/DL (ref 6.4–8.2)
RBC # BLD AUTO: 4.66 MILLION/UL (ref 3.81–5.12)
SODIUM SERPL-SCNC: 136 MMOL/L (ref 136–145)
TRIGL SERPL-MCNC: 185 MG/DL
TSH SERPL DL<=0.05 MIU/L-ACNC: 1.11 UIU/ML (ref 0.36–3.74)
WBC # BLD AUTO: 6.63 THOUSAND/UL (ref 4.31–10.16)

## 2021-07-01 PROCEDURE — 80053 COMPREHEN METABOLIC PANEL: CPT

## 2021-07-01 PROCEDURE — 3075F SYST BP GE 130 - 139MM HG: CPT | Performed by: PHYSICIAN ASSISTANT

## 2021-07-01 PROCEDURE — 36415 COLL VENOUS BLD VENIPUNCTURE: CPT

## 2021-07-01 PROCEDURE — 3079F DIAST BP 80-89 MM HG: CPT | Performed by: PHYSICIAN ASSISTANT

## 2021-07-01 PROCEDURE — 85025 COMPLETE CBC W/AUTO DIFF WBC: CPT

## 2021-07-01 PROCEDURE — 80061 LIPID PANEL: CPT

## 2021-07-01 PROCEDURE — 99213 OFFICE O/P EST LOW 20 MIN: CPT | Performed by: PHYSICIAN ASSISTANT

## 2021-07-01 PROCEDURE — 84443 ASSAY THYROID STIM HORMONE: CPT

## 2021-07-01 PROCEDURE — 20610 DRAIN/INJ JOINT/BURSA W/O US: CPT | Performed by: PHYSICIAN ASSISTANT

## 2021-07-01 RX ORDER — LIDOCAINE HYDROCHLORIDE 10 MG/ML
1 INJECTION, SOLUTION INFILTRATION; PERINEURAL
Status: COMPLETED | OUTPATIENT
Start: 2021-07-01 | End: 2021-07-01

## 2021-07-01 RX ORDER — METHYLPREDNISOLONE ACETATE 40 MG/ML
1 INJECTION, SUSPENSION INTRA-ARTICULAR; INTRALESIONAL; INTRAMUSCULAR; SOFT TISSUE
Status: COMPLETED | OUTPATIENT
Start: 2021-07-01 | End: 2021-07-01

## 2021-07-01 RX ADMIN — LIDOCAINE HYDROCHLORIDE 1 ML: 10 INJECTION, SOLUTION INFILTRATION; PERINEURAL at 10:16

## 2021-07-01 RX ADMIN — METHYLPREDNISOLONE ACETATE 1 ML: 40 INJECTION, SUSPENSION INTRA-ARTICULAR; INTRALESIONAL; INTRAMUSCULAR; SOFT TISSUE at 10:16

## 2021-07-01 NOTE — PROGRESS NOTES
Assessment/Plan:  1  Pes anserinus bursitis of left knee      Orders Placed This Encounter   Procedures    Large joint arthrocentesis       · Patient received left knee pes anserine bursa steroid injection  Tolerated the procedure well  Advised to apply ice raheem void strenuous activity for 1-2 days as needed  · Continue diclofenac and diclofenac gel as needed for pain  · Continue activity as tolerated  · Will plan for VS injections in September  Order already placed in chart  Return in about 10 weeks (around 9/9/2021) for bilateral knee VS and right knee pes bursa  I answered all of the patient's questions during the visit and provided education of the patient's condition during the visit  The patient verbalized understanding of the information given and agrees with the plan  This note was dictated using [employer] software  It may contain errors including improperly dictated words  Please contact physician directly for any questions  Subjective   Chief Complaint:   Chief Complaint   Patient presents with    Left Knee - Follow-up    Right Knee - Follow-up       HPI  Stefani Brothers is a 70 y o  female who presents for follow up for bilateral knee osteoarthritis and bilateral pes anserine bursitis  Patient received bilateral knee CSI and right knee pes anserine bursa injections on 6/17/21 and reports good pain relief  Patient is having tenderness over the left pes anserine bursa area  She is taking diclofenac tabs prn pain  She is doing home exercises  She wants to repeat VS in 3 months  Review of Systems  ROS:    See HPI for musculoskeletal review     All other systems reviewed are negative     History:  Past Medical History:   Diagnosis Date    Asthma     Breathing difficulty     Bronchitis     Depression     Fracture of arm     Fracture of carpal bone     Hyperlipidemia     Hypertension     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[facility] [address] ) 12/16/2016    Shortness of breath     Thyroid nodule  Thyroid nodule     Thyroid nodule      Past Surgical History:   Procedure Laterality Date    HEMORRHOID SURGERY      OK THYROID LOBECTOMY,UNILAT Right 2016    Procedure: HEMITHYROIDECTOMY;  Surgeon: Carlene Connell MD;  [facility];  Service: Surgical Oncology    OK THYROIDECTOMY POST PREV THYR SURG Left 2016    Procedure:  COMPLETION THYROIDECTOMY, FLEXIBLE LARYNGOSCOPY;  Surgeon: Carlene Connell MD;  [facility];  Service: Surgical Oncology    THYROIDECTOMY, PARTIAL Left      Social History   Social History     Substance and Sexual Activity   Alcohol Use No    Comment: occasional glass of wine     Social History     Substance and Sexual Activity   Drug Use No     Social History     Tobacco Use   Smoking Status Former Smoker    Packs/day: 0 25    Years: 4 00    Pack years: 1 00    Types: Cigarettes    Quit date: 6/15/1981    Years since quittin 0   Smokeless Tobacco Former User   Tobacco Comment    quit 40 yrs ago (Never a smoker per [employer])     Family History:   Family History   Adopted: Yes   Problem Relation Age of Onset    Hypertension Family     Kidney disease Family     Hypertension Mother        Current Outpatient Medications on File Prior to Visit   Medication Sig Dispense Refill    albuterol (Ventolin HFA) 90 mcg/act inhaler Inhale 2 puffs every 4 (four) hours as needed for wheezing or shortness of breath 54 g 1    amLODIPine (NORVASC) 5 mg tablet Take 1 tablet (5 mg total) by mouth 2 (two) times a day 180 tablet 1    diclofenac (VOLTAREN) 75 mg EC tablet Take 1 tablet (75 mg total) by mouth 2 (two) times a day as needed (pain) Take with food   180 tablet 0    Diclofenac Sodium (VOLTAREN) 1 % Apply 4 g topically 4 (four) times a day as needed (pain) 150 g 1    furosemide (LASIX) 20 mg tablet Take 1 tablet (20 mg total) by mouth daily as needed (edema) 90 tablet 3    guaifenesin-codeine (GUAIFENESIN AC) 100-10 MG/5ML liquid Take 5 mL by mouth every 4 (four) hours as needed for cough 120 mL 1    levothyroxine 175 mcg tablet Take 1 tablet (175 mcg total) by mouth daily 90 tablet 1    lisinopril-hydrochlorothiazide (PRINZIDE,ZESTORETIC) 20-12 5 MG per tablet Take 1 tablet by mouth 2 (two) times a day 180 tablet 0    Melatonin 10 MG TABS Take by mouth      methylPREDNISolone 4 MG tablet therapy pack Use as directed on package 21 each 0    mometasone (ELOCON) 0 1 % cream Apply topically daily 45 g 3    mometasone (ELOCON) 0 1 % lotion Apply 1 application topically daily      nebivolol (BYSTOLIC) 5 mg tablet Take 1 tablet (5 mg total) by mouth daily 90 tablet 3    omeprazole (PriLOSEC) 40 MG capsule Take 1 capsule (40 mg total) by mouth daily 90 capsule 0    potassium chloride (Klor-Con M20) 20 mEq tablet Take 1 tablet (20 mEq total) by mouth 2 (two) times a day 180 tablet 3    pravastatin (PRAVACHOL) 20 mg tablet Take 1 tablet (20 mg total) by mouth daily 90 tablet 3    venlafaxine (EFFEXOR) 37 5 mg tablet Take 1 tablet (37 5 mg total) by mouth daily 90 tablet 0     No current facility-administered medications on file prior to visit  Allergies   Allergen Reactions    Adhesive [Medical Tape] Rash        Objective     /84   Pulse 94   Ht 5' 2" (1 575 m)   Wt 110 kg (242 lb 12 8 oz)   LMP  (LMP Unknown)   BMI 44 41 kg/m²      PE:  AAOx 3  WDWN  Hearing intact, no drainage from eyes  no audible wheezing  no abdominal distension  LE compartments soft, skin intact    Ortho Exam:  left Knee:   No erythema  no swelling  no effusion  no warmth  +TTP over pes anserine bursa  AROM: 0- 115  Stable to varus/valgus stress      Large joint arthrocentesis: L pes anserine bursa  Universal Protocol:  Consent: Verbal consent obtained    Risks and benefits: risks, benefits and alternatives were discussed  Consent given by: patient  Site marked: the operative site was marked  Supporting Documentation  Indications: pain   Procedure Details  Location: knee - L pes anserine bursa  Preparation: Patient was prepped and draped in the usual sterile fashion  Needle size: 22 G  Ultrasound guidance: no  Approach: medial  Medications administered: 1 mL lidocaine 1 %; 1 mL methylPREDNISolone acetate 40 mg/mL    Patient tolerance: patient tolerated the procedure well with no immediate complications  Dressing:  Sterile dressing applied

## 2021-07-02 ENCOUNTER — RA CDI HCC (OUTPATIENT)
Dept: OTHER | Facility: HOSPITAL | Age: 76
End: 2021-07-02

## 2021-07-02 ENCOUNTER — OFFICE VISIT (OUTPATIENT)
Dept: SLEEP CENTER | Facility: CLINIC | Age: 76
End: 2021-07-02
Payer: COMMERCIAL

## 2021-07-02 VITALS
HEIGHT: 62 IN | DIASTOLIC BLOOD PRESSURE: 82 MMHG | WEIGHT: 243.6 LBS | BODY MASS INDEX: 44.83 KG/M2 | SYSTOLIC BLOOD PRESSURE: 136 MMHG

## 2021-07-02 DIAGNOSIS — J98.4 RESTRICTIVE LUNG DISEASE: ICD-10-CM

## 2021-07-02 DIAGNOSIS — I10 ESSENTIAL HYPERTENSION: ICD-10-CM

## 2021-07-02 DIAGNOSIS — G47.33 OBSTRUCTIVE SLEEP APNEA: Primary | ICD-10-CM

## 2021-07-02 DIAGNOSIS — E66.01 MORBID OBESITY WITH BMI OF 40.0-44.9, ADULT (HCC): ICD-10-CM

## 2021-07-02 DIAGNOSIS — K21.9 GERD WITHOUT ESOPHAGITIS: ICD-10-CM

## 2021-07-02 DIAGNOSIS — R45.86 MOOD DISTURBANCE: ICD-10-CM

## 2021-07-02 PROCEDURE — 99213 OFFICE O/P EST LOW 20 MIN: CPT | Performed by: INTERNAL MEDICINE

## 2021-07-02 NOTE — PROGRESS NOTES
Review of Systems      Genitourinary none   Cardiology ankle/leg swelling   Gastrointestinal none   Neurology none   Constitutional none   Integumentary rash or dry skin and itching   Psychiatry none   Musculoskeletal none   Pulmonary wheezing   ENT none   Endocrine none   Hematological none

## 2021-07-02 NOTE — PROGRESS NOTES
Follow-Up Note - Sleep Center   Kasie Collazo  70 y o  female  MJT:7/41/[birth year]04  SRE:[id number]    CC: I saw this patient for follow-up in clinic today for Sleep disordered breathing, Coexisting Sleep and Medical Problems  Results of prior studies: The diagnostic study in January of 2017 demonstrated AHI of 29 per hour, considerably higher during REM at 64 per hour  There were also 60 respiratory effort-related arousals and severe periodic limb movements of sleep   minimum oxygen saturation was 81% and 10 2 minutes of total sleep time was spent with saturations below 90%  During a subsequent therapeutic study, sleep disordered breathing and periodic limb movements of sleep appeared to be sufficiently remediated with nasal CPAP at 7 cm H2O  She was observed during stage REM but not while supine  Auto titrating Pap was initiated  PFSH, Problem List, Medications & Allergies were reviewed in EMR  Interval changes: none reported  She  has a past medical history of Asthma, Breathing difficulty, Bronchitis, Depression, Fracture of arm, Fracture of carpal bone, Hyperlipidemia, Hypertension, 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[facility] ) (12/16/2016), Shortness of breath, Thyroid nodule, Thyroid nodule, and Thyroid nodule  She has a current medication list which includes the following prescription(s): albuterol, amlodipine, diclofenac, diclofenac sodium, furosemide, levothyroxine, lisinopril-hydrochlorothiazide, melatonin, methylprednisolone, mometasone, nebivolol, omeprazole, potassium chloride, pravastatin, venlafaxine, guaifenesin-codeine, and mometasone  PHYSIOLOGICAL DATA REVIEW AND INTERPRETATION:   using PAP > 4 hours/night 93%  Estimated THO 3 8/hour with pressure of 11 5 cm H2O @90th percentile; Compliance: excellent; Sleep disordered breathing:stable & within target range; Patient reports has  been using So Clean to sanitize the machine      SUBJECTIVE: Regarding use of PAP, Gigi Hall reports:   · She is experiencing significant adverse effects: mask causes redness / facial marks   · She is benefiting from use: sleeping better   Sleep Routine: Jamal Mejias reports getting 8-9 hrs sleep  ; she has no difficulty initiating or maintaining sleep   She arises before alarm most days and feels refreshed  Jamal Mejias denies Excessive Daytime Sleepiness,  and rated herself at Total score: 2 /24 on the [place] Sleepiness Scale  Habits: reports that she quit smoking about 40 years ago  Her smoking use included cigarettes  She has a 1 00 pack-year smoking history  She has quit using smokeless tobacco ,  reports no history of alcohol use ,  reports no history of drug use , Caffeine use: limited , Exercise routine: regular    ROS: as attached  Significant for some intentional weight reduction in the past year  She reports no nasal, but has occasional wheezing  She reported no other respiratory or cardiac symptoms  Georgette Ormond EXAM: /82   Ht 5' 2" (1 575 m)   Wt 110 kg (243 lb 9 6 oz)   LMP  (LMP Unknown)   BMI 44 56 kg/m²     Patient is well groomed; well appearing  H&N: EOMI; NC/AT:no facial pressure marks, no rashes  Skin/Extrem: col & hydration normal; no edema  Psych: cooperativeand in no distress  Mental state:appears normal   Resp: Respiratory effort is normal  CNS: Alert, orientated, clear & coherent speech  Physical findings otherwise essentially unchanged from previous  IMPRESSION: Problem List Items & Comorbidities Addressed this Visit    1  Obstructive sleep apnea  PAP DME Resupply/Reorder   2  Restrictive lung disease     3  Essential hypertension     4  GERD without esophagitis     5  Mood disturbance     6  Morbid obesity with BMI of 40 0-44 9, adult ([facility] [address] )         PLAN:  I reviewed results of prior studies and physiologic data with the patient  Notified regarding recall of [employer] devices and the recommendation to discontinue use pending resolution of degradation of the foam by replacing it     I discussed treatment options with risks and benefits  Patient understands risks of discontinuing PAP vs continuing use while awaiting resolution of the new issue  Instructed patient to register  machine with [employer] and to follow progress on their website  Including web sites of DME provider, [facility] and [facility]  looking out for notifications  Treatment is medically necessary and patient   elected to continue using Pap while awaiting remediation  Care of equipment, methods to improve comfort using PAP and importance of compliance with therapy were discussed  Instructed not to use So Clean or other cleaning devices unless approved by  a and FDA  Pressure setting: 10-12 cmH2O  Rx provided to replace, supplies and Care coordinated with DME provider  She needs refitting of her interface  Discussed strategies for weight reduction  Follow-up is advised in 1 year or sooner if needed to monitor progress, compliance and to adjust therapy  Thank you for allowing me to participate in the care of this patient      Sincerely,    Authenticated electronically by Lluvia Cr MD on 29/10/02   Board Certified Specialist

## 2021-07-02 NOTE — PATIENT INSTRUCTIONS
Continuous Positive Airway Pressure (CPAP) therapy was prescribed to you as a medical necessity and there are risks of discontinuing  use of the device, some of which may be long term  Symptoms you experienced before using CPAP may return such as snoring, apneas, excessive daytime sleepiness, hypertension, cardiac arrhythmias, risk of stroke, congestive heart-failure, exacerbation of COPD and potential respiratory failure  Ultimately, it is a personal decision for you to make if you continue use of an affected device or discontinue until a replacement is provided  Unfortunately, [employer] has not yet provided us with information about available devices  You can visit their website at [email or web address] to register your device or www  [email or web address]  [email or web address]  [email or web address] to learn more about how [employer] to replace your device  Another option would be to check with your medical equipment provider to determine if you are eligible for a new machine through your insurance, if not you can pay out of pocket for a new machine  We are able to provide you with a script, please include your mask type  If you unable to get to their website, you may call [phone] Nursing Support:  When: Monday through Friday 7A-5PM except holidays  Where: Our direct line is [phone]  If you are having a true emergency please call 911  In the event that the line is busy or it is after hours please leave a voice message and we will return your call  Please speak clearly, leaving your full name, birth date, best number to reach you and the reason for your call  Medication refills: We will need the name of the medication, the dosage, the ordering provider, whether you get a 30 or 90 day refill, and the pharmacy name and address  Medications will be ordered by the provider only  Nurses cannot call in prescriptions  Please allow 7 days for medication refills  Physician requested updates:  If your provider requested that you call with an update after starting medication, please be ready to provide us the medication and dosage, what time you take your medication, the time you attempt to fall asleep, time you fall asleep, when you wake up, and what time you get out of bed  Sleep Study Results: We will contact you with sleep study results and/or next steps after the physician has reviewed your testing

## 2021-07-02 NOTE — PROGRESS NOTES
[facility]  coding opportunities             Chart reviewed, (number of) suggestions sent to provider: 3     Problem listed updated  Provider Accepted, (number of) suggestions accepted: 3         Number of suggestions actually used: 0      Number of suggestions NOT actually used: 3     Patients insurance company: [employer] ([employer])   dx not on bill: , depression code, J410  Visit status: Patient arrived for their scheduled appointment        [facility]  coding opportunities             Chart reviewed, (number of) suggestions sent to provider: 3   DX:  Martínez Lal  0-Simple chronic bronchitis  E66 01-Morbid (severe) obesity due to excess calories    It is noted in the patient record that the patient has F32 9 depression, single episode, unspecified   On Effexor  Can the depression be further specified as:     F32 0 major depressive disorder, single episode, mild  OR   F32 1 major depressive disorder, single episode, moderate  OR   F32 2 major depressive disorder, single episode, severe without psychotic features OR   F32 4 major depressive disorder, single episode, in partial remission OR   F32 5 major depressive disorder, single episode, in full remission               Patients insurance company: [employer] ([employer])

## 2021-07-04 DIAGNOSIS — E78.2 MIXED HYPERLIPIDEMIA: Primary | ICD-10-CM

## 2021-07-04 RX ORDER — ATORVASTATIN CALCIUM 20 MG/1
20 TABLET, FILM COATED ORAL DAILY
Qty: 90 TABLET | Refills: 3 | Status: SHIPPED | OUTPATIENT
Start: 2021-07-04 | End: 2022-05-10 | Stop reason: SDUPTHER

## 2021-07-04 NOTE — PROGRESS NOTES
Called pt  Moni Settle LDL too high    has borderline DM and HTN  Is compliant  Moni Kyle stop pravastatin  Marshall Kyle start atorvastatin  Moni Wright discussed side effects    check LDL, Sgot IN Oct

## 2021-07-06 ENCOUNTER — TELEPHONE (OUTPATIENT)
Dept: SLEEP CENTER | Facility: CLINIC | Age: 76
End: 2021-07-06

## 2021-07-06 NOTE — PROGRESS NOTES
Assessment & Plan:     J41 0 Simple chronic bronchitis ([facility] )  I have evaluated the patient and found the condition to be: Stable  I have evaluated the patient and: Recommended continue with same treatment plan    E66 01, Z68 41 Morbid obesity with BMI of 40 0-44 9, adult ([facility] )  I have evaluated the patient and found the condition to be: Stable  I have evaluated the patient and: Recommended continue with same treatment plan    D49 2 Skin neoplasm  I have evaluated the patient and found the condition to be: Resolved    C73 Thyroid Cancer  I have evaluated the patient and found the condition to be: Resolved    K21 9 GERD without esophagitis  I have evaluated the patient and found the condition to be: Stable  I have evaluated the patient and: Recommended continue with same treatment plan    I10 Hypertension   I have evaluated the patient and found the condition to be: Stable  I have evaluated the patient and: Recommended continue with same treatment plan    E78 5 Hyperlipidemia  I have evaluated the patient and found the condition to be: Stable  I have evaluated the patient and: Recommended continue with same treatment plan    E89 0 Hypothyroidism, postsurgical  I have evaluated the patient and found the condition to be: Stable  I have evaluated the patient and: Recommended continue with same treatment plan    J98 4 Restrictive lung disease  I have evaluated the patient and found the condition to be: Stable  I have evaluated the patient and: Recommended continue with same treatment plan         Subjective:     Patient ID: Donny Calle is a 70 y o  female     No chief complaint on file       HPI    Review of Systems    Objective:      LMP  (LMP Unknown)     Problem List Items Addressed This Visit     None          Physical Exam

## 2021-07-12 ENCOUNTER — OFFICE VISIT (OUTPATIENT)
Dept: FAMILY MEDICINE CLINIC | Facility: CLINIC | Age: 76
End: 2021-07-12
Payer: COMMERCIAL

## 2021-07-12 VITALS
SYSTOLIC BLOOD PRESSURE: 168 MMHG | HEIGHT: 62 IN | WEIGHT: 244 LBS | DIASTOLIC BLOOD PRESSURE: 84 MMHG | TEMPERATURE: 97.9 F | BODY MASS INDEX: 44.9 KG/M2

## 2021-07-12 DIAGNOSIS — R60.0 LOWER EXTREMITY EDEMA: ICD-10-CM

## 2021-07-12 DIAGNOSIS — E78.2 MIXED HYPERLIPIDEMIA: ICD-10-CM

## 2021-07-12 DIAGNOSIS — K21.00 GASTROESOPHAGEAL REFLUX DISEASE WITH ESOPHAGITIS WITHOUT HEMORRHAGE: ICD-10-CM

## 2021-07-12 DIAGNOSIS — Z00.00 MEDICARE ANNUAL WELLNESS VISIT, SUBSEQUENT: ICD-10-CM

## 2021-07-12 DIAGNOSIS — F32.A DEPRESSION, UNSPECIFIED DEPRESSION TYPE: ICD-10-CM

## 2021-07-12 DIAGNOSIS — I10 ESSENTIAL HYPERTENSION: ICD-10-CM

## 2021-07-12 DIAGNOSIS — L30.9 DERMATITIS: ICD-10-CM

## 2021-07-12 DIAGNOSIS — C73 THYROID CANCER (HCC): ICD-10-CM

## 2021-07-12 DIAGNOSIS — R73.01 IMPAIRED FASTING GLUCOSE: ICD-10-CM

## 2021-07-12 DIAGNOSIS — E89.0 HYPOTHYROIDISM, POSTSURGICAL: Primary | ICD-10-CM

## 2021-07-12 PROCEDURE — 99214 OFFICE O/P EST MOD 30 MIN: CPT | Performed by: FAMILY MEDICINE

## 2021-07-12 PROCEDURE — T1015 CLINIC SERVICE: HCPCS | Performed by: FAMILY MEDICINE

## 2021-07-12 PROCEDURE — G0439 PPPS, SUBSEQ VISIT: HCPCS | Performed by: FAMILY MEDICINE

## 2021-07-12 PROCEDURE — 1170F FXNL STATUS ASSESSED: CPT | Performed by: FAMILY MEDICINE

## 2021-07-12 PROCEDURE — 1160F RVW MEDS BY RX/DR IN RCRD: CPT | Performed by: FAMILY MEDICINE

## 2021-07-12 PROCEDURE — 3288F FALL RISK ASSESSMENT DOCD: CPT | Performed by: FAMILY MEDICINE

## 2021-07-12 PROCEDURE — 1036F TOBACCO NON-USER: CPT | Performed by: FAMILY MEDICINE

## 2021-07-12 PROCEDURE — 3725F SCREEN DEPRESSION PERFORMED: CPT | Performed by: FAMILY MEDICINE

## 2021-07-12 PROCEDURE — 1125F AMNT PAIN NOTED PAIN PRSNT: CPT | Performed by: FAMILY MEDICINE

## 2021-07-12 PROCEDURE — 3008F BODY MASS INDEX DOCD: CPT | Performed by: FAMILY MEDICINE

## 2021-07-12 RX ORDER — VENLAFAXINE 37.5 MG/1
37.5 TABLET ORAL DAILY
Qty: 90 TABLET | Refills: 0 | Status: SHIPPED | OUTPATIENT
Start: 2021-07-12 | End: 2021-11-16 | Stop reason: SDUPTHER

## 2021-07-12 RX ORDER — LISINOPRIL AND HYDROCHLOROTHIAZIDE 20; 12.5 MG/1; MG/1
1 TABLET ORAL 2 TIMES DAILY
Qty: 180 TABLET | Refills: 1 | Status: SHIPPED | OUTPATIENT
Start: 2021-07-12 | End: 2022-07-26 | Stop reason: SDUPTHER

## 2021-07-12 RX ORDER — POTASSIUM CHLORIDE 20 MEQ/1
20 TABLET, EXTENDED RELEASE ORAL 2 TIMES DAILY
Qty: 180 TABLET | Refills: 1 | Status: SHIPPED | OUTPATIENT
Start: 2021-07-12 | End: 2021-12-07 | Stop reason: SDUPTHER

## 2021-07-12 RX ORDER — MOMETASONE FUROATE 1 MG/G
CREAM TOPICAL DAILY
Qty: 45 G | Refills: 3 | Status: SHIPPED | OUTPATIENT
Start: 2021-07-12 | End: 2021-07-12

## 2021-07-12 RX ORDER — AMLODIPINE BESYLATE 5 MG/1
5 TABLET ORAL 2 TIMES DAILY
Qty: 180 TABLET | Refills: 1 | Status: SHIPPED | OUTPATIENT
Start: 2021-07-12 | End: 2022-01-25 | Stop reason: SDUPTHER

## 2021-07-12 RX ORDER — OMEPRAZOLE 40 MG/1
40 CAPSULE, DELAYED RELEASE ORAL DAILY
Qty: 90 CAPSULE | Refills: 1 | Status: SHIPPED | OUTPATIENT
Start: 2021-07-12 | End: 2022-07-26 | Stop reason: SDUPTHER

## 2021-07-12 RX ORDER — LEVOTHYROXINE SODIUM 175 UG/1
175 TABLET ORAL DAILY
Qty: 90 TABLET | Refills: 1 | Status: SHIPPED | OUTPATIENT
Start: 2021-07-12

## 2021-07-12 RX ORDER — FUROSEMIDE 20 MG/1
20 TABLET ORAL DAILY PRN
Qty: 90 TABLET | Refills: 1 | Status: SHIPPED | OUTPATIENT
Start: 2021-07-12 | End: 2021-11-01

## 2021-07-12 NOTE — PATIENT INSTRUCTIONS
Medicare Preventive Visit Patient Instructions  Thank you for completing your Welcome to Medicare Visit or Medicare Annual Wellness Visit today  Your next wellness visit will be due in one year (7/13/2022)  The screening/preventive services that you may require over the next 5-10 years are detailed below  Some tests may not apply to you based off risk factors and/or age  Screening tests ordered at today's visit but not completed yet may show as past due  Also, please note that scanned in results may not display below  Preventive Screenings:  Service Recommendations Previous Testing/Comments   Colorectal Cancer Screening  * Colonoscopy    * Fecal Occult Blood Test (FOBT)/Fecal Immunochemical Test (FIT)  * Fecal DNA/Cologuard Test  * Flexible Sigmoidoscopy Age: 54-65 years old   Colonoscopy: every 10 years (may be performed more frequently if at higher risk)  OR  FOBT/FIT: every 1 year  OR  Cologuard: every 3 years  OR  Sigmoidoscopy: every 5 years  Screening may be recommended earlier than age 48 if at higher risk for colorectal cancer  Also, an individualized decision between you and your healthcare provider will decide whether screening between the ages of 74-80 would be appropriate  Colonoscopy: Not on file  FOBT/FIT: Not on file  Cologuard: Not on file  Sigmoidoscopy: Not on file          Breast Cancer Screening Age: 36 years old  Frequency: every 1-2 years  Not required if history of left and right mastectomy Mammogram: Not on file        Cervical Cancer Screening Between the ages of 21-29, pap smear recommended once every 3 years  Between the ages of 33-67, can perform pap smear with HPV co-testing every 5 years     Recommendations may differ for women with a history of total hysterectomy, cervical cancer, or abnormal pap smears in past  Pap Smear: Not on file    Screening Not Indicated   Hepatitis C Screening Once for adults born between [facility]  More frequently in patients at high risk for Hepatitis C Hep C Antibody: 03/11/2019    Screening Current   Diabetes Screening 1-2 times per year if you're at risk for diabetes or have pre-diabetes Fasting glucose: 145 mg/dL   A1C: 6 3 %    Screening Current   Cholesterol Screening Once every 5 years if you don't have a lipid disorder  May order more often based on risk factors  Lipid panel: 07/01/2021    Screening Not Indicated  History Lipid Disorder     Other Preventive Screenings Covered by Medicare:  1  Abdominal Aortic Aneurysm (AAA) Screening: covered once if your at risk  You're considered to be at risk if you have a family history of AAA  2  Lung Cancer Screening: covers low dose CT scan once per year if you meet all of the following conditions: (1) Age 50-69; (2) No signs or symptoms of lung cancer; (3) Current smoker or have quit smoking within the last 15 years; (4) You have a tobacco smoking history of at least 30 pack years (packs per day multiplied by number of years you smoked); (5) You get a written order from a healthcare provider  3  Glaucoma Screening: covered annually if you're considered high risk: (1) You have diabetes OR (2) Family history of glaucoma OR (3)  aged 48 and older OR (3)  American aged 72 and older  3  Osteoporosis Screening: covered every 2 years if you meet one of the following conditions: (1) You're estrogen deficient and at risk for osteoporosis based off medical history and other findings; (2) Have a vertebral abnormality; (3) On glucocorticoid therapy for more than 3 months; (4) Have primary hyperparathyroidism; (5) On osteoporosis medications and need to assess response to drug therapy  · Last bone density test (DXA Scan): 09/03/2013  5  HIV Screening: covered annually if you're between the age of 12-76  Also covered annually if you are younger than 13 and older than 72 with risk factors for HIV infection   For pregnant patients, it is covered up to 3 times per pregnancy  Immunizations:  Immunization Recommendations   Influenza Vaccine Annual influenza vaccination during flu season is recommended for all persons aged >= 6 months who do not have contraindications   Pneumococcal Vaccine (Prevnar and Pneumovax)  * Prevnar = PCV13  * Pneumovax = PPSV23   Adults 25-60 years old: 1-3 doses may be recommended based on certain risk factors  Adults 72 years old: Prevnar (PCV13) vaccine recommended followed by Pneumovax (PPSV23) vaccine  If already received PPSV23 since turning 65, then PCV13 recommended at least one year after PPSV23 dose  Hepatitis B Vaccine 3 dose series if at intermediate or high risk (ex: diabetes, end stage renal disease, liver disease)   Tetanus (Td) Vaccine - COST NOT COVERED BY MEDICARE PART B Following completion of primary series, a booster dose should be given every 10 years to maintain immunity against tetanus  Td may also be given as tetanus wound prophylaxis  Tdap Vaccine - COST NOT COVERED BY MEDICARE PART B Recommended at least once for all adults  For pregnant patients, recommended with each pregnancy  Shingles Vaccine (Shingrix) - COST NOT COVERED BY MEDICARE PART B  2 shot series recommended in those aged 48 and above     Health Maintenance Due:      Topic Date Due    MAMMOGRAM  Never done    Colorectal Cancer Screening  Never done    Hepatitis C Screening  Completed     Immunizations Due:      Topic Date Due    DTaP,Tdap,and Td Vaccines (1 - Tdap) 09/21/1966    Influenza Vaccine (1) 09/01/2021     Advance Directives   What are advance directives? Advance directives are legal documents that state your wishes and plans for medical care  These plans are made ahead of time in case you lose your ability to make decisions for yourself  Advance directives can apply to any medical decision, such as the treatments you want, and if you want to donate organs  What are the types of advance directives?   There are many types of advance directives, and each state has rules about how to use them  You may choose a combination of any of the following:  · Living will: This is a written record of the treatment you want  You can also choose which treatments you do not want, which to limit, and which to stop at a certain time  This includes surgery, medicine, IV fluid, and tube feedings  · Durable power of  for healthcare [facility]): This is a written record that states who you want to make healthcare choices for you when you are unable to make them for yourself  This person, called a proxy, is usually a family member or a friend  You may choose more than 1 proxy  · Do not resuscitate (DNR) order:  A DNR order is used in case your heart stops beating or you stop breathing  It is a request not to have certain forms of treatment, such as CPR  A DNR order may be included in other types of advance directives  · Medical directive: This covers the care that you want if you are in a coma, near death, or unable to make decisions for yourself  You can list the treatments you want for each condition  Treatment may include pain medicine, surgery, blood transfusions, dialysis, IV or tube feedings, and a ventilator (breathing machine)  · Values history: This document has questions about your views, beliefs, and how you feel and think about life  This information can help others choose the care that you would choose  Why are advance directives important? An advance directive helps you control your care  Although spoken wishes may be used, it is better to have your wishes written down  Spoken wishes can be misunderstood, or not followed  Treatments may be given even if you do not want them  An advance directive may make it easier for your family to make difficult choices about your care     Weight Management   Why it is important to manage your weight:  Being overweight increases your risk of health conditions such as heart disease, high blood pressure, type 2 diabetes, and certain types of cancer  It can also increase your risk for osteoarthritis, sleep apnea, and other respiratory problems  Aim for a slow, steady weight loss  Even a small amount of weight loss can lower your risk of health problems  How to lose weight safely:  A safe and healthy way to lose weight is to eat fewer calories and get regular exercise  You can lose up about 1 pound a week by decreasing the number of calories you eat by 500 calories each day  Healthy meal plan for weight management:  A healthy meal plan includes a variety of foods, contains fewer calories, and helps you stay healthy  A healthy meal plan includes the following:  · Eat whole-grain foods more often  A healthy meal plan should contain fiber  Fiber is the part of grains, fruits, and vegetables that is not broken down by your body  Whole-grain foods are healthy and provide extra fiber in your diet  Some examples of whole-grain foods are whole-wheat breads and pastas, oatmeal, brown rice, and bulgur  · Eat a variety of vegetables every day  Include dark, leafy greens such as spinach, kale, laura greens, and mustard greens  Eat yellow and orange vegetables such as carrots, sweet potatoes, and winter squash  · Eat a variety of fruits every day  Choose fresh or canned fruit (canned in its own juice or light syrup) instead of juice  Fruit juice has very little or no fiber  · Eat low-fat dairy foods  Drink fat-free (skim) milk or 1% milk  Eat fat-free yogurt and low-fat cottage cheese  Try low-fat cheeses such as mozzarella and other reduced-fat cheeses  · Choose meat and other protein foods that are low in fat  Choose beans or other legumes such as split peas or lentils  Choose fish, skinless poultry (chicken or turkey), or lean cuts of red meat (beef or pork)  Before you cook meat or poultry, cut off any visible fat  · Use less fat and oil  Try baking foods instead of frying them   Add less fat, such as margarine, sour cream, regular salad dressing and mayonnaise to foods  Eat fewer high-fat foods  Some examples of high-fat foods include french fries, doughnuts, ice cream, and cakes  · Eat fewer sweets  Limit foods and drinks that are high in sugar  This includes candy, cookies, regular soda, and sweetened drinks  Exercise:  Exercise at least 30 minutes per day on most days of the week  Some examples of exercise include walking, biking, dancing, and swimming  You can also fit in more physical activity by taking the stairs instead of the elevator or parking farther away from stores  Ask your healthcare provider about the best exercise plan for you  © Copyright [employer] 2018 Information is for End User's use only and may not be sold, redistributed or otherwise used for commercial purposes   All illustrations and images included in CareNotes® are the copyrighted property of A D A M , Inc  or [address] [place] [address]

## 2021-07-12 NOTE — PROGRESS NOTES
Assessment/Plan:       Diagnoses and all orders for this visit:    Hypothyroidism, postsurgical  -     levothyroxine 175 mcg tablet; Take 1 tablet (175 mcg total) by mouth daily    Essential hypertension  -     amLODIPine (NORVASC) 5 mg tablet; Take 1 tablet (5 mg total) by mouth 2 (two) times a day  -     lisinopril-hydrochlorothiazide (PRINZIDE,ZESTORETIC) 20-12 5 MG per tablet; Take 1 tablet by mouth 2 (two) times a day  -     potassium chloride (Klor-Con M20) 20 mEq tablet; Take 1 tablet (20 mEq total) by mouth 2 (two) times a day    Lower extremity edema  -     furosemide (LASIX) 20 mg tablet; Take 1 tablet (20 mg total) by mouth daily as needed (edema)    Dermatitis  -     Discontinue: mometasone (ELOCON) 0 1 % cream; Apply topically daily  -     nystatin-triamcinolone (MYCOLOG-II) cream; Apply topically 2 (two) times a day    Gastroesophageal reflux disease with esophagitis without hemorrhage  -     omeprazole (PriLOSEC) 40 MG capsule; Take 1 capsule (40 mg total) by mouth daily    Depression, unspecified depression type  -     venlafaxine (EFFEXOR) 37 5 mg tablet; Take 1 tablet (37 5 mg total) by mouth daily    Medicare annual wellness visit, subsequent    Th[facility]    Impaired fasting glucose    Mixed hyperlipidemia            Subjective:        Patient ID: Koby Noyola is a 70 y o  female  Patient follow-up on hypertension hyperlipidemia lower extremity edema well as rash on face and around ear  Patient with shortness of breath  Patient also with history of hypothyroidism  No chest pain change urination defecation          The following portions of the patient's history were reviewed and updated as appropriate: allergies, current medications, past family history, past medical history, past social history, past surgical history and problem list       Review of Systems        Objective:               /84 (BP Location: Right arm, Patient Position: Sitting, Cuff Size: Large) Temp 97 9 °F (36 6 °C) (Tympanic)   Ht 5' 2" (1 575 m)   Wt 111 kg (244 lb)   LMP  (LMP Unknown)   BMI 44 63 kg/m²          Physical Exam

## 2021-07-12 NOTE — PROGRESS NOTES
Assessment and Plan:     Problem List Items Addressed This Visit        Endocrine    Hypothyroidism, postsurgical       Cardiovascular and Mediastinum    Hypertension       Musculoskeletal and Integument    Dermatitis       Other    Lower extremity edema      Other Visit Diagnoses     Gastroesophageal reflux disease with esophagitis without hemorrhage        Depression, unspecified depression type               Preventive health issues were discussed with patient, and age appropriate screening tests were ordered as noted in patient's After Visit Summary  Personalized health advice and appropriate referrals for health education or preventive services given if needed, as noted in patient's After Visit Summary  History of Present Illness:     Patient presents for [place] to Medicare visit       Patient Care Team:  Letitia Funes DO as PCP - Mitchel Erickson MD as PCP - Endocrinology (Endocrinology)  Letitia Funes DO as PCP - [facility]CP-[facility]  Letitia Funes, MD Phoebe London MD as Surgeon (Surgical Oncology)     Review of Systems:     Review of Systems   Problem List:     Patient Active Problem List   Diagnosis    History of thyroid cancer    GERD without esophagitis    Hyperlipidemia    Hypertension    Hypothyroidism, postsurgical    Impaired fasting glucose    Insomnia    Restrictive lung disease    Acute non-recurrent frontal sinusitis    Obstructive sleep apnea    Hypersomnia    Morbid obesity with BMI of 40 0-44 9, adult ([address] )    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[address] )    Humerus lesion, left    Acute shoulder bursitis, right    Lower extremity edema    Encounter for follow-up surveillance of thyroid cancer    Thyroid cancer ([address] )    Primary osteoarthritis of left knee    Primary osteoarthritis of right knee    Pes anserine bursitis    Dermatitis    Skin neoplasm    Viral syndrome    COVID-19    Simple chronic bronchitis ([address] )      Past Medical and Surgical History:     Past Medical History:   Diagnosis Date    Asthma     Breathing difficulty     Bronchitis     Depression     Fracture of arm     Fracture of carpal bone     Hyperlipidemia     Hypertension     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    Shortness of breath     Thyroid nodule     Thyroid nodule     Thyroid nodule      Past Surgical History:   Procedure Laterality Date    HEMORRHOID SURGERY      [place] THYROID LOBECTOMY,UNILAT Right 2016    Procedure: HEMITHYROIDECTOMY;  Surgeon: Jennifer Fields MD;  Location:  MAIN OR;  Service: Surgical Oncology    [place] THYROIDECTOMY POST PREV THYR SURG Left 2016    Procedure:  COMPLETION THYROIDECTOMY, FLEXIBLE LARYNGOSCOPY;  Surgeon: Jennifer Fields MD;  Location[facility];  Service: Surgical Oncology    THYROIDECTOMY, PARTIAL Left       Family History:     Family History   Adopted: Yes   Problem Relation Age of Onset    Hypertension Family     Kidney disease Family     Hypertension Mother       Social History:     Social History     Socioeconomic History    Marital status: /Civil Union     Spouse name: None    Number of children: None    Years of education: None    Highest education level: None   Occupational History    None   Tobacco Use    Smoking status: Former Smoker     Packs/day: 0 25     Years: 4 00     Pack years: 1 00     Types: Cigarettes     Quit date: 6/15/1981     Years since quittin 1    Smokeless tobacco: Former User    Tobacco comment: quit 40 yrs ago (Never a smoker per [employer])   Vaping Use    Vaping Use: Never used   Substance and Sexual Activity    Alcohol use: No     Comment: occasional glass of wine    Drug use: No    Sexual activity: Never   Other Topics Concern    None   Social History Narrative    Occasional caffeine consumption     Social Determinants of Health     Financial Resource Strain:     Difficulty of Paying Living Expenses:    Food Insecurity:     Worried About Running Out of Food in the Last Year:    [address] in the Last Year:    Transportation Needs:     Lack of Transportation (Medical):  Lack of Transportation (Non-Medical):    Physical Activity:     Days of Exercise per Week:     Minutes of Exercise per Session:    Stress:     Feeling of Stress :    Social Connections:     Frequency of Communication with Friends and Family:     Frequency of Social Gatherings with Friends and Family:     Attends Yazdanism Services:     Active Member of Clubs or Organizations:     Attends Club or Organization Meetings:     Marital Status:    Intimate Partner Violence:     Fear of Current or Ex-Partner:     Emotionally Abused:     Physically Abused:     Sexually Abused:       Medications and Allergies:     Current Outpatient Medications   Medication Sig Dispense Refill    albuterol (Ventolin HFA) 90 mcg/act inhaler Inhale 2 puffs every 4 (four) hours as needed for wheezing or shortness of breath 54 g 1    amLODIPine (NORVASC) 5 mg tablet Take 1 tablet (5 mg total) by mouth 2 (two) times a day 180 tablet 1    atorvastatin (LIPITOR) 20 mg tablet Take 1 tablet (20 mg total) by mouth daily 90 tablet 3    diclofenac (VOLTAREN) 75 mg EC tablet Take 1 tablet (75 mg total) by mouth 2 (two) times a day as needed (pain) Take with food   180 tablet 0    Diclofenac Sodium (VOLTAREN) 1 % Apply 4 g topically 4 (four) times a day as needed (pain) 150 g 1    furosemide (LASIX) 20 mg tablet Take 1 tablet (20 mg total) by mouth daily as needed (edema) 90 tablet 3    levothyroxine 175 mcg tablet Take 1 tablet (175 mcg total) by mouth daily 90 tablet 1    lisinopril-hydrochlorothiazide (PRINZIDE,ZESTORETIC) 20-12 5 MG per tablet Take 1 tablet by mouth 2 (two) times a day 180 tablet 0    Melatonin 10 MG TABS Take by mouth      nebivolol (BYSTOLIC) 5 mg tablet Take 1 tablet (5 mg total) by mouth daily 90 tablet 3    omeprazole (PriLOSEC) 40 MG capsule Take 1 capsule (40 mg total) by mouth daily 90 capsule 0    potassium chloride (Klor-Con M20) 20 mEq tablet Take 1 tablet (20 mEq total) by mouth 2 (two) times a day 180 tablet 3    venlafaxine (EFFEXOR) 37 5 mg tablet Take 1 tablet (37 5 mg total) by mouth daily 90 tablet 0    guaifenesin-codeine (GUAIFENESIN AC) 100-10 MG/5ML liquid Take 5 mL by mouth every 4 (four) hours as needed for cough 120 mL 1    methylPREDNISolone 4 MG tablet therapy pack Use as directed on package (Patient not taking: Reported on 7/12/2021) 21 each 0    mometasone (ELOCON) 0 1 % cream Apply topically daily 45 g 3    mometasone (ELOCON) 0 1 % lotion Apply 1 application topically daily (Patient not taking: Reported on 7/12/2021)       No current facility-administered medications for this visit  Allergies   Allergen Reactions    Adhesive [Medical Tape] Rash      Immunizations:     Immunization History   Administered Date(s) Administered    INFLUENZA 10/21/2007, 10/26/2020, 10/26/2020    Influenza Split High Dose Preservative Free IM 10/25/2012, 09/25/2013, 10/03/2014, 09/25/2015, 11/07/2017    Influenza, high dose seasonal 0 7 mL 11/12/2018, 10/31/2019    Influenza, seasonal, injectable 10/22/2003, 10/10/2008    Influenza, seasonal, injectable, preservative free 1945    Pneumococcal Conjugate 13-Valent 02/17/2020    Pneumococcal Polysaccharide PPV23 08/26/2013, 08/20/2018    SARS-CoV-2 / COVID-19 mRNA IM ([facility]) 03/26/2021, 04/28/2021    Td (adult), adsorbed 08/07/1987, 10/22/1997, 08/26/2013    Zoster 08/26/2013      Health Maintenance:         Topic Date Due    MAMMOGRAM  Never done    Colorectal Cancer Screening  Never done    Hepatitis C Screening  Completed         Topic Date Due    DTaP,Tdap,and Td Vaccines (1 - Tdap) 09/21/1968    Influenza Vaccine (1) 09/01/2021      Medicare Screening Tests and Risk Assessments:     Latoya Moses is here for her Subsequent Wellness visit       Health Risk Assessment:   Patient rates overall health as good  Patient feels that their physical health rating is same  Patient is satisfied with their life  Eyesight was rated as slightly worse  Hearing was rated as same  Patient feels that their emotional and mental health rating is same  Patients states they are never, rarely angry  Patient states they are sometimes unusually tired/fatigued  Pain experienced in the last 7 days has been some  Patient's pain rating has been 4/10  Patient states that she has experienced no weight loss or gain in last 6 months  Depression Screening:   PHQ-2 Score: 0  PHQ-9 Score: 0      Fall Risk Screening: In the past year, patient has experienced: no history of falling in past year      Urinary Incontinence Screening:   Patient has not leaked urine accidently in the last six months  Home Safety:  Patient does not have trouble with stairs inside or outside of their home  Patient has working smoke alarms and has working carbon monoxide detector  Home safety hazards include: none  Nutrition:   Current diet is Limited junk food  Medications:   Patient is not currently taking any over-the-counter supplements  Patient is able to manage medications  Activities of Daily Living (ADLs)/Instrumental Activities of Daily Living (IADLs):   Walk and transfer into and out of bed and chair?: Yes  Dress and groom yourself?: Yes    Bathe or shower yourself?: Yes    Feed yourself?  Yes  Do your laundry/housekeeping?: Yes  Manage your money, pay your bills and track your expenses?: Yes  Make your own meals?: Yes    Do your own shopping?: Yes    Previous Hospitalizations:   Any hospitalizations or ED visits within the last 12 months?: No      Advance Care Planning:   Living will: No    Durable POA for healthcare: No    Advanced directive: No    Five wishes given: Yes      Cognitive Screening:   Provider or family/friend/caregiver concerned regarding cognition?: No    PREVENTIVE SCREENINGS      Cardiovascular Screening: General: Screening Not Indicated, History Lipid Disorder, Risks and Benefits Discussed and Screening Current      Diabetes Screening:     General: Screening Current and Risks and Benefits Discussed      Colorectal Cancer Screening:     General: Risks and Benefits Discussed and Patient Declines      Breast Cancer Screening:     General: Risks and Benefits Discussed and Patient Declines      Cervical Cancer Screening:    General: Screening Not Indicated, Risks and Benefits Discussed and Patient Declines      Osteoporosis Screening:    General: Risks and Benefits Discussed and Patient Declines      Abdominal Aortic Aneurysm (AAA) Screening:        General: Risks and Benefits Discussed and Screening Not Indicated      Lung Cancer Screening:     General: Screening Not Indicated and Risks and Benefits Discussed      Hepatitis C Screening:    General: Screening Current and Risks and Benefits Discussed    Screening, Brief Intervention, and Referral to Treatment (SBIRT)    Screening    Typical number of drinks in a week: 4    Other Counseling Topics:   Regular weightbearing exercise       No exam data present     Physical Exam:     /84 (BP Location: Right arm, Patient Position: Sitting, Cuff Size: Large)   Temp 97 9 °F (36 6 °C) (Tympanic)   Ht 5' 2" (1 575 m)   Wt 111 kg (244 lb)   LMP  (LMP Unknown)   BMI 44 63 kg/m²     Physical Exam     Sergio Cuellar DO

## 2021-07-16 ENCOUNTER — VBI (OUTPATIENT)
Dept: ADMINISTRATIVE | Facility: OTHER | Age: 76
End: 2021-07-16

## 2021-07-20 ENCOUNTER — VBI (OUTPATIENT)
Dept: ADMINISTRATIVE | Facility: OTHER | Age: 76
End: 2021-07-20

## 2021-08-13 ENCOUNTER — APPOINTMENT (OUTPATIENT)
Dept: LAB | Facility: MEDICAL CENTER | Age: 76
End: 2021-08-13
Payer: COMMERCIAL

## 2021-08-13 DIAGNOSIS — H02.423 MYOGENIC PTOSIS OF EYELID OF BOTH EYES: ICD-10-CM

## 2021-08-13 PROCEDURE — 36415 COLL VENOUS BLD VENIPUNCTURE: CPT

## 2021-08-13 PROCEDURE — 83519 RIA NONANTIBODY: CPT

## 2021-08-16 LAB — ACHR BIND AB SER-SCNC: <0.03 NMOL/L (ref 0–0.24)

## 2021-08-17 LAB — ACHR BLOCK AB/ACHR TOTAL SFR SER: 24 % (ref 0–25)

## 2021-08-23 LAB — ACHR MOD AB/ACHR TOTAL SFR SER: <12 % (ref 0–20)

## 2021-08-26 ENCOUNTER — TELEPHONE (OUTPATIENT)
Dept: OBGYN CLINIC | Facility: HOSPITAL | Age: 76
End: 2021-08-26

## 2021-08-26 DIAGNOSIS — M17.0 BILATERAL PRIMARY OSTEOARTHRITIS OF KNEE: ICD-10-CM

## 2021-08-26 NOTE — TELEPHONE ENCOUNTER
Patient needs refill,  Diclofenac Sodium (VOLTAREN) 1 % [[id number]]     Order Details  Dose: 4 g Route: Topical Frequency: 4 times daily PRN for pain   Dispense Quantity: 150 g Refills: 1          Sig: Apply 4 g topically 4 (four) times a day as needed (pain)         Start Date: 05/07/21 End Date: --   Written Date: 05/07/21 Expiration Date: 05/07/22       Diagnosis Association: Bilateral primary osteoarthritis of knee (M17 0)   Original Order:  Diclofenac Sodium (VOLTAREN) 1 % [[id number]]   Providers    Authorizing Provider:    Jamaica Davidson PA-C   [address] [place] [address], [address] [id number]   Phone:  [phone]   Fax:  [phone]   [facility] #:  [id number]   NPI:  [id number]        Ordering User:  Jamaica Davidson PA-C        Pharmacy    [place] [id number]   Phone:  [phone]  Fax:  [phone]   [address] [place] [address] #:  [id number]   Order Reconciliation Actions       Order Reconciliation Actions   Warnings History    Total number of overridden warnings: 2   Full Warnings History   E-Prescribing Status    Outpatient Medication Detail    Diclofenac Sodium (VOLTAREN) 1 %        Sig: Apply 4 g topically 4 (four) times a day as needed (pain)        Sent to pharmacy as: Diclofenac Sodium (VOLTAREN) 1 %        Class: Normal        Route: Topical        E-Prescribing Status: Receipt confirmed by pharmacy (5/7/2021 11:37 AM EDT)

## 2021-09-16 ENCOUNTER — PROCEDURE VISIT (OUTPATIENT)
Dept: OBGYN CLINIC | Facility: MEDICAL CENTER | Age: 76
End: 2021-09-16
Payer: COMMERCIAL

## 2021-09-16 VITALS
HEART RATE: 69 BPM | HEIGHT: 62 IN | BODY MASS INDEX: 45.01 KG/M2 | WEIGHT: 244.6 LBS | DIASTOLIC BLOOD PRESSURE: 86 MMHG | SYSTOLIC BLOOD PRESSURE: 162 MMHG

## 2021-09-16 DIAGNOSIS — M17.0 BILATERAL PRIMARY OSTEOARTHRITIS OF KNEE: Primary | ICD-10-CM

## 2021-09-16 PROCEDURE — 20610 DRAIN/INJ JOINT/BURSA W/O US: CPT | Performed by: PHYSICIAN ASSISTANT

## 2021-09-16 PROCEDURE — 3077F SYST BP >= 140 MM HG: CPT | Performed by: PHYSICIAN ASSISTANT

## 2021-09-16 PROCEDURE — 3079F DIAST BP 80-89 MM HG: CPT | Performed by: PHYSICIAN ASSISTANT

## 2021-09-16 PROCEDURE — 99213 OFFICE O/P EST LOW 20 MIN: CPT | Performed by: PHYSICIAN ASSISTANT

## 2021-09-16 RX ORDER — LIDOCAINE HYDROCHLORIDE 10 MG/ML
3 INJECTION, SOLUTION INFILTRATION; PERINEURAL
Status: COMPLETED | OUTPATIENT
Start: 2021-09-16 | End: 2021-09-16

## 2021-09-16 RX ORDER — METHYLPREDNISOLONE ACETATE 40 MG/ML
2 INJECTION, SUSPENSION INTRA-ARTICULAR; INTRALESIONAL; INTRAMUSCULAR; SOFT TISSUE
Status: COMPLETED | OUTPATIENT
Start: 2021-09-16 | End: 2021-09-16

## 2021-09-16 RX ADMIN — LIDOCAINE HYDROCHLORIDE 3 ML: 10 INJECTION, SOLUTION INFILTRATION; PERINEURAL at 13:51

## 2021-09-16 RX ADMIN — METHYLPREDNISOLONE ACETATE 2 ML: 40 INJECTION, SUSPENSION INTRA-ARTICULAR; INTRALESIONAL; INTRAMUSCULAR; SOFT TISSUE at 13:51

## 2021-09-16 NOTE — PROGRESS NOTES
Assessment/Plan:  1  Bilateral primary osteoarthritis of knee      Orders Placed This Encounter   Procedures    Large joint arthrocentesis       · Patient received bilateral knee Euflexxa injections 1/3 today  Tolerated the procedures well  Advised to apply ice and avoid strenuous activity for 1-2 days as needed  · Continue diclofenac tabs and gel  · Continue home exercises  Return in about 1 week (around 9/23/2021) for bilat knee euflexxa 2/3  I answered all of the patient's questions during the visit and provided education of the patient's condition during the visit  The patient verbalized understanding of the information given and agrees with the plan  This note was dictated using [employer] software  It may contain errors including improperly dictated words  Please contact physician directly for any questions  Subjective   Chief Complaint:   Chief Complaint   Patient presents with    Left Knee - Follow-up    Right Knee - Follow-up       HPI  Dora Kehr is a 70 y o  female who presents for follow up for bilateral knee pain and OA  Patient is here today for bilateral knee Euflexxa injections  Patient states that her knees are sore, right knee worse than left knee  She is taking diclofenac tabs for pain  She ran out of voltaren gel and is using another cream OTC which does not work as well  She is doing home exercises  Review of Systems  ROS:    See HPI for musculoskeletal review     All other systems reviewed are negative     History:  Past Medical History:   Diagnosis Date    Asthma     Breathing difficulty     Bronchitis     Depression     Fracture of arm     Fracture of carpal bone     Hyperlipidemia     Hypertension     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[facility] ) 12/16/2016    Shortness of breath     Thyroid nodule     Thyroid nodule     Thyroid nodule      Past Surgical History:   Procedure Laterality Date    HEMORRHOID SURGERY      [place] THYROID LOBECTOMY,UNILAT Right 2016    Procedure: HEMITHYROIDECTOMY;  Surgeon: Carlene Connell MD;  [facility];  Service: Surgical Oncology    [place] THYROIDECTOMY POST PREV THYR SURG Left 2016    Procedure:  COMPLETION THYROIDECTOMY, FLEXIBLE LARYNGOSCOPY;  Surgeon: Carlene Connell MD;  [facility];  Service: Surgical Oncology    THYROIDECTOMY, PARTIAL Left      Social History   Social History     Substance and Sexual Activity   Alcohol Use No    Comment: occasional glass of wine     Social History     Substance and Sexual Activity   Drug Use No     Social History     Tobacco Use   Smoking Status Former Smoker    Packs/day: 0 25    Years: 4 00    Pack years: 1 00    Types: Cigarettes    Quit date: 6/15/1981    Years since quittin 2   Smokeless Tobacco Former User   Tobacco Comment    quit 36 yrs ago (Never a smoker per [employer])     Family History:   Family History   Adopted: Yes   Problem Relation Age of Onset    Hypertension Family     Kidney disease Family     Hypertension Mother        Current Outpatient Medications on File Prior to Visit   Medication Sig Dispense Refill    albuterol (Ventolin HFA) 90 mcg/act inhaler Inhale 2 puffs every 4 (four) hours as needed for wheezing or shortness of breath 54 g 1    amLODIPine (NORVASC) 5 mg tablet Take 1 tablet (5 mg total) by mouth 2 (two) times a day 180 tablet 1    atorvastatin (LIPITOR) 20 mg tablet Take 1 tablet (20 mg total) by mouth daily 90 tablet 3    diclofenac (VOLTAREN) 75 mg EC tablet Take 1 tablet (75 mg total) by mouth 2 (two) times a day as needed (pain) Take with food  180 tablet 0    Diclofenac Sodium (VOLTAREN) 1 % APPLY 4 GRAMS TOPICALLY 4 TIMES A DAY AS NEEDED FOR PAIN   GENERIC EQUIVALENT FOR VOLTAREN 100 g 1    furosemide (LASIX) 20 mg tablet Take 1 tablet (20 mg total) by mouth daily as needed (edema) 90 tablet 1    levothyroxine 175 mcg tablet Take 1 tablet (175 mcg total) by mouth daily 90 tablet 1    lisinopril-hydrochlorothiazide (PRINZIDE,ZESTORETIC) 20-12 5 MG per tablet Take 1 tablet by mouth 2 (two) times a day 180 tablet 1    Melatonin 10 MG TABS Take by mouth      nebivolol (BYSTOLIC) 5 mg tablet Take 1 tablet (5 mg total) by mouth daily 90 tablet 3    nystatin-triamcinolone (MYCOLOG-II) cream Apply topically 2 (two) times a day 30 g 3    omeprazole (PriLOSEC) 40 MG capsule Take 1 capsule (40 mg total) by mouth daily 90 capsule 1    potassium chloride (Klor-Con M20) 20 mEq tablet Take 1 tablet (20 mEq total) by mouth 2 (two) times a day 180 tablet 1    venlafaxine (EFFEXOR) 37 5 mg tablet Take 1 tablet (37 5 mg total) by mouth daily 90 tablet 0     No current facility-administered medications on file prior to visit  Allergies   Allergen Reactions    Adhesive [Medical Tape] Rash        Objective     /86   Pulse 69   Ht 5' 2" (1 575 m)   Wt 111 kg (244 lb 9 6 oz)   LMP  (LMP Unknown)   BMI 44 74 kg/m²      PE:  AAOx 3  WDWN  Hearing intact, no drainage from eyes  no audible wheezing  no abdominal distension  LE compartments soft, skin intact    Ortho Exam:  bilateral Knee:   No erythema  no swelling  no effusion  no warmth  No TTP  AROM: 3- 120  Stable to varus/valgus stress      Large joint arthrocentesis: bilateral knee  Universal Protocol:  Consent: Verbal consent obtained    Risks and benefits: risks, benefits and alternatives were discussed  Consent given by: patient  Site marked: the operative site was marked  Supporting Documentation  Indications: pain   Procedure Details  Location: knee - bilateral knee  Preparation: Patient was prepped and draped in the usual sterile fashion  Needle size: 22 G  Ultrasound guidance: no  Approach: anterolateral    Medications (Right): 3 mL lidocaine 1 %; 2 mL methylPREDNISolone acetate 40 mg/mLMedications (Left): 3 mL lidocaine 1 %; 2 mL methylPREDNISolone acetate 40 mg/mL   Patient tolerance: patient tolerated the procedure well with no immediate complications  Dressing:  Sterile dressing applied

## 2021-09-21 ENCOUNTER — VBI (OUTPATIENT)
Dept: ADMINISTRATIVE | Facility: OTHER | Age: 76
End: 2021-09-21

## 2021-09-23 ENCOUNTER — OFFICE VISIT (OUTPATIENT)
Dept: FAMILY MEDICINE CLINIC | Facility: CLINIC | Age: 76
End: 2021-09-23
Payer: COMMERCIAL

## 2021-09-23 DIAGNOSIS — J01.00 ACUTE NON-RECURRENT MAXILLARY SINUSITIS: Primary | ICD-10-CM

## 2021-09-23 PROCEDURE — 99213 OFFICE O/P EST LOW 20 MIN: CPT | Performed by: FAMILY MEDICINE

## 2021-09-23 RX ORDER — AZITHROMYCIN 250 MG/1
TABLET, FILM COATED ORAL
Qty: 6 TABLET | Refills: 0 | Status: SHIPPED | OUTPATIENT
Start: 2021-09-23 | End: 2021-09-27

## 2021-09-23 RX ORDER — GUAIFENESIN AND CODEINE PHOSPHATE 100; 10 MG/5ML; MG/5ML
5 SOLUTION ORAL 3 TIMES DAILY PRN
Qty: 120 ML | Refills: 0 | Status: SHIPPED | OUTPATIENT
Start: 2021-09-23 | End: 2021-11-01

## 2021-09-23 NOTE — PROGRESS NOTES
Assessment/Plan:       There are no diagnoses linked to this encounter  Subjective:        Patient ID: Sheron Mccrary is a 78 y o  female  Patient is here with sinus pain and pressure over the frontal sinuses well as right ear pain as well as sore throat and cough which began yesterday  Patient did have COVID back in January  Patient did have COVID vaccines also later in the year  No significant sputum production  Patient does feel tired  No nausea or vomiting  No loss of smell or taste  Patient with sinus headache  No treatment use  No direct exposure to COVID at this time  The following portions of the patient's history were reviewed and updated as appropriate: allergies, current medications, past family history, past medical history, past social history, past surgical history and problem list       Review of Systems   Constitutional: Positive for fatigue  Negative for fever  HENT: Positive for congestion, postnasal drip, rhinorrhea, sinus pressure, sinus pain and sore throat  Eyes: Negative  Respiratory: Positive for cough  Cardiovascular: Negative  Gastrointestinal: Negative  Endocrine: Negative  Genitourinary: Negative  Musculoskeletal: Negative  Skin: Negative  Allergic/Immunologic: Negative  Neurological: Positive for headaches  Hematological: Negative  Psychiatric/Behavioral: Negative  Objective:               LMP  (LMP Unknown)          Physical Exam  Vitals and nursing note reviewed  Constitutional:       General: She is not in acute distress  Appearance: Normal appearance  She is not ill-appearing, toxic-appearing or diaphoretic  HENT:      Head: Normocephalic and atraumatic  Right Ear: Tympanic membrane, ear canal and external ear normal  There is no impacted cerumen  Left Ear: Tympanic membrane, ear canal and external ear normal  There is no impacted cerumen  Nose: Congestion and rhinorrhea present  Mouth/Throat:      Mouth: Mucous membranes are moist       Pharynx: Oropharyngeal exudate present  No posterior oropharyngeal erythema  Eyes:      General: No scleral icterus  Right eye: No discharge  Left eye: No discharge  Extraocular Movements: Extraocular movements intact  Conjunctiva/sclera: Conjunctivae normal       Pupils: Pupils are equal, round, and reactive to light  Neck:      Vascular: No carotid bruit  Cardiovascular:      Rate and Rhythm: Normal rate and regular rhythm  Pulses: Normal pulses  Heart sounds: Normal heart sounds  No murmur heard  No friction rub  No gallop  Pulmonary:      Effort: Pulmonary effort is normal  No respiratory distress  Breath sounds: Normal breath sounds  No stridor  No wheezing, rhonchi or rales  Chest:      Chest wall: No tenderness  Musculoskeletal:         General: No swelling, tenderness, deformity or signs of injury  Normal range of motion  Cervical back: Normal range of motion and neck supple  No rigidity  No muscular tenderness  Right lower leg: No edema  Left lower leg: No edema  Lymphadenopathy:      Cervical: No cervical adenopathy  Skin:     General: Skin is warm and dry  Capillary Refill: Capillary refill takes less than 2 seconds  Coloration: Skin is not jaundiced  Findings: No bruising, erythema, lesion or rash  Neurological:      Mental Status: She is alert and oriented to person, place, and time  Mental status is at baseline  Cranial Nerves: No cranial nerve deficit  Sensory: No sensory deficit  Motor: No weakness  Coordination: Coordination normal       Gait: Gait normal    Psychiatric:         Mood and Affect: Mood normal          Behavior: Behavior normal          Thought Content:  Thought content normal          Judgment: Judgment normal

## 2021-09-24 ENCOUNTER — PROCEDURE VISIT (OUTPATIENT)
Dept: OBGYN CLINIC | Facility: MEDICAL CENTER | Age: 76
End: 2021-09-24
Payer: COMMERCIAL

## 2021-09-24 VITALS
HEART RATE: 76 BPM | WEIGHT: 244.6 LBS | DIASTOLIC BLOOD PRESSURE: 73 MMHG | HEIGHT: 62 IN | SYSTOLIC BLOOD PRESSURE: 156 MMHG | BODY MASS INDEX: 45.01 KG/M2

## 2021-09-24 DIAGNOSIS — M17.0 BILATERAL PRIMARY OSTEOARTHRITIS OF KNEE: Primary | ICD-10-CM

## 2021-09-24 PROCEDURE — 20610 DRAIN/INJ JOINT/BURSA W/O US: CPT | Performed by: PHYSICIAN ASSISTANT

## 2021-09-24 PROCEDURE — 1036F TOBACCO NON-USER: CPT | Performed by: PHYSICIAN ASSISTANT

## 2021-09-24 PROCEDURE — 99213 OFFICE O/P EST LOW 20 MIN: CPT | Performed by: PHYSICIAN ASSISTANT

## 2021-09-24 PROCEDURE — 3008F BODY MASS INDEX DOCD: CPT | Performed by: PHYSICIAN ASSISTANT

## 2021-09-24 PROCEDURE — 1160F RVW MEDS BY RX/DR IN RCRD: CPT | Performed by: PHYSICIAN ASSISTANT

## 2021-09-24 RX ORDER — HYALURONATE SODIUM 10 MG/ML
20 SYRINGE (ML) INTRAARTICULAR
Status: COMPLETED | OUTPATIENT
Start: 2021-09-24 | End: 2021-09-24

## 2021-09-24 RX ADMIN — Medication 20 MG: at 19:47

## 2021-09-24 NOTE — PROGRESS NOTES
Assessment/Plan:  1  Bilateral primary osteoarthritis of knee      Orders Placed This Encounter   Procedures    Large joint arthrocentesis       · Patient received bilateral knee Euflexxa injections 2/3 today  Tolerated the procedure well  Advised to apply ice and avoid strenuous activity for 1-2 days as needed  · Continue diclofenac tabs and gel prn  · Continue home exercises  Return in about 1 week (around 10/1/2021) for bilateral knee euflexxa 3/3  I answered all of the patient's questions during the visit and provided education of the patient's condition during the visit  The patient verbalized understanding of the information given and agrees with the plan  This note was dictated using [employer] software  It may contain errors including improperly dictated words  Please contact physician directly for any questions  Subjective   Chief Complaint:   Chief Complaint   Patient presents with    Left Knee - Follow-up    Right Knee - Follow-up       HPI  Olu Arzola is a 78 y o  female who presents for follow up for bilateral knee pain and OA  Patient is here today for bilateral knee Euflexxa injections 2/3  Patient states her right knee continues to crack and pop but her left knee is mildly improved since her first injection  Patient is taking diclofenac tabs and using diclofenac gel prn  Patient is doing home exercises  Review of Systems  ROS:    See HPI for musculoskeletal review     All other systems reviewed are negative     History:  Past Medical History:   Diagnosis Date    Asthma     Breathing difficulty     Bronchitis     Depression     Fracture of arm     Fracture of carpal bone     Hyperlipidemia     Hypertension     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[facility] [address] ) 12/16/2016    Shortness of breath     Thyroid nodule     Thyroid nodule     Thyroid nodule      Past Surgical History:   Procedure Laterality Date    HEMORRHOID SURGERY      DC THYROID LOBECTOMY,UNILAT Right 2016    Procedure: HEMITHYROIDECTOMY;  Surgeon: Marilou Brizuela MD;  [facility];  Service: Surgical Oncology    TX THYROIDECTOMY POST PREV THYR SURG Left 2016    Procedure:  COMPLETION THYROIDECTOMY, FLEXIBLE LARYNGOSCOPY;  Surgeon: Marilou Brizuela MD;  [facility];  Service: Surgical Oncology    THYROIDECTOMY, PARTIAL Left      Social History   Social History     Substance and Sexual Activity   Alcohol Use No    Comment: occasional glass of wine     Social History     Substance and Sexual Activity   Drug Use No     Social History     Tobacco Use   Smoking Status Former Smoker    Packs/day: 0 25    Years: 4 00    Pack years: 1 00    Types: Cigarettes    Quit date: 6/15/1981    Years since quittin 3   Smokeless Tobacco Former User   Tobacco Comment    quit 36 yrs ago (Never a smoker per [employer])     Family History:   Family History   Adopted: Yes   Problem Relation Age of Onset    Hypertension Family     Kidney disease Family     Hypertension Mother        Current Outpatient Medications on File Prior to Visit   Medication Sig Dispense Refill    albuterol (Ventolin HFA) 90 mcg/act inhaler Inhale 2 puffs every 4 (four) hours as needed for wheezing or shortness of breath 54 g 1    amLODIPine (NORVASC) 5 mg tablet Take 1 tablet (5 mg total) by mouth 2 (two) times a day 180 tablet 1    atorvastatin (LIPITOR) 20 mg tablet Take 1 tablet (20 mg total) by mouth daily 90 tablet 3    azithromycin (ZITHROMAX) 250 mg tablet Take 2 tablets today then 1 tablet daily x 4 days 6 tablet 0    diclofenac (VOLTAREN) 75 mg EC tablet Take 1 tablet (75 mg total) by mouth 2 (two) times a day as needed (pain) Take with food  180 tablet 0    Diclofenac Sodium (VOLTAREN) 1 % APPLY 4 GRAMS TOPICALLY 4 TIMES A DAY AS NEEDED FOR PAIN   GENERIC EQUIVALENT FOR VOLTAREN 100 g 1    furosemide (LASIX) 20 mg tablet Take 1 tablet (20 mg total) by mouth daily as needed (edema) 90 tablet 1    guaifenesin-codeine (GUAIFENESIN AC) 100-10 MG/5ML liquid Take 5 mL by mouth 3 (three) times a day as needed for cough 120 mL 0    levothyroxine 175 mcg tablet Take 1 tablet (175 mcg total) by mouth daily 90 tablet 1    lisinopril-hydrochlorothiazide (PRINZIDE,ZESTORETIC) 20-12 5 MG per tablet Take 1 tablet by mouth 2 (two) times a day 180 tablet 1    Melatonin 10 MG TABS Take by mouth      nebivolol (BYSTOLIC) 5 mg tablet Take 1 tablet (5 mg total) by mouth daily 90 tablet 3    nystatin-triamcinolone (MYCOLOG-II) cream Apply topically 2 (two) times a day 30 g 3    omeprazole (PriLOSEC) 40 MG capsule Take 1 capsule (40 mg total) by mouth daily 90 capsule 1    potassium chloride (Klor-Con M20) 20 mEq tablet Take 1 tablet (20 mEq total) by mouth 2 (two) times a day 180 tablet 1    venlafaxine (EFFEXOR) 37 5 mg tablet Take 1 tablet (37 5 mg total) by mouth daily 90 tablet 0     No current facility-administered medications on file prior to visit  Allergies   Allergen Reactions    Adhesive [Medical Tape] Rash        Objective     /73   Pulse 76   Ht 5' 2" (1 575 m)   Wt 111 kg (244 lb 9 6 oz)   LMP  (LMP Unknown)   BMI 44 74 kg/m²      PE:  AAOx 3  WDWN  Hearing intact, no drainage from eyes  no audible wheezing  no abdominal distension  LE compartments soft, skin intact    Ortho Exam:  bilateral Knee:   No erythema  no swelling  no effusion  no warmth  No TTP  AROM: 0- 115  Stable to varus/valgus stress    Large joint arthrocentesis: bilateral knee  Universal Protocol:  Consent: Verbal consent obtained    Risks and benefits: risks, benefits and alternatives were discussed  Consent given by: patient  Site marked: the operative site was marked  Supporting Documentation  Indications: pain   Procedure Details  Location: knee - bilateral knee  Needle size: 22 G  Approach: anterolateral    Medications (Right): 20 mg Sodium Hyaluronate 20 MG/2MLMedications (Left): 20 mg Sodium Hyaluronate 20 MG/2ML Patient tolerance: patient tolerated the procedure well with no immediate complications  Dressing:  Sterile dressing applied

## 2021-10-01 ENCOUNTER — APPOINTMENT (OUTPATIENT)
Dept: LAB | Facility: MEDICAL CENTER | Age: 76
End: 2021-10-01
Payer: COMMERCIAL

## 2021-10-01 ENCOUNTER — PROCEDURE VISIT (OUTPATIENT)
Dept: OBGYN CLINIC | Facility: MEDICAL CENTER | Age: 76
End: 2021-10-01
Payer: COMMERCIAL

## 2021-10-01 VITALS
HEIGHT: 62 IN | DIASTOLIC BLOOD PRESSURE: 78 MMHG | HEART RATE: 77 BPM | WEIGHT: 246.2 LBS | BODY MASS INDEX: 45.3 KG/M2 | SYSTOLIC BLOOD PRESSURE: 130 MMHG

## 2021-10-01 DIAGNOSIS — Z85.850 ENCOUNTER FOR FOLLOW-UP SURVEILLANCE OF THYROID CANCER: ICD-10-CM

## 2021-10-01 DIAGNOSIS — M17.12 ARTHRITIS OF LEFT KNEE: Primary | ICD-10-CM

## 2021-10-01 DIAGNOSIS — Z08 ENCOUNTER FOR FOLLOW-UP SURVEILLANCE OF THYROID CANCER: ICD-10-CM

## 2021-10-01 DIAGNOSIS — M17.11 ARTHRITIS OF RIGHT KNEE: ICD-10-CM

## 2021-10-01 PROCEDURE — 86800 THYROGLOBULIN ANTIBODY: CPT

## 2021-10-01 PROCEDURE — 99213 OFFICE O/P EST LOW 20 MIN: CPT | Performed by: ORTHOPAEDIC SURGERY

## 2021-10-01 PROCEDURE — 20610 DRAIN/INJ JOINT/BURSA W/O US: CPT | Performed by: ORTHOPAEDIC SURGERY

## 2021-10-01 PROCEDURE — 84432 ASSAY OF THYROGLOBULIN: CPT

## 2021-10-01 RX ORDER — HYALURONATE SODIUM 10 MG/ML
20 SYRINGE (ML) INTRAARTICULAR
Status: COMPLETED | OUTPATIENT
Start: 2021-10-01 | End: 2021-10-01

## 2021-10-01 RX ADMIN — Medication 20 MG: at 13:10

## 2021-10-05 LAB
THYROGLOB AB SERPL-ACNC: <1 IU/ML (ref 0–0.9)
THYROGLOB SERPL-MCNC: 3.4 NG/ML (ref 1.5–38.5)

## 2021-10-14 ENCOUNTER — VBI (OUTPATIENT)
Dept: ADMINISTRATIVE | Facility: OTHER | Age: 76
End: 2021-10-14

## 2021-10-20 ENCOUNTER — VBI (OUTPATIENT)
Dept: ADMINISTRATIVE | Facility: OTHER | Age: 76
End: 2021-10-20

## 2021-10-21 ENCOUNTER — RA CDI HCC (OUTPATIENT)
Dept: OTHER | Facility: HOSPITAL | Age: 76
End: 2021-10-21

## 2021-10-22 ENCOUNTER — OFFICE VISIT (OUTPATIENT)
Dept: OBGYN CLINIC | Facility: MEDICAL CENTER | Age: 76
End: 2021-10-22
Payer: COMMERCIAL

## 2021-10-22 ENCOUNTER — APPOINTMENT (OUTPATIENT)
Dept: LAB | Facility: MEDICAL CENTER | Age: 76
End: 2021-10-22
Payer: COMMERCIAL

## 2021-10-22 VITALS
HEIGHT: 62 IN | DIASTOLIC BLOOD PRESSURE: 78 MMHG | HEART RATE: 77 BPM | WEIGHT: 252 LBS | BODY MASS INDEX: 46.38 KG/M2 | SYSTOLIC BLOOD PRESSURE: 132 MMHG

## 2021-10-22 DIAGNOSIS — M17.0 BILATERAL PRIMARY OSTEOARTHRITIS OF KNEE: ICD-10-CM

## 2021-10-22 DIAGNOSIS — M70.51 PES ANSERINUS BURSITIS OF RIGHT KNEE: ICD-10-CM

## 2021-10-22 DIAGNOSIS — M70.52 PES ANSERINUS BURSITIS OF LEFT KNEE: Primary | ICD-10-CM

## 2021-10-22 DIAGNOSIS — E78.2 MIXED HYPERLIPIDEMIA: ICD-10-CM

## 2021-10-22 LAB
AST SERPL W P-5'-P-CCNC: 21 U/L (ref 5–45)
LDLC SERPL DIRECT ASSAY-MCNC: 79 MG/DL (ref 0–100)

## 2021-10-22 PROCEDURE — 84450 TRANSFERASE (AST) (SGOT): CPT

## 2021-10-22 PROCEDURE — 3078F DIAST BP <80 MM HG: CPT | Performed by: ORTHOPAEDIC SURGERY

## 2021-10-22 PROCEDURE — 36415 COLL VENOUS BLD VENIPUNCTURE: CPT

## 2021-10-22 PROCEDURE — 1036F TOBACCO NON-USER: CPT | Performed by: ORTHOPAEDIC SURGERY

## 2021-10-22 PROCEDURE — 3075F SYST BP GE 130 - 139MM HG: CPT | Performed by: ORTHOPAEDIC SURGERY

## 2021-10-22 PROCEDURE — 20610 DRAIN/INJ JOINT/BURSA W/O US: CPT | Performed by: ORTHOPAEDIC SURGERY

## 2021-10-22 PROCEDURE — 99213 OFFICE O/P EST LOW 20 MIN: CPT | Performed by: ORTHOPAEDIC SURGERY

## 2021-10-22 PROCEDURE — 1160F RVW MEDS BY RX/DR IN RCRD: CPT | Performed by: ORTHOPAEDIC SURGERY

## 2021-10-22 PROCEDURE — 83721 ASSAY OF BLOOD LIPOPROTEIN: CPT

## 2021-10-22 RX ORDER — METHYLPREDNISOLONE ACETATE 40 MG/ML
1 INJECTION, SUSPENSION INTRA-ARTICULAR; INTRALESIONAL; INTRAMUSCULAR; SOFT TISSUE
Status: COMPLETED | OUTPATIENT
Start: 2021-10-22 | End: 2021-10-22

## 2021-10-22 RX ORDER — LIDOCAINE HYDROCHLORIDE 10 MG/ML
2 INJECTION, SOLUTION INFILTRATION; PERINEURAL
Status: COMPLETED | OUTPATIENT
Start: 2021-10-22 | End: 2021-10-22

## 2021-10-22 RX ADMIN — METHYLPREDNISOLONE ACETATE 1 ML: 40 INJECTION, SUSPENSION INTRA-ARTICULAR; INTRALESIONAL; INTRAMUSCULAR; SOFT TISSUE at 09:36

## 2021-10-22 RX ADMIN — LIDOCAINE HYDROCHLORIDE 2 ML: 10 INJECTION, SOLUTION INFILTRATION; PERINEURAL at 09:36

## 2021-10-26 ENCOUNTER — HOSPITAL ENCOUNTER (OUTPATIENT)
Dept: ULTRASOUND IMAGING | Facility: HOSPITAL | Age: 76
Discharge: HOME | End: 2021-10-26
Attending: SURGERY
Payer: COMMERCIAL

## 2021-10-26 DIAGNOSIS — Z08 ENCOUNTER FOR FOLLOW-UP SURVEILLANCE OF THYROID CANCER: ICD-10-CM

## 2021-10-26 DIAGNOSIS — Z85.850 ENCOUNTER FOR FOLLOW-UP SURVEILLANCE OF THYROID CANCER: ICD-10-CM

## 2021-10-26 PROCEDURE — 76536 US EXAM OF HEAD AND NECK: CPT

## 2021-10-29 ENCOUNTER — OFFICE VISIT (OUTPATIENT)
Dept: SURGICAL ONCOLOGY | Facility: CLINIC | Age: 76
End: 2021-10-29
Payer: COMMERCIAL

## 2021-10-29 VITALS
WEIGHT: 246.6 LBS | RESPIRATION RATE: 18 BRPM | BODY MASS INDEX: 45.38 KG/M2 | DIASTOLIC BLOOD PRESSURE: 79 MMHG | OXYGEN SATURATION: 96 % | SYSTOLIC BLOOD PRESSURE: 168 MMHG | TEMPERATURE: 98.5 F | HEART RATE: 64 BPM | HEIGHT: 62 IN

## 2021-10-29 DIAGNOSIS — Z85.850 HISTORY OF THYROID CANCER: ICD-10-CM

## 2021-10-29 DIAGNOSIS — Z85.850 ENCOUNTER FOR FOLLOW-UP SURVEILLANCE OF THYROID CANCER: Primary | ICD-10-CM

## 2021-10-29 DIAGNOSIS — Z08 ENCOUNTER FOR FOLLOW-UP SURVEILLANCE OF THYROID CANCER: Primary | ICD-10-CM

## 2021-10-29 PROCEDURE — 99214 OFFICE O/P EST MOD 30 MIN: CPT | Performed by: SURGERY

## 2021-10-29 PROCEDURE — 3008F BODY MASS INDEX DOCD: CPT | Performed by: ORTHOPAEDIC SURGERY

## 2021-11-01 ENCOUNTER — OFFICE VISIT (OUTPATIENT)
Dept: FAMILY MEDICINE CLINIC | Facility: CLINIC | Age: 76
End: 2021-11-01
Payer: COMMERCIAL

## 2021-11-01 VITALS
HEART RATE: 60 BPM | OXYGEN SATURATION: 97 % | WEIGHT: 242 LBS | TEMPERATURE: 97.9 F | SYSTOLIC BLOOD PRESSURE: 132 MMHG | HEIGHT: 62 IN | RESPIRATION RATE: 16 BRPM | DIASTOLIC BLOOD PRESSURE: 90 MMHG | BODY MASS INDEX: 44.53 KG/M2

## 2021-11-01 DIAGNOSIS — E89.0 HYPOTHYROIDISM, POSTSURGICAL: ICD-10-CM

## 2021-11-01 DIAGNOSIS — H02.403 PTOSIS OF BOTH EYELIDS: ICD-10-CM

## 2021-11-01 DIAGNOSIS — Z01.818 PREOP EXAMINATION: Primary | ICD-10-CM

## 2021-11-01 DIAGNOSIS — Z23 NEED FOR IMMUNIZATION AGAINST INFLUENZA: ICD-10-CM

## 2021-11-01 DIAGNOSIS — I10 PRIMARY HYPERTENSION: ICD-10-CM

## 2021-11-01 PROCEDURE — 90662 IIV NO PRSV INCREASED AG IM: CPT

## 2021-11-01 PROCEDURE — 99214 OFFICE O/P EST MOD 30 MIN: CPT | Performed by: FAMILY MEDICINE

## 2021-11-01 PROCEDURE — 3075F SYST BP GE 130 - 139MM HG: CPT | Performed by: FAMILY MEDICINE

## 2021-11-01 PROCEDURE — G0008 ADMIN INFLUENZA VIRUS VAC: HCPCS

## 2021-11-01 PROCEDURE — 3080F DIAST BP >= 90 MM HG: CPT | Performed by: FAMILY MEDICINE

## 2021-11-15 ENCOUNTER — VBI (OUTPATIENT)
Dept: ADMINISTRATIVE | Facility: OTHER | Age: 76
End: 2021-11-15

## 2021-11-16 ENCOUNTER — OFFICE VISIT (OUTPATIENT)
Dept: CARDIOLOGY CLINIC | Facility: CLINIC | Age: 76
End: 2021-11-16
Payer: COMMERCIAL

## 2021-11-16 VITALS
WEIGHT: 234.8 LBS | DIASTOLIC BLOOD PRESSURE: 84 MMHG | SYSTOLIC BLOOD PRESSURE: 142 MMHG | RESPIRATION RATE: 16 BRPM | HEIGHT: 62 IN | HEART RATE: 64 BPM | BODY MASS INDEX: 43.21 KG/M2

## 2021-11-16 DIAGNOSIS — R60.0 LOWER EXTREMITY EDEMA: ICD-10-CM

## 2021-11-16 DIAGNOSIS — F32.A DEPRESSION, UNSPECIFIED DEPRESSION TYPE: ICD-10-CM

## 2021-11-16 DIAGNOSIS — I10 PRIMARY HYPERTENSION: Primary | ICD-10-CM

## 2021-11-16 DIAGNOSIS — I10 ESSENTIAL HYPERTENSION: ICD-10-CM

## 2021-11-16 DIAGNOSIS — E78.2 MIXED HYPERLIPIDEMIA: ICD-10-CM

## 2021-11-16 PROCEDURE — 99214 OFFICE O/P EST MOD 30 MIN: CPT | Performed by: INTERNAL MEDICINE

## 2021-11-16 PROCEDURE — 1160F RVW MEDS BY RX/DR IN RCRD: CPT | Performed by: INTERNAL MEDICINE

## 2021-11-16 PROCEDURE — 3008F BODY MASS INDEX DOCD: CPT | Performed by: INTERNAL MEDICINE

## 2021-11-16 PROCEDURE — 1036F TOBACCO NON-USER: CPT | Performed by: INTERNAL MEDICINE

## 2021-11-16 RX ORDER — FUROSEMIDE 20 MG/1
20 TABLET ORAL 2 TIMES DAILY
COMMUNITY

## 2021-11-16 RX ORDER — NEBIVOLOL 5 MG/1
5 TABLET ORAL DAILY
Qty: 90 TABLET | Refills: 3 | Status: SHIPPED | OUTPATIENT
Start: 2021-11-16 | End: 2022-05-18 | Stop reason: SDUPTHER

## 2021-11-16 RX ORDER — VENLAFAXINE 37.5 MG/1
37.5 TABLET ORAL DAILY
Qty: 90 TABLET | Refills: 0 | Status: SHIPPED | OUTPATIENT
Start: 2021-11-16 | End: 2022-07-26 | Stop reason: SDUPTHER

## 2021-11-23 ENCOUNTER — VBI (OUTPATIENT)
Dept: ADMINISTRATIVE | Facility: OTHER | Age: 76
End: 2021-11-23

## 2021-12-07 DIAGNOSIS — I10 ESSENTIAL HYPERTENSION: ICD-10-CM

## 2021-12-07 RX ORDER — POTASSIUM CHLORIDE 20 MEQ/1
20 TABLET, EXTENDED RELEASE ORAL 2 TIMES DAILY
Qty: 180 TABLET | Refills: 1 | Status: SHIPPED | OUTPATIENT
Start: 2021-12-07 | End: 2022-05-10 | Stop reason: SDUPTHER

## 2021-12-28 ENCOUNTER — VBI (OUTPATIENT)
Dept: ADMINISTRATIVE | Facility: OTHER | Age: 76
End: 2021-12-28

## 2022-01-07 ENCOUNTER — VBI (OUTPATIENT)
Dept: ADMINISTRATIVE | Facility: OTHER | Age: 77
End: 2022-01-07

## 2022-01-25 ENCOUNTER — OFFICE VISIT (OUTPATIENT)
Dept: FAMILY MEDICINE CLINIC | Facility: CLINIC | Age: 77
End: 2022-01-25
Payer: COMMERCIAL

## 2022-01-25 VITALS
HEIGHT: 61 IN | RESPIRATION RATE: 20 BRPM | BODY MASS INDEX: 45.42 KG/M2 | WEIGHT: 240.6 LBS | SYSTOLIC BLOOD PRESSURE: 140 MMHG | OXYGEN SATURATION: 99 % | TEMPERATURE: 96.5 F | HEART RATE: 65 BPM | DIASTOLIC BLOOD PRESSURE: 86 MMHG

## 2022-01-25 DIAGNOSIS — R73.03 PREDIABETES: ICD-10-CM

## 2022-01-25 DIAGNOSIS — M17.0 BILATERAL PRIMARY OSTEOARTHRITIS OF KNEE: ICD-10-CM

## 2022-01-25 DIAGNOSIS — I10 ESSENTIAL HYPERTENSION: Primary | ICD-10-CM

## 2022-01-25 DIAGNOSIS — J45.909 ASTHMA, UNSPECIFIED ASTHMA SEVERITY, UNSPECIFIED WHETHER COMPLICATED, UNSPECIFIED WHETHER PERSISTENT: ICD-10-CM

## 2022-01-25 DIAGNOSIS — I10 PRIMARY HYPERTENSION: ICD-10-CM

## 2022-01-25 DIAGNOSIS — M87.9 BONE INFARCT (HCC): ICD-10-CM

## 2022-01-25 DIAGNOSIS — K21.9 GERD WITHOUT ESOPHAGITIS: ICD-10-CM

## 2022-01-25 DIAGNOSIS — C73 THYROID CANCER (HCC): ICD-10-CM

## 2022-01-25 DIAGNOSIS — E89.0 HYPOTHYROIDISM, POSTSURGICAL: ICD-10-CM

## 2022-01-25 DIAGNOSIS — E78.2 MIXED HYPERLIPIDEMIA: ICD-10-CM

## 2022-01-25 DIAGNOSIS — L30.9 DERMATITIS: ICD-10-CM

## 2022-01-25 DIAGNOSIS — J41.0 SIMPLE CHRONIC BRONCHITIS (HCC): ICD-10-CM

## 2022-01-25 PROCEDURE — 3008F BODY MASS INDEX DOCD: CPT | Performed by: FAMILY MEDICINE

## 2022-01-25 PROCEDURE — 3077F SYST BP >= 140 MM HG: CPT | Performed by: FAMILY MEDICINE

## 2022-01-25 PROCEDURE — 1160F RVW MEDS BY RX/DR IN RCRD: CPT | Performed by: FAMILY MEDICINE

## 2022-01-25 PROCEDURE — 1036F TOBACCO NON-USER: CPT | Performed by: FAMILY MEDICINE

## 2022-01-25 PROCEDURE — 3079F DIAST BP 80-89 MM HG: CPT | Performed by: FAMILY MEDICINE

## 2022-01-25 PROCEDURE — 99214 OFFICE O/P EST MOD 30 MIN: CPT | Performed by: FAMILY MEDICINE

## 2022-01-25 RX ORDER — AMLODIPINE BESYLATE 5 MG/1
5 TABLET ORAL 2 TIMES DAILY
Qty: 180 TABLET | Refills: 1 | Status: SHIPPED | OUTPATIENT
Start: 2022-01-25 | End: 2022-06-26 | Stop reason: SDUPTHER

## 2022-01-25 RX ORDER — MOMETASONE FUROATE 1 MG/G
1 CREAM TOPICAL DAILY
COMMUNITY
Start: 2021-12-17 | End: 2022-01-25 | Stop reason: SDUPTHER

## 2022-01-25 RX ORDER — MOMETASONE FUROATE 1 MG/G
1 CREAM TOPICAL DAILY
Qty: 45 G | Refills: 3 | Status: SHIPPED | OUTPATIENT
Start: 2022-01-25 | End: 2022-05-18 | Stop reason: SDUPTHER

## 2022-01-25 RX ORDER — ALBUTEROL SULFATE 90 UG/1
2 AEROSOL, METERED RESPIRATORY (INHALATION) EVERY 4 HOURS PRN
Qty: 8 G | Refills: 1 | Status: SHIPPED | OUTPATIENT
Start: 2022-01-25 | End: 2022-02-21

## 2022-01-25 NOTE — PROGRESS NOTES
Assessment/Plan:  Labs from July reviewed at this time  A1c 6 3  Elevated glucose 145  Ye Bradley TSH 1 1    Patient will continue with current regimen for these chronic conditions listed below  Patient modify diet appropriately regarding impaired fasting glucose/prediabetic state  Patient have labs to re-evaluate these conditions prior to next visit  Refills given at this time for these conditions  Patient will follow-up in 6 months or as needed  Diagnoses and all orders for this visit:    Essential hypertension  -     amLODIPine (NORVASC) 5 mg tablet; Take 1 tablet (5 mg total) by mouth 2 (two) times a day  -     CBC and differential; Future  -     Comprehensive metabolic panel; Future  -     Hemoglobin A1C; Future  -     Lipid panel; Future  -     TSH, 3rd generation with Free T4 reflex; Future    Dermatitis  -     nystatin-triamcinolone (MYCOLOG-II) cream; Apply topically 2 (two) times a day  -     mometasone (ELOCON) 0 1 % cream; Apply 1 application topically daily    Asthma, unspecified asthma severity, unspecified whether complicated, unspecified whether persistent  -     albuterol (Ventolin HFA) 90 mcg/act inhaler; Inhale 2 puffs every 4 (four) hours as needed for wheezing or shortness of breath    Bilateral primary osteoarthritis of knee  -     Diclofenac Sodium (VOLTAREN) 1 %; Apply qid topically    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HCC)    Simple chronic bronchitis (HCC)    Body mass index (BMI) 40 0-44 9, adult (HCC)    Thyroid cancer (HCC)    GERD without esophagitis  -     CBC and differential; Future  -     Comprehensive metabolic panel; Future  -     Hemoglobin A1C; Future  -     Lipid panel; Future  -     TSH, 3rd generation with Free T4 reflex; Future    Hypothyroidism, postsurgical  -     CBC and differential; Future  -     Comprehensive metabolic panel; Future  -     Hemoglobin A1C; Future  -     Lipid panel; Future  -     TSH, 3rd generation with Free T4 reflex;  Future    Primary hypertension    Mixed hyperlipidemia  -     CBC and differential; Future  -     Comprehensive metabolic panel; Future  -     Hemoglobin A1C; Future  -     Lipid panel; Future  -     TSH, 3rd generation with Free T4 reflex; Future    Prediabetes  -     CBC and differential; Future  -     Comprehensive metabolic panel; Future  -     Hemoglobin A1C; Future  -     Lipid panel; Future  -     TSH, 3rd generation with Free T4 reflex; Future    Other orders  -     Discontinue: mometasone (ELOCON) 0 1 % cream; Apply 1 application topically daily  -     Multiple Vitamins-Minerals (CENTRUM SILVER 50+WOMEN PO); Take by mouth            Subjective:        Patient ID: Meredith Umanzor is a 78 y o  female  Patient follow-up on hypertension, hypothyroidism, hyperlipidemia, prediabetic state along with dermatitis and asthma  Patient has been relatively stable overall  No significant new chest pain or worsening shortness breath  No change urination or defecation  No headache or visual disturbance  Patient still with rash face and scalp  Patient does use CPAP  The following portions of the patient's history were reviewed and updated as appropriate: allergies, current medications, past family history, past medical history, past social history, past surgical history and problem list       Review of Systems   Constitutional: Negative  HENT: Negative  Eyes: Negative  Respiratory: Negative  Cardiovascular: Negative  Gastrointestinal: Negative  Endocrine: Negative  Genitourinary: Negative  Musculoskeletal: Negative  Skin: Positive for rash  Allergic/Immunologic: Negative  Neurological: Negative  Hematological: Negative  Psychiatric/Behavioral: Negative  Objective:      BMI Counseling: Body mass index is 45 46 kg/m²  The BMI is above normal  Nutrition recommendations include decreasing portion sizes  Exercise recommendations include moderate physical activity 150 minutes/week   Rationale for BMI follow-up plan is due to patient being overweight or obese  Depression Screening and Follow-up Plan: Clincally patient does not have depression  No treatment is required  /86 (BP Location: Right arm, Patient Position: Sitting, Cuff Size: Large)   Pulse 65   Temp (!) 96 5 °F (35 8 °C) (Tympanic)   Resp 20   Ht 5' 1" (1 549 m)   Wt 109 kg (240 lb 9 6 oz)   LMP  (LMP Unknown)   SpO2 99%   BMI 45 46 kg/m²          Physical Exam  Vitals and nursing note reviewed  Constitutional:       General: She is not in acute distress  Appearance: Normal appearance  She is not ill-appearing, toxic-appearing or diaphoretic  HENT:      Head: Normocephalic and atraumatic  Right Ear: Tympanic membrane, ear canal and external ear normal  There is no impacted cerumen  Left Ear: Tympanic membrane, ear canal and external ear normal  There is no impacted cerumen  Nose: Nose normal  No congestion or rhinorrhea  Mouth/Throat:      Mouth: Mucous membranes are moist       Pharynx: No oropharyngeal exudate or posterior oropharyngeal erythema  Eyes:      General: No scleral icterus  Right eye: No discharge  Left eye: No discharge  Extraocular Movements: Extraocular movements intact  Conjunctiva/sclera: Conjunctivae normal       Pupils: Pupils are equal, round, and reactive to light  Neck:      Vascular: No carotid bruit  Cardiovascular:      Rate and Rhythm: Normal rate and regular rhythm  Pulses: Normal pulses  Heart sounds: Normal heart sounds  No murmur heard  No friction rub  No gallop  Pulmonary:      Effort: Pulmonary effort is normal  No respiratory distress  Breath sounds: Normal breath sounds  No stridor  No wheezing, rhonchi or rales  Chest:      Chest wall: No tenderness  Musculoskeletal:         General: No swelling, tenderness, deformity or signs of injury  Normal range of motion        Cervical back: Normal range of motion and neck supple  No rigidity  No muscular tenderness  Right lower leg: No edema  Left lower leg: Edema present  Comments: Trace lower extremity edema   Lymphadenopathy:      Cervical: No cervical adenopathy  Skin:     General: Skin is warm and dry  Capillary Refill: Capillary refill takes less than 2 seconds  Coloration: Skin is not jaundiced  Findings: Rash present  No bruising, erythema or lesion  Comments: Irritant dermatitis on face for CPAP was   Neurological:      Mental Status: She is alert and oriented to person, place, and time  Mental status is at baseline  Cranial Nerves: No cranial nerve deficit  Sensory: No sensory deficit  Motor: No weakness  Coordination: Coordination normal       Gait: Gait normal    Psychiatric:         Mood and Affect: Mood normal          Behavior: Behavior normal          Thought Content:  Thought content normal          Judgment: Judgment normal

## 2022-03-04 ENCOUNTER — TELEPHONE (OUTPATIENT)
Dept: OBGYN CLINIC | Facility: HOSPITAL | Age: 77
End: 2022-03-04

## 2022-03-04 DIAGNOSIS — M17.0 BILATERAL PRIMARY OSTEOARTHRITIS OF KNEE: Primary | ICD-10-CM

## 2022-03-04 NOTE — TELEPHONE ENCOUNTER
Patient sees Dr Dalton Avilez      Patient is calling to request visco injections in both of her knees  Can this order be placed?       CB: [phone]

## 2022-03-04 NOTE — TELEPHONE ENCOUNTER
Order placed for bilat knee VS injections  Left voicemail for patient  She is good any time after 4/1

## 2022-04-08 ENCOUNTER — PROCEDURE VISIT (OUTPATIENT)
Dept: OBGYN CLINIC | Facility: MEDICAL CENTER | Age: 77
End: 2022-04-08
Payer: COMMERCIAL

## 2022-04-08 VITALS
DIASTOLIC BLOOD PRESSURE: 76 MMHG | BODY MASS INDEX: 45.5 KG/M2 | SYSTOLIC BLOOD PRESSURE: 132 MMHG | HEART RATE: 77 BPM | WEIGHT: 241 LBS | HEIGHT: 61 IN

## 2022-04-08 DIAGNOSIS — M70.52 PES ANSERINUS BURSITIS OF BOTH KNEES: ICD-10-CM

## 2022-04-08 DIAGNOSIS — M70.51 PES ANSERINUS BURSITIS OF BOTH KNEES: ICD-10-CM

## 2022-04-08 DIAGNOSIS — M17.0 BILATERAL PRIMARY OSTEOARTHRITIS OF KNEE: Primary | ICD-10-CM

## 2022-04-08 PROCEDURE — 20610 DRAIN/INJ JOINT/BURSA W/O US: CPT | Performed by: PHYSICIAN ASSISTANT

## 2022-04-08 PROCEDURE — 99213 OFFICE O/P EST LOW 20 MIN: CPT | Performed by: PHYSICIAN ASSISTANT

## 2022-04-08 RX ORDER — LIDOCAINE HYDROCHLORIDE 10 MG/ML
1 INJECTION, SOLUTION INFILTRATION; PERINEURAL
Status: COMPLETED | OUTPATIENT
Start: 2022-04-08 | End: 2022-04-08

## 2022-04-08 RX ORDER — TRIAMCINOLONE ACETONIDE 40 MG/ML
40 INJECTION, SUSPENSION INTRA-ARTICULAR; INTRAMUSCULAR
Status: COMPLETED | OUTPATIENT
Start: 2022-04-08 | End: 2022-04-08

## 2022-04-08 RX ORDER — HYALURONATE SODIUM 10 MG/ML
20 SYRINGE (ML) INTRAARTICULAR
Status: COMPLETED | OUTPATIENT
Start: 2022-04-08 | End: 2022-04-08

## 2022-04-08 RX ADMIN — TRIAMCINOLONE ACETONIDE 40 MG: 40 INJECTION, SUSPENSION INTRA-ARTICULAR; INTRAMUSCULAR at 09:58

## 2022-04-08 RX ADMIN — Medication 20 MG: at 09:58

## 2022-04-08 RX ADMIN — LIDOCAINE HYDROCHLORIDE 1 ML: 10 INJECTION, SOLUTION INFILTRATION; PERINEURAL at 09:58

## 2022-04-08 NOTE — PROGRESS NOTES
Assessment/Plan:  1  Bilateral primary osteoarthritis of knee    2  Pes anserinus bursitis of both knees      Orders Placed This Encounter   Procedures    Large joint arthrocentesis    Large joint arthrocentesis       · Patient received bilateral knee euflexxa injections today  Tolerated the procedures well  Advised to apply ice and avoid strenuous activity for 1-2 days as needed  · Also received right knee pes anserine steroid injection today  She may get her left knee completed at her next visit  · Continue diclofenac as needed  · Continue activity as tolerated  Return in about 1 week (around 4/15/2022) for bilat knee euflexxa 2/3  I answered all of the patient's questions during the visit and provided education of the patient's condition during the visit  The patient verbalized understanding of the information given and agrees with the plan  This note was dictated using [employer] software  It may contain errors including improperly dictated words  Please contact physician directly for any questions  Subjective   Chief Complaint:   Chief Complaint   Patient presents with    Left Knee - Follow-up    Right Knee - Follow-up       HPI  Jordan Dakin is a 78 y o  female who presents for follow up for bilateral knee pain and OA and pes anserine bursitis  Patient received bilateral knee Euflexxa injections on 10/1/21 with good relief  Patient is here today for bilateral knee euflexxa injections  Patient reports bilateral knee soreness  She is taking diclofenac tabs prn pain  She notes tenderness over the medial knee over pes anserine bursa area  She would like her right knee pes bursa injected as well  Review of Systems  ROS:    See HPI for musculoskeletal review     All other systems reviewed are negative     History:  Past Medical History:   Diagnosis Date    Asthma     Breathing difficulty     Bronchitis     Depression     Fracture of arm     Fracture of carpal bone     Hyperlipidemia     Hypertension     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[facility] [address] ) 2016    Shortness of breath     Thyroid nodule     Thyroid nodule     Thyroid nodule      Past Surgical History:   Procedure Laterality Date    EYE SURGERY  2021    HEMORRHOID SURGERY      AL THYROID LOBECTOMY,UNILAT Right 2016    Procedure: HEMITHYROIDECTOMY;  Surgeon: Grupo Ervin MD;  [facility];  Service: Surgical Oncology    AL THYROIDECTOMY POST PREV THYR SURG Left 2016    Procedure:  COMPLETION THYROIDECTOMY, FLEXIBLE LARYNGOSCOPY;  Surgeon: Grupo Ervin MD;  [facility];  Service: Surgical Oncology    THYROIDECTOMY, PARTIAL Left      Social History   Social History     Substance and Sexual Activity   Alcohol Use No    Comment: occasional glass of wine     Social History     Substance and Sexual Activity   Drug Use No     Social History     Tobacco Use   Smoking Status Former Smoker    Packs/day: 0 25    Years: 4 00    Pack years: 1 00    Types: Cigarettes    Quit date: 6/15/1981    Years since quittin 8   Smokeless Tobacco Former User   Tobacco Comment    quit 36 yrs ago (Never a smoker per [employer])     Family History:   Family History   Adopted: Yes   Problem Relation Age of Onset    Hypertension Family     Kidney disease Family     Hypertension Mother        Current Outpatient Medications on File Prior to Visit   Medication Sig Dispense Refill    amLODIPine (NORVASC) 5 mg tablet Take 1 tablet (5 mg total) by mouth 2 (two) times a day 180 tablet 1    atorvastatin (LIPITOR) 20 mg tablet Take 1 tablet (20 mg total) by mouth daily 90 tablet 3    diclofenac (VOLTAREN) 75 mg EC tablet Take 1 tablet (75 mg total) by mouth 2 (two) times a day as needed (pain) Take with food   180 tablet 0    Diclofenac Sodium (VOLTAREN) 1 % Apply qid topically 100 g 1    furosemide (LASIX) 20 mg tablet Take 20 mg by mouth 2 (two) times a day      levothyroxine 175 mcg tablet Take 1 tablet (175 mcg total) by mouth daily 90 tablet 1    lisinopril-hydrochlorothiazide (PRINZIDE,ZESTORETIC) 20-12 5 MG per tablet Take 1 tablet by mouth 2 (two) times a day 180 tablet 1    Melatonin 10 MG TABS Take by mouth      mometasone (ELOCON) 0 1 % cream Apply 1 application topically daily 45 g 3    Multiple Vitamins-Minerals (CENTRUM SILVER 50+WOMEN PO) Take by mouth      nebivolol (BYSTOLIC) 5 mg tablet Take 1 tablet (5 mg total) by mouth daily 90 tablet 3    nystatin-triamcinolone (MYCOLOG-II) cream Apply topically 2 (two) times a day 30 g 3    omeprazole (PriLOSEC) 40 MG capsule Take 1 capsule (40 mg total) by mouth daily 90 capsule 1    potassium chloride (Klor-Con M20) 20 mEq tablet Take 1 tablet (20 mEq total) by mouth 2 (two) times a day 180 tablet 1    venlafaxine (EFFEXOR) 37 5 mg tablet Take 1 tablet (37 5 mg total) by mouth daily 90 tablet 0    Ventolin  (90 Base) MCG/ACT inhaler INHALE 2 PUFFS BY MOUTH EVERY 4 HOURS AS NEEDED FOR WHEEZING OR SHORTNESS OF BREATH 54 g 0     No current facility-administered medications on file prior to visit  Allergies   Allergen Reactions    Adhesive [Medical Tape] Rash        Objective     /76   Pulse 77   Ht 5' 1" (1 549 m)   Wt 109 kg (241 lb)   LMP  (LMP Unknown)   BMI 45 54 kg/m²      PE:  AAOx 3  WDWN  Hearing intact, no drainage from eyes  no audible wheezing  no abdominal distension  LE compartments soft, skin intact    Ortho Exam:  bilateral Knee:   No erythema  no swelling  no effusion  no warmth  +TTP over bilateral pes anserine bursa  AROM: 0- 115  Stable to varus/valgus stress    Large joint arthrocentesis: bilateral knee  Universal Protocol:  Consent: Verbal consent obtained    Risks and benefits: risks, benefits and alternatives were discussed  Consent given by: patient  Site marked: the operative site was marked  Supporting Documentation  Indications: pain   Procedure Details  Location: knee - bilateral knee  Preparation: Patient was prepped and draped in the usual sterile fashion  Needle size: 22 G  Ultrasound guidance: no  Approach: anterolateral    Medications (Right): 20 mg Sodium Hyaluronate 20 MG/2MLMedications (Left): 20 mg Sodium Hyaluronate 20 MG/2ML   Patient tolerance: patient tolerated the procedure well with no immediate complications  Dressing:  Sterile dressing applied    Large joint arthrocentesis: R knee  Universal Protocol:  Consent: Verbal consent obtained    Risks and benefits: risks, benefits and alternatives were discussed  Consent given by: patient  Site marked: the operative site was marked  Supporting Documentation  Indications: pain   Procedure Details  Location: knee - R knee  Preparation: Patient was prepped and draped in the usual sterile fashion  Needle size: 22 G  Ultrasound guidance: no  Approach: medial  Medications administered: 1 mL lidocaine 1 %; 40 mg triamcinolone acetonide 40 mg/mL    Patient tolerance: patient tolerated the procedure well with no immediate complications  Dressing:  Sterile dressing applied          Scribe Attestation    I,:  Caroline Hurley PA-C am acting as a scribe while in the presence of the attending physician :       I,:  Rachel Francisco DO personally performed the services described in this documentation    as scribed in my presence :

## 2022-04-15 ENCOUNTER — PROCEDURE VISIT (OUTPATIENT)
Dept: OBGYN CLINIC | Facility: CLINIC | Age: 77
End: 2022-04-15
Payer: COMMERCIAL

## 2022-04-15 VITALS
HEART RATE: 58 BPM | DIASTOLIC BLOOD PRESSURE: 82 MMHG | HEIGHT: 61 IN | BODY MASS INDEX: 45.5 KG/M2 | WEIGHT: 241 LBS | SYSTOLIC BLOOD PRESSURE: 158 MMHG

## 2022-04-15 DIAGNOSIS — M17.11 PRIMARY OSTEOARTHRITIS OF RIGHT KNEE: ICD-10-CM

## 2022-04-15 DIAGNOSIS — M17.12 PRIMARY OSTEOARTHRITIS OF LEFT KNEE: Primary | ICD-10-CM

## 2022-04-15 PROCEDURE — 20610 DRAIN/INJ JOINT/BURSA W/O US: CPT | Performed by: PHYSICIAN ASSISTANT

## 2022-04-15 RX ORDER — HYALURONATE SODIUM 10 MG/ML
20 SYRINGE (ML) INTRAARTICULAR
Status: COMPLETED | OUTPATIENT
Start: 2022-04-15 | End: 2022-04-15

## 2022-04-15 RX ADMIN — Medication 20 MG: at 15:01

## 2022-04-15 NOTE — PROGRESS NOTES
Patient Name:  Bassam Arnold  MR#:  [id number]    The patient presents for her second injection of Euflexxa into the bilateral knees  She does note overall improvement after undergoing the first injection  Large joint arthrocentesis: bilateral knee  Procedure Details  Location: knee - bilateral knee  Needle size: 22 G  Ultrasound guidance: no  Approach: anterolateral    Medications (Right): 20 mg Sodium Hyaluronate 20 MG/2MLMedications (Left): 20 mg Sodium Hyaluronate 20 MG/2ML   Patient tolerance: patient tolerated the procedure well with no immediate complications  Dressing:  Sterile dressing applied        Reviewed post-injection care with patient  Follow-up in one week for third and final injection of the series

## 2022-04-22 ENCOUNTER — PROCEDURE VISIT (OUTPATIENT)
Dept: OBGYN CLINIC | Facility: MEDICAL CENTER | Age: 77
End: 2022-04-22
Payer: COMMERCIAL

## 2022-04-22 VITALS
DIASTOLIC BLOOD PRESSURE: 83 MMHG | HEIGHT: 61 IN | HEART RATE: 80 BPM | BODY MASS INDEX: 45.5 KG/M2 | SYSTOLIC BLOOD PRESSURE: 150 MMHG | WEIGHT: 241 LBS

## 2022-04-22 DIAGNOSIS — M17.0 BILATERAL PRIMARY OSTEOARTHRITIS OF KNEE: Primary | ICD-10-CM

## 2022-04-22 DIAGNOSIS — M70.52 PES ANSERINUS BURSITIS OF LEFT KNEE: ICD-10-CM

## 2022-04-22 PROCEDURE — 99213 OFFICE O/P EST LOW 20 MIN: CPT | Performed by: PHYSICIAN ASSISTANT

## 2022-04-22 PROCEDURE — 20610 DRAIN/INJ JOINT/BURSA W/O US: CPT | Performed by: PHYSICIAN ASSISTANT

## 2022-04-22 PROCEDURE — 1160F RVW MEDS BY RX/DR IN RCRD: CPT | Performed by: PHYSICIAN ASSISTANT

## 2022-04-22 PROCEDURE — 1036F TOBACCO NON-USER: CPT | Performed by: PHYSICIAN ASSISTANT

## 2022-04-22 PROCEDURE — 3008F BODY MASS INDEX DOCD: CPT | Performed by: PHYSICIAN ASSISTANT

## 2022-04-22 RX ORDER — HYALURONATE SODIUM 10 MG/ML
20 SYRINGE (ML) INTRAARTICULAR
Status: COMPLETED | OUTPATIENT
Start: 2022-04-22 | End: 2022-04-22

## 2022-04-22 RX ORDER — TRIAMCINOLONE ACETONIDE 40 MG/ML
40 INJECTION, SUSPENSION INTRA-ARTICULAR; INTRAMUSCULAR
Status: COMPLETED | OUTPATIENT
Start: 2022-04-22 | End: 2022-04-22

## 2022-04-22 RX ORDER — LIDOCAINE HYDROCHLORIDE 10 MG/ML
1 INJECTION, SOLUTION INFILTRATION; PERINEURAL
Status: COMPLETED | OUTPATIENT
Start: 2022-04-22 | End: 2022-04-22

## 2022-04-22 RX ADMIN — Medication 20 MG: at 10:58

## 2022-04-22 RX ADMIN — TRIAMCINOLONE ACETONIDE 40 MG: 40 INJECTION, SUSPENSION INTRA-ARTICULAR; INTRAMUSCULAR at 10:58

## 2022-04-22 RX ADMIN — LIDOCAINE HYDROCHLORIDE 1 ML: 10 INJECTION, SOLUTION INFILTRATION; PERINEURAL at 10:58

## 2022-04-22 NOTE — PROGRESS NOTES
Patient is here today for bilateral knee euflexxa injections 3/3  Post injection instructions reviewed  Follow up 3 months for bilateral knee and right pes bursa CSI  Will order VS for 6 months  Large joint arthrocentesis: bilateral knee  Universal Protocol:  Consent: Verbal consent obtained  Risks and benefits: risks, benefits and alternatives were discussed  Consent given by: patient  Site marked: the operative site was marked  Supporting Documentation  Indications: pain   Procedure Details  Location: knee - bilateral knee  Preparation: Patient was prepped and draped in the usual sterile fashion  Needle size: 22 G  Ultrasound guidance: no  Approach: anterolateral    Medications (Right): 20 mg Sodium Hyaluronate 20 MG/2MLMedications (Left): 20 mg Sodium Hyaluronate 20 MG/2ML   Patient tolerance: patient tolerated the procedure well with no immediate complications  Dressing:  Sterile dressing applied    Large joint arthrocentesis: L pes anserine bursa  Universal Protocol:  Consent: Verbal consent obtained    Risks and benefits: risks, benefits and alternatives were discussed  Consent given by: patient  Site marked: the operative site was marked  Supporting Documentation  Indications: pain   Procedure Details  Location: knee - L pes anserine bursa  Preparation: Patient was prepped and draped in the usual sterile fashion  Needle size: 22 G  Ultrasound guidance: no  Approach: medial  Medications administered: 1 mL lidocaine 1 %; 40 mg triamcinolone acetonide 40 mg/mL    Patient tolerance: patient tolerated the procedure well with no immediate complications  Dressing:  Sterile dressing applied

## 2022-04-27 ENCOUNTER — VBI (OUTPATIENT)
Dept: ADMINISTRATIVE | Facility: OTHER | Age: 77
End: 2022-04-27

## 2022-05-10 DIAGNOSIS — I10 ESSENTIAL HYPERTENSION: ICD-10-CM

## 2022-05-10 DIAGNOSIS — E78.2 MIXED HYPERLIPIDEMIA: ICD-10-CM

## 2022-05-10 RX ORDER — ATORVASTATIN CALCIUM 20 MG/1
20 TABLET, FILM COATED ORAL DAILY
Qty: 90 TABLET | Refills: 3 | Status: SHIPPED | OUTPATIENT
Start: 2022-05-10 | End: 2022-07-26 | Stop reason: SDUPTHER

## 2022-05-10 RX ORDER — POTASSIUM CHLORIDE 20 MEQ/1
20 TABLET, EXTENDED RELEASE ORAL 2 TIMES DAILY
Qty: 180 TABLET | Refills: 0 | Status: SHIPPED | OUTPATIENT
Start: 2022-05-10

## 2022-05-18 ENCOUNTER — OFFICE VISIT (OUTPATIENT)
Dept: FAMILY MEDICINE CLINIC | Facility: CLINIC | Age: 77
End: 2022-05-18
Payer: COMMERCIAL

## 2022-05-18 VITALS
SYSTOLIC BLOOD PRESSURE: 140 MMHG | HEART RATE: 77 BPM | OXYGEN SATURATION: 100 % | HEIGHT: 61 IN | BODY MASS INDEX: 44.97 KG/M2 | TEMPERATURE: 96.3 F | DIASTOLIC BLOOD PRESSURE: 96 MMHG | WEIGHT: 238.2 LBS

## 2022-05-18 DIAGNOSIS — E78.2 MIXED HYPERLIPIDEMIA: ICD-10-CM

## 2022-05-18 DIAGNOSIS — Z12.11 SCREENING FOR COLON CANCER: Primary | ICD-10-CM

## 2022-05-18 DIAGNOSIS — L30.9 DERMATITIS: ICD-10-CM

## 2022-05-18 DIAGNOSIS — H02.401 PTOSIS OF RIGHT EYELID: ICD-10-CM

## 2022-05-18 DIAGNOSIS — R73.01 IMPAIRED FASTING GLUCOSE: ICD-10-CM

## 2022-05-18 DIAGNOSIS — Z01.818 PREOP EXAMINATION: ICD-10-CM

## 2022-05-18 DIAGNOSIS — I10 PRIMARY HYPERTENSION: ICD-10-CM

## 2022-05-18 PROCEDURE — 1160F RVW MEDS BY RX/DR IN RCRD: CPT | Performed by: FAMILY MEDICINE

## 2022-05-18 PROCEDURE — 3077F SYST BP >= 140 MM HG: CPT | Performed by: FAMILY MEDICINE

## 2022-05-18 PROCEDURE — 1036F TOBACCO NON-USER: CPT | Performed by: FAMILY MEDICINE

## 2022-05-18 PROCEDURE — 3080F DIAST BP >= 90 MM HG: CPT | Performed by: FAMILY MEDICINE

## 2022-05-18 PROCEDURE — 3008F BODY MASS INDEX DOCD: CPT | Performed by: FAMILY MEDICINE

## 2022-05-18 PROCEDURE — 99214 OFFICE O/P EST MOD 30 MIN: CPT | Performed by: FAMILY MEDICINE

## 2022-05-18 RX ORDER — MOMETASONE FUROATE 1 MG/ML
SOLUTION TOPICAL DAILY
Qty: 60 ML | Refills: 5 | Status: SHIPPED | OUTPATIENT
Start: 2022-05-18

## 2022-05-18 RX ORDER — NEBIVOLOL 10 MG/1
10 TABLET ORAL DAILY
Qty: 90 TABLET | Refills: 1 | Status: SHIPPED | OUTPATIENT
Start: 2022-05-18 | End: 2022-07-26 | Stop reason: SDUPTHER

## 2022-05-18 RX ORDER — MOMETASONE FUROATE 1 MG/G
1 CREAM TOPICAL DAILY
Qty: 45 G | Refills: 3 | Status: SHIPPED | OUTPATIENT
Start: 2022-05-18 | End: 2022-07-26 | Stop reason: SDUPTHER

## 2022-05-18 NOTE — PROGRESS NOTES
Assessment/Plan:  Guidance given overall  Patient low risk for cardiopulmonary event  Okay to proceed with surgery  Patient will stop NSAIDs 1 week prior to surgery  Bystolic increased 10 mg daily  Patient will follow-up per routine  Diagnoses and all orders for this visit:    Screening for colon cancer  -     Ambulatory referral for colonoscopy; Future    Dermatitis  -     mometasone (ELOCON) 0 1 % cream; Apply 1 application topically in the morning   -     mometasone (ELOCON) 0 1 % lotion; Apply topically daily    Preop examination    Ptosis of right eyelid    Primary hypertension  -     nebivolol (BYSTOLIC) 10 mg tablet; Take 1 tablet (10 mg total) by mouth in the morning  Impaired fasting glucose    Mixed hyperlipidemia            Subjective:        Patient ID: Travis Friend is a 78 y o  female  Patient is here for preop clearance requested by Dr Iva Walsh for right eye ptosis surgery to be done on June 1st   Patient does notice some visual disturbance associated with this  Patient feeling at baseline otherwise  No new chest pain worsening shortness of fevers or chills URI symptoms cough bleeding issues or problems with anesthesia in the past   No edema  The following portions of the patient's history were reviewed and updated as appropriate: allergies, current medications, past family history, past medical history, past social history, past surgical history and problem list       Review of Systems   Constitutional: Negative  HENT: Negative  Eyes: Positive for visual disturbance  Respiratory: Negative  Cardiovascular: Negative  Gastrointestinal: Negative  Endocrine: Negative  Genitourinary: Negative  Musculoskeletal: Negative  Skin: Negative  Allergic/Immunologic: Negative  Neurological: Negative  Hematological: Negative  Psychiatric/Behavioral: Negative              Objective:        Depression Screening and Follow-up Plan: Clincally patient does not have depression  No treatment is required  /96 (BP Location: Right arm, Patient Position: Sitting, Cuff Size: Adult)   Pulse 77   Temp (!) 96 3 °F (35 7 °C) (Tympanic)   Ht 5' 1" (1 549 m)   Wt 108 kg (238 lb 3 2 oz)   LMP  (LMP Unknown)   SpO2 100%   BMI 45 01 kg/m²          Physical Exam  Vitals and nursing note reviewed  Constitutional:       General: She is not in acute distress  Appearance: Normal appearance  She is not ill-appearing, toxic-appearing or diaphoretic  HENT:      Head: Normocephalic and atraumatic  Right Ear: Tympanic membrane, ear canal and external ear normal  There is no impacted cerumen  Left Ear: Tympanic membrane, ear canal and external ear normal  There is no impacted cerumen  Nose: Nose normal  No congestion or rhinorrhea  Mouth/Throat:      Mouth: Mucous membranes are moist       Pharynx: No oropharyngeal exudate or posterior oropharyngeal erythema  Eyes:      General: No scleral icterus  Right eye: No discharge  Left eye: No discharge  Extraocular Movements: Extraocular movements intact  Conjunctiva/sclera: Conjunctivae normal       Pupils: Pupils are equal, round, and reactive to light  Comments: Ptosis right upper lid   Neck:      Vascular: No carotid bruit  Cardiovascular:      Rate and Rhythm: Normal rate and regular rhythm  Pulses: Normal pulses  Heart sounds: Normal heart sounds  No murmur heard  No friction rub  No gallop  Pulmonary:      Effort: Pulmonary effort is normal  No respiratory distress  Breath sounds: Normal breath sounds  No stridor  No wheezing, rhonchi or rales  Chest:      Chest wall: No tenderness  Abdominal:      General: Abdomen is flat  Bowel sounds are normal  There is no distension  Palpations: Abdomen is soft  Tenderness: There is no abdominal tenderness  There is no guarding or rebound     Musculoskeletal:         General: No swelling, tenderness, deformity or signs of injury  Normal range of motion  Cervical back: Normal range of motion and neck supple  No rigidity  No muscular tenderness  Right lower leg: No edema  Left lower leg: No edema  Lymphadenopathy:      Cervical: No cervical adenopathy  Skin:     General: Skin is warm and dry  Capillary Refill: Capillary refill takes less than 2 seconds  Coloration: Skin is not jaundiced  Findings: No bruising, erythema, lesion or rash  Neurological:      Mental Status: She is alert and oriented to person, place, and time  Mental status is at baseline  Cranial Nerves: No cranial nerve deficit  Sensory: No sensory deficit  Motor: No weakness  Coordination: Coordination normal       Gait: Gait normal    Psychiatric:         Mood and Affect: Mood normal          Behavior: Behavior normal          Thought Content:  Thought content normal          Judgment: Judgment normal

## 2022-06-26 DIAGNOSIS — I10 ESSENTIAL HYPERTENSION: ICD-10-CM

## 2022-06-27 RX ORDER — AMLODIPINE BESYLATE 5 MG/1
5 TABLET ORAL 2 TIMES DAILY
Qty: 180 TABLET | Refills: 0 | Status: SHIPPED | OUTPATIENT
Start: 2022-06-27 | End: 2022-07-26 | Stop reason: SDUPTHER

## 2022-06-28 ENCOUNTER — VBI (OUTPATIENT)
Dept: ADMINISTRATIVE | Facility: OTHER | Age: 77
End: 2022-06-28

## 2022-07-08 ENCOUNTER — OFFICE VISIT (OUTPATIENT)
Dept: SLEEP CENTER | Facility: CLINIC | Age: 77
End: 2022-07-08
Payer: COMMERCIAL

## 2022-07-08 VITALS
WEIGHT: 237 LBS | DIASTOLIC BLOOD PRESSURE: 81 MMHG | HEIGHT: 61 IN | BODY MASS INDEX: 44.75 KG/M2 | HEART RATE: 63 BPM | SYSTOLIC BLOOD PRESSURE: 176 MMHG

## 2022-07-08 DIAGNOSIS — E66.01 MORBID OBESITY WITH BMI OF 40.0-44.9, ADULT (HCC): ICD-10-CM

## 2022-07-08 DIAGNOSIS — G47.33 OBSTRUCTIVE SLEEP APNEA: Primary | ICD-10-CM

## 2022-07-08 DIAGNOSIS — R45.86 MOOD DISTURBANCE: ICD-10-CM

## 2022-07-08 DIAGNOSIS — J98.4 RESTRICTIVE LUNG DISEASE: ICD-10-CM

## 2022-07-08 DIAGNOSIS — K21.9 GERD WITHOUT ESOPHAGITIS: ICD-10-CM

## 2022-07-08 DIAGNOSIS — I10 ESSENTIAL HYPERTENSION: ICD-10-CM

## 2022-07-08 PROCEDURE — 99214 OFFICE O/P EST MOD 30 MIN: CPT | Performed by: INTERNAL MEDICINE

## 2022-07-08 NOTE — PROGRESS NOTES
Follow-Up Note - [facility]   Henny Araujo  78 y o  female  BCB:[birth year]  ELZ:[id number]  DOS:7/8/2022    CC: I saw this patient for follow-up in clinic today for Sleep disordered breathing, Coexisting Sleep and Medical Problems  He has a dream Station 1 machine that she got in 2017  She tried registering with [facility] and was told it is not part of the recall    Results of prior studies: The diagnostic study in January of 2017 demonstrated AHI of 29 per hour, considerably higher during REM at 64 per hour  There were also 60 respiratory effort-related arousals and severe periodic limb movements of sleep  During a subsequent therapeutic study, sleep disordered breathing and periodic limb movements of sleep appeared to be sufficiently remediated with nasal CPAP at 7 cm H2O  She was observed during stage REM but not while supine  Auto titrating Pap was initiated  PFSH, Problem List, Medications & Allergies were reviewed in EMR  Interval changes: none reported  She  has a past medical history of Asthma, Breathing difficulty, Bronchitis, Depression, Fracture of arm, Fracture of carpal bone, Hyperlipidemia, Hypertension, 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), Shortness of breath, Thyroid nodule, Thyroid nodule, and Thyroid nodule  She has a current medication list which includes the following prescription(s): amlodipine, atorvastatin, diclofenac, diclofenac sodium, furosemide, levothyroxine, lisinopril-hydrochlorothiazide, melatonin, mometasone, mometasone, multiple vitamins-minerals, nebivolol, nystatin-triamcinolone, omeprazole, potassium chloride, venlafaxine, and ventolin hfa  PHYSIOLOGICAL DATA REVIEW AND INTERPRETATION:    using PAP > 4 hours/night 97%  Estimated THO 1 8/hour with pressure of 11cm H2O @90th/95th percentile; Patient has not been using ozone based devices to sanitize the machine      SUBJECTIVE: Regarding use of PAP, Neal Liao reports:   · significant adverse effects: mask discomfort and mask causes redness / facial marks ; she got a prescription from PCP that helped only slightly  has not noticed any fibres or foreign material in air line  · She is benefiting from use: sleeping better   Sleep Routine: Richie Brown reports getting 9 hrs sleep  ; she has no difficulty initiating or maintaining sleep   She arises before alarm most days and feels refreshed  Richie Brown denies Excessive Daytime Sleepiness,   She rated herself at Total score: 2 /24 on the [place] Sleepiness Scale  Habits: reports that she quit smoking about 41 years ago  Her smoking use included cigarettes  She has a 1 00 pack-year smoking history  She has quit using smokeless tobacco ,  reports no history of alcohol use ,  reports no history of drug use , Caffeine use:limited ; Exercise routine: regular    ROS: reviewed & as attached  Significant for few lb intentional weight reduction  She reported no nasal symptoms  Her asthma is usually well controlled and she reports only occasional wheezing  Lajean Cassette EXAM: BP (!) 176/81 (BP Location: Left arm, Patient Position: Sitting, Cuff Size: Large)   Pulse 63   Ht 5' 1" (1 549 m)   Wt 108 kg (237 lb)   LMP  (LMP Unknown)   BMI 44 78 kg/m²     Wt Readings from Last 3 Encounters:   07/08/22 108 kg (237 lb)   05/18/22 108 kg (238 lb 3 2 oz)   04/22/22 109 kg (241 lb)      Patient is well groomed; well appearing  CNS: Alert, orientated, clear & coherent speech  Psych: cooperativeand in no distress  Mental state:appears normal   H&N: EOMI; NC/AT:no facial pressure marks, + rash in distribution of the mask reminiscent of rosacea  Skin/Extrem: col & hydration normal; no edema  Resp: Respiratory effort is normal  Physical findings otherwise essentially unchanged from previous  IMPRESSION: Problem List Items & Comorbidities Addressed this Visit    1  Obstructive sleep apnea  PAP DME Resupply/Reorder    Cpap DME   2  Restrictive lung disease     3   Essential hypertension 4  GERD without esophagitis     5  Mood disturbance     6  Morbid obesity with BMI of 40 0-44 9, adult ([facility] [address] )       PLAN:  I reviewed results of prior studies and physiologic data with the patient  Notified of [employer] devices recall and their recommendation to discontinue use  Risks of discontinuing PAP vs continuing while awaiting resolution were explained and patient indicates understanding  I discussed treatment options with risks and benefits  Patient elected to  continue using Pap while awaiting remediation  Instructed  to call [facility] or go to their website and re register the machine   Care of equipment, methods to improve comfort using PAP and importance of compliance with therapy were discussed  I suggested adding a Barrier such as REM Z or snuggies  If rash persists, she may need to see dermatologist  Instructed not to use ozone based or other unapproved  cleaning devices  Pressure setting: 10-12 cmH2O  Rx provided to replace the machine (that is over 11years old) supplies and Care coordinated with DME provider  Discussed strategies for weight reduction  Follow-up is advised in 1 year or sooner if needed to monitor progress, compliance and to adjust therapy  Thank you for allowing me to participate in the care of this patient  Sincerely,    Authenticated electronically by Vero Chamberlain MD on 64/74/03   Board Certified Specialist     Portions of the record may have been created with voice recognition software  Occasional wrong word or "sound a like" substitutions may have occurred due to the inherent limitations of voice recognition software  There may also be notations and random deletions of words or characters from malfunctioning software  Read the chart carefully and recognize, using context, where substitutions/deletions have occurred

## 2022-07-08 NOTE — PATIENT INSTRUCTIONS
Continuous Positive Airway Pressure (CPAP) therapy was prescribed to you as a medical necessity and there are risks of discontinuing  use of the device, some of which may be long term  Symptoms you experienced before using CPAP may return such as snoring, apneas, excessive daytime sleepiness, hypertension, cardiac arrhythmias, risk of stroke, congestive heart-failure, exacerbation of COPD and potential respiratory failure  Ultimately, it is a personal decision for you to make if you continue use of an affected device or discontinue until a replacement is provided  Unfortunately, [employer] has provided us with limited information about available devices  However, recently some patients who registered the machines early on, already received replacement  You can visit their website at [email or web address] to register your device or [email or web address]  [email or web address]  [email or web address] to learn more about how [place] to replace your device  You can also try calling [phone]  Another option would be to check with your medical equipment provider to determine if you are eligible for a new machine through your insurance, if not you can pay out of pocket for a new machine  According to [employer], an in line bacterial filter is not recommended for use with CPAP/BiPAP  If you wish to get a replacement machine, we are able to provide you with a script  Please include your mask type  On 14 Jun 2021, [employer] announced a recall of most of the CPAP machines that it has made in the past decade  The recall notice stated that their concern centered on sound-deadening foam used in all the machines  What should an ordinary CPAP user do? You are being forced to make a personal decision, and you have very little hard data at your disposal   The epidemiologic studies cited above make it appear that [employer] is being overly cautious  However, you will have to live with the consequences of your decision    If you get in your car, you know that you could die in a fiery crash, or become paralyzed in a collision, but you drive on, because you understand that the odds of a bad outcome are acceptably small  Here you are being asked to make a similar decision  The chances that the foam in your CPAP machine will harm you irreparably is certainly small  Is it acceptably small? Only you can decide  The following was written by a physician CPAP user who decided for himself that there is minimal if any carcinogenic risk  On June 14 [employer] issued a recall for almost all their CPAP machines in the [address] [facility], approximately 2 million devices  For the rest of the world, they did not issue a recall, but rather a "safety advisory" for the millions of other CPAP machines  Their stated reason was the "difference in regulatory environments" between the [place] and the rest of the world, but what it really means is that they are concerned with the high risk of litigation in the [address] [facility]  This is much less of an issue in the rest of the world  Because of this concern, the information from [employer] to providers and patients is probably limited by their legal considerations  What did they find? [employer] has two concerns  First, they found that, occasionally, in some unknown number of machines, the sound insulating foam that keeps the machine quiet can deteriorate  Tiny particles of foam could get into the airstream  Particles could, in some cases, irritate the airway and possibly cause coughing, wheezing and other allergic symptoms  This may be a more significant issue for patients with underlying lung disease, such as asthma or emphysema  As far as anyone knows, these foam breakdown particles are generally inert and similar to ordinary dust  [employer] has not released any data revealing long-term risks  Is this common? They haven't told us how often this occurs; whether it's 1/100 machines, 1/1,000 machines or 1/100,0000   We believe that [employer]' perspective is that the recall is necessary to protect them from litigation, even if the risk is very small (or nonexistent)  The second concern is the foam may release trace amounts of several chemicals connected to the manufacture of the foam  These chemicals are in widespread use in industry, and in fact, appear in small quantities in many consumer products, cosmetics, personal care products, and medicines  Based on studies done in the laboratory where bacteria are exposed to high concentrations of these chemicals, there is a concern that one or more may be carcinogenic at some dose  It is important to note that these screening tests do not correlate closely with human disease  At this time, there are no known human cancers associated with these substances  That does not mean they do not have the capability to contribute to cancers; we just don't know  Even if does, we don't know if it takes a month of exposure, a year, or a decade  Is it worse than smoking a pack of cigarettes for a year? Again, we just don't know  Even though the company just became aware of this issue, it has always been there, even if you've had your device for 2,3, or 4 or more years  In summary, we don't know how common foam breakdown occurs  If it does occur, we don't know how often it contributes to disease  We are not even sure that there is any meaningful risk at all  What can you do? In response to this information, patients have 4 choices, and we have had patients select each of these  We cannot give you advice, because there is not enough data to do that, but we can give you information to help you make a choice  The three choices are:    1  Keep using your CPAP until the recall is resolved, which could take up to a year, considering that the risk is unknown  Most of our patients with severe sleep apnea or who are very dependent on their machines have elected to do this   This includes most of our physician patients who are CPAP users  2  Stop using your device until the recall has been resolved  Some patients, especially those with mild to moderate sleep apnea, who may feel minimally different without their CPAPs have elected to stop using CPAP until the recall is resolved  Remember that most people have had symptoms of sleep apnea for years before the diagnosis was made  3  Purchase another brand of machine (such as Resmed) on your own with your own money  They are probably less expensive on the internet (about $900) than from your current equipment provider  If you decide on this, we can provide orders for you to use to purchase one  If you decide to stop using CPAP, you should monitor your symptoms carefully and DO NOT DRIVE IF YOU ARE SLEEPY  If you continue to use your device, keep the humidity and heat low or off  Do not use ozone , such as SoClean  Observe the tubing frequently to make sure there are no black particles or debris  If you decide to purchase a PAP device on your own on the internet, let us know and we can provided a prescription to  order a replacement machine  4  Use another form of therapy e g  Dental appliance, surgery      This has been a difficult time for all of us and we are here to help you if we can  The following was written by a physician CPAP user who decided for himself that there is minimal if any carcinogenic risk  Nursing Support:  When: Monday through Friday 7A-5PM except holidays  Where: Our direct line is [phone]  If you are having a true emergency please call 911  In the event that the line is busy or it is after hours please leave a voice message and we will return your call  Please speak clearly, leaving your full name, birth date, best number to reach you and the reason for your call  Medication refills:  We will need the name of the medication, the dosage, the ordering provider, whether you get a 30 or 90 day refill, and the pharmacy name and address  Medications will be ordered by the provider only  Nurses cannot call in prescriptions  Please allow 7 days for medication refills  Physician requested updates: If your provider requested that you call with an update after starting medication, please be ready to provide us the medication and dosage, what time you take your medication, the time you attempt to fall asleep, time you fall asleep, when you wake up, and what time you get out of bed  Sleep Study Results: We will contact you with sleep study results and/or next steps after the physician has reviewed your testing

## 2022-07-11 ENCOUNTER — TELEPHONE (OUTPATIENT)
Dept: SLEEP CENTER | Facility: CLINIC | Age: 77
End: 2022-07-11

## 2022-07-26 ENCOUNTER — OFFICE VISIT (OUTPATIENT)
Dept: FAMILY MEDICINE CLINIC | Facility: CLINIC | Age: 77
End: 2022-07-26
Payer: COMMERCIAL

## 2022-07-26 VITALS
SYSTOLIC BLOOD PRESSURE: 142 MMHG | HEART RATE: 60 BPM | BODY MASS INDEX: 44.75 KG/M2 | WEIGHT: 237 LBS | RESPIRATION RATE: 18 BRPM | HEIGHT: 61 IN | DIASTOLIC BLOOD PRESSURE: 90 MMHG | OXYGEN SATURATION: 96 % | TEMPERATURE: 96.4 F

## 2022-07-26 DIAGNOSIS — M17.0 BILATERAL PRIMARY OSTEOARTHRITIS OF KNEE: ICD-10-CM

## 2022-07-26 DIAGNOSIS — M70.50 PES ANSERINE BURSITIS: ICD-10-CM

## 2022-07-26 DIAGNOSIS — E78.2 MIXED HYPERLIPIDEMIA: ICD-10-CM

## 2022-07-26 DIAGNOSIS — I10 ESSENTIAL HYPERTENSION: ICD-10-CM

## 2022-07-26 DIAGNOSIS — M17.11 PRIMARY OSTEOARTHRITIS OF RIGHT KNEE: ICD-10-CM

## 2022-07-26 DIAGNOSIS — L30.9 DERMATITIS: ICD-10-CM

## 2022-07-26 DIAGNOSIS — F32.A DEPRESSION, UNSPECIFIED DEPRESSION TYPE: ICD-10-CM

## 2022-07-26 DIAGNOSIS — I10 PRIMARY HYPERTENSION: ICD-10-CM

## 2022-07-26 DIAGNOSIS — K21.00 GASTROESOPHAGEAL REFLUX DISEASE WITH ESOPHAGITIS WITHOUT HEMORRHAGE: ICD-10-CM

## 2022-07-26 DIAGNOSIS — Z00.00 MEDICARE ANNUAL WELLNESS VISIT, SUBSEQUENT: Primary | ICD-10-CM

## 2022-07-26 PROCEDURE — 3077F SYST BP >= 140 MM HG: CPT | Performed by: FAMILY MEDICINE

## 2022-07-26 PROCEDURE — 1170F FXNL STATUS ASSESSED: CPT | Performed by: FAMILY MEDICINE

## 2022-07-26 PROCEDURE — 3725F SCREEN DEPRESSION PERFORMED: CPT | Performed by: FAMILY MEDICINE

## 2022-07-26 PROCEDURE — 3288F FALL RISK ASSESSMENT DOCD: CPT | Performed by: FAMILY MEDICINE

## 2022-07-26 PROCEDURE — 1160F RVW MEDS BY RX/DR IN RCRD: CPT | Performed by: FAMILY MEDICINE

## 2022-07-26 PROCEDURE — G0439 PPPS, SUBSEQ VISIT: HCPCS | Performed by: FAMILY MEDICINE

## 2022-07-26 PROCEDURE — 3080F DIAST BP >= 90 MM HG: CPT | Performed by: FAMILY MEDICINE

## 2022-07-26 PROCEDURE — 1125F AMNT PAIN NOTED PAIN PRSNT: CPT | Performed by: FAMILY MEDICINE

## 2022-07-26 RX ORDER — ATORVASTATIN CALCIUM 20 MG/1
20 TABLET, FILM COATED ORAL DAILY
Qty: 90 TABLET | Refills: 1 | Status: SHIPPED | OUTPATIENT
Start: 2022-07-26

## 2022-07-26 RX ORDER — MOMETASONE FUROATE 1 MG/G
1 CREAM TOPICAL DAILY
Qty: 45 G | Refills: 3 | Status: SHIPPED | OUTPATIENT
Start: 2022-07-26

## 2022-07-26 RX ORDER — AMLODIPINE BESYLATE 5 MG/1
5 TABLET ORAL 2 TIMES DAILY
Qty: 180 TABLET | Refills: 1 | Status: SHIPPED | OUTPATIENT
Start: 2022-07-26

## 2022-07-26 RX ORDER — OMEPRAZOLE 40 MG/1
40 CAPSULE, DELAYED RELEASE ORAL DAILY
Qty: 90 CAPSULE | Refills: 1 | Status: SHIPPED | OUTPATIENT
Start: 2022-07-26

## 2022-07-26 RX ORDER — DICLOFENAC SODIUM 75 MG/1
75 TABLET, DELAYED RELEASE ORAL 2 TIMES DAILY PRN
Qty: 180 TABLET | Refills: 1 | Status: SHIPPED | OUTPATIENT
Start: 2022-07-26

## 2022-07-26 RX ORDER — VENLAFAXINE 37.5 MG/1
37.5 TABLET ORAL DAILY
Qty: 90 TABLET | Refills: 1 | Status: SHIPPED | OUTPATIENT
Start: 2022-07-26

## 2022-07-26 RX ORDER — NEBIVOLOL 10 MG/1
10 TABLET ORAL DAILY
Qty: 90 TABLET | Refills: 1 | Status: SHIPPED | OUTPATIENT
Start: 2022-07-26

## 2022-07-26 RX ORDER — LISINOPRIL AND HYDROCHLOROTHIAZIDE 20; 12.5 MG/1; MG/1
1 TABLET ORAL 2 TIMES DAILY
Qty: 180 TABLET | Refills: 1 | Status: SHIPPED | OUTPATIENT
Start: 2022-07-26

## 2022-07-26 NOTE — PROGRESS NOTES
Assessment and Plan:     Problem List Items Addressed This Visit        Cardiovascular and Mediastinum    Hypertension       Musculoskeletal and Integument    Primary osteoarthritis of right knee    Pes anserine bursitis    Dermatitis       Other    Hyperlipidemia      Other Visit Diagnoses     Essential hypertension        Bilateral primary osteoarthritis of knee        Gastroesophageal reflux disease with esophagitis without hemorrhage        Depression, unspecified depression type               Preventive health issues were discussed with patient, and age appropriate screening tests were ordered as noted in patient's After Visit Summary  Personalized health advice and appropriate referrals for health education or preventive services given if needed, as noted in patient's After Visit Summary       History of Present Illness:     Patient presents for a Medicare Wellness Visit    HPI   Patient Care Team:  Arely Recio DO as PCP - General  Julius Boone MD as PCP - Endocrinology (Endocrinology)  Arely Recio DO as PCP - PCP-[facility]  Devin Bueno MD Doneen Birks, MD as Surgeon (Surgical Oncology)     Review of Systems:     Review of Systems     Problem List:     Patient Active Problem List   Diagnosis    History of thyroid cancer    GERD without esophagitis    Hyperlipidemia    Hypertension    Hypothyroidism, postsurgical    Impaired fasting glucose    Insomnia    Restrictive lung disease    Acute non-recurrent frontal sinusitis    Obstructive sleep apnea    Hypersomnia    Morbid obesity with BMI of 40 0-44 9, adult ([facility] [address] [facility]    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[facility] )    Humerus lesion, left    Acute shoulder bursitis, right    Lower extremity edema    Encounter for follow-up surveillance of thyroid cancer    Thyroid cancer ([facility] [address] [facility]    Primary osteoarthritis of left knee    Primary osteoarthritis of right knee    Pes anserine bursitis    Dermatitis    Skin neoplasm  Viral syndrome    COVID-19    Simple chronic bronchitis (HCC)    Acute non-recurrent maxillary sinusitis    Preop examination    Ptosis of both eyelids    Prediabetes    Ptosis of right eyelid      Past Medical and Surgical History:     Past Medical History:   Diagnosis Date    Asthma     Breathing difficulty     Bronchitis     Depression     Fracture of arm     Fracture of carpal bone     Hyperlipidemia     Hypertension     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    Shortness of breath     Thyroid nodule     Thyroid nodule     Thyroid nodule      Past Surgical History:   Procedure Laterality Date    EYE SURGERY  2021    HEMORRHOID SURGERY      GA THYROID LOBECTOMY,UNILAT Right 2016    Procedure: HEMITHYROIDECTOMY;  Surgeon: Daniel Billy MD;  Location:  MAIN OR;  Service: Surgical Oncology    GA THYROIDECTOMY POST PREV THYR SURG Left 2016    Procedure:  COMPLETION THYROIDECTOMY, FLEXIBLE LARYNGOSCOPY;  Surgeon: Daniel Billy MD;  Location: [facility];  Service: Surgical Oncology    THYROIDECTOMY, PARTIAL Left       Family History:     Family History   Adopted: Yes   Problem Relation Age of Onset    Hypertension Family     Kidney disease Family     Hypertension Mother       Social History:     Social History     Socioeconomic History    Marital status: /Civil Union     Spouse name: None    Number of children: None    Years of education: None    Highest education level: None   Occupational History    None   Tobacco Use    Smoking status: Former Smoker     Packs/day: 0 25     Years: 4 00     Pack years: 1 00     Types: Cigarettes     Quit date: 6/15/1981     Years since quittin 1    Smokeless tobacco: Former User    Tobacco comment: quit 40 yrs ago (Never a smoker per [employer])   Vaping Use    Vaping Use: Never used   Substance and Sexual Activity    Alcohol use: No     Comment: occasional glass of wine    Drug use: No    Sexual activity: Never   Other Topics Concern    None   Social History Narrative    Occasional caffeine consumption     Social Determinants of Health     Financial Resource Strain: Not on file   Food Insecurity: Not on file   Transportation Needs: Not on file   Physical Activity: Not on file   Stress: Not on file   Social Connections: Not on file   Intimate Partner Violence: Not on file   Housing Stability: Not on file      Medications and Allergies:     Current Outpatient Medications   Medication Sig Dispense Refill    amLODIPine (NORVASC) 5 mg tablet Take 1 tablet (5 mg total) by mouth 2 (two) times a day 180 tablet 0    atorvastatin (LIPITOR) 20 mg tablet Take 1 tablet (20 mg total) by mouth daily 90 tablet 3    diclofenac (VOLTAREN) 75 mg EC tablet Take 1 tablet (75 mg total) by mouth 2 (two) times a day as needed (pain) Take with food  180 tablet 0    Diclofenac Sodium (VOLTAREN) 1 % Apply qid topically 100 g 1    furosemide (LASIX) 20 mg tablet Take 20 mg by mouth 2 (two) times a day      levothyroxine 175 mcg tablet Take 1 tablet (175 mcg total) by mouth daily 90 tablet 1    lisinopril-hydrochlorothiazide (PRINZIDE,ZESTORETIC) 20-12 5 MG per tablet Take 1 tablet by mouth 2 (two) times a day 180 tablet 1    Melatonin 10 MG TABS Take by mouth      mometasone (ELOCON) 0 1 % cream Apply 1 application topically in the morning  45 g 3    mometasone (ELOCON) 0 1 % lotion Apply topically daily 60 mL 5    Multiple Vitamins-Minerals (CENTRUM SILVER 50+WOMEN PO) Take by mouth      nebivolol (BYSTOLIC) 10 mg tablet Take 1 tablet (10 mg total) by mouth in the morning   90 tablet 1    nystatin-triamcinolone (MYCOLOG-II) cream Apply topically 2 (two) times a day 30 g 3    omeprazole (PriLOSEC) 40 MG capsule Take 1 capsule (40 mg total) by mouth daily 90 capsule 1    potassium chloride (Klor-Con M20) 20 mEq tablet Take 1 tablet (20 mEq total) by mouth 2 (two) times a day 180 tablet 0    venlafaxine (EFFEXOR) 37 5 mg tablet Take 1 tablet (37 5 mg total) by mouth daily 90 tablet 0    Ventolin  (90 Base) MCG/ACT inhaler INHALE 2 PUFFS BY MOUTH EVERY 4 HOURS AS NEEDED FOR WHEEZING OR SHORTNESS OF BREATH 54 g 0     No current facility-administered medications for this visit  Allergies   Allergen Reactions    Adhesive [Medical Tape] Rash      Immunizations:     Immunization History   Administered Date(s) Administered    COVID-19 MODERNA VACC 0 5 ML IM 03/26/2021, 04/28/2021, 12/03/2021    INFLUENZA 10/21/2007, 10/26/2020, 10/26/2020    Influenza Split High Dose Preservative Free IM 10/25/2012, 09/25/2013, 10/03/2014, 09/25/2015, 11/07/2017    Influenza, high dose seasonal 0 7 mL 11/12/2018, 10/31/2019, 11/01/2021    Influenza, seasonal, injectable 10/22/2003, 10/10/2008    Influenza, seasonal, injectable, preservative free 1947    Pneumococcal Conjugate 13-Valent 02/17/2020    Pneumococcal Polysaccharide PPV23 08/26/2013, 08/20/2018    Td (adult), adsorbed 08/07/1987, 10/22/1997, 08/26/2013    Zoster 08/26/2013    Zoster Vaccine Recombinant 08/13/2021      Health Maintenance:         Topic Date Due    Breast Cancer Screening: Mammogram  Never done    Colorectal Cancer Screening  Never done    Hepatitis C Screening  Completed         Topic Date Due    COVID-19 Vaccine (4 - Booster for Moderna series) 04/03/2022    Influenza Vaccine (1) 09/01/2022      Medicare Screening Tests and Risk Assessments:     Yariel Connell is here for her Subsequent Wellness visit  Health Risk Assessment:   Patient rates overall health as good  Patient feels that their physical health rating is same  Patient is satisfied with their life  Eyesight was rated as same  Hearing was rated as same  Patient feels that their emotional and mental health rating is same  Patients states they are sometimes angry  Patient states they are sometimes unusually tired/fatigued  Pain experienced in the last 7 days has been none   Patient states that she has experienced no weight loss or gain in last 6 months  Depression Screening:   PHQ-9 Score: 0      Fall Risk Screening: In the past year, patient has experienced: no history of falling in past year      Urinary Incontinence Screening:   Patient has not leaked urine accidently in the last six months  Home Safety:  Patient does not have trouble with stairs inside or outside of their home  Patient has working smoke alarms and has working carbon monoxide detector  Home safety hazards include: none  Nutrition:   Current diet is Regular  Medications:   Patient is currently taking over-the-counter supplements  OTC medications include: see medication list  Patient is able to manage medications  Activities of Daily Living (ADLs)/Instrumental Activities of Daily Living (IADLs):   Walk and transfer into and out of bed and chair?: Yes  Dress and groom yourself?: Yes    Bathe or shower yourself?: Yes    Feed yourself?  Yes  Do your laundry/housekeeping?: Yes  Manage your money, pay your bills and track your expenses?: Yes  Make your own meals?: Yes    Do your own shopping?: Yes    Previous Hospitalizations:   Any hospitalizations or ED visits within the last 12 months?: No      Advance Care Planning:   Living will: No    Durable POA for healthcare: No    Advanced directive: No      Cognitive Screening:   Provider or family/friend/caregiver concerned regarding cognition?: No    PREVENTIVE SCREENINGS      Cardiovascular Screening:    General: Screening Not Indicated, History Lipid Disorder, Risks and Benefits Discussed and Screening Current      Diabetes Screening:     General: Risks and Benefits Discussed and Screening Current      Colorectal Cancer Screening:     General: Risks and Benefits Discussed and Patient Declines      Breast Cancer Screening:     General: Risks and Benefits Discussed and Patient Declines      Cervical Cancer Screening:    General: Screening Not Indicated and Risks and Benefits Discussed      Osteoporosis Screening:    General: Risks and Benefits Discussed and Patient Declines      Abdominal Aortic Aneurysm (AAA) Screening:        General: Risks and Benefits Discussed and Screening Not Indicated      Lung Cancer Screening:     General: Screening Not Indicated and Risks and Benefits Discussed      Hepatitis C Screening:    General: Screening Current and Risks and Benefits Discussed    Other Counseling Topics:   Regular weightbearing exercise and calcium and vitamin D intake       No exam data present     Physical Exam:     /90 (BP Location: Left arm, Patient Position: Sitting, Cuff Size: Adult)   Pulse 60   Temp (!) 96 4 °F (35 8 °C) (Tympanic)   Resp 18   Ht 5' 1" (1 549 m)   Wt 108 kg (237 lb)   LMP  (LMP Unknown)   SpO2 96%   BMI 44 78 kg/m²     Physical Exam     Mayme Bowels, DO

## 2022-07-26 NOTE — PATIENT INSTRUCTIONS
Medicare Preventive Visit Patient Instructions  Thank you for completing your Welcome to Medicare Visit or Medicare Annual Wellness Visit today  Your next wellness visit will be due in one year (7/27/2023)  The screening/preventive services that you may require over the next 5-10 years are detailed below  Some tests may not apply to you based off risk factors and/or age  Screening tests ordered at today's visit but not completed yet may show as past due  Also, please note that scanned in results may not display below  Preventive Screenings:  Service Recommendations Previous Testing/Comments   Colorectal Cancer Screening  * Colonoscopy    * Fecal Occult Blood Test (FOBT)/Fecal Immunochemical Test (FIT)  * Fecal DNA/Cologuard Test  * Flexible Sigmoidoscopy Age: 54-65 years old   Colonoscopy: every 10 years (may be performed more frequently if at higher risk)  OR  FOBT/FIT: every 1 year  OR  Cologuard: every 3 years  OR  Sigmoidoscopy: every 5 years  Screening may be recommended earlier than age 48 if at higher risk for colorectal cancer  Also, an individualized decision between you and your healthcare provider will decide whether screening between the ages of 74-80 would be appropriate  Colonoscopy: Not on file  FOBT/FIT: Not on file  Cologuard: Not on file  Sigmoidoscopy: Not on file          Breast Cancer Screening Age: 36 years old  Frequency: every 1-2 years  Not required if history of left and right mastectomy Mammogram: Not on file        Cervical Cancer Screening Between the ages of 21-29, pap smear recommended once every 3 years  Between the ages of 33-67, can perform pap smear with HPV co-testing every 5 years     Recommendations may differ for women with a history of total hysterectomy, cervical cancer, or abnormal pap smears in past  Pap Smear: Not on file    Screening Not Indicated   Hepatitis C Screening Once for adults born between [facility]  More frequently in patients at high risk for Hepatitis C Hep C Antibody: 03/11/2019    Screening Current   Diabetes Screening 1-2 times per year if you're at risk for diabetes or have pre-diabetes Fasting glucose: 145 mg/dL   A1C: 6 3 %        Cholesterol Screening Once every 5 years if you don't have a lipid disorder  May order more often based on risk factors  Lipid panel: 07/01/2021    Screening Not Indicated  History Lipid Disorder     Other Preventive Screenings Covered by Medicare:  1  Abdominal Aortic Aneurysm (AAA) Screening: covered once if your at risk  You're considered to be at risk if you have a family history of AAA  2  Lung Cancer Screening: covers low dose CT scan once per year if you meet all of the following conditions: (1) Age 50-69; (2) No signs or symptoms of lung cancer; (3) Current smoker or have quit smoking within the last 15 years; (4) You have a tobacco smoking history of at least 30 pack years (packs per day multiplied by number of years you smoked); (5) You get a written order from a healthcare provider  3  Glaucoma Screening: covered annually if you're considered high risk: (1) You have diabetes OR (2) Family history of glaucoma OR (3)  aged 48 and older OR (3)  American aged 72 and older  3  Osteoporosis Screening: covered every 2 years if you meet one of the following conditions: (1) You're estrogen deficient and at risk for osteoporosis based off medical history and other findings; (2) Have a vertebral abnormality; (3) On glucocorticoid therapy for more than 3 months; (4) Have primary hyperparathyroidism; (5) On osteoporosis medications and need to assess response to drug therapy  · Last bone density test (DXA Scan): 09/03/2013  5  HIV Screening: covered annually if you're between the age of 12-76  Also covered annually if you are younger than 13 and older than 72 with risk factors for HIV infection  For pregnant patients, it is covered up to 3 times per pregnancy      Immunizations:  Immunization Recommendations   Influenza Vaccine Annual influenza vaccination during flu season is recommended for all persons aged >= 6 months who do not have contraindications   Pneumococcal Vaccine (Prevnar and Pneumovax)  * Prevnar = PCV13  * Pneumovax = PPSV23   Adults 25-60 years old: 1-3 doses may be recommended based on certain risk factors  Adults 72 years old: Prevnar (PCV13) vaccine recommended followed by Pneumovax (PPSV23) vaccine  If already received PPSV23 since turning 65, then PCV13 recommended at least one year after PPSV23 dose  Hepatitis B Vaccine 3 dose series if at intermediate or high risk (ex: diabetes, end stage renal disease, liver disease)   Tetanus (Td) Vaccine - COST NOT COVERED BY MEDICARE PART B Following completion of primary series, a booster dose should be given every 10 years to maintain immunity against tetanus  Td may also be given as tetanus wound prophylaxis  Tdap Vaccine - COST NOT COVERED BY MEDICARE PART B Recommended at least once for all adults  For pregnant patients, recommended with each pregnancy  Shingles Vaccine (Shingrix) - COST NOT COVERED BY MEDICARE PART B  2 shot series recommended in those aged 48 and above     Health Maintenance Due:      Topic Date Due    Breast Cancer Screening: Mammogram  Never done    Colorectal Cancer Screening  Never done    Hepatitis C Screening  Completed     Immunizations Due:      Topic Date Due    COVID-19 Vaccine (4 - Booster for Moderna series) 04/03/2022    Influenza Vaccine (1) 09/01/2022     Advance Directives   What are advance directives? Advance directives are legal documents that state your wishes and plans for medical care  These plans are made ahead of time in case you lose your ability to make decisions for yourself  Advance directives can apply to any medical decision, such as the treatments you want, and if you want to donate organs  What are the types of advance directives?   There are many types of advance directives, and each state has rules about how to use them  You may choose a combination of any of the following:  · Living will: This is a written record of the treatment you want  You can also choose which treatments you do not want, which to limit, and which to stop at a certain time  This includes surgery, medicine, IV fluid, and tube feedings  · Durable power of  for healthcare [facility]): This is a written record that states who you want to make healthcare choices for you when you are unable to make them for yourself  This person, called a proxy, is usually a family member or a friend  You may choose more than 1 proxy  · Do not resuscitate (DNR) order:  A DNR order is used in case your heart stops beating or you stop breathing  It is a request not to have certain forms of treatment, such as CPR  A DNR order may be included in other types of advance directives  · Medical directive: This covers the care that you want if you are in a coma, near death, or unable to make decisions for yourself  You can list the treatments you want for each condition  Treatment may include pain medicine, surgery, blood transfusions, dialysis, IV or tube feedings, and a ventilator (breathing machine)  · Values history: This document has questions about your views, beliefs, and how you feel and think about life  This information can help others choose the care that you would choose  Why are advance directives important? An advance directive helps you control your care  Although spoken wishes may be used, it is better to have your wishes written down  Spoken wishes can be misunderstood, or not followed  Treatments may be given even if you do not want them  An advance directive may make it easier for your family to make difficult choices about your care     Weight Management   Why it is important to manage your weight:  Being overweight increases your risk of health conditions such as heart disease, high blood pressure, type 2 diabetes, and certain types of cancer  It can also increase your risk for osteoarthritis, sleep apnea, and other respiratory problems  Aim for a slow, steady weight loss  Even a small amount of weight loss can lower your risk of health problems  How to lose weight safely:  A safe and healthy way to lose weight is to eat fewer calories and get regular exercise  You can lose up about 1 pound a week by decreasing the number of calories you eat by 500 calories each day  Healthy meal plan for weight management:  A healthy meal plan includes a variety of foods, contains fewer calories, and helps you stay healthy  A healthy meal plan includes the following:  · Eat whole-grain foods more often  A healthy meal plan should contain fiber  Fiber is the part of grains, fruits, and vegetables that is not broken down by your body  Whole-grain foods are healthy and provide extra fiber in your diet  Some examples of whole-grain foods are whole-wheat breads and pastas, oatmeal, brown rice, and bulgur  · Eat a variety of vegetables every day  Include dark, leafy greens such as spinach, kale, laura greens, and mustard greens  Eat yellow and orange vegetables such as carrots, sweet potatoes, and winter squash  · Eat a variety of fruits every day  Choose fresh or canned fruit (canned in its own juice or light syrup) instead of juice  Fruit juice has very little or no fiber  · Eat low-fat dairy foods  Drink fat-free (skim) milk or 1% milk  Eat fat-free yogurt and low-fat cottage cheese  Try low-fat cheeses such as mozzarella and other reduced-fat cheeses  · Choose meat and other protein foods that are low in fat  Choose beans or other legumes such as split peas or lentils  Choose fish, skinless poultry (chicken or turkey), or lean cuts of red meat (beef or pork)  Before you cook meat or poultry, cut off any visible fat  · Use less fat and oil  Try baking foods instead of frying them   Add less fat, such as margarine, sour cream, regular salad dressing and mayonnaise to foods  Eat fewer high-fat foods  Some examples of high-fat foods include french fries, doughnuts, ice cream, and cakes  · Eat fewer sweets  Limit foods and drinks that are high in sugar  This includes candy, cookies, regular soda, and sweetened drinks  Exercise:  Exercise at least 30 minutes per day on most days of the week  Some examples of exercise include walking, biking, dancing, and swimming  You can also fit in more physical activity by taking the stairs instead of the elevator or parking farther away from stores  Ask your healthcare provider about the best exercise plan for you  © Copyright [employer] 2018 Information is for End User's use only and may not be sold, redistributed or otherwise used for commercial purposes   All illustrations and images included in CareNotes® are the copyrighted property of [employer]  or [address] [place] [address]

## 2022-08-12 ENCOUNTER — OFFICE VISIT (OUTPATIENT)
Dept: OBGYN CLINIC | Facility: MEDICAL CENTER | Age: 77
End: 2022-08-12
Payer: COMMERCIAL

## 2022-08-12 VITALS
DIASTOLIC BLOOD PRESSURE: 78 MMHG | HEIGHT: 61 IN | SYSTOLIC BLOOD PRESSURE: 155 MMHG | WEIGHT: 235 LBS | BODY MASS INDEX: 44.37 KG/M2 | HEART RATE: 77 BPM

## 2022-08-12 DIAGNOSIS — M17.0 BILATERAL PRIMARY OSTEOARTHRITIS OF KNEE: Primary | ICD-10-CM

## 2022-08-12 PROCEDURE — 20610 DRAIN/INJ JOINT/BURSA W/O US: CPT | Performed by: ORTHOPAEDIC SURGERY

## 2022-08-12 PROCEDURE — 1160F RVW MEDS BY RX/DR IN RCRD: CPT | Performed by: ORTHOPAEDIC SURGERY

## 2022-08-12 PROCEDURE — 99213 OFFICE O/P EST LOW 20 MIN: CPT | Performed by: ORTHOPAEDIC SURGERY

## 2022-08-12 RX ORDER — BUPIVACAINE HYDROCHLORIDE 5 MG/ML
2 INJECTION, SOLUTION EPIDURAL; INTRACAUDAL
Status: COMPLETED | OUTPATIENT
Start: 2022-08-12 | End: 2022-08-12

## 2022-08-12 RX ORDER — TRIAMCINOLONE ACETONIDE 40 MG/ML
40 INJECTION, SUSPENSION INTRA-ARTICULAR; INTRAMUSCULAR
Status: COMPLETED | OUTPATIENT
Start: 2022-08-12 | End: 2022-08-12

## 2022-08-12 RX ADMIN — BUPIVACAINE HYDROCHLORIDE 2 ML: 5 INJECTION, SOLUTION EPIDURAL; INTRACAUDAL at 10:24

## 2022-08-12 RX ADMIN — TRIAMCINOLONE ACETONIDE 40 MG: 40 INJECTION, SUSPENSION INTRA-ARTICULAR; INTRAMUSCULAR at 10:24

## 2022-08-12 NOTE — PROGRESS NOTES
Assessment/Plan:  1  Bilateral primary osteoarthritis of knee      Orders Placed This Encounter   Procedures    Large joint arthrocentesis     - Received bilateral kneesteroid injections today  Patient should ice and avoid strenuous activity for 1-2 days if needed  She will follow up next week for Pes Anserine injections  - Euflexxa injections were ordered during today's visit for her to return in 4 months    -Continue with HEP for ROM     Return in about 1 week (around 8/19/2022) for Pes Anserine CSI  I answered all of the patient's questions during the visit and provided education of the patient's condition during the visit  The patient verbalized understanding of the information given and agrees with the plan  This note was dictated using CAPPTURE software  It may contain errors including improperly dictated words  Please contact physician directly for any questions  Subjective   Chief Complaint:   Chief Complaint   Patient presents with    Left Knee - Follow-up    Right Knee - Follow-up       HPI  Abelardogermán Solis is a 78 y o  female who presents for follow up for bilateral knee pain  On 04/22/2022 she completed her 3rd bilateral Euflexxa injection in her knee  She continues to experience medial and lateral knee pain, which he describes as dull and achy that is worse with periods of prolonged standing as well as going up and down steps  She denies any mechanical symptoms or sense of instability  She currently manages her pain with diclofenac which does provide her with relief  She states she has been compliant with a home exercise program focusing on range of motion exercises  At this time she would like to continue with nonsurgical intervention including repeat a cortisone/Visco injections when she is able to  Review of Systems  ROS:    See HPI for musculoskeletal review     All other systems reviewed are negative     History:  Past Medical History:   Diagnosis Date    Asthma     Breathing difficulty     Bronchitis     Depression     Fracture of arm     Fracture of carpal bone     Hyperlipidemia     Hypertension     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    Shortness of breath     Thyroid nodule     Thyroid nodule     Thyroid nodule      Past Surgical History:   Procedure Laterality Date    EYE SURGERY  2021    HEMORRHOID SURGERY      UT THYROID LOBECTOMY,UNILAT Right 2016    Procedure: HEMITHYROIDECTOMY;  Surgeon: Barbie Aiken MD;  Location:  MAIN OR;  Service: Surgical Oncology    UT THYROIDECTOMY POST PREV THYR SURG Left 2016    Procedure:  COMPLETION THYROIDECTOMY, FLEXIBLE LARYNGOSCOPY;  Surgeon: Barbie Aiken MD;  Location: [facility];  Service: Surgical Oncology    THYROIDECTOMY, PARTIAL Left      Social History   Social History     Substance and Sexual Activity   Alcohol Use No    Comment: occasional glass of wine     Social History     Substance and Sexual Activity   Drug Use No     Social History     Tobacco Use   Smoking Status Former Smoker    Packs/day: 0 25    Years: 4 00    Pack years: 1 00    Types: Cigarettes    Quit date: 6/15/1981    Years since quittin 1   Smokeless Tobacco Former User   Tobacco Comment    quit 36 yrs ago (Never a smoker per [employer])     Family History:   Family History   Adopted: Yes   Problem Relation Age of Onset    Hypertension Family     Kidney disease Family     Hypertension Mother        Current Outpatient Medications on File Prior to Visit   Medication Sig Dispense Refill    amLODIPine (NORVASC) 5 mg tablet Take 1 tablet (5 mg total) by mouth 2 (two) times a day 180 tablet 1    atorvastatin (LIPITOR) 20 mg tablet Take 1 tablet (20 mg total) by mouth daily 90 tablet 1    diclofenac (VOLTAREN) 75 mg EC tablet Take 1 tablet (75 mg total) by mouth 2 (two) times a day as needed (pain) Take with food   180 tablet 1    Diclofenac Sodium (VOLTAREN) 1 % Apply qid topically 100 g 1    furosemide (LASIX) 20 mg tablet Take 20 mg by mouth 2 (two) times a day      levothyroxine 175 mcg tablet Take 1 tablet (175 mcg total) by mouth daily 90 tablet 1    lisinopril-hydrochlorothiazide (PRINZIDE,ZESTORETIC) 20-12 5 MG per tablet Take 1 tablet by mouth 2 (two) times a day 180 tablet 1    Melatonin 10 MG TABS Take by mouth      mometasone (ELOCON) 0 1 % cream Apply 1 application topically daily 45 g 3    mometasone (ELOCON) 0 1 % lotion Apply topically daily 60 mL 5    Multiple Vitamins-Minerals (CENTRUM SILVER 50+WOMEN PO) Take by mouth      nebivolol (BYSTOLIC) 10 mg tablet Take 1 tablet (10 mg total) by mouth daily 90 tablet 1    nystatin-triamcinolone (MYCOLOG-II) cream Apply topically 2 (two) times a day 30 g 3    omeprazole (PriLOSEC) 40 MG capsule Take 1 capsule (40 mg total) by mouth daily 90 capsule 1    potassium chloride (Klor-Con M20) 20 mEq tablet Take 1 tablet (20 mEq total) by mouth 2 (two) times a day 180 tablet 0    venlafaxine (EFFEXOR) 37 5 mg tablet Take 1 tablet (37 5 mg total) by mouth daily 90 tablet 1    Ventolin  (90 Base) MCG/ACT inhaler INHALE 2 PUFFS BY MOUTH EVERY 4 HOURS AS NEEDED FOR WHEEZING OR SHORTNESS OF BREATH 54 g 0     No current facility-administered medications on file prior to visit  Allergies   Allergen Reactions    Adhesive [Medical Tape] Rash        Objective     /78   Pulse 77   Ht 5' 1" (1 549 m)   Wt 107 kg (235 lb)   LMP  (LMP Unknown)   BMI 44 40 kg/m²      PE:  AAOx 3  WDWN  Hearing intact, no drainage from eyes  no audible wheezing  no abdominal distension  LE compartments soft, skin intact    Ortho Exam:  bilateral Knee:   No erythema  no swelling  no effusion  no warmth  +TTP Medial joint line  +TTP Pes Anserine   AROM: Right: , Left: 0-110  Stable to varus/valgus stress    Imaging Studies: I have personally reviewed pertinent reports      XR bilateral knee:  Performed on 07/24/2018 reveal moderate to severe osteoarthritis with medial joint space narrowing    Large joint arthrocentesis: bilateral knee  Universal Protocol:  Consent: Verbal consent obtained  Risks and benefits: risks, benefits and alternatives were discussed  Consent given by: patient  Time out: Immediately prior to procedure a "time out" was called to verify the correct patient, procedure, equipment, support staff and site/side marked as required    Timeout called at: 8/12/2022 10:22 AM   Patient understanding: patient states understanding of the procedure being performed  Site marked: the operative site was marked  Patient identity confirmed: verbally with patient    Supporting Documentation  Indications: pain and diagnostic evaluation   Procedure Details  Location: knee - bilateral knee  Preparation: Patient was prepped and draped in the usual sterile fashion  Needle size: 22 G  Ultrasound guidance: no  Approach: anterolateral    Medications (Right): 2 mL bupivacaine (PF) 0 5 %; 40 mg triamcinolone acetonide 40 mg/mLMedications (Left): 2 mL bupivacaine (PF) 0 5 %; 40 mg triamcinolone acetonide 40 mg/mL   Patient tolerance: patient tolerated the procedure well with no immediate complications  Dressing:  Sterile dressing applied

## 2022-08-17 ENCOUNTER — VBI (OUTPATIENT)
Dept: ADMINISTRATIVE | Facility: OTHER | Age: 77
End: 2022-08-17

## 2022-08-19 ENCOUNTER — OFFICE VISIT (OUTPATIENT)
Dept: OBGYN CLINIC | Facility: MEDICAL CENTER | Age: 77
End: 2022-08-19
Payer: COMMERCIAL

## 2022-08-19 VITALS
WEIGHT: 238 LBS | DIASTOLIC BLOOD PRESSURE: 76 MMHG | SYSTOLIC BLOOD PRESSURE: 127 MMHG | HEART RATE: 77 BPM | BODY MASS INDEX: 44.93 KG/M2 | HEIGHT: 61 IN

## 2022-08-19 DIAGNOSIS — M17.0 BILATERAL PRIMARY OSTEOARTHRITIS OF KNEE: ICD-10-CM

## 2022-08-19 DIAGNOSIS — M70.52 PES ANSERINUS BURSITIS OF BOTH KNEES: Primary | ICD-10-CM

## 2022-08-19 DIAGNOSIS — M70.51 PES ANSERINUS BURSITIS OF BOTH KNEES: Primary | ICD-10-CM

## 2022-08-19 PROCEDURE — 20610 DRAIN/INJ JOINT/BURSA W/O US: CPT | Performed by: PHYSICIAN ASSISTANT

## 2022-08-19 RX ORDER — TRIAMCINOLONE ACETONIDE 40 MG/ML
40 INJECTION, SUSPENSION INTRA-ARTICULAR; INTRAMUSCULAR
Status: COMPLETED | OUTPATIENT
Start: 2022-08-19 | End: 2022-08-19

## 2022-08-19 RX ORDER — BUPIVACAINE HYDROCHLORIDE 5 MG/ML
1 INJECTION, SOLUTION PERINEURAL
Status: COMPLETED | OUTPATIENT
Start: 2022-08-19 | End: 2022-08-19

## 2022-08-19 RX ADMIN — BUPIVACAINE HYDROCHLORIDE 1 ML: 5 INJECTION, SOLUTION PERINEURAL at 10:16

## 2022-08-19 RX ADMIN — TRIAMCINOLONE ACETONIDE 40 MG: 40 INJECTION, SUSPENSION INTRA-ARTICULAR; INTRAMUSCULAR at 10:16

## 2022-08-19 NOTE — PROGRESS NOTES
Patient is here for bilateral knee pes anserine bursa steroid injections as discussed at her visit one week ago  Post injection instructions reviewed  Order placed for bilat knee VS injections in October  Follow up October for bilat knee VS        Large joint arthrocentesis: bilateral pes anserine bursa  Universal Protocol:  Consent: Verbal consent obtained    Risks and benefits: risks, benefits and alternatives were discussed  Consent given by: patient  Site marked: the operative site was marked  Supporting Documentation  Indications: pain   Procedure Details  Location: knee - bilateral pes anserine bursa  Preparation: Patient was prepped and draped in the usual sterile fashion  Needle size: 22 G  Ultrasound guidance: no  Approach: medial    Medications (Right): 1 mL bupivacaine 0 5 %; 40 mg triamcinolone acetonide 40 mg/mLMedications (Left): 1 mL bupivacaine 0 5 %; 40 mg triamcinolone acetonide 40 mg/mL   Patient tolerance: patient tolerated the procedure well with no immediate complications  Dressing:  Sterile dressing applied

## 2022-09-12 DIAGNOSIS — E89.0 HYPOTHYROIDISM, POSTSURGICAL: ICD-10-CM

## 2022-09-12 RX ORDER — LEVOTHYROXINE SODIUM 175 UG/1
175 TABLET ORAL DAILY
Qty: 90 TABLET | Refills: 1 | Status: SHIPPED | OUTPATIENT
Start: 2022-09-12

## 2022-09-16 ENCOUNTER — OFFICE VISIT (OUTPATIENT)
Dept: FAMILY MEDICINE CLINIC | Facility: CLINIC | Age: 77
End: 2022-09-16
Payer: COMMERCIAL

## 2022-09-16 VITALS
HEART RATE: 83 BPM | DIASTOLIC BLOOD PRESSURE: 80 MMHG | BODY MASS INDEX: 44.93 KG/M2 | TEMPERATURE: 99.3 F | OXYGEN SATURATION: 90 % | HEIGHT: 61 IN | WEIGHT: 238 LBS | SYSTOLIC BLOOD PRESSURE: 142 MMHG

## 2022-09-16 DIAGNOSIS — J06.9 ACUTE UPPER RESPIRATORY INFECTION: Primary | ICD-10-CM

## 2022-09-16 PROCEDURE — 1160F RVW MEDS BY RX/DR IN RCRD: CPT | Performed by: FAMILY MEDICINE

## 2022-09-16 PROCEDURE — 87636 SARSCOV2 & INF A&B AMP PRB: CPT | Performed by: FAMILY MEDICINE

## 2022-09-16 PROCEDURE — 99213 OFFICE O/P EST LOW 20 MIN: CPT | Performed by: FAMILY MEDICINE

## 2022-09-16 RX ORDER — ONDANSETRON 4 MG/1
4 TABLET, ORALLY DISINTEGRATING ORAL EVERY 8 HOURS PRN
Qty: 30 TABLET | Refills: 0 | Status: SHIPPED | OUTPATIENT
Start: 2022-09-16

## 2022-09-16 RX ORDER — AMOXICILLIN 500 MG/1
500 TABLET, FILM COATED ORAL 3 TIMES DAILY
Qty: 21 TABLET | Refills: 0 | OUTPATIENT
Start: 2022-09-16 | End: 2022-09-21

## 2022-09-18 LAB
FLUAV RNA RESP QL NAA+PROBE: NEGATIVE
FLUBV RNA RESP QL NAA+PROBE: NEGATIVE
SARS-COV-2 RNA RESP QL NAA+PROBE: NEGATIVE

## 2022-09-20 NOTE — PROGRESS NOTES
COVID-19 Outpatient Progress Note    Assessment/Plan:    Problem List Items Addressed This Visit    None     Visit Diagnoses     Acute upper respiratory infection    -  Primary    Relevant Medications    amoxicillin (AMOXIL) 500 MG tablet    ondansetron (Zofran ODT) 4 mg disintegrating tablet    Other Relevant Orders    Covid/Flu- Office Collect (Completed)         Disposition:     PCR testing will be performed to test for COVID-19/Influenza  I have spent 10 minutes directly with the patient  Greater than 50% of this time was spent in counseling/coordination of care regarding: risks and benefits of treatment options, instructions for management and patient and family education  Encounter provider: Naz Azevedo MD     Provider located at: [address] [place] [id number]     Recent Visits  Date Type Provider Dept   09/16/22 Office Visit Naz Azevedo MD [facility] recent visits within past 7 days and meeting all other requirements  Future Appointments  No visits were found meeting these conditions  Showing future appointments within next 150 days and meeting all other requirements     Subjective:   Princess Lee is a 78 y o  female who is concerned about COVID-19  Patient's symptoms include nasal congestion and nausea             COVID-19 vaccination status: Fully vaccinated with booster    Exposure:     Hospitalized recently for fever and/or lower respiratory symptoms?: No      Currently a healthcare worker that is involved in direct patient care?: No      Works in a special setting where the risk of COVID-19 transmission may be high? (this may include long-term care, correctional and MCC facilities; homeless shelters; assisted-living facilities and group homes ): No      Resident in a special setting where the risk of COVID-19 transmission may be high? (this may include long-term care, correctional and longterm facilities; homeless shelters; assisted-living facilities and group homes ): No      Lab Results   Component Value Date    SARSCOV2 Negative 09/16/2022       Review of Systems   Constitutional: Negative  HENT: Positive for congestion  Eyes: Negative  Respiratory: Negative  Cardiovascular: Negative  Gastrointestinal: Positive for nausea  Endocrine: Negative  Genitourinary: Negative  Musculoskeletal: Negative  Allergic/Immunologic: Negative  Neurological: Negative  Hematological: Negative  Psychiatric/Behavioral: Negative  All other systems reviewed and are negative  Current Outpatient Medications on File Prior to Visit   Medication Sig    amLODIPine (NORVASC) 5 mg tablet Take 1 tablet (5 mg total) by mouth 2 (two) times a day    atorvastatin (LIPITOR) 20 mg tablet Take 1 tablet (20 mg total) by mouth daily    diclofenac (VOLTAREN) 75 mg EC tablet Take 1 tablet (75 mg total) by mouth 2 (two) times a day as needed (pain) Take with food      Diclofenac Sodium (VOLTAREN) 1 % Apply qid topically    furosemide (LASIX) 20 mg tablet Take 20 mg by mouth 2 (two) times a day    levothyroxine 175 mcg tablet Take 1 tablet (175 mcg total) by mouth daily    lisinopril-hydrochlorothiazide (PRINZIDE,ZESTORETIC) 20-12 5 MG per tablet Take 1 tablet by mouth 2 (two) times a day    Melatonin 10 MG TABS Take by mouth    mometasone (ELOCON) 0 1 % cream Apply 1 application topically daily    mometasone (ELOCON) 0 1 % lotion Apply topically daily    Multiple Vitamins-Minerals (CENTRUM SILVER 50+WOMEN PO) Take by mouth    nebivolol (BYSTOLIC) 10 mg tablet Take 1 tablet (10 mg total) by mouth daily    nystatin-triamcinolone (MYCOLOG-II) cream Apply topically 2 (two) times a day    omeprazole (PriLOSEC) 40 MG capsule Take 1 capsule (40 mg total) by mouth daily    potassium chloride (Klor-Con M20) 20 mEq tablet Take 1 tablet (20 mEq total) by mouth 2 (two) times a day    venlafaxine (EFFEXOR) 37 5 mg tablet Take 1 tablet (37 5 mg total) by mouth daily    Ventolin  (90 Base) MCG/ACT inhaler INHALE 2 PUFFS BY MOUTH EVERY 4 HOURS AS NEEDED FOR WHEEZING OR SHORTNESS OF BREATH       Objective:    /80 (BP Location: Right arm, Patient Position: Sitting, Cuff Size: Standard)   Pulse 83   Temp 99 3 °F (37 4 °C) (Tympanic)   Ht 5' 1" (1 549 m)   Wt 108 kg (238 lb)   LMP  (LMP Unknown)   SpO2 90%   BMI 44 97 kg/m²      Physical Exam  Constitutional:       General: She is not in acute distress  Appearance: She is well-developed  She is not diaphoretic  HENT:      Head: Normocephalic and atraumatic  Right Ear: External ear normal       Left Ear: External ear normal       Nose: Nose normal       Mouth/Throat:      Pharynx: No oropharyngeal exudate  Eyes:      General:         Right eye: No discharge  Left eye: No discharge  Conjunctiva/sclera: Conjunctivae normal       Pupils: Pupils are equal, round, and reactive to light  Neck:      Thyroid: No thyromegaly  Trachea: No tracheal deviation  Cardiovascular:      Rate and Rhythm: Normal rate and regular rhythm  Heart sounds: Normal heart sounds  No murmur heard  No friction rub  No gallop  Pulmonary:      Effort: Pulmonary effort is normal  No respiratory distress  Breath sounds: Normal breath sounds  Abdominal:      General: There is no distension  Palpations: Abdomen is soft  Tenderness: There is no abdominal tenderness  There is no guarding or rebound  Musculoskeletal:         General: Normal range of motion  Lymphadenopathy:      Cervical: No cervical adenopathy  Skin:     General: Skin is warm  Neurological:      Mental Status: She is alert and oriented to person, place, and time  Cranial Nerves: No cranial nerve deficit  Psychiatric:         Behavior: Behavior normal          Thought Content:  Thought content normal  Judgment: Judgment normal        Junaid Mathew MD

## 2022-09-21 ENCOUNTER — APPOINTMENT (EMERGENCY)
Dept: NON INVASIVE DIAGNOSTICS | Facility: HOSPITAL | Age: 77
End: 2022-09-21
Payer: COMMERCIAL

## 2022-09-21 ENCOUNTER — HOSPITAL ENCOUNTER (EMERGENCY)
Facility: HOSPITAL | Age: 77
Discharge: HOME | End: 2022-09-21
Attending: EMERGENCY MEDICINE
Payer: COMMERCIAL

## 2022-09-21 VITALS
SYSTOLIC BLOOD PRESSURE: 152 MMHG | TEMPERATURE: 98.3 F | HEART RATE: 67 BPM | DIASTOLIC BLOOD PRESSURE: 70 MMHG | OXYGEN SATURATION: 97 % | BODY MASS INDEX: 42.49 KG/M2 | RESPIRATION RATE: 18 BRPM | WEIGHT: 224.87 LBS

## 2022-09-21 DIAGNOSIS — L03.116 CELLULITIS OF LEFT LOWER LEG: Primary | ICD-10-CM

## 2022-09-21 LAB
ANION GAP SERPL CALCULATED.3IONS-SCNC: 10 MMOL/L (ref 4–13)
ANISOCYTOSIS BLD QL SMEAR: PRESENT
BASOPHILS # BLD MANUAL: 0 THOUSAND/UL (ref 0–0.1)
BASOPHILS NFR MAR MANUAL: 0 % (ref 0–1)
BUN SERPL-MCNC: 25 MG/DL (ref 5–25)
CALCIUM SERPL-MCNC: 9.4 MG/DL (ref 8.3–10.1)
CHLORIDE SERPL-SCNC: 101 MMOL/L (ref 96–108)
CO2 SERPL-SCNC: 29 MMOL/L (ref 21–32)
CREAT SERPL-MCNC: 0.98 MG/DL (ref 0.6–1.3)
EOSINOPHIL # BLD MANUAL: 0 THOUSAND/UL (ref 0–0.4)
EOSINOPHIL NFR BLD MANUAL: 0 % (ref 0–6)
ERYTHROCYTE [DISTWIDTH] IN BLOOD BY AUTOMATED COUNT: 13 % (ref 11.6–15.1)
GFR SERPL CREATININE-BSD FRML MDRD: 56 ML/MIN/1.73SQ M
GLUCOSE SERPL-MCNC: 141 MG/DL (ref 65–140)
HCT VFR BLD AUTO: 40.4 % (ref 34.8–46.1)
HGB BLD-MCNC: 14 G/DL (ref 11.5–15.4)
LYMPHOCYTES # BLD AUTO: 1.58 THOUSAND/UL (ref 0.6–4.47)
LYMPHOCYTES # BLD AUTO: 27 % (ref 14–44)
MCH RBC QN AUTO: 33.3 PG (ref 26.8–34.3)
MCHC RBC AUTO-ENTMCNC: 34.7 G/DL (ref 31.4–37.4)
MCV RBC AUTO: 96 FL (ref 82–98)
MONOCYTES # BLD AUTO: 0.53 THOUSAND/UL (ref 0–1.22)
MONOCYTES NFR BLD: 9 % (ref 4–12)
MYELOCYTES NFR BLD MANUAL: 1 % (ref 0–1)
NEUTROPHILS # BLD MANUAL: 3.69 THOUSAND/UL (ref 1.85–7.62)
NEUTS BAND NFR BLD MANUAL: 1 % (ref 0–8)
NEUTS SEG NFR BLD AUTO: 62 % (ref 43–75)
PLATELET # BLD AUTO: 186 THOUSANDS/UL (ref 149–390)
PLATELET BLD QL SMEAR: ADEQUATE
PMV BLD AUTO: 10.5 FL (ref 8.9–12.7)
POTASSIUM SERPL-SCNC: 4.4 MMOL/L (ref 3.5–5.3)
RBC # BLD AUTO: 4.2 MILLION/UL (ref 3.81–5.12)
SODIUM SERPL-SCNC: 140 MMOL/L (ref 135–147)
WBC # BLD AUTO: 5.86 THOUSAND/UL (ref 4.31–10.16)

## 2022-09-21 PROCEDURE — 85007 BL SMEAR W/DIFF WBC COUNT: CPT | Performed by: EMERGENCY MEDICINE

## 2022-09-21 PROCEDURE — 80048 BASIC METABOLIC PNL TOTAL CA: CPT | Performed by: EMERGENCY MEDICINE

## 2022-09-21 PROCEDURE — 99284 EMERGENCY DEPT VISIT MOD MDM: CPT | Performed by: EMERGENCY MEDICINE

## 2022-09-21 PROCEDURE — 36415 COLL VENOUS BLD VENIPUNCTURE: CPT | Performed by: EMERGENCY MEDICINE

## 2022-09-21 PROCEDURE — 85025 COMPLETE CBC W/AUTO DIFF WBC: CPT | Performed by: EMERGENCY MEDICINE

## 2022-09-21 PROCEDURE — 85027 COMPLETE CBC AUTOMATED: CPT | Performed by: EMERGENCY MEDICINE

## 2022-09-21 PROCEDURE — 99284 EMERGENCY DEPT VISIT MOD MDM: CPT

## 2022-09-21 PROCEDURE — 93971 EXTREMITY STUDY: CPT

## 2022-09-21 PROCEDURE — 96365 THER/PROPH/DIAG IV INF INIT: CPT

## 2022-09-21 RX ORDER — CEPHALEXIN 500 MG/1
500 CAPSULE ORAL EVERY 6 HOURS SCHEDULED
Qty: 28 CAPSULE | Refills: 0 | Status: SHIPPED | OUTPATIENT
Start: 2022-09-21 | End: 2022-09-28

## 2022-09-21 RX ORDER — CEFAZOLIN SODIUM 2 G/50ML
2000 SOLUTION INTRAVENOUS ONCE
Status: COMPLETED | OUTPATIENT
Start: 2022-09-21 | End: 2022-09-21

## 2022-09-21 RX ADMIN — CEFAZOLIN SODIUM 2000 MG: 2 SOLUTION INTRAVENOUS at 19:37

## 2022-09-21 NOTE — Clinical Note
Ezequiel Bentley was seen and treated in our emergency department on 9/21/2022  No restrictions            Diagnosis:     Janee Sorto  may return to work on return date  She may return on this date: 09/26/2022         If you have any questions or concerns, please don't hesitate to call        Richy Knutson DO    ______________________________           _______________          _______________  Hospital Representative                              Date                                Time

## 2022-09-21 NOTE — ED PROVIDER NOTES
History  Chief Complaint   Patient presents with    Cellulitis     Pt referred here from podiatry office for L leg/foot infection  Redness/swelling noted  82-year-old female with history of asthma, hypertension presents to the ED with a 5 day history of left lower extremity swelling and redness  She states that the symptoms are getting worse  She has had nausea and chills but no documented fever  She states she did scrape her left lower leg getting out of a pool  She went to her podiatrist and was referred here  History provided by:  Patient   used: No    Leg Pain  Location:  Leg  Leg location:  L lower leg  Pain details:     Severity:  Mild    Onset quality:  Gradual    Duration:  5 days  Relieved by:  Nothing  Worsened by:  Nothing  Ineffective treatments:  None tried  Associated symptoms: swelling    Associated symptoms: no back pain, no decreased ROM, no fatigue, no fever, no itching and no muscle weakness    Risk factors: obesity        Prior to Admission Medications   Prescriptions Last Dose Informant Patient Reported? Taking? Diclofenac Sodium (VOLTAREN) 1 %   No No   Sig: Apply qid topically   Melatonin 10 MG TABS  Self Yes No   Sig: Take by mouth   Multiple Vitamins-Minerals (CENTRUM SILVER 50+WOMEN PO)  Self Yes No   Sig: Take by mouth   Ventolin  (90 Base) MCG/ACT inhaler   No No   Sig: INHALE 2 PUFFS BY MOUTH EVERY 4 HOURS AS NEEDED FOR WHEEZING OR SHORTNESS OF BREATH   amLODIPine (NORVASC) 5 mg tablet   No No   Sig: Take 1 tablet (5 mg total) by mouth 2 (two) times a day   amoxicillin (AMOXIL) 500 MG tablet   No No   Sig: Take 1 tablet (500 mg total) by mouth 3 (three) times a day for 7 days   atorvastatin (LIPITOR) 20 mg tablet   No No   Sig: Take 1 tablet (20 mg total) by mouth daily   diclofenac (VOLTAREN) 75 mg EC tablet   No No   Sig: Take 1 tablet (75 mg total) by mouth 2 (two) times a day as needed (pain) Take with food     furosemide (LASIX) 20 mg tablet  Self Yes No   Sig: Take 20 mg by mouth 2 (two) times a day   levothyroxine 175 mcg tablet   No No   Sig: Take 1 tablet (175 mcg total) by mouth daily   lisinopril-hydrochlorothiazide (PRINZIDE,ZESTORETIC) 20-12 5 MG per tablet   No No   Sig: Take 1 tablet by mouth 2 (two) times a day   mometasone (ELOCON) 0 1 % cream   No No   Sig: Apply 1 application topically daily   mometasone (ELOCON) 0 1 % lotion   No No   Sig: Apply topically daily   nebivolol (BYSTOLIC) 10 mg tablet   No No   Sig: Take 1 tablet (10 mg total) by mouth daily   nystatin-triamcinolone (MYCOLOG-II) cream   No No   Sig: Apply topically 2 (two) times a day   omeprazole (PriLOSEC) 40 MG capsule   No No   Sig: Take 1 capsule (40 mg total) by mouth daily   ondansetron (Zofran ODT) 4 mg disintegrating tablet   No No   Sig: Take 1 tablet (4 mg total) by mouth every 8 (eight) hours as needed for nausea or vomiting   potassium chloride (Klor-Con M20) 20 mEq tablet   No No   Sig: Take 1 tablet (20 mEq total) by mouth 2 (two) times a day   venlafaxine (EFFEXOR) 37 5 mg tablet   No No   Sig: Take 1 tablet (37 5 mg total) by mouth daily      Facility-Administered Medications: None       Past Medical History:   Diagnosis Date    Asthma     Breathing difficulty     Bronchitis     Depression     Fracture of arm     Fracture of carpal bone     Hyperlipidemia     Hypertension     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[facility]) 12/16/2016    Shortness of breath     Thyroid nodule     Thyroid nodule     Thyroid nodule        Past Surgical History:   Procedure Laterality Date    EYE SURGERY  11/2021    HEMORRHOID SURGERY      SC THYROID LOBECTOMY,UNILAT Right 2/25/2016    Procedure: HEMITHYROIDECTOMY;  Surgeon: Conrad Griffith MD;  [facility];  Service: Surgical Oncology    SC THYROIDECTOMY POST PREV THYR SURG Left 6/6/2016    Procedure:  COMPLETION THYROIDECTOMY, FLEXIBLE LARYNGOSCOPY;  Surgeon: Conrad Griffith MD;  Location: [facility]; Service: Surgical Oncology    THYROIDECTOMY, PARTIAL Left        Family History   Adopted: Yes   Problem Relation Age of Onset    Hypertension Family     Kidney disease Family     Hypertension Mother      I have reviewed and agree with the history as documented  E-Cigarette/Vaping    E-Cigarette Use Never User      E-Cigarette/Vaping Substances    Nicotine No     THC No     CBD No     Flavoring No     Other No     Unknown No      Social History     Tobacco Use    Smoking status: Former Smoker     Packs/day: 0 25     Years: 4 00     Pack years: 1 00     Types: Cigarettes     Quit date: 6/15/1981     Years since quittin 2    Smokeless tobacco: Former User    Tobacco comment: quit 40 yrs ago (Never a smoker per [employer])   Vaping Use    Vaping Use: Never used   Substance Use Topics    Alcohol use: No     Comment: occasional glass of wine    Drug use: No       Review of Systems   Constitutional: Positive for chills  Negative for diaphoresis, fatigue and fever  HENT: Negative  Negative for congestion, rhinorrhea and sore throat  Eyes: Negative  Negative for discharge, redness and itching  Respiratory: Negative  Negative for apnea, cough, chest tightness, shortness of breath and wheezing  Cardiovascular: Negative for chest pain, palpitations and leg swelling  Gastrointestinal: Negative  Negative for abdominal pain  Endocrine: Negative  Genitourinary: Negative  Negative for flank pain, frequency and urgency  Musculoskeletal: Negative  Negative for back pain  Skin: Negative  Negative for itching  Allergic/Immunologic: Negative  Neurological: Negative  Negative for dizziness, syncope, weakness, light-headedness, numbness and headaches  Hematological: Negative  All other systems reviewed and are negative  Physical Exam  Physical Exam  Vitals and nursing note reviewed  Constitutional:       General: She is awake  She is not in acute distress  Appearance: Normal appearance  She is well-developed and overweight  She is not ill-appearing, toxic-appearing or diaphoretic  HENT:      Head: Normocephalic and atraumatic  Right Ear: External ear normal       Left Ear: External ear normal       Nose: Nose normal       Mouth/Throat:      Mouth: Mucous membranes are moist       Pharynx: No oropharyngeal exudate  Eyes:      General: No scleral icterus  Right eye: No discharge  Left eye: No discharge  Conjunctiva/sclera: Conjunctivae normal       Pupils: Pupils are equal, round, and reactive to light  Neck:      Thyroid: No thyromegaly  Vascular: No JVD  Trachea: No tracheal deviation  Cardiovascular:      Rate and Rhythm: Normal rate and regular rhythm  Heart sounds: Normal heart sounds  No murmur heard  No friction rub  No gallop  Pulmonary:      Effort: Pulmonary effort is normal  No respiratory distress  Breath sounds: Normal breath sounds  No stridor  No wheezing, rhonchi or rales  Chest:      Chest wall: No tenderness  Musculoskeletal:         General: No tenderness or deformity  Normal range of motion  Left lower leg: Edema present  Comments: Left lower extremity swelling below the knee  Erythema from left medial and anterior aspect of left lower extremity below-the-knee   Skin:     General: Skin is warm and dry  Coloration: Skin is not jaundiced or pale  Findings: No bruising, erythema, lesion or rash  Neurological:      General: No focal deficit present  Mental Status: She is alert and oriented to person, place, and time  Motor: No weakness or abnormal muscle tone  Deep Tendon Reflexes: Reflexes are normal and symmetric  Psychiatric:         Behavior: Behavior is cooperative           Vital Signs  ED Triage Vitals   Temperature Pulse Respirations Blood Pressure SpO2   09/21/22 1642 09/21/22 1645 09/21/22 1645 09/21/22 1645 09/21/22 1645   98 3 °F (36 8 °C) 72 18 156/87 98 %      Temp Source Heart Rate Source Patient Position - Orthostatic VS BP Location FiO2 (%)   09/21/22 1642 09/21/22 1645 09/21/22 1645 09/21/22 1645 --   Oral Monitor Sitting Right arm       Pain Score       --                  Vitals:    09/21/22 1645   BP: 156/87   Pulse: 72   Patient Position - Orthostatic VS: Sitting         Visual Acuity      ED Medications  Medications   ceFAZolin (ANCEF) IVPB (premix in dextrose) 2,000 mg 50 mL (has no administration in time range)       Diagnostic Studies  Results Reviewed     Procedure Component Value Units Date/Time    CBC and differential [[id number]]     Lab Status: No result Specimen: Blood     Basic metabolic panel [[id number]]     Lab Status: No result Specimen: Blood                  VAS lower limb venous duplex study, unilateral/limited    (Results Pending)          No DVT    Procedures  Procedures         ED Course                                             MDM    Disposition  Final diagnoses:   None     ED Disposition     None      Follow-up Information    None         Patient's Medications   Discharge Prescriptions    No medications on file       No discharge procedures on file      PDMP Review     None          ED Provider  Electronically Signed by           Geoff Garcia DO  09/21/22 [id number]

## 2022-09-22 ENCOUNTER — VBI (OUTPATIENT)
Dept: ADMINISTRATIVE | Facility: OTHER | Age: 77
End: 2022-09-22

## 2022-09-22 PROCEDURE — 93971 EXTREMITY STUDY: CPT | Performed by: SURGERY

## 2022-09-23 ENCOUNTER — OFFICE VISIT (OUTPATIENT)
Dept: FAMILY MEDICINE CLINIC | Facility: CLINIC | Age: 77
End: 2022-09-23
Payer: COMMERCIAL

## 2022-09-23 VITALS
OXYGEN SATURATION: 94 % | WEIGHT: 225 LBS | BODY MASS INDEX: 42.48 KG/M2 | RESPIRATION RATE: 18 BRPM | TEMPERATURE: 97.7 F | SYSTOLIC BLOOD PRESSURE: 150 MMHG | HEIGHT: 61 IN | DIASTOLIC BLOOD PRESSURE: 90 MMHG | HEART RATE: 54 BPM

## 2022-09-23 DIAGNOSIS — L03.116 CELLULITIS OF LEFT LOWER EXTREMITY: Primary | ICD-10-CM

## 2022-09-23 DIAGNOSIS — I10 PRIMARY HYPERTENSION: ICD-10-CM

## 2022-09-23 PROCEDURE — 99495 TRANSJ CARE MGMT MOD F2F 14D: CPT | Performed by: FAMILY MEDICINE

## 2022-09-23 NOTE — PROGRESS NOTES
Assessment/Plan:  Laboratory studies including C BMP CBC Doppler study of left lower extremity reviewed at this time  Patient will continue to restart medicine for blood pressure  Patient will continue with Keflex for cellulitis as well as use warm compresses as directed  Patient will try to elevate leg appropriately  Patient will follow-up if not seeing improvement  Note for patient to return to work in 1 week  Diagnoses and all orders for this visit:    Cellulitis of left lower extremity    Primary hypertension            Subjective:        Patient ID: Lynn Barrera is a 78 y o  female  Patient is here status post ER visit on the 21st for left leg cellulitis  Patient has taken 3 doses of Keflex so far  Patient did have Doppler study which was negative for DVT  Patient also laboratory studies done  These were reviewed  Patient has notice some cracking in her left heel  Patient has notice some redness and swelling of the left lower extremity over the past week  No fever noted  The patient with headache  No new chest pain or shortness of breath  No other new complaints  The following portions of the patient's history were reviewed and updated as appropriate: allergies, current medications, past family history, past medical history, past social history, past surgical history and problem list       Review of Systems   Constitutional: Negative  HENT: Negative  Eyes: Negative  Respiratory: Negative  Cardiovascular: Positive for leg swelling  Gastrointestinal: Negative  Endocrine: Negative  Genitourinary: Negative  Musculoskeletal: Negative  Skin: Positive for color change and rash  Allergic/Immunologic: Negative  Neurological: Negative  Hematological: Negative  Psychiatric/Behavioral: Negative              Objective:               /90 (BP Location: Left arm, Patient Position: Sitting, Cuff Size: Adult)   Pulse (!) 54   Temp 97 7 °F (36 5 °C) (Tympanic)   Resp 18   Ht 5' 1" (1 549 m)   Wt 102 kg (225 lb)   LMP  (LMP Unknown)   SpO2 94%   BMI 42 51 kg/m²          Physical Exam  Vitals and nursing note reviewed  Constitutional:       General: She is not in acute distress  Appearance: Normal appearance  She is not ill-appearing, toxic-appearing or diaphoretic  HENT:      Head: Normocephalic and atraumatic  Cardiovascular:      Rate and Rhythm: Normal rate and regular rhythm  Pulses: Normal pulses  Heart sounds: Normal heart sounds  Pulmonary:      Effort: Pulmonary effort is normal       Breath sounds: Normal breath sounds  Musculoskeletal:         General: Tenderness present  Left lower leg: Edema present  Skin:     Findings: Erythema present  Comments: Left leg swelling with redness and warmth  Neurological:      Mental Status: She is alert

## 2022-09-28 ENCOUNTER — VBI (OUTPATIENT)
Dept: ADMINISTRATIVE | Facility: OTHER | Age: 77
End: 2022-09-28

## 2022-09-28 NOTE — TELEPHONE ENCOUNTER
Prescription approved [employer] Pregnancy And Lactation Text: The risk during pregnancy and breastfeeding is uncertain with this medication.

## 2022-09-29 ENCOUNTER — TELEPHONE (OUTPATIENT)
Dept: FAMILY MEDICINE CLINIC | Facility: CLINIC | Age: 77
End: 2022-09-29

## 2022-09-29 DIAGNOSIS — L03.116 CELLULITIS OF LEFT LOWER EXTREMITY: Primary | ICD-10-CM

## 2022-09-29 NOTE — TELEPHONE ENCOUNTER
Patient called in stating she was seen for cellulitis and she was treated with keflex 500 mg and takes the last one today at 5:30 pm and it is still red and hurts and warm to touch so she is not sure what to do

## 2022-09-30 RX ORDER — CLINDAMYCIN HYDROCHLORIDE 300 MG/1
300 CAPSULE ORAL 3 TIMES DAILY
Qty: 21 CAPSULE | Refills: 0 | Status: SHIPPED | OUTPATIENT
Start: 2022-09-30 | End: 2022-10-07

## 2022-10-04 ENCOUNTER — TELEPHONE (OUTPATIENT)
Dept: OBGYN CLINIC | Facility: HOSPITAL | Age: 77
End: 2022-10-04

## 2022-10-04 DIAGNOSIS — M17.0 BILATERAL PRIMARY OSTEOARTHRITIS OF KNEE: ICD-10-CM

## 2022-10-04 NOTE — TELEPHONE ENCOUNTER
DR Faisal Chavira  RE: Preauthorization for the Diclofenac  CB: [phone]    Patient called stating that she needs preauthorization to be started for the Diclofenac sodium 1%

## 2022-10-05 DIAGNOSIS — M17.0 BILATERAL PRIMARY OSTEOARTHRITIS OF KNEE: ICD-10-CM

## 2022-10-10 ENCOUNTER — TELEPHONE (OUTPATIENT)
Dept: FAMILY MEDICINE CLINIC | Facility: CLINIC | Age: 77
End: 2022-10-10

## 2022-10-10 NOTE — TELEPHONE ENCOUNTER
The above PA was done through [employer] ([phone]) Insurance today 10/10/22; awaiting decision  Patient was approved for medication/11/2022; a copy of approval letter is scanned into patients' chart and fax to pharmacy of choice

## 2022-10-12 PROBLEM — J01.00 ACUTE NON-RECURRENT MAXILLARY SINUSITIS: Status: RESOLVED | Noted: 2021-09-23 | Resolved: 2022-10-12

## 2022-10-25 ENCOUNTER — APPOINTMENT (OUTPATIENT)
Dept: LAB | Facility: MEDICAL CENTER | Age: 77
End: 2022-10-25
Payer: COMMERCIAL

## 2022-10-25 ENCOUNTER — PROCEDURE VISIT (OUTPATIENT)
Dept: OBGYN CLINIC | Facility: MEDICAL CENTER | Age: 77
End: 2022-10-25

## 2022-10-25 VITALS
HEART RATE: 73 BPM | DIASTOLIC BLOOD PRESSURE: 84 MMHG | WEIGHT: 222.6 LBS | BODY MASS INDEX: 42.03 KG/M2 | SYSTOLIC BLOOD PRESSURE: 143 MMHG | HEIGHT: 61 IN

## 2022-10-25 DIAGNOSIS — M70.50 PES ANSERINE BURSITIS: ICD-10-CM

## 2022-10-25 DIAGNOSIS — M17.12 PRIMARY OSTEOARTHRITIS OF LEFT KNEE: Primary | ICD-10-CM

## 2022-10-25 DIAGNOSIS — B35.1 DERMATOPHYTOSIS OF NAIL: ICD-10-CM

## 2022-10-25 DIAGNOSIS — G89.29 CHRONIC PAIN OF LEFT KNEE: ICD-10-CM

## 2022-10-25 DIAGNOSIS — G89.29 CHRONIC PAIN OF RIGHT KNEE: ICD-10-CM

## 2022-10-25 DIAGNOSIS — M17.11 PRIMARY OSTEOARTHRITIS OF RIGHT KNEE: ICD-10-CM

## 2022-10-25 DIAGNOSIS — M25.561 CHRONIC PAIN OF RIGHT KNEE: ICD-10-CM

## 2022-10-25 DIAGNOSIS — M25.562 CHRONIC PAIN OF LEFT KNEE: ICD-10-CM

## 2022-10-25 LAB
ALBUMIN SERPL BCP-MCNC: 3.6 G/DL (ref 3.5–5)
ALP SERPL-CCNC: 69 U/L (ref 46–116)
ALT SERPL W P-5'-P-CCNC: 29 U/L (ref 12–78)
AST SERPL W P-5'-P-CCNC: 17 U/L (ref 5–45)
BILIRUB DIRECT SERPL-MCNC: 0.14 MG/DL (ref 0–0.2)
BILIRUB SERPL-MCNC: 0.69 MG/DL (ref 0.2–1)
PROT SERPL-MCNC: 7.9 G/DL (ref 6.4–8.4)

## 2022-10-25 PROCEDURE — 36415 COLL VENOUS BLD VENIPUNCTURE: CPT

## 2022-10-25 PROCEDURE — 80076 HEPATIC FUNCTION PANEL: CPT

## 2022-10-25 RX ORDER — HYALURONATE SODIUM 10 MG/ML
20 SYRINGE (ML) INTRAARTICULAR
Status: COMPLETED | OUTPATIENT
Start: 2022-10-25 | End: 2022-10-25

## 2022-10-25 RX ORDER — BUPIVACAINE HYDROCHLORIDE 2.5 MG/ML
1 INJECTION, SOLUTION INFILTRATION; PERINEURAL
Status: COMPLETED | OUTPATIENT
Start: 2022-10-25 | End: 2022-10-25

## 2022-10-25 RX ORDER — METHYLPREDNISOLONE ACETATE 40 MG/ML
1 INJECTION, SUSPENSION INTRA-ARTICULAR; INTRALESIONAL; INTRAMUSCULAR; SOFT TISSUE
Status: COMPLETED | OUTPATIENT
Start: 2022-10-25 | End: 2022-10-25

## 2022-10-25 RX ORDER — MELOXICAM 7.5 MG/1
7.5 TABLET ORAL 2 TIMES DAILY PRN
Qty: 60 TABLET | Refills: 1 | Status: SHIPPED | OUTPATIENT
Start: 2022-10-25

## 2022-10-25 RX ADMIN — Medication 20 MG: at 11:28

## 2022-10-25 RX ADMIN — METHYLPREDNISOLONE ACETATE 1 ML: 40 INJECTION, SUSPENSION INTRA-ARTICULAR; INTRALESIONAL; INTRAMUSCULAR; SOFT TISSUE at 11:28

## 2022-10-25 RX ADMIN — BUPIVACAINE HYDROCHLORIDE 1 ML: 2.5 INJECTION, SOLUTION INFILTRATION; PERINEURAL at 11:28

## 2022-10-25 NOTE — PROGRESS NOTES
Assessment/Plan:  1  Primary osteoarthritis of left knee    2  Chronic pain of left knee    3  Chronic pain of right knee    4  Primary osteoarthritis of right knee    5  Pes anserine bursitis      Orders Placed This Encounter   Procedures   • Large joint arthrocentesis: L knee   • Large joint arthrocentesis: R knee   • Large joint arthrocentesis     Received B/L knee euflexxa injections today  Patient knows to ice and avoid strenuous activity for 1-2 days if needed  After a discussion of risks and benefits the patient elected to proceed with a L knee pes bursa steroid injection today  Patient should ice and avoid strenuous activity for 1-2 days if needed  Patient should avoid vaccines for 2 weeks if possible  If patient is diabetic should also monitor glucose over the next 7 to 10 days  Will stop diclofenac and try meloxicam prn pain  Can take Tylenol 1,000mg by mouth every 8 hours as needed for pain  Do not exceed 3,000mg per day  Continue with diclofenac gel prn pain  Continue home exercises previously learned at PT, focus on extension    Return in about 1 week (around 11/1/2022)  for bilateral euflexxa intraarticular injections #2, R pes anserine bursa steroid injection    I answered all of the patient's questions during the visit and provided education of the patient's condition during the visit  The patient verbalized understanding of the information given and agrees with the plan  This note was dictated using [employer] software  It may contain errors including improperly dictated words  Please contact physician directly for any questions  Subjective   Chief Complaint: No chief complaint on file  HPI  Yimi Lr is a 78 y o  female who presents for follow up for bilateral knee arthritis  She received bilateral knee steroid injections in August as well as bilateral knee pes anserine bursa injections in August of the separate date  She got relief from both of these injections  Over the last month she has been experiencing bilateral knee pain  It is generalized and intermittent  It tends to worsen with weather changes  He is taking diclofenac as needed for pain control but is unsure if this helps  He states the diclofenac gel does help relieve her pain  She is here today for Euflexxa injections  Review of Systems  ROS:    See HPI for musculoskeletal review     All other systems reviewed are negative     History:  Past Medical History:   Diagnosis Date   • Asthma    • Breathing difficulty    • Bronchitis    • Depression    • Fracture of arm    • Fracture of carpal bone    • Hyperlipidemia    • Hypertension    • 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[facility] [address] ) 2016   • Shortness of breath    • Thyroid nodule    • Thyroid nodule    • Thyroid nodule      Past Surgical History:   Procedure Laterality Date   • EYE SURGERY  2021   • HEMORRHOID SURGERY     • VT THYROID LOBECTOMY,UNILAT Right 2016    Procedure: HEMITHYROIDECTOMY;  Surgeon: Brianna García MD;  [facility]: [facility];  Service: Surgical Oncology   • VT THYROIDECTOMY POST PREV THYR SURG Left 2016    Procedure:  COMPLETION THYROIDECTOMY, FLEXIBLE LARYNGOSCOPY;  Surgeon: Brianna García MD;  Location: [facility];  Service: Surgical Oncology   • THYROIDECTOMY, PARTIAL Left      Social History   Social History     Substance and Sexual Activity   Alcohol Use No    Comment: occasional glass of wine     Social History     Substance and Sexual Activity   Drug Use No     Social History     Tobacco Use   Smoking Status Former Smoker   • Packs/day: 0 25   • Years: 4 00   • Pack years: 1 00   • Types: Cigarettes   • Quit date: 6/15/1981   • Years since quittin 3   Smokeless Tobacco Former User   Tobacco Comment    quit 40 yrs ago (Never a smoker per [employer])     Family History:   Family History   Adopted: Yes   Problem Relation Age of Onset   • Hypertension Family    • Kidney disease Family    • Hypertension Mother Current Outpatient Medications on File Prior to Visit   Medication Sig Dispense Refill   • Diclofenac Sodium (VOLTAREN) 1 % Apply qid topically 350 g 1   • amLODIPine (NORVASC) 5 mg tablet Take 1 tablet (5 mg total) by mouth 2 (two) times a day 180 tablet 1   • atorvastatin (LIPITOR) 20 mg tablet Take 1 tablet (20 mg total) by mouth daily 90 tablet 1   • diclofenac (VOLTAREN) 75 mg EC tablet Take 1 tablet (75 mg total) by mouth 2 (two) times a day as needed (pain) Take with food  180 tablet 1   • furosemide (LASIX) 20 mg tablet Take 20 mg by mouth 2 (two) times a day     • levothyroxine 175 mcg tablet Take 1 tablet (175 mcg total) by mouth daily 90 tablet 1   • lisinopril-hydrochlorothiazide (PRINZIDE,ZESTORETIC) 20-12 5 MG per tablet Take 1 tablet by mouth 2 (two) times a day 180 tablet 1   • Melatonin 10 MG TABS Take by mouth     • mometasone (ELOCON) 0 1 % cream Apply 1 application topically daily 45 g 3   • mometasone (ELOCON) 0 1 % lotion Apply topically daily 60 mL 5   • Multiple Vitamins-Minerals (CENTRUM SILVER 50+WOMEN PO) Take by mouth     • nebivolol (BYSTOLIC) 10 mg tablet Take 1 tablet (10 mg total) by mouth daily 90 tablet 1   • nystatin-triamcinolone (MYCOLOG-II) cream Apply topically 2 (two) times a day 30 g 3   • omeprazole (PriLOSEC) 40 MG capsule Take 1 capsule (40 mg total) by mouth daily 90 capsule 1   • ondansetron (Zofran ODT) 4 mg disintegrating tablet Take 1 tablet (4 mg total) by mouth every 8 (eight) hours as needed for nausea or vomiting 30 tablet 0   • potassium chloride (Klor-Con M20) 20 mEq tablet Take 1 tablet (20 mEq total) by mouth 2 (two) times a day 180 tablet 0   • venlafaxine (EFFEXOR) 37 5 mg tablet Take 1 tablet (37 5 mg total) by mouth daily 90 tablet 1   • Ventolin  (90 Base) MCG/ACT inhaler INHALE 2 PUFFS BY MOUTH EVERY 4 HOURS AS NEEDED FOR WHEEZING OR SHORTNESS OF BREATH 54 g 0     No current facility-administered medications on file prior to visit  Allergies   Allergen Reactions   • Adhesive [Medical Tape] Rash        Objective     /84   Pulse 73   Ht 5' 1" (1 549 m)   Wt 101 kg (222 lb 9 6 oz)   LMP  (LMP Unknown)   BMI 42 06 kg/m²      PE:  AAOx 3  WDWN  Hearing intact, no drainage from eyes  no audible wheezing  no abdominal distension  LE compartments soft, skin intact    Ortho Exam:  bilateral Knee:   No erythema  Mild generalized swelling  no effusion  no warmth  AROM:     Left Lower leg with discoloration, no erythema    Large joint arthrocentesis: L knee  Universal Protocol:  Consent given by: patient  Time out: Immediately prior to procedure a "time out" was called to verify the correct patient, procedure, equipment, support staff and site/side marked as required  Site marked: the operative site was marked  Supporting Documentation  Indications: pain   Procedure Details  Location: knee - L knee  Preparation: Patient was prepped and draped in the usual sterile fashion  Needle size: 22 G  Ultrasound guidance: no  Approach: anterolateral  Medications administered: 20 mg Sodium Hyaluronate 20 MG/2ML    Patient tolerance: patient tolerated the procedure well with no immediate complications  Dressing:  Sterile dressing applied    Large joint arthrocentesis: R knee  Universal Protocol:  Consent given by: patient  Time out: Immediately prior to procedure a "time out" was called to verify the correct patient, procedure, equipment, support staff and site/side marked as required    Site marked: the operative site was marked  Supporting Documentation  Indications: pain   Procedure Details  Location: knee - R knee  Preparation: Patient was prepped and draped in the usual sterile fashion  Needle size: 22 G  Ultrasound guidance: no  Approach: anterolateral  Medications administered: 20 mg Sodium Hyaluronate 20 MG/2ML    Patient tolerance: patient tolerated the procedure well with no immediate complications  Dressing:  Sterile dressing applied    Large joint arthrocentesis  Universal Protocol:  Time out: Immediately prior to procedure a "time out" was called to verify the correct patient, procedure, equipment, support staff and site/side marked as required    Site marked: the operative site was marked  Supporting Documentation  Indications: pain   Procedure Details  Needle size: 22 G  Ultrasound guidance: no  Medications administered: 1 mL methylPREDNISolone acetate 40 mg/mL; 1 mL bupivacaine 0 25 %    Patient tolerance: patient tolerated the procedure well with no immediate complications  Dressing:  Sterile dressing applied

## 2022-11-02 ENCOUNTER — PROCEDURE VISIT (OUTPATIENT)
Dept: OBGYN CLINIC | Facility: MEDICAL CENTER | Age: 77
End: 2022-11-02

## 2022-11-02 VITALS
SYSTOLIC BLOOD PRESSURE: 143 MMHG | DIASTOLIC BLOOD PRESSURE: 86 MMHG | WEIGHT: 222.6 LBS | BODY MASS INDEX: 42.03 KG/M2 | HEIGHT: 61 IN

## 2022-11-02 DIAGNOSIS — M17.0 BILATERAL PRIMARY OSTEOARTHRITIS OF KNEE: Primary | ICD-10-CM

## 2022-11-02 DIAGNOSIS — M70.50 PES ANSERINE BURSITIS: ICD-10-CM

## 2022-11-02 RX ORDER — TRIAMCINOLONE ACETONIDE 40 MG/ML
40 INJECTION, SUSPENSION INTRA-ARTICULAR; INTRAMUSCULAR
Status: COMPLETED | OUTPATIENT
Start: 2022-11-02 | End: 2022-11-02

## 2022-11-02 RX ORDER — HYALURONATE SODIUM 10 MG/ML
20 SYRINGE (ML) INTRAARTICULAR
Status: COMPLETED | OUTPATIENT
Start: 2022-11-02 | End: 2022-11-02

## 2022-11-02 RX ORDER — BUPIVACAINE HYDROCHLORIDE 2.5 MG/ML
1 INJECTION, SOLUTION INFILTRATION; PERINEURAL
Status: COMPLETED | OUTPATIENT
Start: 2022-11-02 | End: 2022-11-02

## 2022-11-02 RX ADMIN — Medication 20 MG: at 09:15

## 2022-11-02 RX ADMIN — TRIAMCINOLONE ACETONIDE 40 MG: 40 INJECTION, SUSPENSION INTRA-ARTICULAR; INTRAMUSCULAR at 09:15

## 2022-11-02 RX ADMIN — BUPIVACAINE HYDROCHLORIDE 1 ML: 2.5 INJECTION, SOLUTION INFILTRATION; PERINEURAL at 09:15

## 2022-11-02 NOTE — PROGRESS NOTES
Patient has severe bilateral knee osteoarthritis  Patient is here for bilateral knee euflexxa injections 3/3 and right knee pes anserine bursa steroid injection  Post injection instructions reviewed  Follow up 1 week for injection 3/3  Large joint arthrocentesis: bilateral knee  Universal Protocol:  Consent: Verbal consent obtained  Risks and benefits: risks, benefits and alternatives were discussed  Consent given by: patient  Site marked: the operative site was marked  Supporting Documentation  Indications: pain   Procedure Details  Location: knee - bilateral knee  Preparation: Patient was prepped and draped in the usual sterile fashion  Needle size: 22 G  Ultrasound guidance: no  Approach: anterolateral    Medications (Right): 20 mg Sodium Hyaluronate 20 MG/2MLMedications (Left): 20 mg Sodium Hyaluronate 20 MG/2ML   Patient tolerance: patient tolerated the procedure well with no immediate complications  Dressing:  Sterile dressing applied    Large joint arthrocentesis: R pes anserine bursa  Universal Protocol:  Consent: Verbal consent obtained    Risks and benefits: risks, benefits and alternatives were discussed  Consent given by: patient  Site marked: the operative site was marked  Supporting Documentation  Indications: pain   Procedure Details  Location: knee - R pes anserine bursa  Preparation: Patient was prepped and draped in the usual sterile fashion  Needle size: 22 G  Ultrasound guidance: no  Approach: medial  Medications administered: 1 mL bupivacaine 0 25 %; 40 mg triamcinolone acetonide 40 mg/mL    Patient tolerance: patient tolerated the procedure well with no immediate complications  Dressing:  Sterile dressing applied

## 2022-11-09 ENCOUNTER — PROCEDURE VISIT (OUTPATIENT)
Dept: OBGYN CLINIC | Facility: MEDICAL CENTER | Age: 77
End: 2022-11-09

## 2022-11-09 VITALS
BODY MASS INDEX: 41.91 KG/M2 | HEIGHT: 61 IN | WEIGHT: 222 LBS | DIASTOLIC BLOOD PRESSURE: 84 MMHG | SYSTOLIC BLOOD PRESSURE: 152 MMHG | HEART RATE: 66 BPM

## 2022-11-09 DIAGNOSIS — M17.0 BILATERAL PRIMARY OSTEOARTHRITIS OF KNEE: Primary | ICD-10-CM

## 2022-11-09 RX ORDER — HYALURONATE SODIUM 10 MG/ML
20 SYRINGE (ML) INTRAARTICULAR
Status: COMPLETED | OUTPATIENT
Start: 2022-11-09 | End: 2022-11-09

## 2022-11-09 RX ADMIN — Medication 20 MG: at 09:09

## 2022-11-09 NOTE — PROGRESS NOTES
Patient has severe bilateral knee osteoarthritis  Patient received bilateral knee euflexxa injections 3/3  Post injection instructions reviewed  Follow up 2 months for repeat bilat pes bursa steroid injections  Large joint arthrocentesis: bilateral knee  Universal Protocol:  Consent: Verbal consent obtained    Risks and benefits: risks, benefits and alternatives were discussed  Consent given by: patient  Site marked: the operative site was marked  Supporting Documentation  Indications: pain   Procedure Details  Location: knee - bilateral knee  Preparation: Patient was prepped and draped in the usual sterile fashion  Needle size: 22 G  Ultrasound guidance: no  Approach: anterolateral    Medications (Right): 20 mg Sodium Hyaluronate 20 MG/2MLMedications (Left): 20 mg Sodium Hyaluronate 20 MG/2ML   Patient tolerance: patient tolerated the procedure well with no immediate complications  Dressing:  Sterile dressing applied

## 2022-11-18 ENCOUNTER — VBI (OUTPATIENT)
Dept: ADMINISTRATIVE | Facility: OTHER | Age: 77
End: 2022-11-18

## 2022-11-30 ENCOUNTER — VBI (OUTPATIENT)
Dept: ADMINISTRATIVE | Facility: OTHER | Age: 77
End: 2022-11-30

## 2022-12-02 DIAGNOSIS — I10 ESSENTIAL HYPERTENSION: ICD-10-CM

## 2022-12-02 RX ORDER — POTASSIUM CHLORIDE 20 MEQ/1
20 TABLET, EXTENDED RELEASE ORAL 2 TIMES DAILY
Qty: 180 TABLET | Refills: 0 | Status: SHIPPED | OUTPATIENT
Start: 2022-12-02

## 2022-12-19 ENCOUNTER — APPOINTMENT (OUTPATIENT)
Dept: LAB | Facility: MEDICAL CENTER | Age: 77
End: 2022-12-19

## 2022-12-19 DIAGNOSIS — B35.1 DERMATOPHYTOSIS OF NAIL: ICD-10-CM

## 2022-12-19 LAB
ALBUMIN SERPL BCP-MCNC: 3.7 G/DL (ref 3.5–5)
ALP SERPL-CCNC: 55 U/L (ref 46–116)
ALT SERPL W P-5'-P-CCNC: 18 U/L (ref 12–78)
AST SERPL W P-5'-P-CCNC: 19 U/L (ref 5–45)
BILIRUB DIRECT SERPL-MCNC: 0.08 MG/DL (ref 0–0.2)
BILIRUB SERPL-MCNC: 0.45 MG/DL (ref 0.2–1)
PROT SERPL-MCNC: 7.3 G/DL (ref 6.4–8.4)

## 2022-12-22 ENCOUNTER — VBI (OUTPATIENT)
Dept: ADMINISTRATIVE | Facility: OTHER | Age: 77
End: 2022-12-22

## 2023-01-03 DIAGNOSIS — M70.50 PES ANSERINE BURSITIS: ICD-10-CM

## 2023-01-03 DIAGNOSIS — M25.561 CHRONIC PAIN OF RIGHT KNEE: ICD-10-CM

## 2023-01-03 DIAGNOSIS — M25.562 CHRONIC PAIN OF LEFT KNEE: ICD-10-CM

## 2023-01-03 DIAGNOSIS — G89.29 CHRONIC PAIN OF LEFT KNEE: ICD-10-CM

## 2023-01-03 DIAGNOSIS — M17.11 PRIMARY OSTEOARTHRITIS OF RIGHT KNEE: ICD-10-CM

## 2023-01-03 DIAGNOSIS — M17.12 PRIMARY OSTEOARTHRITIS OF LEFT KNEE: ICD-10-CM

## 2023-01-03 DIAGNOSIS — G89.29 CHRONIC PAIN OF RIGHT KNEE: ICD-10-CM

## 2023-01-03 RX ORDER — MELOXICAM 7.5 MG/1
7.5 TABLET ORAL 2 TIMES DAILY PRN
Qty: 60 TABLET | Refills: 1 | Status: SHIPPED | OUTPATIENT
Start: 2023-01-03

## 2023-01-09 ENCOUNTER — VBI (OUTPATIENT)
Dept: ADMINISTRATIVE | Facility: OTHER | Age: 78
End: 2023-01-09

## 2023-01-11 ENCOUNTER — OFFICE VISIT (OUTPATIENT)
Dept: OBGYN CLINIC | Facility: MEDICAL CENTER | Age: 78
End: 2023-01-11

## 2023-01-11 VITALS
DIASTOLIC BLOOD PRESSURE: 77 MMHG | WEIGHT: 223.2 LBS | HEART RATE: 67 BPM | BODY MASS INDEX: 42.14 KG/M2 | HEIGHT: 61 IN | SYSTOLIC BLOOD PRESSURE: 136 MMHG

## 2023-01-11 DIAGNOSIS — M70.50 PES ANSERINE BURSITIS: Primary | ICD-10-CM

## 2023-01-11 DIAGNOSIS — M17.0 BILATERAL PRIMARY OSTEOARTHRITIS OF KNEE: ICD-10-CM

## 2023-01-11 RX ORDER — TRIAMCINOLONE ACETONIDE 40 MG/ML
40 INJECTION, SUSPENSION INTRA-ARTICULAR; INTRAMUSCULAR
Status: COMPLETED | OUTPATIENT
Start: 2023-01-11 | End: 2023-01-11

## 2023-01-11 RX ORDER — BUPIVACAINE HYDROCHLORIDE 2.5 MG/ML
1 INJECTION, SOLUTION INFILTRATION; PERINEURAL
Status: COMPLETED | OUTPATIENT
Start: 2023-01-11 | End: 2023-01-11

## 2023-01-11 RX ADMIN — BUPIVACAINE HYDROCHLORIDE 1 ML: 2.5 INJECTION, SOLUTION INFILTRATION; PERINEURAL at 12:11

## 2023-01-11 RX ADMIN — TRIAMCINOLONE ACETONIDE 40 MG: 40 INJECTION, SUSPENSION INTRA-ARTICULAR; INTRAMUSCULAR at 12:11

## 2023-01-11 NOTE — PROGRESS NOTES
Assessment/Plan:  1  Pes anserine bursitis    2  Bilateral primary osteoarthritis of knee      Orders Placed This Encounter   Procedures   • Injection Procedure Prior Authorization       · Patient has bilateral pes anserine bursitis and severe bilateral knee osteoarthritis  · Patient received bilateral pes anserine bursa steroid injections  Post injection instructions reviewed  · Order placed for bilateral knee euflexxa injections which can be admin after 5/9  · Refill provided for voltaren gel  Meloxicam script refilled previously  · Continue activity as tolerated  Return in about 4 months (around 5/9/2023) for bilat knee euflexxa 1  I answered all of the patient's questions during the visit and provided education of the patient's condition during the visit  The patient verbalized understanding of the information given and agrees with the plan  This note was dictated using [employer] software  It may contain errors including improperly dictated words  Please contact physician directly for any questions  Subjective   Chief Complaint:   Chief Complaint   Patient presents with   • Left Knee - Follow-up   • Right Knee - Follow-up       \[facility] [place] [facility]\""  Bob Bee is a 78 y o  female who presents for follow up for bilateral knee pain  Patient completed bilateral knee euflexxa series on 11/9/22 and last pes bursa steroid injections were 11/2/22  Patient reports the return of tenderness over the medial knee/ proximal tibia area  Patient is taking mobic and applying voltaren gel  She would like to continue with current treatment  Review of Systems  ROS:    See HPI for musculoskeletal review     All other systems reviewed are negative     History:  Past Medical History:   Diagnosis Date   • Asthma    • Breathing difficulty    • Bronchitis    • Depression    • Fracture of arm    • Fracture of carpal bone    • Hyperlipidemia    • Hypertension    • 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[facility] [address] ) 12/16/2016   • Shortness of breath    • Thyroid nodule    • Thyroid nodule    • Thyroid nodule      Past Surgical History:   Procedure Laterality Date   • EYE SURGERY  2021   • HEMORRHOID SURGERY     • CO THYROIDECTOMY RMVL REMAINING TISS FLWG PRTL RMVL Left 2016    Procedure:  COMPLETION THYROIDECTOMY, FLEXIBLE LARYNGOSCOPY;  Surgeon: Kolton Kurtz MD;  Location[facility];  Service: Surgical Oncology   • CO TOTAL THYROID LOBECTOMY UNI W/WO ISTHMUSECTOMY Right 2016    Procedure: HEMITHYROIDECTOMY;  Surgeon: Kolton Kurtz MD;  Location[facility];  Service: Surgical Oncology   • THYROIDECTOMY, PARTIAL Left      Social History   Social History     Substance and Sexual Activity   Alcohol Use No    Comment: occasional glass of wine     Social History     Substance and Sexual Activity   Drug Use No     Social History     Tobacco Use   Smoking Status Former   • Packs/day: 0 25   • Years: 4 00   • Pack years: 1 00   • Types: Cigarettes   • Quit date: 6/15/1981   • Years since quittin 6   Smokeless Tobacco Former   Tobacco Comments    quit 40 yrs ago (Never a smoker per [employer])     Family History:   Family History   Adopted: Yes   Problem Relation Age of Onset   • Hypertension Family    • Kidney disease Family    • Hypertension Mother        Current Outpatient Medications on File Prior to Visit   Medication Sig Dispense Refill   • potassium chloride (Klor-Con M20) 20 mEq tablet Take 1 tablet (20 mEq total) by mouth 2 (two) times a day 180 tablet 0   • amLODIPine (NORVASC) 5 mg tablet Take 1 tablet (5 mg total) by mouth 2 (two) times a day 180 tablet 1   • atorvastatin (LIPITOR) 20 mg tablet Take 1 tablet (20 mg total) by mouth daily 90 tablet 1   • diclofenac (VOLTAREN) 75 mg EC tablet Take 1 tablet (75 mg total) by mouth 2 (two) times a day as needed (pain) Take with food   180 tablet 1   • Diclofenac Sodium (VOLTAREN) 1 % Apply qid topically 350 g 1   • furosemide (LASIX) 20 mg tablet Take 20 mg by mouth 2 (two) times a day     • levothyroxine 175 mcg tablet Take 1 tablet (175 mcg total) by mouth daily 90 tablet 1   • lisinopril-hydrochlorothiazide (PRINZIDE,ZESTORETIC) 20-12 5 MG per tablet Take 1 tablet by mouth 2 (two) times a day 180 tablet 1   • Melatonin 10 MG TABS Take by mouth     • meloxicam (Mobic) 7 5 mg tablet Take 1 tablet (7 5 mg total) by mouth 2 (two) times a day as needed for moderate pain (knee pain) 60 tablet 1   • mometasone (ELOCON) 0 1 % cream Apply 1 application topically daily 45 g 3   • mometasone (ELOCON) 0 1 % lotion Apply topically daily 60 mL 5   • Multiple Vitamins-Minerals (CENTRUM SILVER 50+WOMEN PO) Take by mouth     • nebivolol (BYSTOLIC) 10 mg tablet Take 1 tablet (10 mg total) by mouth daily 90 tablet 1   • nystatin-triamcinolone (MYCOLOG-II) cream Apply topically 2 (two) times a day 30 g 3   • omeprazole (PriLOSEC) 40 MG capsule Take 1 capsule (40 mg total) by mouth daily 90 capsule 1   • ondansetron (Zofran ODT) 4 mg disintegrating tablet Take 1 tablet (4 mg total) by mouth every 8 (eight) hours as needed for nausea or vomiting 30 tablet 0   • venlafaxine (EFFEXOR) 37 5 mg tablet Take 1 tablet (37 5 mg total) by mouth daily 90 tablet 1   • Ventolin  (90 Base) MCG/ACT inhaler INHALE 2 PUFFS BY MOUTH EVERY 4 HOURS AS NEEDED FOR WHEEZING OR SHORTNESS OF BREATH 54 g 0     No current facility-administered medications on file prior to visit       Allergies   Allergen Reactions   • Adhesive [Medical Tape] Rash        Objective     /77   Pulse 67   Ht 5' 1" (1 549 m)   Wt 101 kg (223 lb 3 2 oz)   LMP  (LMP Unknown)   BMI 42 17 kg/m²      PE:  AAOx 3  WDWN  Hearing intact, no drainage from eyes  no audible wheezing  no abdominal distension  LE compartments soft, skin intact    Ortho Exam:  bilateral Knee:   No erythema  no swelling  no effusion  no warmth  +TTP bilateral pes anserine bursa  AROM: 3- 115  Stable to varus/valgus stress      Large joint arthrocentesis: bilateral pes anserine bursa  Universal Protocol:  Consent: Verbal consent obtained    Risks and benefits: risks, benefits and alternatives were discussed  Consent given by: patient  Site marked: the operative site was marked  Supporting Documentation  Indications: pain   Procedure Details  Location: knee - bilateral pes anserine bursa  Preparation: Patient was prepped and draped in the usual sterile fashion  Needle size: 22 G  Ultrasound guidance: no  Approach: medial    Medications (Right): 1 mL bupivacaine 0 25 %; 40 mg triamcinolone acetonide 40 mg/mLMedications (Left): 1 mL bupivacaine 0 25 %; 40 mg triamcinolone acetonide 40 mg/mL   Patient tolerance: patient tolerated the procedure well with no immediate complications  Dressing:  Sterile dressing applied                Scribe Attestation    I,:  Jeferson Martinez PA-C am acting as a scribe while in the presence of the attending physician :       I,:  Sebastián Blake DO personally performed the services described in this documentation    as scribed in my presence :

## 2023-01-12 ENCOUNTER — OFFICE VISIT (OUTPATIENT)
Dept: FAMILY MEDICINE CLINIC | Facility: CLINIC | Age: 78
End: 2023-01-12

## 2023-01-12 ENCOUNTER — VBI (OUTPATIENT)
Dept: ADMINISTRATIVE | Facility: OTHER | Age: 78
End: 2023-01-12

## 2023-01-12 VITALS
WEIGHT: 223.6 LBS | TEMPERATURE: 98.2 F | HEART RATE: 73 BPM | OXYGEN SATURATION: 98 % | DIASTOLIC BLOOD PRESSURE: 80 MMHG | SYSTOLIC BLOOD PRESSURE: 132 MMHG | HEIGHT: 61 IN | BODY MASS INDEX: 42.21 KG/M2

## 2023-01-12 DIAGNOSIS — E89.0 HYPOTHYROIDISM, POSTSURGICAL: ICD-10-CM

## 2023-01-12 DIAGNOSIS — L30.9 DERMATITIS: ICD-10-CM

## 2023-01-12 DIAGNOSIS — E66.01 MORBID OBESITY WITH BMI OF 40.0-44.9, ADULT (HCC): ICD-10-CM

## 2023-01-12 DIAGNOSIS — F32.A DEPRESSION, UNSPECIFIED DEPRESSION TYPE: ICD-10-CM

## 2023-01-12 DIAGNOSIS — C73 THYROID CANCER (HCC): ICD-10-CM

## 2023-01-12 DIAGNOSIS — J41.0 SIMPLE CHRONIC BRONCHITIS (HCC): ICD-10-CM

## 2023-01-12 DIAGNOSIS — E78.2 MIXED HYPERLIPIDEMIA: ICD-10-CM

## 2023-01-12 DIAGNOSIS — M87.9 BONE INFARCT (HCC): ICD-10-CM

## 2023-01-12 DIAGNOSIS — K21.00 GASTROESOPHAGEAL REFLUX DISEASE WITH ESOPHAGITIS WITHOUT HEMORRHAGE: ICD-10-CM

## 2023-01-12 DIAGNOSIS — I10 PRIMARY HYPERTENSION: ICD-10-CM

## 2023-01-12 DIAGNOSIS — I10 ESSENTIAL HYPERTENSION: Primary | ICD-10-CM

## 2023-01-12 DIAGNOSIS — L03.116 CELLULITIS OF LEFT LOWER EXTREMITY: ICD-10-CM

## 2023-01-12 DIAGNOSIS — N18.30 STAGE 3 CHRONIC KIDNEY DISEASE, UNSPECIFIED WHETHER STAGE 3A OR 3B CKD (HCC): ICD-10-CM

## 2023-01-12 RX ORDER — MOMETASONE FUROATE 1 MG/ML
SOLUTION TOPICAL DAILY
Qty: 60 ML | Refills: 5 | Status: SHIPPED | OUTPATIENT
Start: 2023-01-12

## 2023-01-12 RX ORDER — ATORVASTATIN CALCIUM 20 MG/1
20 TABLET, FILM COATED ORAL DAILY
Qty: 90 TABLET | Refills: 1 | Status: SHIPPED | OUTPATIENT
Start: 2023-01-12

## 2023-01-12 RX ORDER — OMEPRAZOLE 40 MG/1
40 CAPSULE, DELAYED RELEASE ORAL DAILY
Qty: 90 CAPSULE | Refills: 1 | Status: SHIPPED | OUTPATIENT
Start: 2023-01-12

## 2023-01-12 RX ORDER — POTASSIUM CHLORIDE 20 MEQ/1
20 TABLET, EXTENDED RELEASE ORAL 2 TIMES DAILY
Qty: 180 TABLET | Refills: 0 | Status: SHIPPED | OUTPATIENT
Start: 2023-01-12

## 2023-01-12 RX ORDER — VENLAFAXINE 37.5 MG/1
37.5 TABLET ORAL DAILY
Qty: 90 TABLET | Refills: 1 | Status: SHIPPED | OUTPATIENT
Start: 2023-01-12

## 2023-01-12 RX ORDER — LISINOPRIL AND HYDROCHLOROTHIAZIDE 20; 12.5 MG/1; MG/1
1 TABLET ORAL 2 TIMES DAILY
Qty: 180 TABLET | Refills: 1 | Status: SHIPPED | OUTPATIENT
Start: 2023-01-12

## 2023-01-12 RX ORDER — NEBIVOLOL 10 MG/1
10 TABLET ORAL DAILY
Qty: 90 TABLET | Refills: 1 | Status: SHIPPED | OUTPATIENT
Start: 2023-01-12

## 2023-01-12 RX ORDER — LEVOTHYROXINE SODIUM 175 UG/1
175 TABLET ORAL DAILY
Qty: 90 TABLET | Refills: 1 | Status: SHIPPED | OUTPATIENT
Start: 2023-01-12

## 2023-01-12 RX ORDER — CLINDAMYCIN HYDROCHLORIDE 300 MG/1
300 CAPSULE ORAL 3 TIMES DAILY
Qty: 21 CAPSULE | Refills: 0 | Status: SHIPPED | OUTPATIENT
Start: 2023-01-12 | End: 2023-01-19

## 2023-01-12 RX ORDER — AMLODIPINE BESYLATE 5 MG/1
5 TABLET ORAL 2 TIMES DAILY
Qty: 180 TABLET | Refills: 1 | Status: SHIPPED | OUTPATIENT
Start: 2023-01-12

## 2023-01-12 RX ORDER — FUROSEMIDE 20 MG/1
20 TABLET ORAL 2 TIMES DAILY
Qty: 180 TABLET | Refills: 1 | Status: SHIPPED | OUTPATIENT
Start: 2023-01-12

## 2023-01-12 NOTE — PROGRESS NOTES
Assessment/Plan: Laboratory studies and records reviewed at this time  Patient will continue with current regimen for chronic conditions and refills given at this time  Patient have laboratory studies prior to next visit in 6 months  Patient will elevate leg and use clindamycin and warm compresses as directed for left lower extremity cellulitis  Patient will call if not seeing improvement  Otherwise follow-up 6 months       Diagnoses and all orders for this visit:    Essential hypertension  -     amLODIPine (NORVASC) 5 mg tablet; Take 1 tablet (5 mg total) by mouth 2 (two) times a day  -     furosemide (LASIX) 20 mg tablet; Take 1 tablet (20 mg total) by mouth 2 (two) times a day  -     lisinopril-hydrochlorothiazide (PRINZIDE,ZESTORETIC) 20-12 5 MG per tablet; Take 1 tablet by mouth 2 (two) times a day  -     potassium chloride (Klor-Con M20) 20 mEq tablet; Take 1 tablet (20 mEq total) by mouth 2 (two) times a day  -     CBC and differential; Future  -     Comprehensive metabolic panel; Future  -     Lipid panel; Future  -     TSH, 3rd generation with Free T4 reflex; Future    Mixed hyperlipidemia  -     atorvastatin (LIPITOR) 20 mg tablet; Take 1 tablet (20 mg total) by mouth daily  -     furosemide (LASIX) 20 mg tablet; Take 1 tablet (20 mg total) by mouth 2 (two) times a day  -     CBC and differential; Future  -     Comprehensive metabolic panel; Future  -     Lipid panel; Future  -     TSH, 3rd generation with Free T4 reflex; Future    Hypothyroidism, postsurgical  -     furosemide (LASIX) 20 mg tablet; Take 1 tablet (20 mg total) by mouth 2 (two) times a day  -     levothyroxine 175 mcg tablet; Take 1 tablet (175 mcg total) by mouth daily  -     CBC and differential; Future  -     Comprehensive metabolic panel; Future  -     Lipid panel; Future  -     TSH, 3rd generation with Free T4 reflex; Future    Dermatitis  -     mometasone (ELOCON) 0 1 % lotion;  Apply topically daily    Primary hypertension  - furosemide (LASIX) 20 mg tablet; Take 1 tablet (20 mg total) by mouth 2 (two) times a day  -     nebivolol (BYSTOLIC) 10 mg tablet; Take 1 tablet (10 mg total) by mouth daily    Gastroesophageal reflux disease with esophagitis without hemorrhage  -     omeprazole (PriLOSEC) 40 MG capsule; Take 1 capsule (40 mg total) by mouth daily    Depression, unspecified depression type  -     venlafaxine (EFFEXOR) 37 5 mg tablet; Take 1 tablet (37 5 mg total) by mouth daily    Cellulitis of left lower extremity    Stage 3 chronic kidney disease, unspecified whether stage 3a or 3b CKD (HCC)    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[facility] [address] )    [facility]itis [facility] )    Morbid obesity with BMI of 40 0-44 9, adult ([facility] )    Thyroid cancer ([facility] )            Subjective:        Patient ID: Geno Kayser is a 78 y o  female  Patient is here to follow-up on hypertension hyperlipidemia hypothyroidism GERD and other chronic conditions  Patient feeling fairly well overall until recently when patient developed redness and swelling of left lower extremity  Patient with history of cellulitis left lower extremity  No new chest pain shortness of breath or abdominal pain or problems urinating or defecating  GERD symptoms stable overall  Mood is stable  The following portions of the patient's history were reviewed and updated as appropriate: allergies, current medications, past family history, past medical history, past social history, past surgical history and problem list       Review of Systems   Constitutional: Negative  HENT: Negative  Eyes: Negative  Respiratory: Negative  Cardiovascular: Negative  Gastrointestinal: Negative  Endocrine: Negative  Genitourinary: Negative  Musculoskeletal: Negative  Skin: Positive for color change  Allergic/Immunologic: Negative  Neurological: Negative  Hematological: Negative  Psychiatric/Behavioral: Negative  Objective:      BMI Counseling:  Body mass index is 42 25 kg/m²  The BMI is above normal  Nutrition recommendations include consuming healthier snacks  Exercise recommendations include strength training exercises  Rationale for BMI follow-up plan is due to patient being overweight or obese  Depression Screening and Follow-up Plan: Clincally patient does not have depression  No treatment is required  /80 (BP Location: Right arm, Patient Position: Sitting, Cuff Size: Standard)   Pulse 73   Temp 98 2 °F (36 8 °C) (Temporal)   Ht 5' 1" (1 549 m)   Wt 101 kg (223 lb 9 6 oz)   LMP  (LMP Unknown)   SpO2 98%   BMI 42 25 kg/m²          Physical Exam  Vitals and nursing note reviewed  Constitutional:       General: She is not in acute distress  Appearance: Normal appearance  She is not ill-appearing, toxic-appearing or diaphoretic  HENT:      Head: Normocephalic and atraumatic  Right Ear: Tympanic membrane, ear canal and external ear normal  There is no impacted cerumen  Left Ear: Tympanic membrane, ear canal and external ear normal  There is no impacted cerumen  Nose: Nose normal  No congestion or rhinorrhea  Mouth/Throat:      Mouth: Mucous membranes are moist       Pharynx: No oropharyngeal exudate or posterior oropharyngeal erythema  Eyes:      General: No scleral icterus  Right eye: No discharge  Left eye: No discharge  Extraocular Movements: Extraocular movements intact  Conjunctiva/sclera: Conjunctivae normal       Pupils: Pupils are equal, round, and reactive to light  Neck:      Vascular: No carotid bruit  Cardiovascular:      Rate and Rhythm: Normal rate and regular rhythm  Pulses: Normal pulses  Heart sounds: Normal heart sounds  No murmur heard  No friction rub  No gallop  Pulmonary:      Effort: Pulmonary effort is normal  No respiratory distress  Breath sounds: Normal breath sounds  No stridor  No wheezing, rhonchi or rales     Chest:      Chest wall: No tenderness  Musculoskeletal:         General: Swelling and tenderness present  No deformity or signs of injury  Cervical back: Normal range of motion and neck supple  No rigidity  No muscular tenderness  Right lower leg: No edema  Left lower leg: No edema  Comments: Redness and warmth of left lower extremity anterior shin  Swelling noted  Onychomycosis toenails   Lymphadenopathy:      Cervical: No cervical adenopathy  Skin:     General: Skin is warm and dry  Capillary Refill: Capillary refill takes less than 2 seconds  Coloration: Skin is not jaundiced  Findings: Erythema present  No bruising, lesion or rash  Neurological:      Mental Status: She is alert and oriented to person, place, and time  Mental status is at baseline  Cranial Nerves: No cranial nerve deficit  Sensory: No sensory deficit  Motor: No weakness  Coordination: Coordination normal       Gait: Gait normal    Psychiatric:         Mood and Affect: Mood normal          Behavior: Behavior normal          Thought Content:  Thought content normal          Judgment: Judgment normal

## 2023-01-24 ENCOUNTER — TELEPHONE (OUTPATIENT)
Dept: OBGYN CLINIC | Facility: HOSPITAL | Age: 78
End: 2023-01-24

## 2023-01-24 DIAGNOSIS — M17.0 BILATERAL PRIMARY OSTEOARTHRITIS OF KNEE: ICD-10-CM

## 2023-01-24 NOTE — TELEPHONE ENCOUNTER
Pt contacted Call Center requested refill of their medication  Medication Name: Diclofenac 1 %      Dosage of Med: 350 g      Frequency of Med: apply qid topically      Remaining Medication: Was never received by pharmacy on 1/11/23  [facility]        Pt  Preferred Callback Phone Number: [phone]      Thank you

## 2023-01-25 DIAGNOSIS — K21.00 GASTROESOPHAGEAL REFLUX DISEASE WITH ESOPHAGITIS WITHOUT HEMORRHAGE: ICD-10-CM

## 2023-01-25 DIAGNOSIS — M17.0 BILATERAL PRIMARY OSTEOARTHRITIS OF KNEE: ICD-10-CM

## 2023-01-25 DIAGNOSIS — E89.0 HYPOTHYROIDISM, POSTSURGICAL: ICD-10-CM

## 2023-01-25 DIAGNOSIS — I10 PRIMARY HYPERTENSION: ICD-10-CM

## 2023-01-25 DIAGNOSIS — F32.A DEPRESSION, UNSPECIFIED DEPRESSION TYPE: ICD-10-CM

## 2023-01-25 DIAGNOSIS — E78.2 MIXED HYPERLIPIDEMIA: ICD-10-CM

## 2023-01-25 DIAGNOSIS — I10 ESSENTIAL HYPERTENSION: ICD-10-CM

## 2023-01-25 RX ORDER — FUROSEMIDE 20 MG/1
TABLET ORAL
Qty: 90 TABLET | Refills: 1 | Status: SHIPPED | OUTPATIENT
Start: 2023-01-25

## 2023-01-25 RX ORDER — OMEPRAZOLE 40 MG/1
CAPSULE, DELAYED RELEASE ORAL
Qty: 90 CAPSULE | Refills: 1 | Status: SHIPPED | OUTPATIENT
Start: 2023-01-25

## 2023-01-25 RX ORDER — LISINOPRIL AND HYDROCHLOROTHIAZIDE 20; 12.5 MG/1; MG/1
TABLET ORAL
Qty: 180 TABLET | Refills: 1 | Status: SHIPPED | OUTPATIENT
Start: 2023-01-25

## 2023-01-25 RX ORDER — VENLAFAXINE 37.5 MG/1
TABLET ORAL
Qty: 90 TABLET | Refills: 1 | Status: SHIPPED | OUTPATIENT
Start: 2023-01-25

## 2023-01-26 ENCOUNTER — TELEPHONE (OUTPATIENT)
Dept: OBGYN CLINIC | Facility: MEDICAL CENTER | Age: 78
End: 2023-01-26

## 2023-01-26 NOTE — TELEPHONE ENCOUNTER
[address]    Doctor: Cy Sanchez    Reason for call: Needs clarification for what joints  to apply Voltaren to       Call back#: [phone]

## 2023-01-27 DIAGNOSIS — M17.0 BILATERAL PRIMARY OSTEOARTHRITIS OF KNEE: ICD-10-CM

## 2023-02-24 DIAGNOSIS — M70.50 PES ANSERINE BURSITIS: ICD-10-CM

## 2023-02-24 DIAGNOSIS — G89.29 CHRONIC PAIN OF RIGHT KNEE: ICD-10-CM

## 2023-02-24 DIAGNOSIS — M25.561 CHRONIC PAIN OF RIGHT KNEE: ICD-10-CM

## 2023-02-24 DIAGNOSIS — G89.29 CHRONIC PAIN OF LEFT KNEE: ICD-10-CM

## 2023-02-24 DIAGNOSIS — M25.562 CHRONIC PAIN OF LEFT KNEE: ICD-10-CM

## 2023-02-24 DIAGNOSIS — M17.12 PRIMARY OSTEOARTHRITIS OF LEFT KNEE: ICD-10-CM

## 2023-02-24 DIAGNOSIS — M17.11 PRIMARY OSTEOARTHRITIS OF RIGHT KNEE: ICD-10-CM

## 2023-02-24 RX ORDER — MELOXICAM 7.5 MG/1
TABLET ORAL
Qty: 60 TABLET | Refills: 1 | Status: SHIPPED | OUTPATIENT
Start: 2023-02-24

## 2023-03-04 DIAGNOSIS — E89.0 HYPOTHYROIDISM, POSTSURGICAL: ICD-10-CM

## 2023-03-06 RX ORDER — LEVOTHYROXINE SODIUM 175 UG/1
TABLET ORAL
Qty: 90 TABLET | Refills: 1 | Status: SHIPPED | OUTPATIENT
Start: 2023-03-06

## 2023-03-13 DIAGNOSIS — L30.9 DERMATITIS: ICD-10-CM

## 2023-03-13 RX ORDER — MOMETASONE FUROATE 1 MG/G
1 CREAM TOPICAL DAILY
Qty: 45 G | Refills: 3 | Status: SHIPPED | OUTPATIENT
Start: 2023-03-13

## 2023-04-30 DIAGNOSIS — I10 ESSENTIAL HYPERTENSION: ICD-10-CM

## 2023-05-01 RX ORDER — POTASSIUM CHLORIDE 20 MEQ/1
TABLET, EXTENDED RELEASE ORAL
Qty: 180 TABLET | Refills: 0 | Status: SHIPPED | OUTPATIENT
Start: 2023-05-01

## 2023-05-11 ENCOUNTER — PROCEDURE VISIT (OUTPATIENT)
Dept: OBGYN CLINIC | Facility: MEDICAL CENTER | Age: 78
End: 2023-05-11

## 2023-05-11 VITALS
HEART RATE: 63 BPM | HEIGHT: 61 IN | WEIGHT: 228 LBS | DIASTOLIC BLOOD PRESSURE: 80 MMHG | SYSTOLIC BLOOD PRESSURE: 146 MMHG | BODY MASS INDEX: 43.05 KG/M2

## 2023-05-11 DIAGNOSIS — M25.561 CHRONIC PAIN OF BOTH KNEES: ICD-10-CM

## 2023-05-11 DIAGNOSIS — M25.562 CHRONIC PAIN OF BOTH KNEES: ICD-10-CM

## 2023-05-11 DIAGNOSIS — G89.29 CHRONIC PAIN OF BOTH KNEES: ICD-10-CM

## 2023-05-11 DIAGNOSIS — M17.0 BILATERAL PRIMARY OSTEOARTHRITIS OF KNEE: Primary | ICD-10-CM

## 2023-05-11 DIAGNOSIS — M70.50 PES ANSERINE BURSITIS: ICD-10-CM

## 2023-05-11 RX ORDER — HYALURONATE SODIUM 10 MG/ML
20 SYRINGE (ML) INTRAARTICULAR
Status: COMPLETED | OUTPATIENT
Start: 2023-05-11 | End: 2023-05-11

## 2023-05-11 RX ORDER — ANTIOX #8/OM3/DHA/EPA/LUT/ZEAX 250-2.5 MG
CAPSULE ORAL
COMMUNITY
Start: 2023-04-14

## 2023-05-11 RX ORDER — TRIAMCINOLONE ACETONIDE 40 MG/ML
40 INJECTION, SUSPENSION INTRA-ARTICULAR; INTRAMUSCULAR
Status: COMPLETED | OUTPATIENT
Start: 2023-05-11 | End: 2023-05-11

## 2023-05-11 RX ORDER — BUPIVACAINE HYDROCHLORIDE 2.5 MG/ML
1 INJECTION, SOLUTION INFILTRATION; PERINEURAL
Status: COMPLETED | OUTPATIENT
Start: 2023-05-11 | End: 2023-05-11

## 2023-05-11 RX ADMIN — Medication 20 MG: at 13:28

## 2023-05-11 RX ADMIN — BUPIVACAINE HYDROCHLORIDE 1 ML: 2.5 INJECTION, SOLUTION INFILTRATION; PERINEURAL at 13:28

## 2023-05-11 RX ADMIN — TRIAMCINOLONE ACETONIDE 40 MG: 40 INJECTION, SUSPENSION INTRA-ARTICULAR; INTRAMUSCULAR at 13:28

## 2023-05-11 NOTE — PROGRESS NOTES
Patient has severe bilateral knee osteoarthritis and left pes anserine bursitis  The patient's pain score is 6/10  They previously tried viscosupplementation with improvement  Patient received bilateral knee euflexxa injections 1/3 and LEFT pes anserine bursa steroid injections  Tolerated the procedure well  Post injection instructions reviewed  Follow up 1 week  Large joint arthrocentesis: bilateral knee  Universal Protocol:  Consent: Verbal consent obtained  Risks and benefits: risks, benefits and alternatives were discussed  Consent given by: patient  Site marked: the operative site was marked  Supporting Documentation  Indications: pain   Procedure Details  Location: knee - bilateral knee  Preparation: Patient was prepped and draped in the usual sterile fashion  Needle size: 22 G  Ultrasound guidance: no  Approach: anterolateral    Medications (Right): 20 mg Sodium Hyaluronate 20 MG/2MLMedications (Left): 20 mg Sodium Hyaluronate 20 MG/2ML   Patient tolerance: patient tolerated the procedure well with no immediate complications  Dressing:  Sterile dressing applied    Large joint arthrocentesis: L pes anserine bursa  Universal Protocol:  Consent: Verbal consent obtained    Risks and benefits: risks, benefits and alternatives were discussed  Consent given by: patient  Site marked: the operative site was marked  Supporting Documentation  Indications: pain   Procedure Details  Location: knee - L pes anserine bursa  Preparation: Patient was prepped and draped in the usual sterile fashion  Needle size: 22 G  Ultrasound guidance: no  Approach: medial  Medications administered: 1 mL bupivacaine 0 25 %; 40 mg triamcinolone acetonide 40 mg/mL    Patient tolerance: patient tolerated the procedure well with no immediate complications  Dressing:  Sterile dressing applied

## 2023-05-18 ENCOUNTER — PROCEDURE VISIT (OUTPATIENT)
Dept: OBGYN CLINIC | Facility: MEDICAL CENTER | Age: 78
End: 2023-05-18

## 2023-05-18 VITALS
BODY MASS INDEX: 42.93 KG/M2 | SYSTOLIC BLOOD PRESSURE: 141 MMHG | HEART RATE: 64 BPM | WEIGHT: 227.4 LBS | DIASTOLIC BLOOD PRESSURE: 82 MMHG | HEIGHT: 61 IN

## 2023-05-18 DIAGNOSIS — M70.50 PES ANSERINE BURSITIS: ICD-10-CM

## 2023-05-18 DIAGNOSIS — G89.29 CHRONIC PAIN OF BOTH KNEES: ICD-10-CM

## 2023-05-18 DIAGNOSIS — M25.562 CHRONIC PAIN OF BOTH KNEES: ICD-10-CM

## 2023-05-18 DIAGNOSIS — M17.0 BILATERAL PRIMARY OSTEOARTHRITIS OF KNEE: Primary | ICD-10-CM

## 2023-05-18 DIAGNOSIS — M25.561 CHRONIC PAIN OF BOTH KNEES: ICD-10-CM

## 2023-05-18 RX ORDER — BUPIVACAINE HYDROCHLORIDE 2.5 MG/ML
1 INJECTION, SOLUTION INFILTRATION; PERINEURAL
Status: COMPLETED | OUTPATIENT
Start: 2023-05-18 | End: 2023-05-18

## 2023-05-18 RX ORDER — HYALURONATE SODIUM 10 MG/ML
20 SYRINGE (ML) INTRAARTICULAR
Status: COMPLETED | OUTPATIENT
Start: 2023-05-18 | End: 2023-05-18

## 2023-05-18 RX ORDER — TRIAMCINOLONE ACETONIDE 40 MG/ML
40 INJECTION, SUSPENSION INTRA-ARTICULAR; INTRAMUSCULAR
Status: COMPLETED | OUTPATIENT
Start: 2023-05-18 | End: 2023-05-18

## 2023-05-18 RX ADMIN — TRIAMCINOLONE ACETONIDE 40 MG: 40 INJECTION, SUSPENSION INTRA-ARTICULAR; INTRAMUSCULAR at 13:19

## 2023-05-18 RX ADMIN — BUPIVACAINE HYDROCHLORIDE 1 ML: 2.5 INJECTION, SOLUTION INFILTRATION; PERINEURAL at 13:19

## 2023-05-18 RX ADMIN — Medication 20 MG: at 13:19

## 2023-05-18 NOTE — PROGRESS NOTES
Patient has severe bilateral knee osteoarthritis  Patient received bilateral knee euflexxa injections 3/3  Patient also received right knee pes anserine bursa CSI  Tolerated the procedures well  Post injection instructions reviewed  Follow up 1 week for injections 3/3  Large joint arthrocentesis: bilateral knee  Universal Protocol:  Consent: Verbal consent obtained  Risks and benefits: risks, benefits and alternatives were discussed  Consent given by: patient  Site marked: the operative site was marked  Supporting Documentation  Indications: pain   Procedure Details  Location: knee - bilateral knee  Preparation: Patient was prepped and draped in the usual sterile fashion  Needle size: 22 G  Ultrasound guidance: no  Approach: anterolateral    Medications (Right): 20 mg Sodium Hyaluronate 20 MG/2MLMedications (Left): 20 mg Sodium Hyaluronate 20 MG/2ML   Patient tolerance: patient tolerated the procedure well with no immediate complications  Dressing:  Sterile dressing applied    Large joint arthrocentesis: R pes anserine bursa  Universal Protocol:  Consent: Verbal consent obtained    Risks and benefits: risks, benefits and alternatives were discussed  Consent given by: patient  Site marked: the operative site was marked  Supporting Documentation  Indications: pain   Procedure Details  Location: knee - R pes anserine bursa  Preparation: Patient was prepped and draped in the usual sterile fashion  Needle size: 22 G  Ultrasound guidance: no  Approach: medial  Medications administered: 1 mL bupivacaine 0 25 %; 40 mg triamcinolone acetonide 40 mg/mL    Patient tolerance: patient tolerated the procedure well with no immediate complications  Dressing:  Sterile dressing applied

## 2023-05-24 DIAGNOSIS — M17.0 BILATERAL PRIMARY OSTEOARTHRITIS OF KNEE: ICD-10-CM

## 2023-05-25 ENCOUNTER — PROCEDURE VISIT (OUTPATIENT)
Dept: OBGYN CLINIC | Facility: MEDICAL CENTER | Age: 78
End: 2023-05-25

## 2023-05-25 VITALS
WEIGHT: 227.4 LBS | BODY MASS INDEX: 42.93 KG/M2 | SYSTOLIC BLOOD PRESSURE: 145 MMHG | HEIGHT: 61 IN | DIASTOLIC BLOOD PRESSURE: 76 MMHG | HEART RATE: 69 BPM

## 2023-05-25 DIAGNOSIS — M17.0 BILATERAL PRIMARY OSTEOARTHRITIS OF KNEE: Primary | ICD-10-CM

## 2023-05-25 RX ORDER — HYALURONATE SODIUM 10 MG/ML
20 SYRINGE (ML) INTRAARTICULAR
Status: COMPLETED | OUTPATIENT
Start: 2023-05-25 | End: 2023-05-25

## 2023-05-25 RX ADMIN — Medication 20 MG: at 14:00

## 2023-05-25 NOTE — PROGRESS NOTES
Patient has severe bilateral knee osteoarthritis  Patient received bilateral knee euflexxa injections 3/3 today  Tolerated the procedure well  Post injection instructions reviewed  Refill sent for voltaren gel  Follow up in 2 months for bilat knee pes bursa  Large joint arthrocentesis: bilateral knee  Universal Protocol:  Consent: Verbal consent obtained    Risks and benefits: risks, benefits and alternatives were discussed  Consent given by: patient  Site marked: the operative site was marked  Supporting Documentation  Indications: pain   Procedure Details  Location: knee - bilateral knee  Preparation: Patient was prepped and draped in the usual sterile fashion  Needle size: 22 G  Ultrasound guidance: no  Approach: anterolateral    Medications (Right): 20 mg Sodium Hyaluronate 20 MG/2MLMedications (Left): 20 mg Sodium Hyaluronate 20 MG/2ML   Patient tolerance: patient tolerated the procedure well with no immediate complications  Dressing:  Sterile dressing applied

## 2023-05-29 DIAGNOSIS — M17.11 PRIMARY OSTEOARTHRITIS OF RIGHT KNEE: ICD-10-CM

## 2023-05-29 DIAGNOSIS — M25.561 CHRONIC PAIN OF RIGHT KNEE: ICD-10-CM

## 2023-05-29 DIAGNOSIS — G89.29 CHRONIC PAIN OF LEFT KNEE: ICD-10-CM

## 2023-05-29 DIAGNOSIS — M17.12 PRIMARY OSTEOARTHRITIS OF LEFT KNEE: ICD-10-CM

## 2023-05-29 DIAGNOSIS — G89.29 CHRONIC PAIN OF RIGHT KNEE: ICD-10-CM

## 2023-05-29 DIAGNOSIS — M70.50 PES ANSERINE BURSITIS: ICD-10-CM

## 2023-05-29 DIAGNOSIS — M25.562 CHRONIC PAIN OF LEFT KNEE: ICD-10-CM

## 2023-05-30 RX ORDER — MELOXICAM 7.5 MG/1
TABLET ORAL
Qty: 60 TABLET | Refills: 1 | Status: SHIPPED | OUTPATIENT
Start: 2023-05-30

## 2023-06-03 DIAGNOSIS — M17.0 BILATERAL PRIMARY OSTEOARTHRITIS OF KNEE: ICD-10-CM

## 2023-06-21 ENCOUNTER — RA CDI HCC (OUTPATIENT)
Dept: OTHER | Facility: HOSPITAL | Age: 78
End: 2023-06-21

## 2023-06-21 NOTE — PROGRESS NOTES
[facility] [address]  coding opportunities       Chart reviewed, no opportunity found: CHART REVIEWED, NO OPPORTUNITY FOUND        Patients Insurance     Medicare Insurance: [employer]
No pertinent family history in first degree relatives

## 2023-07-06 DIAGNOSIS — I10 ESSENTIAL HYPERTENSION: ICD-10-CM

## 2023-07-06 DIAGNOSIS — I10 PRIMARY HYPERTENSION: ICD-10-CM

## 2023-07-06 RX ORDER — POTASSIUM CHLORIDE 20 MEQ/1
TABLET, EXTENDED RELEASE ORAL
Qty: 180 TABLET | Refills: 0 | Status: SHIPPED | OUTPATIENT
Start: 2023-07-06 | End: 2023-07-12 | Stop reason: SDUPTHER

## 2023-07-06 RX ORDER — NEBIVOLOL 10 MG/1
TABLET ORAL
Qty: 90 TABLET | Refills: 1 | Status: SHIPPED | OUTPATIENT
Start: 2023-07-06 | End: 2023-07-12 | Stop reason: SDUPTHER

## 2023-07-07 DIAGNOSIS — I10 ESSENTIAL HYPERTENSION: ICD-10-CM

## 2023-07-07 DIAGNOSIS — K21.00 GASTROESOPHAGEAL REFLUX DISEASE WITH ESOPHAGITIS WITHOUT HEMORRHAGE: ICD-10-CM

## 2023-07-07 DIAGNOSIS — F32.A DEPRESSION, UNSPECIFIED DEPRESSION TYPE: ICD-10-CM

## 2023-07-07 RX ORDER — VENLAFAXINE 37.5 MG/1
TABLET ORAL
Qty: 90 TABLET | Refills: 1 | Status: SHIPPED | OUTPATIENT
Start: 2023-07-07 | End: 2023-07-12 | Stop reason: SDUPTHER

## 2023-07-07 RX ORDER — OMEPRAZOLE 40 MG/1
CAPSULE, DELAYED RELEASE ORAL
Qty: 90 CAPSULE | Refills: 1 | Status: SHIPPED | OUTPATIENT
Start: 2023-07-07 | End: 2023-07-12 | Stop reason: SDUPTHER

## 2023-07-07 RX ORDER — LISINOPRIL AND HYDROCHLOROTHIAZIDE 20; 12.5 MG/1; MG/1
TABLET ORAL
Qty: 180 TABLET | Refills: 1 | Status: SHIPPED | OUTPATIENT
Start: 2023-07-07 | End: 2023-07-12 | Stop reason: SDUPTHER

## 2023-07-07 RX ORDER — AMLODIPINE BESYLATE 5 MG/1
TABLET ORAL
Qty: 180 TABLET | Refills: 0 | Status: SHIPPED | OUTPATIENT
Start: 2023-07-07 | End: 2023-07-12 | Stop reason: SDUPTHER

## 2023-07-12 ENCOUNTER — OFFICE VISIT (OUTPATIENT)
Dept: FAMILY MEDICINE CLINIC | Facility: CLINIC | Age: 78
End: 2023-07-12
Payer: COMMERCIAL

## 2023-07-12 VITALS
SYSTOLIC BLOOD PRESSURE: 120 MMHG | BODY MASS INDEX: 42.33 KG/M2 | HEART RATE: 64 BPM | DIASTOLIC BLOOD PRESSURE: 72 MMHG | OXYGEN SATURATION: 98 % | HEIGHT: 61 IN | WEIGHT: 224.2 LBS | TEMPERATURE: 98.4 F

## 2023-07-12 DIAGNOSIS — J45.909 ASTHMA, UNSPECIFIED ASTHMA SEVERITY, UNSPECIFIED WHETHER COMPLICATED, UNSPECIFIED WHETHER PERSISTENT: ICD-10-CM

## 2023-07-12 DIAGNOSIS — J32.9 RHINOSINUSITIS: Primary | ICD-10-CM

## 2023-07-12 DIAGNOSIS — K21.00 GASTROESOPHAGEAL REFLUX DISEASE WITH ESOPHAGITIS WITHOUT HEMORRHAGE: ICD-10-CM

## 2023-07-12 DIAGNOSIS — E78.2 MIXED HYPERLIPIDEMIA: ICD-10-CM

## 2023-07-12 DIAGNOSIS — E89.0 HYPOTHYROIDISM, POSTSURGICAL: ICD-10-CM

## 2023-07-12 DIAGNOSIS — I10 PRIMARY HYPERTENSION: ICD-10-CM

## 2023-07-12 DIAGNOSIS — I10 ESSENTIAL HYPERTENSION: ICD-10-CM

## 2023-07-12 DIAGNOSIS — F32.A DEPRESSION, UNSPECIFIED DEPRESSION TYPE: ICD-10-CM

## 2023-07-12 DIAGNOSIS — J31.0 RHINOSINUSITIS: Primary | ICD-10-CM

## 2023-07-12 PROCEDURE — 99214 OFFICE O/P EST MOD 30 MIN: CPT | Performed by: FAMILY MEDICINE

## 2023-07-12 RX ORDER — ALBUTEROL SULFATE 90 UG/1
2 AEROSOL, METERED RESPIRATORY (INHALATION) EVERY 4 HOURS PRN
Qty: 54 G | Refills: 0 | Status: SHIPPED | OUTPATIENT
Start: 2023-07-12

## 2023-07-12 RX ORDER — VENLAFAXINE 37.5 MG/1
37.5 TABLET ORAL DAILY
Qty: 90 TABLET | Refills: 1 | Status: SHIPPED | OUTPATIENT
Start: 2023-07-12

## 2023-07-12 RX ORDER — FUROSEMIDE 20 MG/1
20 TABLET ORAL DAILY
Qty: 90 TABLET | Refills: 1 | Status: SHIPPED | OUTPATIENT
Start: 2023-07-12

## 2023-07-12 RX ORDER — LEVOFLOXACIN 500 MG/1
500 TABLET, FILM COATED ORAL EVERY 24 HOURS
Qty: 7 TABLET | Refills: 0 | Status: SHIPPED | OUTPATIENT
Start: 2023-07-12 | End: 2023-07-19

## 2023-07-12 RX ORDER — LISINOPRIL AND HYDROCHLOROTHIAZIDE 20; 12.5 MG/1; MG/1
TABLET ORAL
Qty: 180 TABLET | Refills: 1 | Status: SHIPPED | OUTPATIENT
Start: 2023-07-12

## 2023-07-12 RX ORDER — LEVOTHYROXINE SODIUM 175 UG/1
175 TABLET ORAL DAILY
Qty: 90 TABLET | Refills: 1 | Status: SHIPPED | OUTPATIENT
Start: 2023-07-12

## 2023-07-12 RX ORDER — NEBIVOLOL 10 MG/1
TABLET ORAL
Qty: 90 TABLET | Refills: 1 | Status: SHIPPED | OUTPATIENT
Start: 2023-07-12

## 2023-07-12 RX ORDER — AMLODIPINE BESYLATE 5 MG/1
5 TABLET ORAL 2 TIMES DAILY
Qty: 180 TABLET | Refills: 1 | Status: SHIPPED | OUTPATIENT
Start: 2023-07-12

## 2023-07-12 RX ORDER — POTASSIUM CHLORIDE 20 MEQ/1
20 TABLET, EXTENDED RELEASE ORAL 2 TIMES DAILY
Qty: 180 TABLET | Refills: 1 | Status: SHIPPED | OUTPATIENT
Start: 2023-07-12

## 2023-07-12 RX ORDER — OMEPRAZOLE 40 MG/1
40 CAPSULE, DELAYED RELEASE ORAL DAILY
Qty: 90 CAPSULE | Refills: 1 | Status: SHIPPED | OUTPATIENT
Start: 2023-07-12

## 2023-07-12 NOTE — PROGRESS NOTES
Assessment/Plan: Patient will try to get laboratory studies done at her convenience. Patient use Claritin daily as directed as well as Levaquin for sinusitis. Patient will continue with other meds for chronic conditions for conditions listed below. Refills given at this time. Diagnoses and all orders for this visit:    Rhinosinusitis  -     levofloxacin (LEVAQUIN) 500 mg tablet; Take 1 tablet (500 mg total) by mouth every 24 hours for 7 days    Essential hypertension  -     amLODIPine (NORVASC) 5 mg tablet; Take 1 tablet (5 mg total) by mouth 2 (two) times a day  -     furosemide (LASIX) 20 mg tablet; Take 1 tablet (20 mg total) by mouth daily Edema  -     lisinopril-hydrochlorothiazide (PRINZIDE,ZESTORETIC) 20-12.5 MG per tablet; Take 1 pill bid  -     potassium chloride (K-DUR,KLOR-CON) 20 mEq tablet; Take 1 tablet (20 mEq total) by mouth 2 (two) times a day    Mixed hyperlipidemia  -     furosemide (LASIX) 20 mg tablet; Take 1 tablet (20 mg total) by mouth daily Edema    Hypothyroidism, postsurgical  -     furosemide (LASIX) 20 mg tablet; Take 1 tablet (20 mg total) by mouth daily Edema  -     levothyroxine 175 mcg tablet; Take 1 tablet (175 mcg total) by mouth daily    Primary hypertension  -     furosemide (LASIX) 20 mg tablet; Take 1 tablet (20 mg total) by mouth daily Edema  -     nebivolol (BYSTOLIC) 10 mg tablet; Take 1 pill daily    Gastroesophageal reflux disease with esophagitis without hemorrhage  -     omeprazole (PriLOSEC) 40 MG capsule; Take 1 capsule (40 mg total) by mouth daily    Depression, unspecified depression type  -     venlafaxine (EFFEXOR) 37.5 mg tablet; Take 1 tablet (37.5 mg total) by mouth daily    Asthma, unspecified asthma severity, unspecified whether complicated, unspecified whether persistent  -     albuterol (Ventolin HFA) 90 mcg/act inhaler;  Inhale 2 puffs every 4 (four) hours as needed for wheezing or shortness of breath            Subjective:        Patient ID: Fernie ramirez Carrie Jackson is a 78 y.o. female. Patient is here to follow-up on hypertension hyperlipidemia hypothyroidism with history of GERD and depression. Patient was in normal state of health until roughly 2 weeks ago. Patient has had sinus issues such as rhinorrhea nasal congestion sore throat and cough. Patient did use Tylenol and cough drops. No fevers noted. Mood has been stable. The following portions of the patient's history were reviewed and updated as appropriate: allergies, current medications, past family history, past medical history, past social history, past surgical history and problem list.      Review of Systems   Constitutional: Negative. Negative for fever. HENT: Positive for congestion, postnasal drip, rhinorrhea, sinus pressure, sinus pain and sore throat. Eyes: Negative. Respiratory: Positive for cough. Cardiovascular: Positive for leg swelling. Gastrointestinal: Negative. Endocrine: Negative. Genitourinary: Negative. Musculoskeletal: Negative. Skin: Negative. Allergic/Immunologic: Negative. Neurological: Negative. Hematological: Negative. Psychiatric/Behavioral: Negative. Objective:        Depression Screening and Follow-up Plan: Clincally patient does not have depression. No treatment is required. /72 (BP Location: Left arm, Patient Position: Sitting, Cuff Size: Large)   Pulse 64   Temp 98.4 °F (36.9 °C) (Temporal)   Ht 5' 1" (1.549 m)   Wt 102 kg (224 lb 3.2 oz)   LMP  (LMP Unknown)   SpO2 98%   BMI 42.36 kg/m²          Physical Exam  Vitals and nursing note reviewed. Constitutional:       General: She is not in acute distress. Appearance: Normal appearance. She is not ill-appearing, toxic-appearing or diaphoretic. HENT:      Head: Normocephalic and atraumatic. Right Ear: Tympanic membrane, ear canal and external ear normal. There is no impacted cerumen.       Left Ear: Tympanic membrane, ear canal and external ear normal. There is no impacted cerumen. Nose: Nose normal. No congestion or rhinorrhea. Mouth/Throat:      Mouth: Mucous membranes are moist.      Pharynx: Oropharyngeal exudate and posterior oropharyngeal erythema present. Eyes:      General: No scleral icterus. Right eye: No discharge. Left eye: No discharge. Extraocular Movements: Extraocular movements intact. Conjunctiva/sclera: Conjunctivae normal.      Pupils: Pupils are equal, round, and reactive to light. Neck:      Vascular: No carotid bruit. Cardiovascular:      Rate and Rhythm: Normal rate and regular rhythm. Pulses: Normal pulses. Heart sounds: Normal heart sounds. No murmur heard. No friction rub. No gallop. Pulmonary:      Effort: Pulmonary effort is normal. No respiratory distress. Breath sounds: Normal breath sounds. No stridor. No wheezing, rhonchi or rales. Chest:      Chest wall: No tenderness. Musculoskeletal:         General: No swelling, tenderness, deformity or signs of injury. Normal range of motion. Cervical back: Normal range of motion and neck supple. No rigidity. No muscular tenderness. Right lower leg: No edema. Left lower leg: Edema present. Lymphadenopathy:      Cervical: No cervical adenopathy. Skin:     General: Skin is warm and dry. Capillary Refill: Capillary refill takes less than 2 seconds. Coloration: Skin is not jaundiced. Findings: No bruising, erythema, lesion or rash. Neurological:      Mental Status: She is alert and oriented to person, place, and time. Mental status is at baseline. Cranial Nerves: No cranial nerve deficit. Sensory: No sensory deficit. Motor: No weakness. Coordination: Coordination normal.      Gait: Gait normal.   Psychiatric:         Mood and Affect: Mood normal.         Behavior: Behavior normal.         Thought Content:  Thought content normal.         Judgment: Judgment normal.

## 2023-07-28 ENCOUNTER — OFFICE VISIT (OUTPATIENT)
Dept: OBGYN CLINIC | Facility: MEDICAL CENTER | Age: 78
End: 2023-07-28
Payer: COMMERCIAL

## 2023-07-28 VITALS
WEIGHT: 227.2 LBS | HEIGHT: 61 IN | SYSTOLIC BLOOD PRESSURE: 152 MMHG | HEART RATE: 57 BPM | BODY MASS INDEX: 42.9 KG/M2 | DIASTOLIC BLOOD PRESSURE: 85 MMHG

## 2023-07-28 DIAGNOSIS — M70.52 PES ANSERINUS BURSITIS OF BOTH KNEES: Primary | ICD-10-CM

## 2023-07-28 DIAGNOSIS — M17.0 BILATERAL PRIMARY OSTEOARTHRITIS OF KNEE: ICD-10-CM

## 2023-07-28 DIAGNOSIS — M70.51 PES ANSERINUS BURSITIS OF BOTH KNEES: Primary | ICD-10-CM

## 2023-07-28 PROCEDURE — 20610 DRAIN/INJ JOINT/BURSA W/O US: CPT | Performed by: PHYSICIAN ASSISTANT

## 2023-07-28 PROCEDURE — 99213 OFFICE O/P EST LOW 20 MIN: CPT | Performed by: PHYSICIAN ASSISTANT

## 2023-07-28 RX ORDER — TRIAMCINOLONE ACETONIDE 40 MG/ML
40 INJECTION, SUSPENSION INTRA-ARTICULAR; INTRAMUSCULAR
Status: COMPLETED | OUTPATIENT
Start: 2023-07-28 | End: 2023-07-28

## 2023-07-28 RX ORDER — BUPIVACAINE HYDROCHLORIDE 5 MG/ML
1 INJECTION, SOLUTION PERINEURAL
Status: COMPLETED | OUTPATIENT
Start: 2023-07-28 | End: 2023-07-28

## 2023-07-28 RX ADMIN — BUPIVACAINE HYDROCHLORIDE 1 ML: 5 INJECTION, SOLUTION PERINEURAL at 11:15

## 2023-07-28 RX ADMIN — TRIAMCINOLONE ACETONIDE 40 MG: 40 INJECTION, SUSPENSION INTRA-ARTICULAR; INTRAMUSCULAR at 11:15

## 2023-07-28 NOTE — PROGRESS NOTES
Assessment/Plan:  1. Pes anserinus bursitis of both knees    2. Bilateral primary osteoarthritis of knee      Orders Placed This Encounter   Procedures   • Large joint arthrocentesis   • Injection Procedure Prior Authorization       · Patient has severe bilateral knee osteoarthritis and bilat pes anserine bursitis. · Patient received bilateral pes bursa steroid injections. Tolerated the procedures well. Post injection instructions reviewed. · Continue meloxicam prn pain and diclofenac gel. Refill provided for diclofenac gel. · Continue activity as tolerated. · She will return in September for bursa CSI then November for VS and bursa. Return in about 2 months (around 9/28/2023) for bilat pes bursa CSI. I answered all of the patient's questions during the visit and provided education of the patient's condition during the visit. The patient verbalized understanding of the information given and agrees with the plan. This note was dictated using [employer] software. It may contain errors including improperly dictated words. Please contact physician directly for any questions. Subjective   Chief Complaint:   Chief Complaint   Patient presents with   • Left Knee - Follow-up   • Right Knee - Follow-up       HPI  Maximino Villanueva is a 78 y.o. female who presents for follow up for bilateral knee pain. Patient received right pes bursa CSI on 5/18/23 and left pes bursa CSI on 5/11/23 and reports good relief until recently. Patient reports the return of bilateral knee pain located medially. Pain is rated 5/10. She notes tenderness. She takes meloxicam and applies voltaren gel for pain. She is not doing PT currently. Patient would like to repeat her bursa injections today and would like to do VS in November. Review of Systems  ROS:    See HPI for musculoskeletal review.    All other systems reviewed are negative     History:  Past Medical History:   Diagnosis Date   • Asthma    • Breathing difficulty • Bronchitis    • Depression    • Fracture of arm    • Fracture of carpal bone    • Hyperlipidemia    • Hypertension    • 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[address]) 2016   • Shortness of breath    • Thyroid nodule    • Thyroid nodule    • Thyroid nodule      Past Surgical History:   Procedure Laterality Date   • EYE SURGERY  2021   • HEMORRHOID SURGERY     • [place] THYROIDECTOMY RMVL REMAINING TISS FLWG PRTL RMVL Left 2016    Procedure:  COMPLETION THYROIDECTOMY, FLEXIBLE LARYNGOSCOPY;  Surgeon: Nichole Benton MD;  Location: [facility];  Service: Surgical Oncology   • NY TOTAL THYROID LOBECTOMY UNI W/WO ISTHMUSECTOMY Right 2016    Procedure: HEMITHYROIDECTOMY;  Surgeon: Nichole Benton MD;  Location: [facility];  Service: Surgical Oncology   • THYROIDECTOMY, PARTIAL Left      Social History   Social History     Substance and Sexual Activity   Alcohol Use No    Comment: occasional glass of wine     Social History     Substance and Sexual Activity   Drug Use No     Social History     Tobacco Use   Smoking Status Former   • Packs/day: 0.25   • Years: 4.00   • Total pack years: 1.00   • Types: Cigarettes   • Quit date: 6/15/1981   • Years since quittin.1   Smokeless Tobacco Former   Tobacco Comments    quit 40 yrs ago (Never a smoker per [employer])     Family History:   Family History   Adopted: Yes   Problem Relation Age of Onset   • Hypertension Family    • Kidney disease Family    • Hypertension Mother        Current Outpatient Medications on File Prior to Visit   Medication Sig Dispense Refill   • albuterol (Ventolin HFA) 90 mcg/act inhaler Inhale 2 puffs every 4 (four) hours as needed for wheezing or shortness of breath 54 g 0   • amLODIPine (NORVASC) 5 mg tablet Take 1 tablet (5 mg total) by mouth 2 (two) times a day 180 tablet 1   • atorvastatin (LIPITOR) 20 mg tablet Take 1 tablet (20 mg total) by mouth daily 90 tablet 1   • Diclofenac Sodium (VOLTAREN) 1 % APPLY 4 GRAMS TOPICALLY FOUR TIMES DAILY AS NEEDED FOR BILATERAL KNEE PAIN. 300 g 2   • furosemide (LASIX) 20 mg tablet Take 1 tablet (20 mg total) by mouth daily Edema 90 tablet 1   • levothyroxine 175 mcg tablet Take 1 tablet (175 mcg total) by mouth daily 90 tablet 1   • lisinopril-hydrochlorothiazide (PRINZIDE,ZESTORETIC) 20-12.5 MG per tablet Take 1 pill bid 180 tablet 1   • Melatonin 10 MG TABS Take by mouth     • meloxicam (MOBIC) 7.5 mg tablet TAKE 1 TABLET BY MOUTH TWICE DAILY AS NEEDED FOR MODERATE KNEE PAIN 60 tablet 1   • mometasone (ELOCON) 0.1 % cream APPLY 1 APPLICATION TOPICALLY DAILY 45 g 3   • nebivolol (BYSTOLIC) 10 mg tablet Take 1 pill daily 90 tablet 1   • nystatin-triamcinolone (MYCOLOG-II) cream Apply topically 2 (two) times a day 30 g 3   • omeprazole (PriLOSEC) 40 MG capsule Take 1 capsule (40 mg total) by mouth daily 90 capsule 1   • potassium chloride (K-DUR,KLOR-CON) 20 mEq tablet Take 1 tablet (20 mEq total) by mouth 2 (two) times a day 180 tablet 1   • venlafaxine (EFFEXOR) 37.5 mg tablet Take 1 tablet (37.5 mg total) by mouth daily 90 tablet 1   • Multiple Vitamins-Minerals (PreserVision AREDS 2) CAPS  (Patient not taking: Reported on 7/28/2023)       No current facility-administered medications on file prior to visit. Allergies   Allergen Reactions   • Adhesive [Medical Tape] Rash        Objective     /85   Pulse 57   Ht 5' 1" (1.549 m)   Wt 103 kg (227 lb 3.2 oz)   LMP  (LMP Unknown)   BMI 42.93 kg/m²      PE:  AAOx 3  WDWN  Hearing intact, no drainage from eyes  no audible wheezing  no abdominal distension  LE compartments soft, skin intact    Ortho Exam:  bilateral Knee:   No erythema  no swelling  no effusion  no warmth  +TTP over pes anserine bursa  AROM: 3- 115  Stable to varus/valgus stress      Large joint arthrocentesis: bilateral pes anserine bursa  Universal Protocol:  Consent: Verbal consent obtained.   Risks and benefits: risks, benefits and alternatives were discussed  Consent given by: patient  Site marked: the operative site was marked  Supporting Documentation  Indications: pain   Procedure Details  Location: knee - bilateral pes anserine bursa  Preparation: Patient was prepped and draped in the usual sterile fashion  Needle size: 22 G  Ultrasound guidance: no  Approach: anterolateral    Medications (Right): 1 mL bupivacaine 0.5 %; 40 mg triamcinolone acetonide 40 mg/mLMedications (Left): 1 mL bupivacaine 0.5 %; 40 mg triamcinolone acetonide 40 mg/mL   Patient tolerance: patient tolerated the procedure well with no immediate complications  Dressing:  Sterile dressing applied              Scribe Attestation    I,:  Viviana Montenegro PA-C am acting as a scribe while in the presence of the attending physician.:       I,:  Raine Rodriguez DO personally performed the services described in this documentation    as scribed in my presence.:

## 2023-08-01 DIAGNOSIS — E78.2 MIXED HYPERLIPIDEMIA: ICD-10-CM

## 2023-08-01 RX ORDER — ATORVASTATIN CALCIUM 20 MG/1
20 TABLET, FILM COATED ORAL DAILY
Qty: 90 TABLET | Refills: 1 | Status: SHIPPED | OUTPATIENT
Start: 2023-08-01

## 2023-08-04 ENCOUNTER — OFFICE VISIT (OUTPATIENT)
Dept: SLEEP CENTER | Facility: CLINIC | Age: 78
End: 2023-08-04
Payer: COMMERCIAL

## 2023-08-04 VITALS
SYSTOLIC BLOOD PRESSURE: 185 MMHG | HEIGHT: 61 IN | WEIGHT: 224 LBS | BODY MASS INDEX: 42.29 KG/M2 | DIASTOLIC BLOOD PRESSURE: 86 MMHG

## 2023-08-04 DIAGNOSIS — K21.9 GERD WITHOUT ESOPHAGITIS: ICD-10-CM

## 2023-08-04 DIAGNOSIS — I10 ESSENTIAL HYPERTENSION: ICD-10-CM

## 2023-08-04 DIAGNOSIS — G47.33 OBSTRUCTIVE SLEEP APNEA: Primary | ICD-10-CM

## 2023-08-04 DIAGNOSIS — E66.01 MORBID OBESITY WITH BMI OF 40.0-44.9, ADULT (HCC): ICD-10-CM

## 2023-08-04 DIAGNOSIS — R68.2 DRY MOUTH: ICD-10-CM

## 2023-08-04 DIAGNOSIS — R45.86 MOOD DISTURBANCE: ICD-10-CM

## 2023-08-04 DIAGNOSIS — J98.4 RESTRICTIVE LUNG DISEASE: ICD-10-CM

## 2023-08-04 PROCEDURE — 99214 OFFICE O/P EST MOD 30 MIN: CPT | Performed by: INTERNAL MEDICINE

## 2023-08-04 NOTE — PATIENT INSTRUCTIONS
Strategies to improve dry mouth were discussed. Specifically, adequate treatment of nasal allergies, adjusting heat and humidity settings on PAP machine, using Biotene mouthwash &/or Xylimelt. Nursing Support:  When: Monday through Friday 7A-5PM except holidays  Where: Our direct line is [phone]. If you are having a true emergency please call 911. In the event that the line is busy or it is after hours please leave a voice message and we will return your call. Please speak clearly, leaving your full name, birth date, best number to reach you and the reason for your call. Medication refills: We will need the name of the medication, the dosage, the ordering provider, whether you get a 30 or 90 day refill, and the pharmacy name and address. Medications will be ordered by the provider only. Nurses cannot call in prescriptions. Please allow 7 days for medication refills. Physician requested updates: If your provider requested that you call with an update after starting medication, please be ready to provide us the medication and dosage, what time you take your medication, the time you attempt to fall asleep, time you fall asleep, when you wake up, and what time you get out of bed. Sleep Study Results: We will contact you with sleep study results and/or next steps after the physician has reviewed your testing.

## 2023-08-04 NOTE — PROGRESS NOTES
Follow-Up Note - Sleep [facility]   Marc Gonzales  78 y.o. female  KRN:[id number]  [facility]:[id number]  DOS:8/4/2023    CC: I saw this patient for follow-up in clinic today for Sleep disordered breathing, Coexisting Sleep and Medical Problems. Interval changes: Patient received ot a Dream Station Version 2 machine from [place] several months ago. Results of prior studies: The diagnostic study in January of 2017 demonstrated AHI of 29 per hour, considerably higher during REM at 64 per hour. There were also 60 respiratory effort-related arousals and severe periodic limb movements of sleep. During a subsequent therapeutic study, sleep disordered breathing and periodic limb movements of sleep appeared to be sufficiently remediated with nasal CPAP at 7 cm H2O. She was observed during stage REM but not while supine. Auto titrating Pap was initiated. PFSH, Problem List, Medications & Allergies were reviewed in EMR. She  has a past medical history of Asthma, Breathing difficulty, Bronchitis, Depression, Fracture of arm, Fracture of carpal bone, Hyperlipidemia, Hypertension, 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[address]) (12/16/2016), Shortness of breath, Thyroid nodule, Thyroid nodule, and Thyroid nodule. She has a current medication list which includes the following prescription(s): albuterol, amlodipine, atorvastatin, diclofenac sodium, furosemide, levothyroxine, lisinopril-hydrochlorothiazide, melatonin, meloxicam, mometasone, preservision areds 2, nebivolol, nystatin-triamcinolone, omeprazole, potassium chloride, and venlafaxine. PHYSIOLOGICAL DATA REVIEW : Device has been used for 20/30 days and average on days used is 8 hours and 20 minutes. She was unable to use the machine for several days because of sinus infection. Using PAP > 4 hours/night 63%.  Estimated THO 1.7/hour with pressure of 12cm [email or web address] percentile; patient has not been using non FDA approved devices to sanitize the machine. INTERPRETATION: Compliance is good; Pressure setting is:optimal; ;   SUBJECTIVE: With respect to use of PAP, Mary Lay  is experiencing some adverse effects:dry mouth/throat. She derives benefit. Is satisfied with sleep and daytime function. Sleep Routine: Mary Lay reports getting 8.5 hrs sleep; she has no difficulty initiating or maintaining sleep . She arises spontaneously and feels refreshed. Mary Lay denies daytime sleepiness, but occasionally may nap for 20 minutes She rated [herself] at Total score: 0 /24 on the Edison Sleepiness Scale. Other issues: None reported. Habits:[ reports that she quit smoking about 42 years ago. Her smoking use included cigarettes. She has a 1.00 pack-year smoking history. She has quit using smokeless tobacco.], [ reports no history of alcohol use.], [ reports no history of drug use.], Caffeine use:limited; Exercise routine: "not enough". ROS: Significant for weight fluctuates in the range of a few pounds. She has nasal and respiratory symptoms due to allergies. Acid reflux is controlled. Mood is stable on current medication. [facility] EXAM: BP (!) 185/86 (BP Location: Left arm, Patient Position: Sitting, Cuff Size: Large)   Ht 5' 1" (1.549 m)   Wt 102 kg (224 lb)   LMP  (LMP Unknown)   BMI 42.32 kg/m²     Wt Readings from Last 3 Encounters:   08/04/23 102 kg (224 lb)   07/28/23 103 kg (227 lb 3.2 oz)   07/12/23 102 kg (224 lb 3.2 oz)      Patient is well groomed; well appearing. CNS: Alert, orientated, speech clear & coherent  Psych: cooperative and in no distress. Mental state: Appears normal.  H&N: EOMI; NC/AT: No facial pressure marks, no rashes. Skin/Extrem: col & hydration normal; no edema  Resp: Respiratory effort is normal  Physical findings otherwise essentially unchanged from previous. IMPRESSION: Problem List Items & Comorbidities Addressed this Visit    1. Obstructive sleep apnea  PAP DME Resupply/Reorder      2. Dry mouth        3.  Restrictive lung disease        4. Essential hypertension        5. GERD without esophagitis        6. Mood disturbance        7. Morbid obesity with BMI of 40.0-44.9, adult ([address])            PLAN:  1. I reviewed results of prior studies and physiologic data with the patient. 2. I discussed treatment options with risks and benefits. 3. Treatment with  PAP is medically necessary and Jose Montejo is agreable to continue use. 4. Care of equipment, methods to improve comfort using PAP and importance of compliance with therapy were discussed. 5. Pressure setting: continue 10-12 cmH2O.    6. Rx provided to replace supplies and Care coordinated with DME provider. 7. She monitors blood pressure at home and is well controlled. She attributes the weighted blood pressure to stress about being late for her appointment. 8. Strategies to improve dry mouth were discussed. Specifically, adequate treatment of nasal allergies, adjusting heat and humidity settings on PAP machine, using Biotene mouthwash &/or Xylimelt. 9. Discussed strategies for weight reduction. 10. Follow-up is advised in 1 year or sooner if needed to monitor progress, compliance and to adjust therapy. Thank you for allowing me to participate in the care of this patient. Sincerely,     Authenticated electronically on 68/21/96   Board Certified Specialist     Portions of the record may have been created with voice recognition software. Occasional wrong word or "sound a like" substitutions may have occurred due to the inherent limitations of voice recognition software. There may also be notations and random deletions of words or characters from malfunctioning software. Read the chart carefully and recognize, using context, where substitutions/deletions have occurred.

## 2023-08-07 ENCOUNTER — TELEPHONE (OUTPATIENT)
Dept: SLEEP CENTER | Facility: CLINIC | Age: 78
End: 2023-08-07

## 2023-08-08 LAB

## 2023-08-19 DIAGNOSIS — E89.0 HYPOTHYROIDISM, POSTSURGICAL: ICD-10-CM

## 2023-08-21 RX ORDER — LEVOTHYROXINE SODIUM 175 UG/1
175 TABLET ORAL DAILY
Qty: 90 TABLET | Refills: 1 | Status: SHIPPED | OUTPATIENT
Start: 2023-08-21

## 2023-09-10 PROBLEM — J32.9 RHINOSINUSITIS: Status: RESOLVED | Noted: 2023-07-12 | Resolved: 2023-09-10

## 2023-09-10 PROBLEM — J31.0 RHINOSINUSITIS: Status: RESOLVED | Noted: 2023-07-12 | Resolved: 2023-09-10

## 2023-09-19 DIAGNOSIS — L30.9 DERMATITIS: ICD-10-CM

## 2023-09-19 RX ORDER — MOMETASONE FUROATE 1 MG/G
1 CREAM TOPICAL DAILY
Qty: 45 G | Refills: 3 | Status: SHIPPED | OUTPATIENT
Start: 2023-09-19

## 2023-09-29 ENCOUNTER — OFFICE VISIT (OUTPATIENT)
Dept: OBGYN CLINIC | Facility: MEDICAL CENTER | Age: 78
End: 2023-09-29
Payer: COMMERCIAL

## 2023-09-29 VITALS
HEART RATE: 70 BPM | WEIGHT: 227.2 LBS | HEIGHT: 61 IN | BODY MASS INDEX: 42.9 KG/M2 | DIASTOLIC BLOOD PRESSURE: 74 MMHG | SYSTOLIC BLOOD PRESSURE: 127 MMHG

## 2023-09-29 DIAGNOSIS — M70.51 PES ANSERINUS BURSITIS OF BOTH KNEES: Primary | ICD-10-CM

## 2023-09-29 DIAGNOSIS — M70.52 PES ANSERINUS BURSITIS OF BOTH KNEES: Primary | ICD-10-CM

## 2023-09-29 DIAGNOSIS — I10 ESSENTIAL HYPERTENSION: ICD-10-CM

## 2023-09-29 PROCEDURE — 20610 DRAIN/INJ JOINT/BURSA W/O US: CPT | Performed by: ORTHOPAEDIC SURGERY

## 2023-09-29 PROCEDURE — 99213 OFFICE O/P EST LOW 20 MIN: CPT | Performed by: ORTHOPAEDIC SURGERY

## 2023-09-29 RX ORDER — BUPIVACAINE HYDROCHLORIDE 2.5 MG/ML
1 INJECTION, SOLUTION INFILTRATION; PERINEURAL
Status: COMPLETED | OUTPATIENT
Start: 2023-09-29 | End: 2023-09-29

## 2023-09-29 RX ORDER — TRIAMCINOLONE ACETONIDE 40 MG/ML
20 INJECTION, SUSPENSION INTRA-ARTICULAR; INTRAMUSCULAR
Status: COMPLETED | OUTPATIENT
Start: 2023-09-29 | End: 2023-09-29

## 2023-09-29 RX ORDER — POTASSIUM CHLORIDE 20 MEQ/1
20 TABLET, EXTENDED RELEASE ORAL 2 TIMES DAILY
Qty: 180 TABLET | Refills: 1 | Status: SHIPPED | OUTPATIENT
Start: 2023-09-29

## 2023-09-29 RX ORDER — AMLODIPINE BESYLATE 5 MG/1
5 TABLET ORAL 2 TIMES DAILY
Qty: 180 TABLET | Refills: 1 | Status: SHIPPED | OUTPATIENT
Start: 2023-09-29

## 2023-09-29 RX ADMIN — BUPIVACAINE HYDROCHLORIDE 1 ML: 2.5 INJECTION, SOLUTION INFILTRATION; PERINEURAL at 10:45

## 2023-09-29 RX ADMIN — TRIAMCINOLONE ACETONIDE 20 MG: 40 INJECTION, SUSPENSION INTRA-ARTICULAR; INTRAMUSCULAR at 10:45

## 2023-09-29 NOTE — PROGRESS NOTES
Assessment/Plan:  1. Pes anserinus bursitis of both knees      Orders Placed This Encounter   Procedures   • Large joint arthrocentesis: bilateral anserine bursa       · Patient has severe bilateral knee osteoarthritis and bilat pes anserine bursitis. · Patient received bilateral pes bursa steroid injections. Tolerated the procedures well. Post injection instructions reviewed. · Continue meloxicam prn pain and diclofenac gel. · Continue activity as tolerated. · She will return in November for gel injections     Return for gel injections in november, also make 3 month follow up for pes bursa injections. I answered all of the patient's questions during the visit and provided education of the patient's condition during the visit. The patient verbalized understanding of the information given and agrees with the plan. This note was dictated using [employer] software. It may contain errors including improperly dictated words. Please contact physician directly for any questions. Subjective   Chief Complaint:   No chief complaint on file. [facility]  Braden Tello is a 70 y.o. female who presents for follow up for bilateral knee pain. Patient received bilateral pes bursa CSI on 7/28/23 which helped for 2.5 months. Patient reports the return of bilateral knee pain located medially. She takes meloxicam and applies voltaren gel for pain. Patient would like to repeat her bursa injections today and is scheduled for gel injections in November for both knee's. Review of Systems  ROS:    See HPI for musculoskeletal review.    All other systems reviewed are negative     History:  Past Medical History:   Diagnosis Date   • Asthma    • Breathing difficulty    • Bronchitis    • Depression    • Fracture of arm    • Fracture of carpal bone    • Hyperlipidemia    • Hypertension    • 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[address]) 12/16/2016   • Shortness of breath    • Thyroid nodule    • Thyroid nodule    • Thyroid nodule Past Surgical History:   Procedure Laterality Date   • EYE SURGERY  2021   • HEMORRHOID SURGERY     • RI THYROIDECTOMY RMVL REMAINING TISS FLWG PRTL RMVL Left 2016    Procedure:  COMPLETION THYROIDECTOMY, FLEXIBLE LARYNGOSCOPY;  Surgeon: Leida Gallegos MD;  [facility];  Service: Surgical Oncology   • RI TOTAL THYROID LOBECTOMY UNI W/WO ISTHMUSECTOMY Right 2016    Procedure: HEMITHYROIDECTOMY;  Surgeon: Leida Gallegos MD;  [facility];  Service: Surgical Oncology   • THYROIDECTOMY, PARTIAL Left      Social History   Social History     Substance and Sexual Activity   Alcohol Use No    Comment: occasional glass of wine     Social History     Substance and Sexual Activity   Drug Use No     Social History     Tobacco Use   Smoking Status Former   • Packs/day: 0.25   • Years: 4.00   • Total pack years: 1.00   • Types: Cigarettes   • Quit date: 6/15/1981   • Years since quittin.3   Smokeless Tobacco Former   Tobacco Comments    quit 40 yrs ago (Never a smoker per [employer])     Family History:   Family History   Adopted: Yes   Problem Relation Age of Onset   • Hypertension Family    • Kidney disease Family    • Hypertension Mother        Current Outpatient Medications on File Prior to Visit   Medication Sig Dispense Refill   • albuterol (Ventolin HFA) 90 mcg/act inhaler Inhale 2 puffs every 4 (four) hours as needed for wheezing or shortness of breath 54 g 0   • amLODIPine (NORVASC) 5 mg tablet Take 1 tablet (5 mg total) by mouth 2 (two) times a day 180 tablet 1   • atorvastatin (LIPITOR) 20 mg tablet TAKE 1 TABLET BY MOUTH DAILY 90 tablet 1   • Diclofenac Sodium (VOLTAREN) 1 % Apply 2 g topically 4 (four) times a day as needed (pain) 350 g 2   • furosemide (LASIX) 20 mg tablet Take 1 tablet (20 mg total) by mouth daily Edema 90 tablet 1   • levothyroxine 175 mcg tablet TAKE 1 TABLET BY MOUTH DAILY 90 tablet 1   • lisinopril-hydrochlorothiazide (PRINZIDE,ZESTORETIC) 20-12.5 MG per tablet Take 1 pill bid 180 tablet 1   • Melatonin 10 MG TABS Take by mouth     • meloxicam (MOBIC) 7.5 mg tablet TAKE 1 TABLET BY MOUTH TWICE DAILY AS NEEDED FOR MODERATE KNEE PAIN 60 tablet 1   • mometasone (ELOCON) 0.1 % cream Apply 1 Application topically daily 45 g 3   • Multiple Vitamins-Minerals (PreserVision AREDS 2) CAPS      • nebivolol (BYSTOLIC) 10 mg tablet Take 1 pill daily 90 tablet 1   • nystatin-triamcinolone (MYCOLOG-II) cream Apply topically 2 (two) times a day 30 g 3   • omeprazole (PriLOSEC) 40 MG capsule Take 1 capsule (40 mg total) by mouth daily 90 capsule 1   • potassium chloride (K-DUR,KLOR-CON) 20 mEq tablet Take 1 tablet (20 mEq total) by mouth 2 (two) times a day 180 tablet 1   • venlafaxine (EFFEXOR) 37.5 mg tablet Take 1 tablet (37.5 mg total) by mouth daily 90 tablet 1     No current facility-administered medications on file prior to visit. Allergies   Allergen Reactions   • Adhesive [Medical Tape] Rash        Objective     /74   Pulse 70   Ht 5' 1" (1.549 m)   Wt 103 kg (227 lb 3.2 oz)   LMP  (LMP Unknown)   BMI 42.93 kg/m²      PE:  AAOx 3  WDWN  Hearing intact, no drainage from eyes  no audible wheezing  no abdominal distension  LE compartments soft, skin intact    Ortho Exam:  bilateral Knee:   No erythema  no swelling  no effusion  no warmth  +TTP over pes anserine bursa  AROM: 3- 115  Stable to varus/valgus stress      Large joint arthrocentesis: bilateral anserine bursa  Universal Protocol:  Consent: Verbal consent obtained.   Risks and benefits: risks, benefits and alternatives were discussed  Consent given by: patient  Patient understanding: patient states understanding of the procedure being performed  Site marked: the operative site was marked  Patient identity confirmed: verbally with patient    Supporting Documentation  Indications: pain   Procedure Details  Location: knee - bilateral anserine bursa  Preparation: Patient was prepped and draped in the usual sterile fashion  Needle size: 25 G  Ultrasound guidance: no  Approach: anteromedial    Medications (Right): 1 mL bupivacaine 0.25 %; 20 mg triamcinolone acetonide 40 mg/mLMedications (Left): 1 mL bupivacaine 0.25 %; 20 mg triamcinolone acetonide 40 mg/mL   Patient tolerance: patient tolerated the procedure well with no immediate complications  Dressing:  Sterile dressing applied          Scribe Attestation    I,:  Carol Turk am acting as a scribe while in the presence of the attending physician.:       I,:  Terell Brenner DO personally performed the services described in this documentation    as scribed in my presence.:

## 2023-10-16 ENCOUNTER — VBI (OUTPATIENT)
Dept: ADMINISTRATIVE | Facility: OTHER | Age: 78
End: 2023-10-16

## 2023-10-23 DIAGNOSIS — M17.0 BILATERAL PRIMARY OSTEOARTHRITIS OF KNEE: ICD-10-CM

## 2023-12-01 ENCOUNTER — PROCEDURE VISIT (OUTPATIENT)
Dept: OBGYN CLINIC | Facility: MEDICAL CENTER | Age: 78
End: 2023-12-01
Payer: COMMERCIAL

## 2023-12-01 VITALS
HEART RATE: 77 BPM | HEIGHT: 61 IN | SYSTOLIC BLOOD PRESSURE: 127 MMHG | BODY MASS INDEX: 43.05 KG/M2 | DIASTOLIC BLOOD PRESSURE: 78 MMHG | WEIGHT: 228 LBS

## 2023-12-01 DIAGNOSIS — G89.29 CHRONIC PAIN OF RIGHT KNEE: ICD-10-CM

## 2023-12-01 DIAGNOSIS — M70.50 PES ANSERINE BURSITIS: ICD-10-CM

## 2023-12-01 DIAGNOSIS — M25.561 CHRONIC PAIN OF RIGHT KNEE: ICD-10-CM

## 2023-12-01 DIAGNOSIS — M70.51 PES ANSERINUS BURSITIS OF RIGHT KNEE: ICD-10-CM

## 2023-12-01 DIAGNOSIS — M17.11 PRIMARY OSTEOARTHRITIS OF RIGHT KNEE: ICD-10-CM

## 2023-12-01 DIAGNOSIS — M25.562 CHRONIC PAIN OF LEFT KNEE: ICD-10-CM

## 2023-12-01 DIAGNOSIS — G89.29 CHRONIC PAIN OF LEFT KNEE: ICD-10-CM

## 2023-12-01 DIAGNOSIS — M17.12 PRIMARY OSTEOARTHRITIS OF LEFT KNEE: ICD-10-CM

## 2023-12-01 DIAGNOSIS — M17.0 BILATERAL PRIMARY OSTEOARTHRITIS OF KNEE: Primary | ICD-10-CM

## 2023-12-01 PROCEDURE — 20610 DRAIN/INJ JOINT/BURSA W/O US: CPT | Performed by: PHYSICIAN ASSISTANT

## 2023-12-01 RX ORDER — HYALURONATE SODIUM 10 MG/ML
20 SYRINGE (ML) INTRAARTICULAR
Status: COMPLETED | OUTPATIENT
Start: 2023-12-01 | End: 2023-12-01

## 2023-12-01 RX ORDER — TRIAMCINOLONE ACETONIDE 40 MG/ML
40 INJECTION, SUSPENSION INTRA-ARTICULAR; INTRAMUSCULAR
Status: COMPLETED | OUTPATIENT
Start: 2023-12-01 | End: 2023-12-01

## 2023-12-01 RX ORDER — BUPIVACAINE HYDROCHLORIDE 2.5 MG/ML
1 INJECTION, SOLUTION INFILTRATION; PERINEURAL
Status: COMPLETED | OUTPATIENT
Start: 2023-12-01 | End: 2023-12-01

## 2023-12-01 RX ORDER — MELOXICAM 7.5 MG/1
7.5 TABLET ORAL 2 TIMES DAILY PRN
Qty: 60 TABLET | Refills: 1 | Status: SHIPPED | OUTPATIENT
Start: 2023-12-01

## 2023-12-01 RX ADMIN — TRIAMCINOLONE ACETONIDE 40 MG: 40 INJECTION, SUSPENSION INTRA-ARTICULAR; INTRAMUSCULAR at 09:45

## 2023-12-01 RX ADMIN — Medication 20 MG: at 09:45

## 2023-12-01 RX ADMIN — BUPIVACAINE HYDROCHLORIDE 1 ML: 2.5 INJECTION, SOLUTION INFILTRATION; PERINEURAL at 09:45

## 2023-12-01 NOTE — PROGRESS NOTES
Patient has severe bilateral knee osteoarthritis and right pes anserine bursitis. Patient received bilateral knee euflexxa injections 1/3 and right pes anserine bursa CSI. Tolerated the procedures well. Post injection instructions reviewed. Refill provided for voltaren gel and mobic tabs. Briefly discussed TKA criteria including BMI <40. Patient advised to work on weight loss. Follow up 1 week for injection 2 and left pes bursa CSI. Large joint arthrocentesis: bilateral knee  Universal Protocol:  Consent: Verbal consent obtained. Risks and benefits: risks, benefits and alternatives were discussed  Consent given by: patient  Site marked: the operative site was marked  Supporting Documentation  Indications: pain   Procedure Details  Location: knee - bilateral knee  Preparation: Patient was prepped and draped in the usual sterile fashion  Needle size: 22 G  Ultrasound guidance: no  Approach: anterolateral    Medications (Right): 20 mg Sodium Hyaluronate (Viscosup) 20 MG/2MLMedications (Left): 20 mg Sodium Hyaluronate (Viscosup) 20 MG/2ML   Patient tolerance: patient tolerated the procedure well with no immediate complications  Dressing:  Sterile dressing applied      Large joint arthrocentesis: R knee  Universal Protocol:  Consent: Verbal consent obtained.   Risks and benefits: risks, benefits and alternatives were discussed  Consent given by: patient  Site marked: the operative site was marked  Supporting Documentation  Indications: pain   Procedure Details  Location: knee - R knee  Preparation: Patient was prepped and draped in the usual sterile fashion  Needle size: 22 G  Ultrasound guidance: no  Approach: medial  Medications administered: 1 mL bupivacaine 0.25 %; 40 mg triamcinolone acetonide 40 mg/mL    Patient tolerance: patient tolerated the procedure well with no immediate complications  Dressing:  Sterile dressing applied

## 2023-12-08 ENCOUNTER — PROCEDURE VISIT (OUTPATIENT)
Dept: OBGYN CLINIC | Facility: MEDICAL CENTER | Age: 78
End: 2023-12-08
Payer: COMMERCIAL

## 2023-12-08 VITALS
DIASTOLIC BLOOD PRESSURE: 79 MMHG | SYSTOLIC BLOOD PRESSURE: 128 MMHG | HEART RATE: 77 BPM | HEIGHT: 61 IN | WEIGHT: 227.8 LBS | BODY MASS INDEX: 43.01 KG/M2

## 2023-12-08 DIAGNOSIS — M70.52 PES ANSERINUS BURSITIS OF BOTH KNEES: ICD-10-CM

## 2023-12-08 DIAGNOSIS — M70.51 PES ANSERINUS BURSITIS OF BOTH KNEES: ICD-10-CM

## 2023-12-08 DIAGNOSIS — M17.0 BILATERAL PRIMARY OSTEOARTHRITIS OF KNEE: Primary | ICD-10-CM

## 2023-12-08 PROCEDURE — 20610 DRAIN/INJ JOINT/BURSA W/O US: CPT | Performed by: ORTHOPAEDIC SURGERY

## 2023-12-08 RX ORDER — HYALURONATE SODIUM 10 MG/ML
20 SYRINGE (ML) INTRAARTICULAR
Status: COMPLETED | OUTPATIENT
Start: 2023-12-08 | End: 2023-12-08

## 2023-12-08 RX ORDER — TRIAMCINOLONE ACETONIDE 40 MG/ML
40 INJECTION, SUSPENSION INTRA-ARTICULAR; INTRAMUSCULAR
Status: COMPLETED | OUTPATIENT
Start: 2023-12-08 | End: 2023-12-08

## 2023-12-08 RX ORDER — BUPIVACAINE HYDROCHLORIDE 2.5 MG/ML
1 INJECTION, SOLUTION EPIDURAL; INFILTRATION; INTRACAUDAL
Status: COMPLETED | OUTPATIENT
Start: 2023-12-08 | End: 2023-12-08

## 2023-12-08 RX ADMIN — Medication 20 MG: at 09:15

## 2023-12-08 RX ADMIN — TRIAMCINOLONE ACETONIDE 40 MG: 40 INJECTION, SUSPENSION INTRA-ARTICULAR; INTRAMUSCULAR at 09:15

## 2023-12-08 RX ADMIN — BUPIVACAINE HYDROCHLORIDE 1 ML: 2.5 INJECTION, SOLUTION EPIDURAL; INFILTRATION; INTRACAUDAL at 09:15

## 2023-12-08 NOTE — PROGRESS NOTES
1. Bilateral primary osteoarthritis of knee        2. Pes anserinus bursitis of both knees          Patient has severe bilateral knee osteoarthritis and left Pez anserine bursitis. Patient is here for her 2nd injection of Euflexxa into the bilateral knee and left pes anserine Bursa CSI. Post injection instructions reviewed. Physical exam of the knee shows no effusion no ecchymosis. Patient tolerated procedure follow up 1 week for injection 3 of bilateral knees     Large joint arthrocentesis: bilateral knee  Universal Protocol:  Consent: Verbal consent obtained. Risks and benefits: risks, benefits and alternatives were discussed  Consent given by: patient  Patient understanding: patient states understanding of the procedure being performed  Patient identity confirmed: verbally with patient  Supporting Documentation  Indications: pain and diagnostic evaluation   Procedure Details  Location: knee - bilateral knee  Preparation: Patient was prepped and draped in the usual sterile fashion  Needle size: 22 G  Ultrasound guidance: no  Approach: anterolateral    Medications (Right): 20 mg Sodium Hyaluronate (Viscosup) 20 MG/2MLMedications (Left): 20 mg Sodium Hyaluronate (Viscosup) 20 MG/2ML       Large joint arthrocentesis: L anserine bursa  Universal Protocol:  Consent: Verbal consent obtained.   Risks and benefits: risks, benefits and alternatives were discussed  Consent given by: patient  Patient understanding: patient states understanding of the procedure being performed  Site marked: the operative site was marked  Patient identity confirmed: verbally with patient  Supporting Documentation  Indications: pain   Procedure Details  Location: knee - L anserine bursa  Needle size: 22 G  Ultrasound guidance: no  Approach: anterolateral  Medications administered: 1 mL bupivacaine 0.25 %; 40 mg triamcinolone acetonide 40 mg/mL    Patient tolerance: patient tolerated the procedure well with no immediate complications  Dressing:  Sterile dressing applied

## 2023-12-08 NOTE — PROGRESS NOTES
Ms. Kaylan Tim is here for bilateral knee euflexxa injections # 2 and L pes anserine bursa steroid injection. Tolerated first set of euflexxa injections and R pes anserine bursa steroid injection well last week. Large joint arthrocentesis: L knee  Universal Protocol:  Consent given by: patient  Time out: Immediately prior to procedure a "time out" was called to verify the correct patient, procedure, equipment, support staff and site/side marked as required. Site marked: the operative site was marked  Supporting Documentation  Indications: pain   Procedure Details  Location: knee - L knee  Preparation: Patient was prepped and draped in the usual sterile fashion  Needle size: 22 G  Ultrasound guidance: no  Approach: anterolateral  Medications administered: 20 mg Sodium Hyaluronate (Viscosup) 20 MG/2ML    Patient tolerance: patient tolerated the procedure well with no immediate complications  Dressing:  Sterile dressing applied      Large joint arthrocentesis: R knee  Universal Protocol:  Consent given by: patient  Time out: Immediately prior to procedure a "time out" was called to verify the correct patient, procedure, equipment, support staff and site/side marked as required. Site marked: the operative site was marked  Supporting Documentation  Indications: pain   Procedure Details  Location: knee - R knee  Preparation: Patient was prepped and draped in the usual sterile fashion  Needle size: 22 G  Ultrasound guidance: no  Approach: anterolateral  Medications administered: 20 mg Sodium Hyaluronate (Viscosup) 20 MG/2ML    Patient tolerance: patient tolerated the procedure well with no immediate complications  Dressing:  Sterile dressing applied      Large joint arthrocentesis  Universal Protocol:  Time out: Immediately prior to procedure a "time out" was called to verify the correct patient, procedure, equipment, support staff and site/side marked as required.   Site marked: the operative site was marked  Supporting Documentation  Indications: pain   Procedure Details  Needle size: 22 G  Ultrasound guidance: no  Medications administered: 1 mL bupivacaine (PF) 0.25 %; 40 mg triamcinolone acetonide 40 mg/mL    Patient tolerance: patient tolerated the procedure well with no immediate complications  Dressing:  Sterile dressing applied      Tolerated injections well.

## 2023-12-15 ENCOUNTER — PROCEDURE VISIT (OUTPATIENT)
Dept: OBGYN CLINIC | Facility: MEDICAL CENTER | Age: 78
End: 2023-12-15

## 2023-12-15 VITALS
WEIGHT: 226.8 LBS | HEIGHT: 61 IN | BODY MASS INDEX: 42.82 KG/M2 | HEART RATE: 72 BPM | SYSTOLIC BLOOD PRESSURE: 130 MMHG | DIASTOLIC BLOOD PRESSURE: 78 MMHG

## 2023-12-15 DIAGNOSIS — M17.0 BILATERAL PRIMARY OSTEOARTHRITIS OF KNEE: Primary | ICD-10-CM

## 2023-12-15 RX ORDER — HYALURONATE SODIUM 10 MG/ML
20 SYRINGE (ML) INTRAARTICULAR
Status: COMPLETED | OUTPATIENT
Start: 2023-12-15 | End: 2023-12-15

## 2023-12-15 RX ADMIN — Medication 20 MG: at 09:45

## 2023-12-15 NOTE — PROGRESS NOTES
Patient has severe bilateral knee osteoarthritis. Patient received bilateral knee euflexxa injections 3/3. Tolerated the procedures well. Post injection instructions reviewed. Follow up in 3 months for bilateral pes bursa CSI. Large joint arthrocentesis: bilateral knee  Universal Protocol:  Consent: Verbal consent obtained.   Risks and benefits: risks, benefits and alternatives were discussed  Consent given by: patient  Site marked: the operative site was marked  Supporting Documentation  Indications: pain   Procedure Details  Location: knee - bilateral knee  Preparation: Patient was prepped and draped in the usual sterile fashion  Needle size: 22 G  Ultrasound guidance: no  Approach: anterolateral    Medications (Right): 20 mg Sodium Hyaluronate (Viscosup) 20 MG/2MLMedications (Left): 20 mg Sodium Hyaluronate (Viscosup) 20 MG/2ML   Patient tolerance: patient tolerated the procedure well with no immediate complications  Dressing:  Sterile dressing applied

## 2023-12-21 DIAGNOSIS — I10 PRIMARY HYPERTENSION: ICD-10-CM

## 2023-12-21 RX ORDER — NEBIVOLOL 10 MG/1
TABLET ORAL
Qty: 90 TABLET | Refills: 1 | Status: SHIPPED | OUTPATIENT
Start: 2023-12-21

## 2023-12-22 DIAGNOSIS — K21.00 GASTROESOPHAGEAL REFLUX DISEASE WITH ESOPHAGITIS WITHOUT HEMORRHAGE: ICD-10-CM

## 2023-12-22 DIAGNOSIS — F32.A DEPRESSION, UNSPECIFIED DEPRESSION TYPE: ICD-10-CM

## 2023-12-22 DIAGNOSIS — I10 ESSENTIAL HYPERTENSION: ICD-10-CM

## 2023-12-22 RX ORDER — OMEPRAZOLE 40 MG/1
40 CAPSULE, DELAYED RELEASE ORAL DAILY
Qty: 90 CAPSULE | Refills: 1 | Status: SHIPPED | OUTPATIENT
Start: 2023-12-22

## 2023-12-22 RX ORDER — VENLAFAXINE 37.5 MG/1
37.5 TABLET ORAL DAILY
Qty: 90 TABLET | Refills: 1 | Status: SHIPPED | OUTPATIENT
Start: 2023-12-22

## 2023-12-22 RX ORDER — LISINOPRIL AND HYDROCHLOROTHIAZIDE 20; 12.5 MG/1; MG/1
TABLET ORAL
Qty: 180 TABLET | Refills: 1 | Status: SHIPPED | OUTPATIENT
Start: 2023-12-22

## 2024-01-08 DIAGNOSIS — M17.0 BILATERAL PRIMARY OSTEOARTHRITIS OF KNEE: ICD-10-CM

## 2024-01-11 ENCOUNTER — RA CDI HCC (OUTPATIENT)
Dept: OTHER | Facility: HOSPITAL | Age: 79
End: 2024-01-11

## 2024-01-12 ENCOUNTER — OFFICE VISIT (OUTPATIENT)
Dept: FAMILY MEDICINE CLINIC | Facility: CLINIC | Age: 79
End: 2024-01-12
Payer: COMMERCIAL

## 2024-01-12 VITALS
TEMPERATURE: 98 F | OXYGEN SATURATION: 98 % | DIASTOLIC BLOOD PRESSURE: 86 MMHG | WEIGHT: 230.8 LBS | BODY MASS INDEX: 43.58 KG/M2 | HEART RATE: 96 BPM | SYSTOLIC BLOOD PRESSURE: 138 MMHG | HEIGHT: 61 IN

## 2024-01-12 DIAGNOSIS — E66.01 MORBID OBESITY WITH BMI OF 40.0-44.9, ADULT (HCC): ICD-10-CM

## 2024-01-12 DIAGNOSIS — Z00.00 MEDICARE ANNUAL WELLNESS VISIT, SUBSEQUENT: ICD-10-CM

## 2024-01-12 DIAGNOSIS — M85.88 OSTEOPENIA OF OTHER SITE: ICD-10-CM

## 2024-01-12 DIAGNOSIS — C73 THYROID CANCER (HCC): ICD-10-CM

## 2024-01-12 DIAGNOSIS — N18.30 STAGE 3 CHRONIC KIDNEY DISEASE, UNSPECIFIED WHETHER STAGE 3A OR 3B CKD (HCC): ICD-10-CM

## 2024-01-12 DIAGNOSIS — J41.0 SIMPLE CHRONIC BRONCHITIS (HCC): ICD-10-CM

## 2024-01-12 DIAGNOSIS — I10 PRIMARY HYPERTENSION: ICD-10-CM

## 2024-01-12 DIAGNOSIS — J98.4 RESTRICTIVE LUNG DISEASE: ICD-10-CM

## 2024-01-12 DIAGNOSIS — E78.2 MIXED HYPERLIPIDEMIA: ICD-10-CM

## 2024-01-12 DIAGNOSIS — F32.A DEPRESSION, UNSPECIFIED DEPRESSION TYPE: ICD-10-CM

## 2024-01-12 DIAGNOSIS — E89.0 HYPOTHYROIDISM, POSTSURGICAL: ICD-10-CM

## 2024-01-12 DIAGNOSIS — I10 ESSENTIAL HYPERTENSION: Primary | ICD-10-CM

## 2024-01-12 DIAGNOSIS — R23.3 EASY BRUISING: ICD-10-CM

## 2024-01-12 DIAGNOSIS — M87.9 BONE INFARCT (HCC): ICD-10-CM

## 2024-01-12 DIAGNOSIS — K21.00 GASTROESOPHAGEAL REFLUX DISEASE WITH ESOPHAGITIS WITHOUT HEMORRHAGE: ICD-10-CM

## 2024-01-12 DIAGNOSIS — J45.909 ASTHMA, UNSPECIFIED ASTHMA SEVERITY, UNSPECIFIED WHETHER COMPLICATED, UNSPECIFIED WHETHER PERSISTENT: ICD-10-CM

## 2024-01-12 PROCEDURE — 99214 OFFICE O/P EST MOD 30 MIN: CPT | Performed by: FAMILY MEDICINE

## 2024-01-12 PROCEDURE — G0439 PPPS, SUBSEQ VISIT: HCPCS | Performed by: FAMILY MEDICINE

## 2024-01-12 RX ORDER — AMLODIPINE BESYLATE 5 MG/1
5 TABLET ORAL 2 TIMES DAILY
Qty: 180 TABLET | Refills: 1 | Status: SHIPPED | OUTPATIENT
Start: 2024-01-12

## 2024-01-12 RX ORDER — POTASSIUM CHLORIDE 20 MEQ/1
20 TABLET, EXTENDED RELEASE ORAL 2 TIMES DAILY
Qty: 180 TABLET | Refills: 1 | Status: SHIPPED | OUTPATIENT
Start: 2024-01-12

## 2024-01-12 RX ORDER — NEBIVOLOL 10 MG/1
TABLET ORAL
Qty: 90 TABLET | Refills: 1 | Status: SHIPPED | OUTPATIENT
Start: 2024-01-12

## 2024-01-12 RX ORDER — LISINOPRIL AND HYDROCHLOROTHIAZIDE 20; 12.5 MG/1; MG/1
TABLET ORAL
Qty: 180 TABLET | Refills: 1 | Status: SHIPPED | OUTPATIENT
Start: 2024-01-12

## 2024-01-12 RX ORDER — ATORVASTATIN CALCIUM 20 MG/1
20 TABLET, FILM COATED ORAL DAILY
Qty: 90 TABLET | Refills: 1 | Status: SHIPPED | OUTPATIENT
Start: 2024-01-12

## 2024-01-12 RX ORDER — FLUTICASONE FUROATE AND VILANTEROL 100; 25 UG/1; UG/1
1 POWDER RESPIRATORY (INHALATION) DAILY
Qty: 180 BLISTER | Refills: 3 | Status: SHIPPED | OUTPATIENT
Start: 2024-01-12 | End: 2025-01-06

## 2024-01-12 RX ORDER — LEVOTHYROXINE SODIUM 175 UG/1
175 TABLET ORAL DAILY
Qty: 90 TABLET | Refills: 1 | Status: SHIPPED | OUTPATIENT
Start: 2024-01-12

## 2024-01-12 RX ORDER — VENLAFAXINE 37.5 MG/1
37.5 TABLET ORAL DAILY
Qty: 90 TABLET | Refills: 1 | Status: SHIPPED | OUTPATIENT
Start: 2024-01-12

## 2024-01-12 RX ORDER — OMEPRAZOLE 40 MG/1
40 CAPSULE, DELAYED RELEASE ORAL DAILY
Qty: 90 CAPSULE | Refills: 1 | Status: SHIPPED | OUTPATIENT
Start: 2024-01-12

## 2024-01-12 RX ORDER — ALBUTEROL SULFATE 90 UG/1
2 AEROSOL, METERED RESPIRATORY (INHALATION) EVERY 4 HOURS PRN
Qty: 54 G | Refills: 0 | Status: SHIPPED | OUTPATIENT
Start: 2024-01-12

## 2024-01-12 RX ORDER — FUROSEMIDE 40 MG/1
40 TABLET ORAL DAILY
Qty: 90 TABLET | Refills: 1 | Status: SHIPPED | OUTPATIENT
Start: 2024-01-12

## 2024-01-12 NOTE — PATIENT INSTRUCTIONS
Medicare Preventive Visit Patient Instructions  Thank you for completing your Welcome to Medicare Visit or Medicare Annual Wellness Visit today. Your next wellness visit will be due in one year (1/12/2025).  The screening/preventive services that you may require over the next 5-10 years are detailed below. Some tests may not apply to you based off risk factors and/or age. Screening tests ordered at today's visit but not completed yet may show as past due. Also, please note that scanned in results may not display below.  Preventive Screenings:  Service Recommendations Previous Testing/Comments   Colorectal Cancer Screening  * Colonoscopy    * Fecal Occult Blood Test (FOBT)/Fecal Immunochemical Test (FIT)  * Fecal DNA/Cologuard Test  * Flexible Sigmoidoscopy Age: 45-75 years old   Colonoscopy: every 10 years (may be performed more frequently if at higher risk)  OR  FOBT/FIT: every 1 year  OR  Cologuard: every 3 years  OR  Sigmoidoscopy: every 5 years  Screening may be recommended earlier than age 45 if at higher risk for colorectal cancer. Also, an individualized decision between you and your healthcare provider will decide whether screening between the ages of 76-85 would be appropriate. Colonoscopy: Not on file  FOBT/FIT: Not on file  Cologuard: Not on file  Sigmoidoscopy: Not on file          Breast Cancer Screening Age: 40+ years old  Frequency: every 1-2 years  Not required if history of left and right mastectomy Mammogram: Not on file        Cervical Cancer Screening Between the ages of 21-29, pap smear recommended once every 3 years.   Between the ages of 30-65, can perform pap smear with HPV co-testing every 5 years.   Recommendations may differ for women with a history of total hysterectomy, cervical cancer, or abnormal pap smears in past. Pap Smear: Not on file    Screening Not Indicated   Hepatitis C Screening Once for adults born between 1945 and 1965  More frequently in patients at high risk for Hepatitis  C Hep C Antibody: 03/11/2019    Screening Current   Diabetes Screening 1-2 times per year if you're at risk for diabetes or have pre-diabetes Fasting glucose: 145 mg/dL (7/1/2021)  A1C: 6.3 % (10/15/2020)      Cholesterol Screening Once every 5 years if you don't have a lipid disorder. May order more often based on risk factors. Lipid panel: 07/01/2021    Screening Not Indicated  History Lipid Disorder     Other Preventive Screenings Covered by Medicare:  Abdominal Aortic Aneurysm (AAA) Screening: covered once if your at risk. You're considered to be at risk if you have a family history of AAA.  Lung Cancer Screening: covers low dose CT scan once per year if you meet all of the following conditions: (1) Age 55-77; (2) No signs or symptoms of lung cancer; (3) Current smoker or have quit smoking within the last 15 years; (4) You have a tobacco smoking history of at least 20 pack years (packs per day multiplied by number of years you smoked); (5) You get a written order from a healthcare provider.  Glaucoma Screening: covered annually if you're considered high risk: (1) You have diabetes OR (2) Family history of glaucoma OR (3)  aged 50 and older OR (4)  American aged 65 and older  Osteoporosis Screening: covered every 2 years if you meet one of the following conditions: (1) You're estrogen deficient and at risk for osteoporosis based off medical history and other findings; (2) Have a vertebral abnormality; (3) On glucocorticoid therapy for more than 3 months; (4) Have primary hyperparathyroidism; (5) On osteoporosis medications and need to assess response to drug therapy.   Last bone density test (DXA Scan): 09/03/2013.  HIV Screening: covered annually if you're between the age of 15-65. Also covered annually if you are younger than 15 and older than 65 with risk factors for HIV infection. For pregnant patients, it is covered up to 3 times per pregnancy.    Immunizations:  Immunization  Recommendations   Influenza Vaccine Annual influenza vaccination during flu season is recommended for all persons aged >= 6 months who do not have contraindications   Pneumococcal Vaccine   * Pneumococcal conjugate vaccine = PCV13 (Prevnar 13), PCV15 (Vaxneuvance), PCV20 (Prevnar 20)  * Pneumococcal polysaccharide vaccine = PPSV23 (Pneumovax) Adults 19-65 yo with certain risk factors or if 65+ yo  If never received any pneumonia vaccine: recommend Prevnar 20 (PCV20)  Give PCV20 if previously received 1 dose of PCV13 or PPSV23   Hepatitis B Vaccine 3 dose series if at intermediate or high risk (ex: diabetes, end stage renal disease, liver disease)   Respiratory syncytial virus (RSV) Vaccine - COVERED BY MEDICARE PART D  * RSVPreF3 (Arexvy) CDC recommends that adults 60 years of age and older may receive a single dose of RSV vaccine using shared clinical decision-making (SCDM)   Tetanus (Td) Vaccine - COST NOT COVERED BY MEDICARE PART B Following completion of primary series, a booster dose should be given every 10 years to maintain immunity against tetanus. Td may also be given as tetanus wound prophylaxis.   Tdap Vaccine - COST NOT COVERED BY MEDICARE PART B Recommended at least once for all adults. For pregnant patients, recommended with each pregnancy.   Shingles Vaccine (Shingrix) - COST NOT COVERED BY MEDICARE PART B  2 shot series recommended in those 19 years and older who have or will have weakened immune systems or those 50 years and older     Health Maintenance Due:      Topic Date Due   • Breast Cancer Screening: Mammogram  Never done   • Hepatitis C Screening  Completed     Immunizations Due:      Topic Date Due   • Influenza Vaccine (1) 09/01/2023   • COVID-19 Vaccine (5 - 2023-24 season) 10/26/2023     Advance Directives   What are advance directives?  Advance directives are legal documents that state your wishes and plans for medical care. These plans are made ahead of time in case you lose your  ability to make decisions for yourself. Advance directives can apply to any medical decision, such as the treatments you want, and if you want to donate organs.   What are the types of advance directives?  There are many types of advance directives, and each state has rules about how to use them. You may choose a combination of any of the following:  Living will:  This is a written record of the treatment you want. You can also choose which treatments you do not want, which to limit, and which to stop at a certain time. This includes surgery, medicine, IV fluid, and tube feedings.   Durable power of  for healthcare (DPAHC):  This is a written record that states who you want to make healthcare choices for you when you are unable to make them for yourself. This person, called a proxy, is usually a family member or a friend. You may choose more than 1 proxy.  Do not resuscitate (DNR) order:  A DNR order is used in case your heart stops beating or you stop breathing. It is a request not to have certain forms of treatment, such as CPR. A DNR order may be included in other types of advance directives.  Medical directive:  This covers the care that you want if you are in a coma, near death, or unable to make decisions for yourself. You can list the treatments you want for each condition. Treatment may include pain medicine, surgery, blood transfusions, dialysis, IV or tube feedings, and a ventilator (breathing machine).  Values history:  This document has questions about your views, beliefs, and how you feel and think about life. This information can help others choose the care that you would choose.  Why are advance directives important?  An advance directive helps you control your care. Although spoken wishes may be used, it is better to have your wishes written down. Spoken wishes can be misunderstood, or not followed. Treatments may be given even if you do not want them. An advance directive may make it easier  for your family to make difficult choices about your care.   Weight Management   Why it is important to manage your weight:  Being overweight increases your risk of health conditions such as heart disease, high blood pressure, type 2 diabetes, and certain types of cancer. It can also increase your risk for osteoarthritis, sleep apnea, and other respiratory problems. Aim for a slow, steady weight loss. Even a small amount of weight loss can lower your risk of health problems.  How to lose weight safely:  A safe and healthy way to lose weight is to eat fewer calories and get regular exercise. You can lose up about 1 pound a week by decreasing the number of calories you eat by 500 calories each day.   Healthy meal plan for weight management:  A healthy meal plan includes a variety of foods, contains fewer calories, and helps you stay healthy. A healthy meal plan includes the following:  Eat whole-grain foods more often.  A healthy meal plan should contain fiber. Fiber is the part of grains, fruits, and vegetables that is not broken down by your body. Whole-grain foods are healthy and provide extra fiber in your diet. Some examples of whole-grain foods are whole-wheat breads and pastas, oatmeal, brown rice, and bulgur.  Eat a variety of vegetables every day.  Include dark, leafy greens such as spinach, kale, laura greens, and mustard greens. Eat yellow and orange vegetables such as carrots, sweet potatoes, and winter squash.   Eat a variety of fruits every day.  Choose fresh or canned fruit (canned in its own juice or light syrup) instead of juice. Fruit juice has very little or no fiber.  Eat low-fat dairy foods.  Drink fat-free (skim) milk or 1% milk. Eat fat-free yogurt and low-fat cottage cheese. Try low-fat cheeses such as mozzarella and other reduced-fat cheeses.  Choose meat and other protein foods that are low in fat.  Choose beans or other legumes such as split peas or lentils. Choose fish, skinless poultry  (chicken or turkey), or lean cuts of red meat (beef or pork). Before you cook meat or poultry, cut off any visible fat.   Use less fat and oil.  Try baking foods instead of frying them. Add less fat, such as margarine, sour cream, regular salad dressing and mayonnaise to foods. Eat fewer high-fat foods. Some examples of high-fat foods include french fries, doughnuts, ice cream, and cakes.  Eat fewer sweets.  Limit foods and drinks that are high in sugar. This includes candy, cookies, regular soda, and sweetened drinks.  Exercise:  Exercise at least 30 minutes per day on most days of the week. Some examples of exercise include walking, biking, dancing, and swimming. You can also fit in more physical activity by taking the stairs instead of the elevator or parking farther away from stores. Ask your healthcare provider about the best exercise plan for you.      © Copyright [employer] 2018 Information is for End User's use only and may not be sold, redistributed or otherwise used for commercial purposes. All illustrations and images included in CareNotes® are the copyrighted property of [employer] or [employer]

## 2024-01-12 NOTE — PROGRESS NOTES
Assessment and Plan:     Problem List Items Addressed This Visit       Hyperlipidemia    Relevant Medications    atorvastatin (LIPITOR) 20 mg tablet    furosemide (LASIX) 40 mg tablet    Other Relevant Orders    CBC and differential    Comprehensive metabolic panel    Lipid panel    TSH, 3rd generation with Free T4 reflex    Hypertension    Relevant Medications    amLODIPine (NORVASC) 5 mg tablet    furosemide (LASIX) 40 mg tablet    lisinopril-hydrochlorothiazide (PRINZIDE,ZESTORETIC) 20-12.5 MG per tablet    nebivolol (BYSTOLIC) 10 mg tablet    potassium chloride (K-DUR,KLOR-CON) 20 mEq tablet    Hypothyroidism, postsurgical    Relevant Medications    furosemide (LASIX) 40 mg tablet    levothyroxine 175 mcg tablet    nebivolol (BYSTOLIC) 10 mg tablet    Other Relevant Orders    CBC and differential    Comprehensive metabolic panel    Lipid panel    TSH, 3rd generation with Free T4 reflex    Restrictive lung disease    Relevant Medications    albuterol (Ventolin HFA) 90 mcg/act inhaler    Fluticasone Furoate-Vilanterol 100-25 mcg/actuation inhaler    Morbid obesity with BMI of 40.0-44.9, adult (HCC)    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HCC)    Thyroid cancer (HCC)    Relevant Medications    levothyroxine 175 mcg tablet    nebivolol (BYSTOLIC) 10 mg tablet    Simple chronic bronchitis (HCC)    Relevant Medications    albuterol (Ventolin HFA) 90 mcg/act inhaler    Fluticasone Furoate-Vilanterol 100-25 mcg/actuation inhaler    Stage 3 chronic kidney disease, unspecified whether stage 3a or 3b CKD (HCC)    Relevant Medications    furosemide (LASIX) 40 mg tablet    Easy bruising    Relevant Orders    Protime-INR    APTT     Other Visit Diagnoses       Essential hypertension    -  Primary    Relevant Medications    amLODIPine (NORVASC) 5 mg tablet    furosemide (LASIX) 40 mg tablet    lisinopril-hydrochlorothiazide (PRINZIDE,ZESTORETIC) 20-12.5 MG per tablet    nebivolol (BYSTOLIC) 10 mg tablet    potassium chloride (K-DUR,KLOR-CON) 20 mEq  tablet    Other Relevant Orders    CBC and differential    Comprehensive metabolic panel    Lipid panel    TSH, 3rd generation with Free T4 reflex    Asthma, unspecified asthma severity, unspecified whether complicated, unspecified whether persistent        Relevant Medications    albuterol (Ventolin HFA) 90 mcg/act inhaler    Fluticasone Furoate-Vilanterol 100-25 mcg/actuation inhaler    Gastroesophageal reflux disease with esophagitis without hemorrhage        Relevant Medications    omeprazole (PriLOSEC) 40 MG capsule    Depression, unspecified depression type        Relevant Medications    venlafaxine (EFFEXOR) 37.5 mg tablet    Medicare annual wellness visit, subsequent        Osteopenia of other site        Relevant Orders    DXA bone density spine hip and pelvis            Depression Screening and Follow-up Plan: Patient was screened for depression during today's encounter. They screened negative with a PHQ-2 score of 0.      Preventive health issues were discussed with patient, and age appropriate screening tests were ordered as noted in patient's After Visit Summary.  Personalized health advice and appropriate referrals for health education or preventive services given if needed, as noted in patient's After Visit Summary.     History of Present Illness:     Patient presents for a Medicare Wellness Visit    Patient is here for Medicare wellness exam as well as to follow-up on all chronic conditions.  Patient does get bilateral knee pain in the cold.  Patient does see orthopedics in this regard.  Patient with chronic shortness of breath.  This varies.       Patient Care Team:  Cortez Morris DO as PCP - General  Audrey Sandoval MD as PCP - Endocrinology (Endocrinology)  Cortez Morris DO as PCP - PCP-[facility]  DO Lucas Kaur MD Roderick Quiros, MD as Surgeon (Surgical Oncology)     Review of Systems:     Review of Systems   Constitutional: Negative.    HENT:  Negative.     Eyes: Negative.    Respiratory:  Positive for shortness of breath.    Cardiovascular: Negative.    Gastrointestinal: Negative.    Endocrine: Negative.    Genitourinary: Negative.    Musculoskeletal:  Positive for arthralgias.   Skin: Negative.    Allergic/Immunologic: Negative.    Neurological: Negative.    Hematological: Negative.    Psychiatric/Behavioral: Negative.          Problem List:     Patient Active Problem List   Diagnosis    History of thyroid cancer    GERD without esophagitis    Hyperlipidemia    Hypertension    Hypothyroidism, postsurgical    Impaired fasting glucose    Insomnia    Restrictive lung disease    Acute non-recurrent frontal sinusitis    Obstructive sleep apnea    Hypersomnia    Morbid obesity with BMI of 40.0-44.9, adult (HCC)    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HCC)    Humerus lesion, left    Acute shoulder bursitis, right    Lower extremity edema    Encounter for follow-up surveillance of thyroid cancer    Thyroid cancer (HCC)    Primary osteoarthritis of left knee    Primary osteoarthritis of right knee    Pes anserine bursitis    Dermatitis    Skin neoplasm    Viral syndrome    COVID-19    Simple chronic bronchitis (HCC)    Preop examination    Ptosis of both eyelids    Prediabetes    Ptosis of right eyelid    Cellulitis of left lower extremity    Stage 3 chronic kidney disease, unspecified whether stage 3a or 3b CKD (HCC)    Easy bruising      Past Medical and Surgical History:     Past Medical History:   Diagnosis Date    Asthma     Breathing difficulty     Bronchitis     Depression     Fracture of arm     Fracture of carpal bone     Hyperlipidemia     Hypertension     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HCC) 12/16/2016    Shortness of breath     Thyroid nodule     Thyroid nodule     Thyroid nodule      Past Surgical History:   Procedure Laterality Date    EYE SURGERY  11/2021    HEMORRHOID SURGERY      HI THYROIDECTOMY RMVL REMAINING TISS FLWG PRTL RMVL Left 6/6/2016    Procedure:  COMPLETION  THYROIDECTOMY, FLEXIBLE LARYNGOSCOPY;  Surgeon: David Rivera MD;  Location: [facility];  Service: Surgical Oncology    NE TOTAL THYROID LOBECTOMY UNI W/WO ISTHMUSECTOMY Right 2016    Procedure: HEMITHYROIDECTOMY;  Surgeon: David Rivera MD;  Location:  MAIN OR;  Service: Surgical Oncology    THYROIDECTOMY, PARTIAL Left       Family History:     Family History   Adopted: Yes   Problem Relation Age of Onset    Hypertension Family     Kidney disease Family     Hypertension Mother       Social History:     Social History     Socioeconomic History    Marital status: /Civil Union     Spouse name: None    Number of children: None    Years of education: None    Highest education level: None   Occupational History    None   Tobacco Use    Smoking status: Former     Current packs/day: 0.00     Average packs/day: 0.3 packs/day for 4.0 years (1.0 ttl pk-yrs)     Types: Cigarettes     Start date: 6/15/1977     Quit date: 6/15/1981     Years since quittin.6    Smokeless tobacco: Former    Tobacco comments:     quit 40 yrs ago (Never a smoker per [employer])   Vaping Use    Vaping status: Never Used   Substance and Sexual Activity    Alcohol use: No     Comment: occasional glass of wine    Drug use: No    Sexual activity: Never   Other Topics Concern    None   Social History Narrative    Occasional caffeine consumption     Social Determinants of Health     Financial Resource Strain: Low Risk  (2024)    Overall Financial Resource Strain (CARDIA)     Difficulty of Paying Living Expenses: Not hard at all   Food Insecurity: Not on file   Transportation Needs: No Transportation Needs (2024)    PRAPARE - Transportation     Lack of Transportation (Medical): No     Lack of Transportation (Non-Medical): No   Physical Activity: Not on file   Stress: Not on file   Social Connections: Not on file   Intimate Partner Violence: Not on file   Housing Stability: Not on file      Medications and Allergies:      Current Outpatient Medications   Medication Sig Dispense Refill    albuterol (Ventolin HFA) 90 mcg/act inhaler Inhale 2 puffs every 4 (four) hours as needed for wheezing or shortness of breath 54 g 0    amLODIPine (NORVASC) 5 mg tablet Take 1 tablet (5 mg total) by mouth 2 (two) times a day 180 tablet 1    atorvastatin (LIPITOR) 20 mg tablet Take 1 tablet (20 mg total) by mouth daily 90 tablet 1    Diclofenac Sodium (VOLTAREN) 1 % APPLY 4 GRAMS TOPICALLY FOUR TIMES DAILY AS NEEDED FOR KNEE PAIN. GENERIC EQUIVALENT FOR VOLTAREN. 350 g 1    Fluticasone Furoate-Vilanterol 100-25 mcg/actuation inhaler Inhale 1 puff daily Rinse mouth after use. 180 blister 3    furosemide (LASIX) 40 mg tablet Take 1 tablet (40 mg total) by mouth daily Edema 90 tablet 1    levothyroxine 175 mcg tablet Take 1 tablet (175 mcg total) by mouth daily 90 tablet 1    lisinopril-hydrochlorothiazide (PRINZIDE,ZESTORETIC) 20-12.5 MG per tablet TAKE 1 TABLET BY MOUTH TWICE DAILY 180 tablet 1    Melatonin 10 MG TABS Take by mouth      meloxicam (MOBIC) 7.5 mg tablet Take 1 tablet (7.5 mg total) by mouth 2 (two) times a day as needed for mild pain Take with food. 60 tablet 1    mometasone (ELOCON) 0.1 % cream Apply 1 Application topically daily 45 g 3    Multiple Vitamins-Minerals (PreserVision AREDS 2) CAPS       nebivolol (BYSTOLIC) 10 mg tablet TAKE 1 TABLET BY MOUTH DAILY GENERIC EQUIVALENT FOR BYSTOLIC 90 tablet 1    nystatin-triamcinolone (MYCOLOG-II) cream Apply topically 2 (two) times a day 30 g 3    omeprazole (PriLOSEC) 40 MG capsule Take 1 capsule (40 mg total) by mouth daily 90 capsule 1    potassium chloride (K-DUR,KLOR-CON) 20 mEq tablet Take 1 tablet (20 mEq total) by mouth 2 (two) times a day 180 tablet 1    venlafaxine (EFFEXOR) 37.5 mg tablet Take 1 tablet (37.5 mg total) by mouth daily 90 tablet 1     No current facility-administered medications for this visit.     Allergies   Allergen Reactions    Adhesive [Medical Tape] Rash       Immunizations:     Immunization History   Administered Date(s) Administered    COVID-19 MODERNA VACC 0.5 ML IM 03/26/2021, 04/28/2021, 12/03/2021    COVID-19 Pfizer Vac BIVALENT Alejandro-sucrose 12 Yr+ IM 08/31/2023    INFLUENZA 10/21/2007, 10/26/2020, 10/26/2020    Influenza Split High Dose Preservative Free IM 10/25/2012, 09/25/2013, 10/03/2014, 09/25/2015, 11/07/2017    Influenza, high dose seasonal 0.7 mL 11/12/2018, 10/31/2019, 11/01/2021    Influenza, seasonal, injectable 10/22/2003, 10/10/2008    Influenza, seasonal, injectable, preservative free 1947    Pneumococcal Conjugate 13-Valent 02/17/2020    Pneumococcal Polysaccharide PPV23 08/26/2013, 08/20/2018    Td (adult), adsorbed 08/07/1987, 10/22/1997, 08/26/2013    Zoster 08/26/2013    Zoster Vaccine Recombinant 08/13/2021      Health Maintenance:         Topic Date Due    Breast Cancer Screening: Mammogram  Never done    Hepatitis C Screening  Completed         Topic Date Due    Influenza Vaccine (1) 09/01/2023    COVID-19 Vaccine (5 - 2023-24 season) 10/26/2023      Medicare Screening Tests and Risk Assessments:     Jeny is here for her Subsequent Wellness visit.     Health Risk Assessment:   Patient rates overall health as good. Patient feels that their physical health rating is same. Patient is satisfied with their life. Eyesight was rated as same. Hearing was rated as same. Patient feels that their emotional and mental health rating is same. Patients states they are never, rarely angry. Patient states they are sometimes unusually tired/fatigued. Pain experienced in the last 7 days has been none. Patient states that she has experienced no weight loss or gain in last 6 months.     Depression Screening:   PHQ-2 Score: 0      Fall Risk Screening:   In the past year, patient has experienced: no history of falling in past year      Urinary Incontinence Screening:   Patient has not leaked urine accidently in the last six months.     Home  Safety:  Patient has trouble with stairs inside or outside of their home. Patient has working smoke alarms and has no working carbon monoxide detector. Home safety hazards include: none.     Nutrition:   Current diet is Regular.     Medications:   Patient is currently taking over-the-counter supplements. OTC medications include: see medication list. Patient is able to manage medications.     Activities of Daily Living (ADLs)/Instrumental Activities of Daily Living (IADLs):   Walk and transfer into and out of bed and chair?: Yes  Dress and groom yourself?: Yes    Bathe or shower yourself?: Yes    Feed yourself? Yes  Do your laundry/housekeeping?: Yes  Manage your money, pay your bills and track your expenses?: Yes  Make your own meals?: Yes    Do your own shopping?: Yes    Durable Medical Equipment Suppliers  c [facility] [employer]    Previous Hospitalizations:   Any hospitalizations or ED visits within the last 12 months?: No      Advance Care Planning:   Living will: Yes    Durable POA for healthcare: No    Advanced directive: Yes      Cognitive Screening:   Provider or family/friend/caregiver concerned regarding cognition?: No    PREVENTIVE SCREENINGS      Cardiovascular Screening:    General: Screening Not Indicated, History Lipid Disorder and Risks and Benefits Discussed    Due for: Lipid Panel      Diabetes Screening:     General: Risks and Benefits Discussed    Due for: Blood Glucose      Colorectal Cancer Screening:     General: Risks and Benefits Discussed and Screening Not Indicated      Breast Cancer Screening:     General: Risks and Benefits Discussed and Screening Not Indicated      Cervical Cancer Screening:    General: Screening Not Indicated and Risks and Benefits Discussed      Osteoporosis Screening:    General: Risks and Benefits Discussed    Due for: DXA Axial      Abdominal Aortic Aneurysm (AAA) Screening:        General: Risks and Benefits Discussed and Screening Not Indicated      Lung Cancer  "Screening:     General: Screening Not Indicated and Risks and Benefits Discussed      Hepatitis C Screening:    General: Screening Current and Risks and Benefits Discussed    Screening, Brief Intervention, and Referral to Treatment (SBIRT)    Screening  Typical number of drinks in a day: 0  Typical number of drinks in a week: 1  Interpretation: Low risk drinking behavior.    AUDIT-C Screenin) How often did you have a drink containing alcohol in the past year? 2 to 4 times a month  2) How many drinks did you have on a typical day when you were drinking in the past year? 0  3) How often did you have 6 or more drinks on one occasion in the past year? never    AUDIT-C Score: 2  Interpretation: Score 0-2 (female): Negative screen for alcohol misuse    Single Item Drug Screening:  How often have you used an illegal drug (including marijuana) or a prescription medication for non-medical reasons in the past year? never    Single Item Drug Screen Score: 0  Interpretation: Negative screen for possible drug use disorder    Other Counseling Topics:   Regular weightbearing exercise and calcium and vitamin D intake.     No results found.     Physical Exam:     /86 (BP Location: Left arm, Patient Position: Sitting, Cuff Size: Large)   Pulse 96   Temp 98 °F (36.7 °C) (Temporal)   Ht 5' 1\" (1.549 m)   Wt 105 kg (230 lb 12.8 oz)   LMP  (LMP Unknown)   SpO2 98%   BMI 43.61 kg/m²     Physical Exam  Vitals and nursing note reviewed.   Constitutional:       General: She is not in acute distress.     Appearance: Normal appearance. She is well-developed. She is not ill-appearing, toxic-appearing or diaphoretic.   HENT:      Head: Normocephalic and atraumatic.      Right Ear: Tympanic membrane, ear canal and external ear normal. There is no impacted cerumen.      Left Ear: Tympanic membrane, ear canal and external ear normal. There is no impacted cerumen.      Nose: Nose normal. No congestion or rhinorrhea.   Eyes:      " General: No scleral icterus.        Right eye: No discharge.         Left eye: No discharge.      Conjunctiva/sclera: Conjunctivae normal.      Pupils: Pupils are equal, round, and reactive to light.   Neck:      Thyroid: No thyromegaly.      Vascular: No JVD.      Trachea: No tracheal deviation.   Cardiovascular:      Rate and Rhythm: Normal rate and regular rhythm.      Heart sounds: Normal heart sounds. No murmur heard.     No friction rub. No gallop.   Pulmonary:      Effort: Pulmonary effort is normal. No respiratory distress.      Breath sounds: Normal breath sounds. No stridor. No wheezing or rales.   Chest:      Chest wall: No tenderness.   Musculoskeletal:         General: Tenderness present. No deformity.      Cervical back: Normal range of motion and neck supple.   Lymphadenopathy:      Cervical: No cervical adenopathy.   Skin:     General: Skin is warm and dry.      Capillary Refill: Capillary refill takes 2 to 3 seconds.      Coloration: Skin is not pale.      Findings: No erythema or rash.   Neurological:      Mental Status: She is alert and oriented to person, place, and time.      Cranial Nerves: No cranial nerve deficit.      Sensory: No sensory deficit.      Motor: No abnormal muscle tone.      Coordination: Coordination normal.      Deep Tendon Reflexes: Reflexes normal.   Psychiatric:         Behavior: Behavior normal.         Thought Content: Thought content normal.         Judgment: Judgment normal.          Dylon Morris,

## 2024-01-16 DIAGNOSIS — M25.562 CHRONIC PAIN OF LEFT KNEE: ICD-10-CM

## 2024-01-16 DIAGNOSIS — M17.12 PRIMARY OSTEOARTHRITIS OF LEFT KNEE: ICD-10-CM

## 2024-01-16 DIAGNOSIS — M70.50 PES ANSERINE BURSITIS: ICD-10-CM

## 2024-01-16 DIAGNOSIS — G89.29 CHRONIC PAIN OF RIGHT KNEE: ICD-10-CM

## 2024-01-16 DIAGNOSIS — G89.29 CHRONIC PAIN OF LEFT KNEE: ICD-10-CM

## 2024-01-16 DIAGNOSIS — M25.561 CHRONIC PAIN OF RIGHT KNEE: ICD-10-CM

## 2024-01-16 DIAGNOSIS — M17.11 PRIMARY OSTEOARTHRITIS OF RIGHT KNEE: ICD-10-CM

## 2024-01-16 RX ORDER — MELOXICAM 7.5 MG/1
TABLET ORAL
Qty: 60 TABLET | Refills: 1 | Status: SHIPPED | OUTPATIENT
Start: 2024-01-16

## 2024-01-18 ENCOUNTER — TELEPHONE (OUTPATIENT)
Age: 79
End: 2024-01-18

## 2024-01-18 NOTE — TELEPHONE ENCOUNTER
Caller: Ladan [facility]ane[facility] pharmacy     Doctor: Ana Lo     Reason for call: They received a script patient on the 16th and they want to verify that Ana Munoz and Ana Lo are the same person     Call back#: [phone]

## 2024-02-21 PROBLEM — J01.10 ACUTE NON-RECURRENT FRONTAL SINUSITIS: Status: RESOLVED | Noted: 2018-02-14 | Resolved: 2024-02-21

## 2024-03-01 DIAGNOSIS — M17.12 PRIMARY OSTEOARTHRITIS OF LEFT KNEE: ICD-10-CM

## 2024-03-01 DIAGNOSIS — M25.562 CHRONIC PAIN OF LEFT KNEE: ICD-10-CM

## 2024-03-01 DIAGNOSIS — G89.29 CHRONIC PAIN OF RIGHT KNEE: ICD-10-CM

## 2024-03-01 DIAGNOSIS — M25.561 CHRONIC PAIN OF RIGHT KNEE: ICD-10-CM

## 2024-03-01 DIAGNOSIS — M17.11 PRIMARY OSTEOARTHRITIS OF RIGHT KNEE: ICD-10-CM

## 2024-03-01 DIAGNOSIS — G89.29 CHRONIC PAIN OF LEFT KNEE: ICD-10-CM

## 2024-03-01 DIAGNOSIS — M70.50 PES ANSERINE BURSITIS: ICD-10-CM

## 2024-03-01 RX ORDER — MELOXICAM 7.5 MG/1
TABLET ORAL
Qty: 60 TABLET | Refills: 1 | Status: SHIPPED | OUTPATIENT
Start: 2024-03-01

## 2024-03-15 ENCOUNTER — OFFICE VISIT (OUTPATIENT)
Dept: OBGYN CLINIC | Facility: MEDICAL CENTER | Age: 79
End: 2024-03-15
Payer: COMMERCIAL

## 2024-03-15 VITALS
HEART RATE: 65 BPM | WEIGHT: 219.2 LBS | SYSTOLIC BLOOD PRESSURE: 132 MMHG | BODY MASS INDEX: 41.39 KG/M2 | DIASTOLIC BLOOD PRESSURE: 81 MMHG | HEIGHT: 61 IN

## 2024-03-15 DIAGNOSIS — M17.0 BILATERAL PRIMARY OSTEOARTHRITIS OF KNEE: Primary | ICD-10-CM

## 2024-03-15 DIAGNOSIS — M70.51 PES ANSERINUS BURSITIS OF RIGHT KNEE: ICD-10-CM

## 2024-03-15 DIAGNOSIS — M70.52 PES ANSERINUS BURSITIS OF LEFT KNEE: ICD-10-CM

## 2024-03-15 PROCEDURE — 99213 OFFICE O/P EST LOW 20 MIN: CPT | Performed by: ORTHOPAEDIC SURGERY

## 2024-03-15 PROCEDURE — 20610 DRAIN/INJ JOINT/BURSA W/O US: CPT | Performed by: ORTHOPAEDIC SURGERY

## 2024-03-15 RX ORDER — LIDOCAINE HYDROCHLORIDE 10 MG/ML
1 INJECTION, SOLUTION INFILTRATION; PERINEURAL
Status: COMPLETED | OUTPATIENT
Start: 2024-03-15 | End: 2024-03-15

## 2024-03-15 RX ORDER — TRIAMCINOLONE ACETONIDE 40 MG/ML
40 INJECTION, SUSPENSION INTRA-ARTICULAR; INTRAMUSCULAR
Status: COMPLETED | OUTPATIENT
Start: 2024-03-15 | End: 2024-03-15

## 2024-03-15 RX ADMIN — TRIAMCINOLONE ACETONIDE 40 MG: 40 INJECTION, SUSPENSION INTRA-ARTICULAR; INTRAMUSCULAR at 10:00

## 2024-03-15 RX ADMIN — LIDOCAINE HYDROCHLORIDE 1 ML: 10 INJECTION, SOLUTION INFILTRATION; PERINEURAL at 10:00

## 2024-03-15 NOTE — PROGRESS NOTES
Assessment/Plan:  1. Bilateral primary osteoarthritis of knee    2. Pes anserinus bursitis of right knee    3. Pes anserinus bursitis of left knee      Orders Placed This Encounter   Procedures    Large joint arthrocentesis    Injection Procedure Prior Authorization     Patient has severe bilateral knee osteoarthritis, bilateral Pes anserine bursitis.  After a discussion of risks and benefits the patient elected to proceed with a Bilateral pes anserine steroid injection today.  Patient should ice and avoid strenuous activity for 1-2 days if needed.  Patient should avoid vaccines for 2 weeks if possible.  If patient is diabetic should also monitor glucose over the next 7 to 10 days.   Viscosupplementation was ordered at today's visit.  Patient rates her pain at 2/10 at today's visit.  Patient may receive bilateral Euflexxa injections after Tatum 15 2024.  Patient may follow-up to receive bilateral knee gel injections.      No follow-ups on file.    I answered all of the patient's questions during the visit and provided education of the patient's condition during the visit.  The patient verbalized understanding of the information given and agrees with the plan.  This note was dictated using [employer] software.  It may contain errors including improperly dictated words.  Please contact physician directly for any questions.    Subjective   Chief Complaint:   Chief Complaint   Patient presents with    Left Knee - Follow-up    Right Knee - Follow-up       [facility]  Jeny Collazo is a 78 y.o. female who presents for follow up for bilateral knee arthritis, pes anserine bursitis.  She last received bilateral knee euflexxa injections in December which were helpful.  She admits mild medial knee pain at this time.  She takes meloxicam as needed for pain.      Review of Systems  ROS:    See HPI for musculoskeletal review.   All other systems reviewed are negative     History:  Past Medical History:   Diagnosis Date    Asthma      Breathing difficulty     Bronchitis     Depression     Fracture of arm     Fracture of carpal bone     Hyperlipidemia     Hypertension     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HCC) 2016    Shortness of breath     Thyroid nodule     Thyroid nodule     Thyroid nodule      Past Surgical History:   Procedure Laterality Date    EYE SURGERY  2021    HEMORRHOID SURGERY      NE THYROIDECTOMY RMVL REMAINING TISS FLWG PRTL RMVL Left 2016    Procedure:  COMPLETION THYROIDECTOMY, FLEXIBLE LARYNGOSCOPY;  Surgeon: David Rivera MD;  Location[facility];  Service: Surgical Oncology    NE TOTAL THYROID LOBECTOMY UNI W/WO ISTHMUSECTOMY Right 2016    Procedure: HEMITHYROIDECTOMY;  Surgeon: David Rivera MD;  [facility];  Service: Surgical Oncology    THYROIDECTOMY, PARTIAL Left      Social History   Social History     Substance and Sexual Activity   Alcohol Use No    Comment: occasional glass of wine     Social History     Substance and Sexual Activity   Drug Use No     Social History     Tobacco Use   Smoking Status Former    Current packs/day: 0.00    Average packs/day: 0.3 packs/day for 4.0 years (1.0 ttl pk-yrs)    Types: Cigarettes    Start date: 6/15/1977    Quit date: 6/15/1981    Years since quittin.7   Smokeless Tobacco Former   Tobacco Comments    quit 40 yrs ago (Never a smoker per [employer])     Family History:   Family History   Adopted: Yes   Problem Relation Age of Onset    Hypertension Family     Kidney disease Family     Hypertension Mother        Current Outpatient Medications on File Prior to Visit   Medication Sig Dispense Refill    albuterol (Ventolin HFA) 90 mcg/act inhaler Inhale 2 puffs every 4 (four) hours as needed for wheezing or shortness of breath 54 g 0    amLODIPine (NORVASC) 5 mg tablet Take 1 tablet (5 mg total) by mouth 2 (two) times a day 180 tablet 1    atorvastatin (LIPITOR) 20 mg tablet Take 1 tablet (20 mg total) by mouth daily 90 tablet 1    Diclofenac Sodium  "(VOLTAREN) 1 % APPLY 4 GRAMS TOPICALLY FOUR TIMES DAILY AS NEEDED FOR KNEE PAIN. GENERIC EQUIVALENT FOR VOLTAREN. 350 g 1    Fluticasone Furoate-Vilanterol 100-25 mcg/actuation inhaler Inhale 1 puff daily Rinse mouth after use. 180 blister 3    furosemide (LASIX) 40 mg tablet Take 1 tablet (40 mg total) by mouth daily Edema 90 tablet 1    levothyroxine 175 mcg tablet Take 1 tablet (175 mcg total) by mouth daily 90 tablet 1    lisinopril-hydrochlorothiazide (PRINZIDE,ZESTORETIC) 20-12.5 MG per tablet TAKE 1 TABLET BY MOUTH TWICE DAILY 180 tablet 1    Melatonin 10 MG TABS Take by mouth      meloxicam (MOBIC) 7.5 mg tablet TAKE 1 TABLET BY MOUTH TWICE DAILY AS NEEDED FOR MILD PAIN. TAKE WITH FOOD. GENERIC EQUIVALENT FOR MOBIC 60 tablet 1    mometasone (ELOCON) 0.1 % cream Apply 1 Application topically daily 45 g 3    Multiple Vitamins-Minerals (PreserVision AREDS 2) CAPS       nebivolol (BYSTOLIC) 10 mg tablet TAKE 1 TABLET BY MOUTH DAILY GENERIC EQUIVALENT FOR BYSTOLIC 90 tablet 1    nystatin-triamcinolone (MYCOLOG-II) cream Apply topically 2 (two) times a day 30 g 3    omeprazole (PriLOSEC) 40 MG capsule Take 1 capsule (40 mg total) by mouth daily 90 capsule 1    potassium chloride (K-DUR,KLOR-CON) 20 mEq tablet Take 1 tablet (20 mEq total) by mouth 2 (two) times a day 180 tablet 1    venlafaxine (EFFEXOR) 37.5 mg tablet Take 1 tablet (37.5 mg total) by mouth daily 90 tablet 1     No current facility-administered medications on file prior to visit.     Allergies   Allergen Reactions    Adhesive [Medical Tape] Rash        Objective     /81   Pulse 65   Ht 5' 1\" (1.549 m)   Wt 99.4 kg (219 lb 3.2 oz)   LMP  (LMP Unknown)   BMI 41.42 kg/m²      PE:  AAOx 3  WDWN  Hearing intact, no drainage from eyes  no audible wheezing  no abdominal distension  LE compartments soft, skin intact    Ortho Exam:  bilateral Knee:   No erythema  no swelling  no effusion  no warmth  AROM: left 5-110 right        Large joint " arthrocentesis: bilateral anserine bursa  Universal Protocol:  Consent: Verbal consent obtained.  Risks and benefits: risks, benefits and alternatives were discussed  Consent given by: patient  Patient understanding: patient states understanding of the procedure being performed  Site marked: the operative site was marked  Patient identity confirmed: verbally with patient  Supporting Documentation  Indications: pain   Procedure Details  Location: knee - bilateral anserine bursa  Needle size: 22 G  Ultrasound guidance: no  Approach: anterolateral    Medications (Right): 40 mg triamcinolone acetonide 40 mg/mL; 1 mL lidocaine 1 %Medications (Left): 40 mg triamcinolone acetonide 40 mg/mL; 1 mL lidocaine 1 %   Patient tolerance: patient tolerated the procedure well with no immediate complications  Dressing:  Sterile dressing applied

## 2024-03-16 DIAGNOSIS — I10 ESSENTIAL HYPERTENSION: ICD-10-CM

## 2024-03-18 RX ORDER — POTASSIUM CHLORIDE 20 MEQ/1
20 TABLET, EXTENDED RELEASE ORAL 2 TIMES DAILY
Qty: 180 TABLET | Refills: 0 | Status: SHIPPED | OUTPATIENT
Start: 2024-03-18

## 2024-03-18 RX ORDER — AMLODIPINE BESYLATE 5 MG/1
5 TABLET ORAL 2 TIMES DAILY
Qty: 180 TABLET | Refills: 1 | Status: SHIPPED | OUTPATIENT
Start: 2024-03-18

## 2024-04-16 DIAGNOSIS — M70.50 PES ANSERINE BURSITIS: ICD-10-CM

## 2024-04-16 DIAGNOSIS — M25.562 CHRONIC PAIN OF LEFT KNEE: ICD-10-CM

## 2024-04-16 DIAGNOSIS — G89.29 CHRONIC PAIN OF LEFT KNEE: ICD-10-CM

## 2024-04-16 DIAGNOSIS — M17.12 PRIMARY OSTEOARTHRITIS OF LEFT KNEE: ICD-10-CM

## 2024-04-16 DIAGNOSIS — M17.11 PRIMARY OSTEOARTHRITIS OF RIGHT KNEE: ICD-10-CM

## 2024-04-16 DIAGNOSIS — M25.561 CHRONIC PAIN OF RIGHT KNEE: ICD-10-CM

## 2024-04-16 DIAGNOSIS — G89.29 CHRONIC PAIN OF RIGHT KNEE: ICD-10-CM

## 2024-04-16 RX ORDER — MELOXICAM 7.5 MG/1
TABLET ORAL
Qty: 60 TABLET | Refills: 1 | Status: SHIPPED | OUTPATIENT
Start: 2024-04-16

## 2024-05-24 ENCOUNTER — OFFICE VISIT (OUTPATIENT)
Dept: OBGYN CLINIC | Facility: MEDICAL CENTER | Age: 79
End: 2024-05-24
Payer: COMMERCIAL

## 2024-05-24 VITALS
WEIGHT: 217.2 LBS | DIASTOLIC BLOOD PRESSURE: 79 MMHG | BODY MASS INDEX: 41.01 KG/M2 | HEIGHT: 61 IN | HEART RATE: 72 BPM | SYSTOLIC BLOOD PRESSURE: 113 MMHG

## 2024-05-24 DIAGNOSIS — M70.51 PES ANSERINUS BURSITIS OF RIGHT KNEE: Primary | ICD-10-CM

## 2024-05-24 DIAGNOSIS — M70.52 PES ANSERINUS BURSITIS OF LEFT KNEE: ICD-10-CM

## 2024-05-24 PROCEDURE — 99213 OFFICE O/P EST LOW 20 MIN: CPT | Performed by: ORTHOPAEDIC SURGERY

## 2024-05-24 PROCEDURE — 20610 DRAIN/INJ JOINT/BURSA W/O US: CPT | Performed by: ORTHOPAEDIC SURGERY

## 2024-05-24 RX ORDER — LIDOCAINE HYDROCHLORIDE 10 MG/ML
1 INJECTION, SOLUTION EPIDURAL; INFILTRATION; INTRACAUDAL; PERINEURAL
Status: COMPLETED | OUTPATIENT
Start: 2024-05-24 | End: 2024-05-24

## 2024-05-24 RX ORDER — TRIAMCINOLONE ACETONIDE 40 MG/ML
20 INJECTION, SUSPENSION INTRA-ARTICULAR; INTRAMUSCULAR
Status: COMPLETED | OUTPATIENT
Start: 2024-05-24 | End: 2024-05-24

## 2024-05-24 RX ADMIN — TRIAMCINOLONE ACETONIDE 20 MG: 40 INJECTION, SUSPENSION INTRA-ARTICULAR; INTRAMUSCULAR at 09:45

## 2024-05-24 RX ADMIN — LIDOCAINE HYDROCHLORIDE 1 ML: 10 INJECTION, SOLUTION EPIDURAL; INFILTRATION; INTRACAUDAL; PERINEURAL at 09:45

## 2024-05-24 NOTE — PROGRESS NOTES
Assessment/Plan:  1. Pes anserinus bursitis of right knee    2. Pes anserinus bursitis of left knee        Orders Placed This Encounter   Procedures    Large joint arthrocentesis     Patient has severe bilateral knee osteoarthritis, bilateral Pes anserine bursitis.  After a discussion of risks and benefits the patient elected to proceed with a Bilateral pes anserine steroid injection today.  Patient should ice and avoid strenuous activity for 1-2 days if needed.  Patient should avoid vaccines for 2 weeks if possible.  If patient is diabetic should also monitor glucose over the next 7 to 10 days.   Viscosupplementation was ordered at last appt and patient is scheduled for 7/3/24 to start series.   Patient may follow-up to receive bilateral knee gel injections.      Return for for ge injections starting in July .    I answered all of the patient's questions during the visit and provided education of the patient's condition during the visit.  The patient verbalized understanding of the information given and agrees with the plan.  This note was dictated using [employer] software.  It may contain errors including improperly dictated words.  Please contact physician directly for any questions.      Subjective   Chief Complaint:   No chief complaint on file.      HPI  Jeny Collazo is a 78 y.o. female who presents for follow up for bilateral knee arthritis, pes anserine bursitis.  She last received bilateral knee euflexxa injections in December which were helpful.  She was given bilateral pes bursa cortisone injections 3/15/24 with benefit. Pain has returned and is requesting repeat pes bursa injections today. She is currently set up for bilateral gel injections 7/3/24.     Review of Systems  ROS:    See HPI for musculoskeletal review.   All other systems reviewed are negative     History:  Past Medical History:   Diagnosis Date    Asthma     Breathing difficulty     Bronchitis     Depression     Fracture of arm      Fracture of carpal bone     Hyperlipidemia     Hypertension     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HCC) 2016    Shortness of breath     Thyroid nodule     Thyroid nodule     Thyroid nodule      Past Surgical History:   Procedure Laterality Date    EYE SURGERY  2021    HEMORRHOID SURGERY      MN THYROIDECTOMY RMVL REMAINING TISS FLWG PRTL RMVL Left 2016    Procedure:  COMPLETION THYROIDECTOMY, FLEXIBLE LARYNGOSCOPY;  Surgeon: David Rivera MD;  Location: [facility];  Service: Surgical Oncology    MN TOTAL THYROID LOBECTOMY UNI W/WO ISTHMUSECTOMY Right 2016    Procedure: HEMITHYROIDECTOMY;  Surgeon: David Rivera MD;  Location: [facility];  Service: Surgical Oncology    THYROIDECTOMY, PARTIAL Left      Social History   Social History     Substance and Sexual Activity   Alcohol Use No    Comment: occasional glass of wine     Social History     Substance and Sexual Activity   Drug Use No     Social History     Tobacco Use   Smoking Status Former    Current packs/day: 0.00    Average packs/day: 0.3 packs/day for 4.0 years (1.0 ttl pk-yrs)    Types: Cigarettes    Start date: 6/15/1977    Quit date: 6/15/1981    Years since quittin.9   Smokeless Tobacco Former   Tobacco Comments    quit 40 yrs ago (Never a smoker per [employer])     Family History:   Family History   Adopted: Yes   Problem Relation Age of Onset    Hypertension Family     Kidney disease Family     Hypertension Mother        Current Outpatient Medications on File Prior to Visit   Medication Sig Dispense Refill    albuterol (Ventolin HFA) 90 mcg/act inhaler Inhale 2 puffs every 4 (four) hours as needed for wheezing or shortness of breath 54 g 0    amLODIPine (NORVASC) 5 mg tablet TAKE 1 TABLET BY MOUTH TWICE DAILY 180 tablet 1    atorvastatin (LIPITOR) 20 mg tablet Take 1 tablet (20 mg total) by mouth daily 90 tablet 1    Diclofenac Sodium (VOLTAREN) 1 % APPLY 4 GRAMS TOPICALLY FOUR TIMES DAILY AS NEEDED FOR KNEE PAIN. GENERIC  "EQUIVALENT FOR VOLTAREN. 350 g 1    Fluticasone Furoate-Vilanterol 100-25 mcg/actuation inhaler Inhale 1 puff daily Rinse mouth after use. 180 blister 3    furosemide (LASIX) 40 mg tablet Take 1 tablet (40 mg total) by mouth daily Edema 90 tablet 1    levothyroxine 175 mcg tablet Take 1 tablet (175 mcg total) by mouth daily 90 tablet 1    lisinopril-hydrochlorothiazide (PRINZIDE,ZESTORETIC) 20-12.5 MG per tablet TAKE 1 TABLET BY MOUTH TWICE DAILY 180 tablet 1    Melatonin 10 MG TABS Take by mouth      meloxicam (MOBIC) 7.5 mg tablet TAKE 1 TABLET BY MOUTH TWICE DAILY AS NEEDED FOR MILD PAIN. TAKE WITH FOOD. GENERIC EQUIVALENT FOR MOBIC 60 tablet 1    mometasone (ELOCON) 0.1 % cream Apply 1 Application topically daily 45 g 3    Multiple Vitamins-Minerals (PreserVision AREDS 2) CAPS       nebivolol (BYSTOLIC) 10 mg tablet TAKE 1 TABLET BY MOUTH DAILY GENERIC EQUIVALENT FOR BYSTOLIC 90 tablet 1    nystatin-triamcinolone (MYCOLOG-II) cream Apply topically 2 (two) times a day 30 g 3    omeprazole (PriLOSEC) 40 MG capsule Take 1 capsule (40 mg total) by mouth daily 90 capsule 1    potassium chloride (Klor-Con M20) 20 mEq tablet TAKE 1 TABLET BY MOUTH TWICE DAILY 180 tablet 0    venlafaxine (EFFEXOR) 37.5 mg tablet Take 1 tablet (37.5 mg total) by mouth daily 90 tablet 1     No current facility-administered medications on file prior to visit.     Allergies   Allergen Reactions    Adhesive [Medical Tape] Rash        Objective     /79   Pulse 72   Ht 5' 1\" (1.549 m)   Wt 98.5 kg (217 lb 3.2 oz)   LMP  (LMP Unknown)   BMI 41.04 kg/m²      PE:  AAOx 3  WDWN  Hearing intact, no drainage from eyes  no audible wheezing  no abdominal distension  LE compartments soft, skin intact    Ortho Exam:  bilateral Knee:   No erythema  no swelling  no effusion  no warmth  TTP pes bursa bilateral  AROM: left 5-110 right        Large joint arthrocentesis: bilateral pes anserine bursa  Universal Protocol:  Consent: Verbal " consent obtained.  Risks and benefits: risks, benefits and alternatives were discussed  Consent given by: patient  Patient understanding: patient states understanding of the procedure being performed  Site marked: the operative site was marked  Patient identity confirmed: verbally with patient  Supporting Documentation  Indications: pain   Procedure Details  Location: knee - bilateral pes anserine bursa  Preparation: Patient was prepped and draped in the usual sterile fashion  Needle size: 22 G  Ultrasound guidance: no  Approach: anteromedial    Medications (Right): 1 mL lidocaine (PF) 1 %; 20 mg triamcinolone acetonide 40 mg/mLMedications (Left): 1 mL lidocaine (PF) 1 %; 20 mg triamcinolone acetonide 40 mg/mL   Patient tolerance: patient tolerated the procedure well with no immediate complications  Dressing:  Sterile dressing applied            Scribe Attestation      I,:  Fidencio Dhaliwal am acting as a scribe while in the presence of the attending physician.:       I,:  Laura Quinn DO personally performed the services described in this documentation    as scribed in my presence.:

## 2024-06-03 ENCOUNTER — TELEPHONE (OUTPATIENT)
Dept: OTHER | Facility: OTHER | Age: 79
End: 2024-06-03

## 2024-06-03 DIAGNOSIS — M70.50 PES ANSERINE BURSITIS: ICD-10-CM

## 2024-06-03 DIAGNOSIS — F32.A DEPRESSION, UNSPECIFIED DEPRESSION TYPE: ICD-10-CM

## 2024-06-03 DIAGNOSIS — K21.00 GASTROESOPHAGEAL REFLUX DISEASE WITH ESOPHAGITIS WITHOUT HEMORRHAGE: ICD-10-CM

## 2024-06-03 DIAGNOSIS — M17.12 PRIMARY OSTEOARTHRITIS OF LEFT KNEE: ICD-10-CM

## 2024-06-03 DIAGNOSIS — G89.29 CHRONIC PAIN OF RIGHT KNEE: ICD-10-CM

## 2024-06-03 DIAGNOSIS — G89.29 CHRONIC PAIN OF LEFT KNEE: ICD-10-CM

## 2024-06-03 DIAGNOSIS — I10 ESSENTIAL HYPERTENSION: ICD-10-CM

## 2024-06-03 DIAGNOSIS — I10 PRIMARY HYPERTENSION: ICD-10-CM

## 2024-06-03 DIAGNOSIS — M25.562 CHRONIC PAIN OF LEFT KNEE: ICD-10-CM

## 2024-06-03 DIAGNOSIS — M25.561 CHRONIC PAIN OF RIGHT KNEE: ICD-10-CM

## 2024-06-03 DIAGNOSIS — M17.11 PRIMARY OSTEOARTHRITIS OF RIGHT KNEE: ICD-10-CM

## 2024-06-03 RX ORDER — LISINOPRIL AND HYDROCHLOROTHIAZIDE 20; 12.5 MG/1; MG/1
TABLET ORAL
Qty: 60 TABLET | Refills: 0 | Status: SHIPPED | OUTPATIENT
Start: 2024-06-03

## 2024-06-03 RX ORDER — NEBIVOLOL 10 MG/1
TABLET ORAL
Qty: 90 TABLET | Refills: 1 | Status: SHIPPED | OUTPATIENT
Start: 2024-06-03

## 2024-06-03 RX ORDER — OMEPRAZOLE 40 MG/1
40 CAPSULE, DELAYED RELEASE ORAL DAILY
Qty: 90 CAPSULE | Refills: 1 | Status: SHIPPED | OUTPATIENT
Start: 2024-06-03

## 2024-06-03 RX ORDER — VENLAFAXINE 37.5 MG/1
37.5 TABLET ORAL DAILY
Qty: 90 TABLET | Refills: 1 | Status: SHIPPED | OUTPATIENT
Start: 2024-06-03

## 2024-06-03 NOTE — TELEPHONE ENCOUNTER
Pharmacist called saying that theres a drug interaction with the medication lisinopril that Dr. Chávez had prescribed for the patient.

## 2024-06-04 RX ORDER — MELOXICAM 7.5 MG/1
TABLET ORAL
Qty: 180 TABLET | Refills: 0 | Status: SHIPPED | OUTPATIENT
Start: 2024-06-04

## 2024-06-04 NOTE — TELEPHONE ENCOUNTER
I called [facility] pharmacy and spoke to the pharmacist.  Lisinopril HCTZ interacts with Spirolactone increasing patient's potassium.

## 2024-06-04 NOTE — TELEPHONE ENCOUNTER
LIANE: I spoke with Leesa, pharmacist - explained the situation. She investigated a little bit and determined there is no documentation the patient is taking spironolactone. I confirmed on my end based on the med list that is what I'm showing as well. It appears she is only taking lisino-hct and furosemide. I even checked in the Dc'd med list and I do not see any documentation of her using spironolactone.   For now we agreed we will disregard the alert for now. Leesa will sent this issue off to her managers and IT teams to further evaluate this and will call our office back if it is determined otherwise.

## 2024-06-07 DIAGNOSIS — I10 ESSENTIAL HYPERTENSION: ICD-10-CM

## 2024-06-07 RX ORDER — POTASSIUM CHLORIDE 20 MEQ/1
20 TABLET, EXTENDED RELEASE ORAL 2 TIMES DAILY
Qty: 60 TABLET | Refills: 0 | Status: SHIPPED | OUTPATIENT
Start: 2024-06-07

## 2024-06-28 DIAGNOSIS — I10 PRIMARY HYPERTENSION: ICD-10-CM

## 2024-06-28 DIAGNOSIS — E89.0 HYPOTHYROIDISM, POSTSURGICAL: ICD-10-CM

## 2024-06-28 DIAGNOSIS — E78.2 MIXED HYPERLIPIDEMIA: ICD-10-CM

## 2024-06-28 DIAGNOSIS — I10 ESSENTIAL HYPERTENSION: ICD-10-CM

## 2024-06-28 RX ORDER — LISINOPRIL AND HYDROCHLOROTHIAZIDE 20; 12.5 MG/1; MG/1
TABLET ORAL
Qty: 60 TABLET | Refills: 0 | Status: SHIPPED | OUTPATIENT
Start: 2024-06-28

## 2024-06-28 RX ORDER — ATORVASTATIN CALCIUM 20 MG/1
20 TABLET, FILM COATED ORAL DAILY
Qty: 90 TABLET | Refills: 1 | Status: SHIPPED | OUTPATIENT
Start: 2024-06-28

## 2024-06-28 RX ORDER — LEVOTHYROXINE SODIUM 175 UG/1
TABLET ORAL
Qty: 90 TABLET | Refills: 1 | Status: SHIPPED | OUTPATIENT
Start: 2024-06-28

## 2024-06-28 RX ORDER — FUROSEMIDE 40 MG/1
TABLET ORAL
Qty: 90 TABLET | Refills: 1 | Status: SHIPPED | OUTPATIENT
Start: 2024-06-28

## 2024-06-28 RX ORDER — POTASSIUM CHLORIDE 20 MEQ/1
20 TABLET, EXTENDED RELEASE ORAL 2 TIMES DAILY
Qty: 60 TABLET | Refills: 5 | Status: SHIPPED | OUTPATIENT
Start: 2024-06-28

## 2024-07-03 NOTE — PROGRESS NOTES
Assessment/Plan:  Patient will see Cardiology in follow-up for lymph node mapping  Patient had refills given for all chronic conditions listed  Patient will try Breo for respiratory issues  Guidance given overall  Follow-up in 6 months     Diagnoses and all orders for this visit:    Need for hepatitis C screening test  -     Hepatitis C antibody; Future    Asthma, unspecified asthma severity, unspecified whether complicated, unspecified whether persistent  -     albuterol (VENTOLIN HFA) 90 mcg/act inhaler; Inhale 2 puffs 4 (four) times a day    Edema, unspecified type  -     furosemide (LASIX) 20 mg tablet; Take 1 tablet (20 mg total) by mouth every other day    Hypothyroidism, postsurgical    Essential hypertension  -     lisinopril-hydrochlorothiazide (PRINZIDE,ZESTORETIC) 20-12 5 MG per tablet; Take 1 tablet by mouth 2 (two) times a day  -     potassium chloride (K-DUR,KLOR-CON) 20 mEq tablet; Take 1 tablet (20 mEq total) by mouth daily  -     verapamil (CALAN-SR) 240 mg CR tablet; Take 1 tablet (240 mg total) by mouth daily    Gastroesophageal reflux disease, esophagitis presence not specified  -     omeprazole (PriLOSEC) 40 MG capsule; Take 1 capsule (40 mg total) by mouth daily    Other hyperlipidemia  -     pravastatin (PRAVACHOL) 20 mg tablet; Take 1 tablet (20 mg total) by mouth daily    Depression, unspecified depression type  -     venlafaxine (EFFEXOR) 75 mg tablet; Take 1 tablet (75 mg total) by mouth daily    Impaired fasting glucose    Obstructive sleep apnea    Restrictive lung disease    [facility]          Subjective:      Patient ID: Radhika Benavides is a 72 y o  female  Patient here to follow-up on hypertension hyperlipidemia edema thyroid cancer restrictive airway disease  Patient is going for lymph node mapping for thyroid cancer which is now effecting her vocal cords  The patient will have this done in the next few weeks    Patient has appointment with Cardiology in the CPM      Wendi West RD, LDN   Clinical Dietitian [phone]           next 2 weeks  Patient with chronic shortness of breath  The patient with increased stress  No problems with urination or defecation  Patient is having knee pain  The following portions of the patient's history were reviewed and updated as appropriate: allergies, current medications, past family history, past medical history, past social history, past surgical history and problem list     Review of Systems   Constitutional: Negative  HENT: Negative  Eyes: Negative  Respiratory: Positive for shortness of breath  Cardiovascular: Negative  Gastrointestinal: Negative  Endocrine: Negative  Genitourinary: Negative  Musculoskeletal: Positive for arthralgias  Skin: Negative  Allergic/Immunologic: Negative  Neurological: Negative  Hematological: Negative  Psychiatric/Behavioral: Negative  Objective:      /98 (BP Location: Right arm, Patient Position: Sitting, Cuff Size: Large)   Temp 99 1 °F (37 3 °C) (Tympanic)   Ht 5' 2" (1 575 m)   Wt 114 kg (251 lb)   LMP  (LMP Unknown)   BMI 45 91 kg/m²          Physical Exam   Constitutional: She is oriented to person, place, and time  She appears well-developed and well-nourished  No distress  HENT:   Head: Normocephalic  Right Ear: External ear normal    Left Ear: External ear normal    Mouth/Throat: Oropharynx is clear and moist  No oropharyngeal exudate  Eyes: Pupils are equal, round, and reactive to light  EOM are normal  Right eye exhibits no discharge  Left eye exhibits no discharge  No scleral icterus  Neck: Normal range of motion  Neck supple  No thyromegaly present  Cardiovascular: Normal rate, regular rhythm, normal heart sounds and intact distal pulses  Exam reveals no gallop and no friction rub  No murmur heard  Pulmonary/Chest: Effort normal and breath sounds normal  No respiratory distress  She has no wheezes  She has no rales  She exhibits no tenderness  Abdominal: Soft   Bowel sounds are normal  She exhibits no distension  There is no tenderness  There is no rebound and no guarding  Musculoskeletal: Normal range of motion  She exhibits no edema or tenderness  Lymphadenopathy:     She has no cervical adenopathy  Neurological: She is oriented to person, place, and time  No cranial nerve deficit  She exhibits normal muscle tone  Coordination normal    Skin: Skin is warm and dry  No rash noted  She is not diaphoretic  No erythema  No pallor  Psychiatric: She has a normal mood and affect   Her behavior is normal  Judgment and thought content normal

## 2024-07-05 ENCOUNTER — PROCEDURE VISIT (OUTPATIENT)
Dept: OBGYN CLINIC | Facility: MEDICAL CENTER | Age: 79
End: 2024-07-05
Payer: COMMERCIAL

## 2024-07-05 VITALS
BODY MASS INDEX: 40.4 KG/M2 | HEART RATE: 76 BPM | HEIGHT: 61 IN | DIASTOLIC BLOOD PRESSURE: 74 MMHG | WEIGHT: 214 LBS | SYSTOLIC BLOOD PRESSURE: 122 MMHG

## 2024-07-05 DIAGNOSIS — M25.561 CHRONIC PAIN OF BOTH KNEES: ICD-10-CM

## 2024-07-05 DIAGNOSIS — M17.0 PRIMARY OSTEOARTHRITIS OF BOTH KNEES: Primary | ICD-10-CM

## 2024-07-05 DIAGNOSIS — M25.562 CHRONIC PAIN OF BOTH KNEES: ICD-10-CM

## 2024-07-05 DIAGNOSIS — G89.29 CHRONIC PAIN OF BOTH KNEES: ICD-10-CM

## 2024-07-05 PROCEDURE — 20610 DRAIN/INJ JOINT/BURSA W/O US: CPT | Performed by: PHYSICIAN ASSISTANT

## 2024-07-05 RX ORDER — HYALURONATE SODIUM 10 MG/ML
20 SYRINGE (ML) INTRAARTICULAR
Status: COMPLETED | OUTPATIENT
Start: 2024-07-05 | End: 2024-07-05

## 2024-07-05 RX ADMIN — Medication 20 MG: at 15:00

## 2024-07-05 NOTE — PROGRESS NOTES
1. Primary osteoarthritis of both knees  Large joint arthrocentesis: bilateral knee      2. Chronic pain of both knees  Large joint arthrocentesis: bilateral knee        Patient has severe bilateral knee osteoarthirtis.   Patient rates pain 6/10.   Patient received bilateral knee euflexxa injections 1/3.  Tolerated the procedures well.   Post injection instructions reviewed.   Also with abrasion over the right shin. Cleansed and dressing applied. Instructions provided.   Follow up 1 week.       Large joint arthrocentesis: bilateral knee  Universal Protocol:  Consent: Verbal consent obtained.  Risks and benefits: risks, benefits and alternatives were discussed  Consent given by: patient  Site marked: the operative site was marked  Supporting Documentation  Indications: pain   Procedure Details  Location: knee - bilateral knee  Preparation: Patient was prepped and draped in the usual sterile fashion  Needle size: 22 G  Ultrasound guidance: no  Approach: anterolateral    Medications (Right): 20 mg Sodium Hyaluronate (Viscosup) 20 MG/2MLMedications (Left): 20 mg Sodium Hyaluronate (Viscosup) 20 MG/2ML   Patient tolerance: patient tolerated the procedure well with no immediate complications  Dressing:  Sterile dressing applied

## 2024-07-12 ENCOUNTER — PROCEDURE VISIT (OUTPATIENT)
Dept: OBGYN CLINIC | Facility: MEDICAL CENTER | Age: 79
End: 2024-07-12
Payer: COMMERCIAL

## 2024-07-12 VITALS
WEIGHT: 213.8 LBS | SYSTOLIC BLOOD PRESSURE: 114 MMHG | HEIGHT: 61 IN | HEART RATE: 80 BPM | DIASTOLIC BLOOD PRESSURE: 78 MMHG | BODY MASS INDEX: 40.37 KG/M2

## 2024-07-12 DIAGNOSIS — G89.29 CHRONIC PAIN OF BOTH KNEES: ICD-10-CM

## 2024-07-12 DIAGNOSIS — M17.0 BILATERAL PRIMARY OSTEOARTHRITIS OF KNEE: Primary | ICD-10-CM

## 2024-07-12 DIAGNOSIS — M25.561 CHRONIC PAIN OF BOTH KNEES: ICD-10-CM

## 2024-07-12 DIAGNOSIS — M25.562 CHRONIC PAIN OF BOTH KNEES: ICD-10-CM

## 2024-07-12 PROCEDURE — 20610 DRAIN/INJ JOINT/BURSA W/O US: CPT | Performed by: PHYSICIAN ASSISTANT

## 2024-07-12 RX ORDER — HYALURONATE SODIUM 10 MG/ML
20 SYRINGE (ML) INTRAARTICULAR
Status: COMPLETED | OUTPATIENT
Start: 2024-07-12 | End: 2024-07-12

## 2024-07-12 RX ADMIN — Medication 20 MG: at 10:45

## 2024-07-12 NOTE — PROGRESS NOTES
Patient has severe bilateral knee osteoarthritis.   Patient received bilateral knee euflexxa injections 2/3.   Tolerated the procedure well.   Post injection instructions reviewed.   Follow up 1 week for injection 3. She will then return the following week for bilat pes bursa CSI.       Large joint arthrocentesis: bilateral knee  Universal Protocol:  Consent: Verbal consent obtained.  Risks and benefits: risks, benefits and alternatives were discussed  Consent given by: patient  Site marked: the operative site was marked  Supporting Documentation  Indications: pain   Procedure Details  Location: knee - bilateral knee  Preparation: Patient was prepped and draped in the usual sterile fashion  Needle size: 22 G  Ultrasound guidance: no  Approach: anterolateral    Medications (Right): 20 mg Sodium Hyaluronate (Viscosup) 20 MG/2MLMedications (Left): 20 mg Sodium Hyaluronate (Viscosup) 20 MG/2ML   Patient tolerance: patient tolerated the procedure well with no immediate complications  Dressing:  Sterile dressing applied

## 2024-07-19 ENCOUNTER — PROCEDURE VISIT (OUTPATIENT)
Dept: OBGYN CLINIC | Facility: MEDICAL CENTER | Age: 79
End: 2024-07-19
Payer: COMMERCIAL

## 2024-07-19 VITALS
DIASTOLIC BLOOD PRESSURE: 72 MMHG | SYSTOLIC BLOOD PRESSURE: 114 MMHG | HEIGHT: 61 IN | HEART RATE: 77 BPM | WEIGHT: 212 LBS | BODY MASS INDEX: 40.02 KG/M2

## 2024-07-19 DIAGNOSIS — M17.0 BILATERAL PRIMARY OSTEOARTHRITIS OF KNEE: Primary | ICD-10-CM

## 2024-07-19 PROCEDURE — 20610 DRAIN/INJ JOINT/BURSA W/O US: CPT | Performed by: ORTHOPAEDIC SURGERY

## 2024-07-19 RX ORDER — HYALURONATE SODIUM 10 MG/ML
20 SYRINGE (ML) INTRAARTICULAR
Status: COMPLETED | OUTPATIENT
Start: 2024-07-19 | End: 2024-07-19

## 2024-07-19 RX ADMIN — Medication 20 MG: at 10:15

## 2024-07-19 NOTE — PROGRESS NOTES
Patient has severe bilateral knee osteoarthritis.   Patient received bilateral knee euflexxa injections 3/3.   Tolerated the procedure well.   Post injection instructions reviewed.   Follow up next week for pes bursa CSI if needed      Large joint arthrocentesis: bilateral knee  Universal Protocol:  Consent: Verbal consent obtained.  Risks and benefits: risks, benefits and alternatives were discussed  Consent given by: patient  Patient understanding: patient states understanding of the procedure being performed  Site marked: the operative site was marked  Patient identity confirmed: verbally with patient  Supporting Documentation  Indications: pain   Procedure Details  Location: knee - bilateral knee  Preparation: Patient was prepped and draped in the usual sterile fashion  Needle size: 22 G  Ultrasound guidance: no  Approach: anterolateral    Medications (Right): 20 mg Sodium Hyaluronate (Viscosup) 20 MG/2MLMedications (Left): 20 mg Sodium Hyaluronate (Viscosup) 20 MG/2ML   Patient tolerance: patient tolerated the procedure well with no immediate complications  Dressing:  Sterile dressing applied

## 2024-07-22 DIAGNOSIS — I10 ESSENTIAL HYPERTENSION: ICD-10-CM

## 2024-07-22 RX ORDER — LISINOPRIL AND HYDROCHLOROTHIAZIDE 20; 12.5 MG/1; MG/1
TABLET ORAL
Qty: 60 TABLET | Refills: 0 | Status: SHIPPED | OUTPATIENT
Start: 2024-07-22 | End: 2024-07-25 | Stop reason: SDUPTHER

## 2024-07-25 ENCOUNTER — OFFICE VISIT (OUTPATIENT)
Dept: FAMILY MEDICINE CLINIC | Facility: CLINIC | Age: 79
End: 2024-07-25
Payer: COMMERCIAL

## 2024-07-25 VITALS
TEMPERATURE: 97.8 F | DIASTOLIC BLOOD PRESSURE: 70 MMHG | HEART RATE: 49 BPM | SYSTOLIC BLOOD PRESSURE: 124 MMHG | OXYGEN SATURATION: 96 % | BODY MASS INDEX: 40.4 KG/M2 | WEIGHT: 214 LBS | HEIGHT: 61 IN

## 2024-07-25 DIAGNOSIS — J98.4 RESTRICTIVE LUNG DISEASE: ICD-10-CM

## 2024-07-25 DIAGNOSIS — I10 ESSENTIAL HYPERTENSION: ICD-10-CM

## 2024-07-25 DIAGNOSIS — C73 THYROID CANCER (HCC): Primary | ICD-10-CM

## 2024-07-25 DIAGNOSIS — N18.30 STAGE 3 CHRONIC KIDNEY DISEASE, UNSPECIFIED WHETHER STAGE 3A OR 3B CKD (HCC): ICD-10-CM

## 2024-07-25 DIAGNOSIS — E78.2 MIXED HYPERLIPIDEMIA: ICD-10-CM

## 2024-07-25 DIAGNOSIS — E89.0 HYPOTHYROIDISM, POSTSURGICAL: ICD-10-CM

## 2024-07-25 DIAGNOSIS — I10 PRIMARY HYPERTENSION: ICD-10-CM

## 2024-07-25 DIAGNOSIS — K21.00 GASTROESOPHAGEAL REFLUX DISEASE WITH ESOPHAGITIS WITHOUT HEMORRHAGE: ICD-10-CM

## 2024-07-25 PROCEDURE — 99214 OFFICE O/P EST MOD 30 MIN: CPT | Performed by: FAMILY MEDICINE

## 2024-07-25 PROCEDURE — G2211 COMPLEX E/M VISIT ADD ON: HCPCS | Performed by: FAMILY MEDICINE

## 2024-07-25 RX ORDER — LEVOTHYROXINE SODIUM 175 UG/1
175 TABLET ORAL DAILY
Qty: 90 TABLET | Refills: 1 | Status: SHIPPED | OUTPATIENT
Start: 2024-07-25

## 2024-07-25 RX ORDER — POTASSIUM CHLORIDE 20 MEQ/1
20 TABLET, EXTENDED RELEASE ORAL 2 TIMES DAILY
Qty: 60 TABLET | Refills: 5 | Status: SHIPPED | OUTPATIENT
Start: 2024-07-25

## 2024-07-25 RX ORDER — OMEPRAZOLE 40 MG/1
40 CAPSULE, DELAYED RELEASE ORAL DAILY
Qty: 90 CAPSULE | Refills: 1 | Status: SHIPPED | OUTPATIENT
Start: 2024-07-25

## 2024-07-25 RX ORDER — AMLODIPINE BESYLATE 5 MG/1
5 TABLET ORAL 2 TIMES DAILY
Qty: 180 TABLET | Refills: 1 | Status: SHIPPED | OUTPATIENT
Start: 2024-07-25

## 2024-07-25 RX ORDER — ATORVASTATIN CALCIUM 20 MG/1
20 TABLET, FILM COATED ORAL DAILY
Qty: 90 TABLET | Refills: 1 | Status: SHIPPED | OUTPATIENT
Start: 2024-07-25

## 2024-07-25 RX ORDER — FLUTICASONE FUROATE AND VILANTEROL 200; 25 UG/1; UG/1
1 POWDER RESPIRATORY (INHALATION) DAILY
Qty: 180 BLISTER | Refills: 1 | Status: SHIPPED | OUTPATIENT
Start: 2024-07-25 | End: 2025-01-21

## 2024-07-25 RX ORDER — LISINOPRIL AND HYDROCHLOROTHIAZIDE 20; 12.5 MG/1; MG/1
TABLET ORAL
Qty: 90 TABLET | Refills: 1 | Status: SHIPPED | OUTPATIENT
Start: 2024-07-25

## 2024-07-25 RX ORDER — FUROSEMIDE 40 MG/1
40 TABLET ORAL DAILY
Qty: 90 TABLET | Refills: 1 | Status: SHIPPED | OUTPATIENT
Start: 2024-07-25

## 2024-07-25 RX ORDER — NEBIVOLOL 10 MG/1
TABLET ORAL
Qty: 90 TABLET | Refills: 1 | Status: SHIPPED | OUTPATIENT
Start: 2024-07-25

## 2024-07-25 RX ORDER — FLUTICASONE FUROATE AND VILANTEROL 100; 25 UG/1; UG/1
1 POWDER RESPIRATORY (INHALATION) DAILY
Qty: 180 BLISTER | Refills: 3 | Status: CANCELLED | OUTPATIENT
Start: 2024-07-25 | End: 2025-07-20

## 2024-07-25 NOTE — PROGRESS NOTES
Assessment/Plan: Previous labs reviewed.  Patient will try to get laboratory studies done prior to next visit.  Patient will continue with current regimen of medications other than increasing Breo to 200 mg daily as directed.  Refills given at this time.  Patient will follow-up in 6 months or as needed.       Diagnoses and all orders for this visit:    Thyroid cancer (HCC)    Essential hypertension  -     amLODIPine (NORVASC) 5 mg tablet; Take 1 tablet (5 mg total) by mouth 2 (two) times a day  -     furosemide (LASIX) 40 mg tablet; Take 1 tablet (40 mg total) by mouth daily  -     lisinopril-hydrochlorothiazide (PRINZIDE,ZESTORETIC) 20-12.5 MG per tablet; TAKE 1 TABLET BY MOUTH TWICE DAILY  -     potassium chloride (Klor-Con M20) 20 mEq tablet; Take 1 tablet (20 mEq total) by mouth 2 (two) times a day    Mixed hyperlipidemia  -     atorvastatin (LIPITOR) 20 mg tablet; Take 1 tablet (20 mg total) by mouth daily  -     furosemide (LASIX) 40 mg tablet; Take 1 tablet (40 mg total) by mouth daily    Restrictive lung disease  -     fluticasone-vilanterol 200-25 mcg/actuation inhaler; Inhale 1 puff daily Rinse mouth after use.    Hypothyroidism, postsurgical  -     furosemide (LASIX) 40 mg tablet; Take 1 tablet (40 mg total) by mouth daily  -     levothyroxine 175 mcg tablet; Take 1 tablet (175 mcg total) by mouth daily    Primary hypertension  -     furosemide (LASIX) 40 mg tablet; Take 1 tablet (40 mg total) by mouth daily  -     nebivolol (BYSTOLIC) 10 mg tablet; TAKE 1 TABLET BY MOUTH DAILY GENERIC EQUIVALENT FOR BYSTOLIC    Gastroesophageal reflux disease with esophagitis without hemorrhage  -     omeprazole (PriLOSEC) 40 MG capsule; Take 1 capsule (40 mg total) by mouth daily    Stage 3 chronic kidney disease, unspecified whether stage 3a or 3b CKD (HCC)            Subjective:        Patient ID: Jeny Collazo is a 78 y.o. female.      Patient here to follow-up on hypertension hyperlipidemia with history of  "restrictive lung disease hypothyroidism status post surgery for thyroid cancer.  Patient also with GERD.  Patient with some shortness of breath which is relatively controlled with inhaler overall.  No chest pain.  Normal urination and defecation.  Swelling on lower extremities improved overall.          The following portions of the patient's history were reviewed and updated as appropriate: allergies, current medications, past family history, past medical history, past social history, past surgical history and problem list.      Review of Systems   Constitutional: Negative.    HENT: Negative.     Eyes: Negative.    Respiratory:  Positive for shortness of breath.    Cardiovascular: Negative.    Gastrointestinal: Negative.    Endocrine: Negative.    Genitourinary: Negative.    Musculoskeletal: Negative.    Skin: Negative.    Allergic/Immunologic: Negative.    Neurological: Negative.    Hematological: Negative.    Psychiatric/Behavioral: Negative.             Objective:        Depression Screening and Follow-up Plan: Patient was screened for depression during today's encounter. They screened negative with a PHQ-2 score of 0.            /70 (BP Location: Left arm, Patient Position: Sitting, Cuff Size: Large)   Pulse (!) 49   Temp 97.8 °F (36.6 °C) (Temporal)   Ht 5' 1\" (1.549 m)   Wt 97.1 kg (214 lb)   LMP  (LMP Unknown)   SpO2 96%   BMI 40.43 kg/m²          Physical Exam  Vitals and nursing note reviewed.   Constitutional:       General: She is not in acute distress.     Appearance: Normal appearance. She is not ill-appearing, toxic-appearing or diaphoretic.   HENT:      Head: Normocephalic and atraumatic.      Right Ear: Tympanic membrane, ear canal and external ear normal. There is no impacted cerumen.      Left Ear: Tympanic membrane, ear canal and external ear normal. There is no impacted cerumen.      Nose: Nose normal. No congestion or rhinorrhea.      Mouth/Throat:      Mouth: Mucous membranes are " moist.      Pharynx: No oropharyngeal exudate or posterior oropharyngeal erythema.   Eyes:      General: No scleral icterus.        Right eye: No discharge.         Left eye: No discharge.   Neck:      Vascular: No carotid bruit.   Cardiovascular:      Rate and Rhythm: Normal rate and regular rhythm.      Pulses: Normal pulses.      Heart sounds: Normal heart sounds. No murmur heard.     No friction rub. No gallop.   Pulmonary:      Effort: Pulmonary effort is normal. No respiratory distress.      Breath sounds: Normal breath sounds. No stridor. No wheezing, rhonchi or rales.   Chest:      Chest wall: No tenderness.   Musculoskeletal:         General: No swelling, tenderness, deformity or signs of injury. Normal range of motion.      Cervical back: Normal range of motion and neck supple. No rigidity. No muscular tenderness.      Right lower leg: No edema.      Left lower leg: Edema present.   Lymphadenopathy:      Cervical: No cervical adenopathy.   Skin:     General: Skin is warm and dry.      Capillary Refill: Capillary refill takes less than 2 seconds.      Coloration: Skin is not jaundiced.      Findings: No bruising, erythema, lesion or rash.   Neurological:      Mental Status: She is alert and oriented to person, place, and time. Mental status is at baseline.      Cranial Nerves: No cranial nerve deficit.      Sensory: No sensory deficit.      Motor: No weakness.      Coordination: Coordination normal.      Gait: Gait normal.   Psychiatric:         Mood and Affect: Mood normal.         Behavior: Behavior normal.         Thought Content: Thought content normal.         Judgment: Judgment normal.

## 2024-07-26 DIAGNOSIS — J98.4 RESTRICTIVE LUNG DISEASE: ICD-10-CM

## 2024-07-26 RX ORDER — FLUTICASONE FUROATE AND VILANTEROL 100; 25 UG/1; UG/1
1 POWDER RESPIRATORY (INHALATION) DAILY
Qty: 180 BLISTER | Refills: 1 | Status: SHIPPED | OUTPATIENT
Start: 2024-07-26 | End: 2025-01-22

## 2024-07-26 NOTE — TELEPHONE ENCOUNTER
Dr. Morris increased dose to 2000 mcg. Copay is very high so she would like to stay with the 100 mcg.    Reason for call:   [x] Refill   [] Prior Auth  [] Other:     Office:   [x] PCP/Provider -   [] Specialty/Provider -     Medication: Fluticasone Furoate-Vilanterol 100-25 mcg       Dose/Frequency:  Inhale 1 puff daily Rinse mouth after use.     Quantity: 180    Pharmacy: [facility] - [place] - [address]  [address] [place] [address]  Phone: [phone]  Fax: [phone]  KIRIT #: [id number]        Does the patient have enough for 3 days?   [x] Yes   [] No - Send as HP to POD

## 2024-07-28 DIAGNOSIS — M17.11 PRIMARY OSTEOARTHRITIS OF RIGHT KNEE: ICD-10-CM

## 2024-07-28 DIAGNOSIS — M70.50 PES ANSERINE BURSITIS: ICD-10-CM

## 2024-07-28 DIAGNOSIS — G89.29 CHRONIC PAIN OF LEFT KNEE: ICD-10-CM

## 2024-07-28 DIAGNOSIS — M25.562 CHRONIC PAIN OF LEFT KNEE: ICD-10-CM

## 2024-07-28 DIAGNOSIS — G89.29 CHRONIC PAIN OF RIGHT KNEE: ICD-10-CM

## 2024-07-28 DIAGNOSIS — M17.12 PRIMARY OSTEOARTHRITIS OF LEFT KNEE: ICD-10-CM

## 2024-07-28 DIAGNOSIS — M25.561 CHRONIC PAIN OF RIGHT KNEE: ICD-10-CM

## 2024-07-29 RX ORDER — MELOXICAM 7.5 MG/1
TABLET ORAL
Qty: 60 TABLET | Refills: 2 | Status: SHIPPED | OUTPATIENT
Start: 2024-07-29

## 2024-08-11 DIAGNOSIS — I10 ESSENTIAL HYPERTENSION: ICD-10-CM

## 2024-08-12 RX ORDER — LISINOPRIL AND HYDROCHLOROTHIAZIDE 12.5; 2 MG/1; MG/1
TABLET ORAL
Qty: 180 TABLET | Refills: 1 | Status: SHIPPED | OUTPATIENT
Start: 2024-08-12

## 2024-08-14 ENCOUNTER — OFFICE VISIT (OUTPATIENT)
Dept: OBGYN CLINIC | Facility: MEDICAL CENTER | Age: 79
End: 2024-08-14
Payer: COMMERCIAL

## 2024-08-14 VITALS
WEIGHT: 213 LBS | HEIGHT: 61 IN | SYSTOLIC BLOOD PRESSURE: 113 MMHG | HEART RATE: 69 BPM | DIASTOLIC BLOOD PRESSURE: 69 MMHG | BODY MASS INDEX: 40.22 KG/M2

## 2024-08-14 DIAGNOSIS — G89.29 CHRONIC PAIN OF BOTH KNEES: ICD-10-CM

## 2024-08-14 DIAGNOSIS — M25.561 CHRONIC PAIN OF BOTH KNEES: ICD-10-CM

## 2024-08-14 DIAGNOSIS — M70.50 PES ANSERINE BURSITIS: Primary | ICD-10-CM

## 2024-08-14 DIAGNOSIS — M25.562 CHRONIC PAIN OF BOTH KNEES: ICD-10-CM

## 2024-08-14 DIAGNOSIS — M17.0 BILATERAL PRIMARY OSTEOARTHRITIS OF KNEE: ICD-10-CM

## 2024-08-14 PROCEDURE — 20610 DRAIN/INJ JOINT/BURSA W/O US: CPT | Performed by: PHYSICIAN ASSISTANT

## 2024-08-14 PROCEDURE — 99213 OFFICE O/P EST LOW 20 MIN: CPT | Performed by: ORTHOPAEDIC SURGERY

## 2024-08-14 RX ADMIN — BUPIVACAINE HYDROCHLORIDE 2 ML: 2.5 INJECTION, SOLUTION INFILTRATION; PERINEURAL at 15:15

## 2024-08-14 RX ADMIN — TRIAMCINOLONE ACETONIDE 40 MG: 40 INJECTION, SUSPENSION INTRA-ARTICULAR; INTRAMUSCULAR at 15:15

## 2024-08-14 NOTE — PROGRESS NOTES
Assessment & Plan     1. Pes anserine bursitis    2. Chronic pain of both knees    3. Bilateral primary osteoarthritis of knee      Orders Placed This Encounter   Procedures    Large joint arthrocentesis    Cane         Patient has severe bilateral knee osteoarthritis and bilateral pes anserine bursitis.   Non operative and operative treatment options were reviewed with patient today. Patient would like to have R TKA but is going to wait until after husbands cardiac procedure in Nov.   Patient is not a surgical candidate at this time due to BMI over 40. We discussed goal weight 210 lbs.   Injections: Patient received bilateral knee pes anserine bursa steroid injection today. Tolerated the procedure well. Post injection instructions reviewed including information on glucose monitoring for diabetic patients. Patient aware that they may repeat steroid injection every 3 months if needed.   Medications: Tylenol up to 3000 mg per day  PT: Continue home exercises.   Bracing:  no bracing. Patient provided with cane today.   Activity: Continue activity as tolerated.   Plan for next appt:  repeat pes bursa CSI      Return in about 2 months (around 10/14/2024) for bilat pes bursa CSI.    I answered all of the patient's questions during the visit and provided education of the patient's condition during the visit.  The patient verbalized understanding of the information given and agrees with the plan.  This note was dictated using [employer] View software.  It may contain errors including improperly dictated words.  Please contact physician directly for any questions.    History of Present Illness   Chief complaint:   Chief Complaint   Patient presents with    Left Knee - Follow-up    Right Knee - Follow-up       HPI: Jeny Collazo is a 78 y.o. female that c/o bilateral knee pain.      Mechanism of Injury: has had multiple falls over the last few weeks due to knee instability   Pain Description: bilateral knee pain, right knee  worse than left knee, constant, tenderness medially   Palliating Factors: rest  Provoking Factors: weight bearing   Associated Symptoms: instability   Medications: tylenol and meloxicam   Able to take NSAIDs? If not, why: yes  Physical Therapy or Home Exercises: does some home exercises   Injections: completed bilat knee euflexxa injections on 7/19 and reports mild pain relief. Had bilat pes bursa CSI last on 5/24 and reports pain relief from the medial knee discomfort   Bracing: none  Miscellaneous:  to have cardiac surgery in Nov. Would like to consider R TKA after his recovery.      ROS:    See HPI for musculoskeletal review.   All other systems reviewed are negative     Historical Information   Past Medical History:   Diagnosis Date    Asthma     Breathing difficulty     Bronchitis     Depression     Fracture of arm     Fracture of carpal bone     Hyperlipidemia     Hypertension     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HCC) 12/16/2016    Shortness of breath     Thyroid nodule     Thyroid nodule     Thyroid nodule      Past Surgical History:   Procedure Laterality Date    EYE SURGERY  11/2021    HEMORRHOID SURGERY      MA THYROIDECTOMY RMVL REMAINING TISS FLWG PRTL RMVL Left 6/6/2016    Procedure:  COMPLETION THYROIDECTOMY, FLEXIBLE LARYNGOSCOPY;  Surgeon: David Rivera MD;  Location: [facility];  Service: Surgical Oncology    MA TOTAL THYROID LOBECTOMY UNI W/WO ISTHMUSECTOMY Right 2/25/2016    Procedure: HEMITHYROIDECTOMY;  Surgeon: David Rivera MD;  Location:  MAIN OR;  Service: Surgical Oncology    THYROIDECTOMY, PARTIAL Left      Social History   Social History     Substance and Sexual Activity   Alcohol Use No    Comment: occasional glass of wine     Social History     Substance and Sexual Activity   Drug Use No     Social History     Tobacco Use   Smoking Status Former    Current packs/day: 0.00    Average packs/day: 0.3 packs/day for 4.0 years (1.0 ttl pk-yrs)    Types: Cigarettes    Start date:  6/15/1977    Quit date: 6/15/1981    Years since quittin.1   Smokeless Tobacco Former   Tobacco Comments    quit 40 yrs ago (Never a smoker per [employer])     Family History:   Family History   Adopted: Yes   Problem Relation Age of Onset    Hypertension Family     Kidney disease Family     Hypertension Mother        Current Outpatient Medications on File Prior to Visit   Medication Sig Dispense Refill    albuterol (Ventolin HFA) 90 mcg/act inhaler Inhale 2 puffs every 4 (four) hours as needed for wheezing or shortness of breath 54 g 0    amLODIPine (NORVASC) 5 mg tablet Take 1 tablet (5 mg total) by mouth 2 (two) times a day 180 tablet 1    atorvastatin (LIPITOR) 20 mg tablet Take 1 tablet (20 mg total) by mouth daily 90 tablet 1    Diclofenac Sodium (VOLTAREN) 1 % APPLY 4 GRAMS TOPICALLY FOUR TIMES DAILY AS NEEDED FOR KNEE PAIN. GENERIC EQUIVALENT FOR VOLTAREN. 350 g 1    Fluticasone Furoate-Vilanterol 100-25 mcg/actuation inhaler Inhale 1 puff daily Rinse mouth after use. 180 blister 1    fluticasone-vilanterol 200-25 mcg/actuation inhaler Inhale 1 puff daily Rinse mouth after use. 180 blister 1    furosemide (LASIX) 40 mg tablet Take 1 tablet (40 mg total) by mouth daily 90 tablet 1    levothyroxine 175 mcg tablet Take 1 tablet (175 mcg total) by mouth daily 90 tablet 1    lisinopril-hydrochlorothiazide (PRINZIDE,ZESTORETIC) 20-12.5 MG per tablet TAKE 1 TABLET BY MOUTH TWICE DAILY 180 tablet 1    Melatonin 10 MG TABS Take by mouth      meloxicam (MOBIC) 7.5 mg tablet TAKE 1 TABLET BY MOUTH TWICE DAILY AS NEEDED FOR MODERATE KNEE PAIN 60 tablet 2    mometasone (ELOCON) 0.1 % cream Apply 1 Application topically daily 45 g 3    Multiple Vitamins-Minerals (PreserVision AREDS 2) CAPS       nebivolol (BYSTOLIC) 10 mg tablet TAKE 1 TABLET BY MOUTH DAILY GENERIC EQUIVALENT FOR BYSTOLIC 90 tablet 1    nystatin-triamcinolone (MYCOLOG-II) cream Apply topically 2 (two) times a day 30 g 3    omeprazole (PriLOSEC) 40 MG  capsule Take 1 capsule (40 mg total) by mouth daily 90 capsule 1    potassium chloride (Klor-Con M20) 20 mEq tablet Take 1 tablet (20 mEq total) by mouth 2 (two) times a day 60 tablet 5    venlafaxine (EFFEXOR) 37.5 mg tablet TAKE 1 TABLET BY MOUTH DAILY 90 tablet 1     No current facility-administered medications on file prior to visit.     Allergies   Allergen Reactions    Adhesive [Medical Tape] Rash       Current Outpatient Medications on File Prior to Visit   Medication Sig Dispense Refill    albuterol (Ventolin HFA) 90 mcg/act inhaler Inhale 2 puffs every 4 (four) hours as needed for wheezing or shortness of breath 54 g 0    amLODIPine (NORVASC) 5 mg tablet Take 1 tablet (5 mg total) by mouth 2 (two) times a day 180 tablet 1    atorvastatin (LIPITOR) 20 mg tablet Take 1 tablet (20 mg total) by mouth daily 90 tablet 1    Diclofenac Sodium (VOLTAREN) 1 % APPLY 4 GRAMS TOPICALLY FOUR TIMES DAILY AS NEEDED FOR KNEE PAIN. GENERIC EQUIVALENT FOR VOLTAREN. 350 g 1    Fluticasone Furoate-Vilanterol 100-25 mcg/actuation inhaler Inhale 1 puff daily Rinse mouth after use. 180 blister 1    fluticasone-vilanterol 200-25 mcg/actuation inhaler Inhale 1 puff daily Rinse mouth after use. 180 blister 1    furosemide (LASIX) 40 mg tablet Take 1 tablet (40 mg total) by mouth daily 90 tablet 1    levothyroxine 175 mcg tablet Take 1 tablet (175 mcg total) by mouth daily 90 tablet 1    lisinopril-hydrochlorothiazide (PRINZIDE,ZESTORETIC) 20-12.5 MG per tablet TAKE 1 TABLET BY MOUTH TWICE DAILY 180 tablet 1    Melatonin 10 MG TABS Take by mouth      meloxicam (MOBIC) 7.5 mg tablet TAKE 1 TABLET BY MOUTH TWICE DAILY AS NEEDED FOR MODERATE KNEE PAIN 60 tablet 2    mometasone (ELOCON) 0.1 % cream Apply 1 Application topically daily 45 g 3    Multiple Vitamins-Minerals (PreserVision AREDS 2) CAPS       nebivolol (BYSTOLIC) 10 mg tablet TAKE 1 TABLET BY MOUTH DAILY GENERIC EQUIVALENT FOR BYSTOLIC 90 tablet 1    nystatin-triamcinolone  "(MYCOLOG-II) cream Apply topically 2 (two) times a day 30 g 3    omeprazole (PriLOSEC) 40 MG capsule Take 1 capsule (40 mg total) by mouth daily 90 capsule 1    potassium chloride (Klor-Con M20) 20 mEq tablet Take 1 tablet (20 mEq total) by mouth 2 (two) times a day 60 tablet 5    venlafaxine (EFFEXOR) 37.5 mg tablet TAKE 1 TABLET BY MOUTH DAILY 90 tablet 1     No current facility-administered medications on file prior to visit.       Objective   Vitals: Blood pressure 113/69, pulse 69, height 5' 1\" (1.549 m), weight 96.6 kg (213 lb), not currently breastfeeding.,Body mass index is 40.25 kg/m².    PE:  AAOx 3  WDWN  Hearing intact, no drainage from eyes  Regular rate  no audible wheezing  no abdominal distension  LE compartments soft, skin intact    bilateralknee:    Appearance:  Yes  swelling   No  ecchymosis  No  obvious joint deformity   No  effusion   Palpation/Tenderness:  Yes  TTP over medial joint line  Yes  TTP over lateral joint line   No  TTP over patella  No  TTP over patellar tendon  Yes  TTP over pes anserine bursa  Active Range of Motion:  AROM: 5- 110  Special Tests:  Valgus Stress Test: negative  Varus Stress Test: negative        Large joint arthrocentesis: bilateral pes anserine bursa  Elizabeth Protocol:  procedure performed by consultantConsent: Verbal consent obtained.  Risks and benefits: risks, benefits and alternatives were discussed  Consent given by: patient  Site marked: the operative site was marked  Supporting Documentation  Indications: pain   Procedure Details  Location: knee - bilateral pes anserine bursa  Preparation: Patient was prepped and draped in the usual sterile fashion  Needle size: 22 G  Ultrasound guidance: no  Approach: medial    Medications (Right): 2 mL bupivacaine 0.25 %; 40 mg triamcinolone acetonide 40 mg/mLMedications (Left): 2 mL bupivacaine 0.25 %; 40 mg triamcinolone acetonide 40 mg/mL   Patient tolerance: patient tolerated the procedure well with no immediate " complications  Dressing:  Sterile dressing applied            Scribe Attestation      I,:  Ana Munoz PA-C am acting as a scribe while in the presence of the attending physician.:       I,:  Laura Quinn DO personally performed the services described in this documentation    as scribed in my presence.:

## 2024-08-19 DIAGNOSIS — M17.0 BILATERAL PRIMARY OSTEOARTHRITIS OF KNEE: ICD-10-CM

## 2024-08-20 RX ORDER — BUPIVACAINE HYDROCHLORIDE 2.5 MG/ML
2 INJECTION, SOLUTION INFILTRATION; PERINEURAL
Status: COMPLETED | OUTPATIENT
Start: 2024-08-14 | End: 2024-08-14

## 2024-08-20 RX ORDER — TRIAMCINOLONE ACETONIDE 40 MG/ML
40 INJECTION, SUSPENSION INTRA-ARTICULAR; INTRAMUSCULAR
Status: COMPLETED | OUTPATIENT
Start: 2024-08-14 | End: 2024-08-14

## 2024-08-30 ENCOUNTER — OFFICE VISIT (OUTPATIENT)
Dept: SLEEP CENTER | Facility: CLINIC | Age: 79
End: 2024-08-30
Payer: COMMERCIAL

## 2024-08-30 VITALS
BODY MASS INDEX: 39.46 KG/M2 | HEIGHT: 61 IN | WEIGHT: 209 LBS | DIASTOLIC BLOOD PRESSURE: 70 MMHG | SYSTOLIC BLOOD PRESSURE: 116 MMHG

## 2024-08-30 DIAGNOSIS — R68.2 DRY MOUTH: ICD-10-CM

## 2024-08-30 DIAGNOSIS — I10 ESSENTIAL HYPERTENSION: ICD-10-CM

## 2024-08-30 DIAGNOSIS — G47.33 OBSTRUCTIVE SLEEP APNEA: Primary | ICD-10-CM

## 2024-08-30 DIAGNOSIS — R45.86 MOOD DISTURBANCE: ICD-10-CM

## 2024-08-30 DIAGNOSIS — K21.9 GERD WITHOUT ESOPHAGITIS: ICD-10-CM

## 2024-08-30 DIAGNOSIS — E66.9 OBESITY (BMI 30-39.9): ICD-10-CM

## 2024-08-30 DIAGNOSIS — J98.4 RESTRICTIVE LUNG DISEASE: ICD-10-CM

## 2024-08-30 PROCEDURE — 99214 OFFICE O/P EST MOD 30 MIN: CPT | Performed by: INTERNAL MEDICINE

## 2024-08-30 PROCEDURE — G2211 COMPLEX E/M VISIT ADD ON: HCPCS | Performed by: INTERNAL MEDICINE

## 2024-08-30 NOTE — PROGRESS NOTES
Follow[facility]Up Note - [facility]   Jeny Collazo  78 y.o. female  :1945  MRN:[id number]  DOS:2024    CC: I saw this patient for follow-up in clinic today for Sleep disordered breathing, Coexisting Sleep and Medical Problems.  Patient received ot a  Dream Station Version 2 machine from [employer] over a year ago.. Interval changes: None reported.      Results of prior studies:  The diagnostic study in 2017 demonstrated AHI of 29 per hour, considerably higher during REM at 64 per hour.  There were also 60 respiratory effort-related arousals and severe periodic limb movements of sleep.  During a subsequent therapeutic study, sleep disordered breathing and periodic limb movements of sleep appeared to be sufficiently remediated with nasal CPAP at 7 cm H2O.  She was observed during stage REM but not while supine.  Auto titrating Pap was initiated.     PFSH, Problem List, Medications & Allergies were reviewed in EMR.   She  has a past medical history of Asthma, Breathing difficulty, Bronchitis, Depression, Fracture of arm, Fracture of carpal bone, Hyperlipidemia, Hypertension, 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HCC) (2016), Shortness of breath, Thyroid nodule, Thyroid nodule, and Thyroid nodule.    She has a current medication list which includes the following prescription(s): albuterol, amlodipine, atorvastatin, diclofenac sodium, fluticasone furoate-vilanterol, fluticasone-vilanterol, furosemide, levothyroxine, lisinopril-hydrochlorothiazide, melatonin, meloxicam, mometasone, preservision areds 2, nebivolol, nystatin-triamcinolone, omeprazole, potassium chloride, and venlafaxine.    PHYSIOLOGICAL DATA REVIEW : Using PAP > 4 hours/night 93%. Estimated THO 3/hour with pressure of 12cm H2O@90th/95th percentile; patient has not been using non FDA approved devices to sanitize the machine.  INTERPRETATION: Compliance is Within accepted guidelines; Pressure setting is:within target range; ;  "  SUBJECTIVE: With respect to use of PAP, Jeny  is experiencing minimal adverse effects:dry mouth/throat.She derives benefit.  Is satisfied with sleep and daytime function.   Sleep Routine: Jeny reports getting >9 hrs sleep; she has no difficulty initiating or maintaining sleep . She arises spontaneously and feels refreshed.eJny denies excessive daytime sleepiness, habitually takes planned naps of around 20 minutes She rated herself at Total score: (P) 4 /24 on the [place] Sleepiness Scale.   Other issues: None reported.     Habits: Reports that she quit smoking about 43 years ago. Her smoking use included cigarettes. She started smoking about 47 years ago. She has a 1 pack-year smoking history. She has quit using smokeless tobacco.,  Reports no history of alcohol use.,  Reports no history of drug use., Caffeine use:limited until  ; Exercise routine: regular.      ROS: Significant for few pounds weight reduction.  Nasal and respiratory symptoms are controlled on current medication.  Mood is stable on Effexor.  Medical conditions are stable..    EXAM: /70 (BP Location: Left arm, Patient Position: Sitting, Cuff Size: Large)   Ht 5' 1\" (1.549 m)   Wt 94.8 kg (209 lb)   LMP  (LMP Unknown)   BMI 39.49 kg/m²     Wt Readings from Last 3 Encounters:   08/30/24 94.8 kg (209 lb)   08/14/24 96.6 kg (213 lb)   07/25/24 97.1 kg (214 lb)      Patient is well groomed; well appearing.   CNS: Alert, orientated, speech clear & coherent  Psych: cooperative and in no distress. Mental state: Appears normal.  H&N: EOMI; NC/AT: No facial pressure marks, no rashes.    Skin/Extrem: col & hydration normal; no edema  Resp: Respiratory effort is normal  Physical findings otherwise essentially unchanged from previous.    IMPRESSION: Problem List Items & Comorbidities Addressed this Visit    1. Obstructive sleep apnea  PAP DME Resupply/Reorder      2. Dry mouth        3. Restrictive lung disease        4. Essential hypertension      " "  5. GERD without esophagitis        6. Mood disturbance        7. Obesity (BMI 30-39.9)            PLAN:  I reviewed results of prior studies and physiologic data with the patient.   I discussed treatment options with risks and benefits.  Treatment with  PAP is medically necessary and Jeny is agreable to continue use.   Care of equipment, methods to improve comfort using PAP and importance of compliance with therapy were discussed.  Pressure setting: continue 10-12 cmH2O.    Rx provided to replace supplies and Care coordinated with DME provider.   The patient's current unit has now reached its 5 yr reasonable, useful life and needs to be replaced.  She declined a replacement machine at this time.  She will call for a prescription prior to her next visit.  Discussed strategies for weight reduction.    Follow-up is advised in 1 year or sooner if needed to monitor progress, compliance and to adjust therapy.     Thank you for allowing me to participate in the care of this patient.    Sincerely,     Authenticated electronically on 08/30/24   Board Certified Specialist     Portions of the record may have been created with voice recognition software. Occasional wrong word or \"sound a like\" substitutions may have occurred due to the inherent limitations of voice recognition software. There may also be notations and random deletions of words or characters from malfunctioning software. Read the chart carefully and recognize, using context, where substitutions/deletions have occurred.    "

## 2024-08-31 ENCOUNTER — TELEPHONE (OUTPATIENT)
Dept: SLEEP CENTER | Facility: CLINIC | Age: 79
End: 2024-08-31

## 2024-08-31 LAB
DME PARACHUTE DELIVERY DATE REQUESTED: NORMAL
DME PARACHUTE ITEM DESCRIPTION: NORMAL
DME PARACHUTE ORDER STATUS: NORMAL
DME PARACHUTE SUPPLIER NAME: NORMAL
DME PARACHUTE SUPPLIER PHONE: NORMAL

## 2024-09-03 LAB

## 2024-10-17 ENCOUNTER — OFFICE VISIT (OUTPATIENT)
Dept: OBGYN CLINIC | Facility: MEDICAL CENTER | Age: 79
End: 2024-10-17
Payer: COMMERCIAL

## 2024-10-17 VITALS
SYSTOLIC BLOOD PRESSURE: 133 MMHG | HEIGHT: 61 IN | WEIGHT: 215.3 LBS | DIASTOLIC BLOOD PRESSURE: 75 MMHG | BODY MASS INDEX: 40.65 KG/M2 | HEART RATE: 65 BPM

## 2024-10-17 DIAGNOSIS — M25.561 CHRONIC PAIN OF RIGHT KNEE: ICD-10-CM

## 2024-10-17 DIAGNOSIS — M70.50 PES ANSERINE BURSITIS: ICD-10-CM

## 2024-10-17 DIAGNOSIS — M17.0 BILATERAL PRIMARY OSTEOARTHRITIS OF KNEE: Primary | ICD-10-CM

## 2024-10-17 DIAGNOSIS — M25.562 CHRONIC PAIN OF LEFT KNEE: ICD-10-CM

## 2024-10-17 DIAGNOSIS — M17.12 PRIMARY OSTEOARTHRITIS OF LEFT KNEE: ICD-10-CM

## 2024-10-17 DIAGNOSIS — M17.11 PRIMARY OSTEOARTHRITIS OF RIGHT KNEE: ICD-10-CM

## 2024-10-17 DIAGNOSIS — G89.29 CHRONIC PAIN OF LEFT KNEE: ICD-10-CM

## 2024-10-17 DIAGNOSIS — G89.29 CHRONIC PAIN OF RIGHT KNEE: ICD-10-CM

## 2024-10-17 PROCEDURE — 20610 DRAIN/INJ JOINT/BURSA W/O US: CPT | Performed by: PHYSICIAN ASSISTANT

## 2024-10-17 PROCEDURE — 99213 OFFICE O/P EST LOW 20 MIN: CPT | Performed by: ORTHOPAEDIC SURGERY

## 2024-10-17 RX ORDER — MELOXICAM 7.5 MG/1
7.5 TABLET ORAL 2 TIMES DAILY PRN
Qty: 60 TABLET | Refills: 2 | Status: SHIPPED | OUTPATIENT
Start: 2024-10-17

## 2024-10-17 RX ORDER — BUPIVACAINE HYDROCHLORIDE 2.5 MG/ML
1 INJECTION, SOLUTION INFILTRATION; PERINEURAL
Status: COMPLETED | OUTPATIENT
Start: 2024-10-17 | End: 2024-10-17

## 2024-10-17 RX ORDER — TRIAMCINOLONE ACETONIDE 40 MG/ML
40 INJECTION, SUSPENSION INTRA-ARTICULAR; INTRAMUSCULAR
Status: COMPLETED | OUTPATIENT
Start: 2024-10-17 | End: 2024-10-17

## 2024-10-17 RX ADMIN — BUPIVACAINE HYDROCHLORIDE 1 ML: 2.5 INJECTION, SOLUTION INFILTRATION; PERINEURAL at 12:00

## 2024-10-17 RX ADMIN — TRIAMCINOLONE ACETONIDE 40 MG: 40 INJECTION, SUSPENSION INTRA-ARTICULAR; INTRAMUSCULAR at 12:00

## 2024-10-17 NOTE — PROGRESS NOTES
Assessment & Plan     1. Bilateral primary osteoarthritis of knee    2. Pes anserine bursitis    3. Primary osteoarthritis of left knee    4. Primary osteoarthritis of right knee    5. Chronic pain of left knee    6. Chronic pain of right knee      Orders Placed This Encounter   Procedures    Injection Procedure Prior Authorization       Patient has severe bilateral knee osteoarthritis and bilateral knee pes anserine bursitis.   Injections: Patient received bilateral pes anserine bursa steroid injection today. Tolerated the procedure well. Post injection instructions reviewed including information on glucose monitoring for diabetic patients. Patient aware that they may repeat steroid injection every 3 months if needed.   Order placed for bilateral knee series of 3 VS injections which can be admin after 1/19/25. Pain rated 2/10 today. Patient has had VS series with good relief.   Medications: Tylenol up to 3000 mg per day and meloxicam refill provided   PT: Continue home exercises.   Bracing:  she will try thigh high compression stockings instead of knee high to try to avoid irritation at the bursa area   Activity: Continue activity as tolerated.   Plan for next appt:  bilat knee VS injections      Return for bilat knee VS after 1/19/25.    I answered all of the patient's questions during the visit and provided education of the patient's condition during the visit.  The patient verbalized understanding of the information given and agrees with the plan.  This note was dictated using [employer] software.  It may contain errors including improperly dictated words.  Please contact physician directly for any questions.    History of Present Illness   Chief complaint:   Chief Complaint   Patient presents with    Left Knee - Follow-up, Pain    Right Knee - Follow-up, Pain       HPI: Jeny Collazo is a 79 y.o. female that c/o bilateral knee pain.      Mechanism of Injury: none  Pain Description: bilateral knee pain,  neither knee worse, patient notes medial knee pain and tenderness which is constant   Palliating Factors: injections   Provoking Factors: compression stockings which end right at the tender area on her knee   Associated Symptoms: swelling   Medications: meloxicam and voltaren gel   Able to take NSAIDs? If not, why: yes  Physical Therapy or Home Exercises: some home exercises   Injections: received bilateral pes anserine bursa CSI at last visit on 8/14 and reports good relief for almost 2 months   Bracing: none but has been wearing knee high compression stockings        ROS:    See HPI for musculoskeletal review.   All other systems reviewed are negative     Historical Information   Past Medical History:   Diagnosis Date    Asthma     Breathing difficulty     Bronchitis     Depression     Fracture of arm     Fracture of carpal bone     Hyperlipidemia     Hypertension     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HCC) 12/16/2016    Shortness of breath     Thyroid nodule     Thyroid nodule     Thyroid nodule      Past Surgical History:   Procedure Laterality Date    EYE SURGERY  11/2021    HEMORRHOID SURGERY      DC THYROIDECTOMY RMVL REMAINING TISS FLWG PRTL RMVL Left 6/6/2016    Procedure:  COMPLETION THYROIDECTOMY, FLEXIBLE LARYNGOSCOPY;  Surgeon: David Rivera MD;  Location: [facility];  Service: Surgical Oncology    DC TOTAL THYROID LOBECTOMY UNI W/WO ISTHMUSECTOMY Right 2/25/2016    Procedure: HEMITHYROIDECTOMY;  Surgeon: David Rivera MD;  Location: [facility];  Service: Surgical Oncology    THYROIDECTOMY, PARTIAL Left      Social History   Social History     Substance and Sexual Activity   Alcohol Use No    Comment: occasional glass of wine     Social History     Substance and Sexual Activity   Drug Use No     Social History     Tobacco Use   Smoking Status Former    Current packs/day: 0.00    Average packs/day: 0.3 packs/day for 4.0 years (1.0 ttl pk-yrs)    Types: Cigarettes    Start date: 6/15/1977    Quit date:  6/15/1981    Years since quittin.3   Smokeless Tobacco Former   Tobacco Comments    quit 40 yrs ago (Never a smoker per [employer])     Family History:   Family History   Adopted: Yes   Problem Relation Age of Onset    Hypertension Family     Kidney disease Family     Hypertension Mother        Current Outpatient Medications on File Prior to Visit   Medication Sig Dispense Refill    albuterol (Ventolin HFA) 90 mcg/act inhaler Inhale 2 puffs every 4 (four) hours as needed for wheezing or shortness of breath 54 g 0    amLODIPine (NORVASC) 5 mg tablet Take 1 tablet (5 mg total) by mouth 2 (two) times a day 180 tablet 1    atorvastatin (LIPITOR) 20 mg tablet Take 1 tablet (20 mg total) by mouth daily 90 tablet 1    Diclofenac Sodium (VOLTAREN) 1 % APPLY 4 GRAMS TOPICALLY 4 TIMES DAILY AS NEEDED FOR KNEE PAIN 300 g 0    Fluticasone Furoate-Vilanterol 100-25 mcg/actuation inhaler Inhale 1 puff daily Rinse mouth after use. 180 blister 1    fluticasone-vilanterol 200-25 mcg/actuation inhaler Inhale 1 puff daily Rinse mouth after use. 180 blister 1    furosemide (LASIX) 40 mg tablet Take 1 tablet (40 mg total) by mouth daily 90 tablet 1    levothyroxine 175 mcg tablet Take 1 tablet (175 mcg total) by mouth daily 90 tablet 1    lisinopril-hydrochlorothiazide (PRINZIDE,ZESTORETIC) 20-12.5 MG per tablet TAKE 1 TABLET BY MOUTH TWICE DAILY 180 tablet 1    Melatonin 10 MG TABS Take by mouth      mometasone (ELOCON) 0.1 % cream Apply 1 Application topically daily 45 g 3    Multiple Vitamins-Minerals (PreserVision AREDS 2) CAPS       nebivolol (BYSTOLIC) 10 mg tablet TAKE 1 TABLET BY MOUTH DAILY GENERIC EQUIVALENT FOR BYSTOLIC 90 tablet 1    nystatin-triamcinolone (MYCOLOG-II) cream Apply topically 2 (two) times a day 30 g 3    omeprazole (PriLOSEC) 40 MG capsule Take 1 capsule (40 mg total) by mouth daily 90 capsule 1    potassium chloride (Klor-Con M20) 20 mEq tablet Take 1 tablet (20 mEq total) by mouth 2 (two) times a day  60 tablet 5    venlafaxine (EFFEXOR) 37.5 mg tablet TAKE 1 TABLET BY MOUTH DAILY 90 tablet 1    [DISCONTINUED] meloxicam (MOBIC) 7.5 mg tablet TAKE 1 TABLET BY MOUTH TWICE DAILY AS NEEDED FOR MODERATE KNEE PAIN 60 tablet 2     No current facility-administered medications on file prior to visit.     Allergies   Allergen Reactions    Adhesive [Medical Tape] Rash       Current Outpatient Medications on File Prior to Visit   Medication Sig Dispense Refill    albuterol (Ventolin HFA) 90 mcg/act inhaler Inhale 2 puffs every 4 (four) hours as needed for wheezing or shortness of breath 54 g 0    amLODIPine (NORVASC) 5 mg tablet Take 1 tablet (5 mg total) by mouth 2 (two) times a day 180 tablet 1    atorvastatin (LIPITOR) 20 mg tablet Take 1 tablet (20 mg total) by mouth daily 90 tablet 1    Diclofenac Sodium (VOLTAREN) 1 % APPLY 4 GRAMS TOPICALLY 4 TIMES DAILY AS NEEDED FOR KNEE PAIN 300 g 0    Fluticasone Furoate-Vilanterol 100-25 mcg/actuation inhaler Inhale 1 puff daily Rinse mouth after use. 180 blister 1    fluticasone-vilanterol 200-25 mcg/actuation inhaler Inhale 1 puff daily Rinse mouth after use. 180 blister 1    furosemide (LASIX) 40 mg tablet Take 1 tablet (40 mg total) by mouth daily 90 tablet 1    levothyroxine 175 mcg tablet Take 1 tablet (175 mcg total) by mouth daily 90 tablet 1    lisinopril-hydrochlorothiazide (PRINZIDE,ZESTORETIC) 20-12.5 MG per tablet TAKE 1 TABLET BY MOUTH TWICE DAILY 180 tablet 1    Melatonin 10 MG TABS Take by mouth      mometasone (ELOCON) 0.1 % cream Apply 1 Application topically daily 45 g 3    Multiple Vitamins-Minerals (PreserVision AREDS 2) CAPS       nebivolol (BYSTOLIC) 10 mg tablet TAKE 1 TABLET BY MOUTH DAILY GENERIC EQUIVALENT FOR BYSTOLIC 90 tablet 1    nystatin-triamcinolone (MYCOLOG-II) cream Apply topically 2 (two) times a day 30 g 3    omeprazole (PriLOSEC) 40 MG capsule Take 1 capsule (40 mg total) by mouth daily 90 capsule 1    potassium chloride (Klor-Con M20) 20  "mEq tablet Take 1 tablet (20 mEq total) by mouth 2 (two) times a day 60 tablet 5    venlafaxine (EFFEXOR) 37.5 mg tablet TAKE 1 TABLET BY MOUTH DAILY 90 tablet 1    [DISCONTINUED] meloxicam (MOBIC) 7.5 mg tablet TAKE 1 TABLET BY MOUTH TWICE DAILY AS NEEDED FOR MODERATE KNEE PAIN 60 tablet 2     No current facility-administered medications on file prior to visit.       Objective   Vitals: Blood pressure 133/75, pulse 65, height 5' 1\" (1.549 m), weight 97.7 kg (215 lb 4.8 oz), not currently breastfeeding.,Body mass index is 40.68 kg/m².    PE:  AAOx 3  WDWN  Hearing intact, no drainage from eyes  Regular rate  no audible wheezing  no abdominal distension  LE compartments soft, skin intact    bilateralknee:    Appearance:  Yes  swelling   No  ecchymosis  Yes  obvious joint deformity   No  effusion   Palpation/Tenderness:  Yes  TTP over medial joint line  No  TTP over lateral joint line   No  TTP over patella  No  TTP over patellar tendon  Yes  TTP over pes anserine bursa  Active Range of Motion:  AROM: 3- 110  Special Tests:  Valgus Stress Test: negative  Varus Stress Test: negative        Large joint arthrocentesis: bilateral pes anserine bursa  [place] Protocol:  procedure performed by consultantConsent: Verbal consent obtained.  Risks and benefits: risks, benefits and alternatives were discussed  Consent given by: patient  Site marked: the operative site was marked  Supporting Documentation  Indications: pain   Procedure Details  Location: knee - bilateral pes anserine bursa  Preparation: Patient was prepped and draped in the usual sterile fashion  Needle size: 22 G  Ultrasound guidance: no  Approach: medial    Medications (Right): 1 mL bupivacaine 0.25 %; 40 mg triamcinolone acetonide 40 mg/mLMedications (Left): 1 mL bupivacaine 0.25 %; 40 mg triamcinolone acetonide 40 mg/mL   Patient tolerance: patient tolerated the procedure well with no immediate complications  Dressing:  Sterile dressing " applied            Scribe Attestation      I,:  Ana Munoz PA-C am acting as a scribe while in the presence of the attending physician.:       I,:  Laura Quinn DO personally performed the services described in this documentation    as scribed in my presence.:

## 2024-11-18 DIAGNOSIS — F32.A DEPRESSION, UNSPECIFIED DEPRESSION TYPE: ICD-10-CM

## 2024-11-19 RX ORDER — VENLAFAXINE 37.5 MG/1
37.5 TABLET ORAL DAILY
Qty: 90 TABLET | Refills: 1 | Status: SHIPPED | OUTPATIENT
Start: 2024-11-19

## 2024-11-21 ENCOUNTER — PATIENT MESSAGE (OUTPATIENT)
Age: 79
End: 2024-11-21

## 2024-12-08 DIAGNOSIS — E78.2 MIXED HYPERLIPIDEMIA: ICD-10-CM

## 2024-12-08 DIAGNOSIS — I10 PRIMARY HYPERTENSION: ICD-10-CM

## 2024-12-08 DIAGNOSIS — E89.0 HYPOTHYROIDISM, POSTSURGICAL: ICD-10-CM

## 2024-12-08 DIAGNOSIS — I10 ESSENTIAL HYPERTENSION: ICD-10-CM

## 2024-12-10 RX ORDER — FUROSEMIDE 40 MG/1
TABLET ORAL
Qty: 90 TABLET | Refills: 0 | Status: SHIPPED | OUTPATIENT
Start: 2024-12-10

## 2025-01-02 DIAGNOSIS — J98.4 RESTRICTIVE LUNG DISEASE: ICD-10-CM

## 2025-01-03 RX ORDER — FLUTICASONE FUROATE AND VILANTEROL 100; 25 UG/1; UG/1
1 POWDER RESPIRATORY (INHALATION) DAILY
Qty: 180 BLISTER | Refills: 1 | Status: SHIPPED | OUTPATIENT
Start: 2025-01-03 | End: 2025-07-02

## 2025-01-08 DIAGNOSIS — G89.29 CHRONIC PAIN OF RIGHT KNEE: ICD-10-CM

## 2025-01-08 DIAGNOSIS — G89.29 CHRONIC PAIN OF LEFT KNEE: ICD-10-CM

## 2025-01-08 DIAGNOSIS — M25.561 CHRONIC PAIN OF RIGHT KNEE: ICD-10-CM

## 2025-01-08 DIAGNOSIS — M70.50 PES ANSERINE BURSITIS: ICD-10-CM

## 2025-01-08 DIAGNOSIS — M17.11 PRIMARY OSTEOARTHRITIS OF RIGHT KNEE: ICD-10-CM

## 2025-01-08 DIAGNOSIS — M17.12 PRIMARY OSTEOARTHRITIS OF LEFT KNEE: ICD-10-CM

## 2025-01-08 DIAGNOSIS — M25.562 CHRONIC PAIN OF LEFT KNEE: ICD-10-CM

## 2025-01-08 NOTE — TELEPHONE ENCOUNTER
Reason for call:   [x] Refill   [] Prior Auth  [] Other:     Office:   [] PCP/Provider -   [x] Specialty/Provider - [facility]  Authorized By: Ana Munoz PA-C      Medication: meloxicam (MOBIC) 7.5 mg tablet    Dose/Frequency: Take 1 tablet (7.5 mg total) by mouth 2 (two) times a day as needed for moderate pain    Quantity: 180     Pharmacy: [facility] [id number]    Does the patient have enough for 3 days?   [x] Yes   [] No - Send as HP to POD

## 2025-01-10 RX ORDER — MELOXICAM 7.5 MG/1
7.5 TABLET ORAL 2 TIMES DAILY PRN
Qty: 60 TABLET | Refills: 0 | OUTPATIENT
Start: 2025-01-10

## 2025-01-13 DIAGNOSIS — E89.0 HYPOTHYROIDISM, POSTSURGICAL: ICD-10-CM

## 2025-01-13 DIAGNOSIS — E78.2 MIXED HYPERLIPIDEMIA: ICD-10-CM

## 2025-01-13 DIAGNOSIS — I10 PRIMARY HYPERTENSION: ICD-10-CM

## 2025-01-13 DIAGNOSIS — I10 ESSENTIAL HYPERTENSION: ICD-10-CM

## 2025-01-14 ENCOUNTER — PATIENT MESSAGE (OUTPATIENT)
Dept: OBGYN CLINIC | Facility: MEDICAL CENTER | Age: 80
End: 2025-01-14

## 2025-01-14 ENCOUNTER — TELEPHONE (OUTPATIENT)
Age: 80
End: 2025-01-14

## 2025-01-14 DIAGNOSIS — I10 ESSENTIAL HYPERTENSION: ICD-10-CM

## 2025-01-14 DIAGNOSIS — E78.2 MIXED HYPERLIPIDEMIA: Primary | ICD-10-CM

## 2025-01-14 DIAGNOSIS — R23.3 EASY BRUISING: ICD-10-CM

## 2025-01-14 DIAGNOSIS — E89.0 HYPOTHYROIDISM, POSTSURGICAL: ICD-10-CM

## 2025-01-14 DIAGNOSIS — M17.0 BILATERAL PRIMARY OSTEOARTHRITIS OF KNEE: ICD-10-CM

## 2025-01-14 RX ORDER — FUROSEMIDE 40 MG/1
40 TABLET ORAL DAILY
Qty: 90 TABLET | Refills: 0 | Status: SHIPPED | OUTPATIENT
Start: 2025-01-14

## 2025-01-14 RX ORDER — LEVOTHYROXINE SODIUM 175 UG/1
175 TABLET ORAL DAILY
Qty: 30 TABLET | Refills: 0 | Status: SHIPPED | OUTPATIENT
Start: 2025-01-14

## 2025-01-14 NOTE — TELEPHONE ENCOUNTER
Pharmacy change confirmed. No further action needed. Thank you. Dr. Quinn will see task and refill if appropriate.

## 2025-01-14 NOTE — TELEPHONE ENCOUNTER
Patient had to reschedule her Medicare Wellness Visit on 2/10/2025. She needs new lab orders since her 2024 orders have .

## 2025-01-22 ENCOUNTER — PROCEDURE VISIT (OUTPATIENT)
Dept: OBGYN CLINIC | Facility: MEDICAL CENTER | Age: 80
End: 2025-01-22
Payer: COMMERCIAL

## 2025-01-22 VITALS — BODY MASS INDEX: 39.16 KG/M2 | WEIGHT: 207.4 LBS | HEIGHT: 61 IN

## 2025-01-22 DIAGNOSIS — M17.12 PRIMARY OSTEOARTHRITIS OF LEFT KNEE: Primary | ICD-10-CM

## 2025-01-22 DIAGNOSIS — M17.11 PRIMARY OSTEOARTHRITIS OF RIGHT KNEE: ICD-10-CM

## 2025-01-22 PROCEDURE — 20610 DRAIN/INJ JOINT/BURSA W/O US: CPT | Performed by: ORTHOPAEDIC SURGERY

## 2025-01-22 NOTE — PROGRESS NOTES
1. Primary osteoarthritis of left knee  Large joint arthrocentesis: bilateral knee      2. Primary osteoarthritis of right knee  Large joint arthrocentesis: bilateral knee          Patient has severe bilateral knee osteoarthirtis.   Patient rates pain 3/10  Patient currently taking Meloxicam 7.5mg, advised that patient discuss with her PCP kidney function for continuation of NSAID medications  Patient presents today for 1/3 Synvisc injections, she tolerated the procedure well  Post injection instructions reviewed.  Patient expressed interest in bilateral total knee arthroplasty with right being performed first, at the earliest 5/5/25  Patient has been informed of future maternity leave and is agreeable to waiting until post-maternity leave for the left knee total arthroplasty.  Discussed with patient clearances needed are cardio, dental, and SOC  Bilateral pes bursa injections are desired after the 3rd injection  Follow up 1 week for 2/3 Synvisc injections.        Large joint arthrocentesis: bilateral knee  Universal Protocol:  procedure performed by consultantConsent: Verbal consent obtained.  Risks and benefits: risks, benefits and alternatives were discussed  Consent given by: patient  Patient consent: the patient's understanding of the procedure matches consent given  Site marked: the operative site was marked  Patient identity confirmed: verbally with patient  Supporting Documentation  Indications: pain and joint swelling   Procedure Details  Location: knee - bilateral knee  Needle size: 22 G  Ultrasound guidance: no  Approach: anterolateral    Medications (Right): 16 mg hylan 16 MG/2MLMedications (Left): 16 mg hylan 16 MG/2ML   Patient tolerance: patient tolerated the procedure well with no immediate complications  Dressing:  Sterile dressing applied

## 2025-01-22 NOTE — PROGRESS NOTES
Assessment & Plan     No diagnosis found.    No orders of the defined types were placed in this encounter.      Patient has severe bilateral knee osteoarthritis and bilateral knee pes anserine bursitis.   Injections: Patient received bilateral pes anserine bursa steroid injection today. Tolerated the procedure well. Post injection instructions reviewed including information on glucose monitoring for diabetic patients. Patient aware that they may repeat steroid injection every 3 months if needed.   Order placed for bilateral knee series of 3 VS injections which can be admin after 1/19/25. Pain rated 2/10 today. Patient has had VS series with good relief.   Medications: Tylenol up to 3000 mg per day and meloxicam refill provided   PT: Continue home exercises.   Bracing:  she will try thigh high compression stockings instead of knee high to try to avoid irritation at the bursa area   Activity: Continue activity as tolerated.   Plan for next appt:  bilat knee VS injections      No follow-ups on file.    I answered all of the patient's questions during the visit and provided education of the patient's condition during the visit.  The patient verbalized understanding of the information given and agrees with the plan.  This note was dictated using [employer] software.  It may contain errors including improperly dictated words.  Please contact physician directly for any questions.    History of Present Illness   Chief complaint:   Chief Complaint   Patient presents with   • Left Knee - Follow-up   • Right Knee - Follow-up       HPI: Jeny Collazo is a 79 y.o. female that c/o bilateral knee pain.      Mechanism of Injury: none  Pain Description: bilateral knee pain, neither knee worse, patient notes medial knee pain and tenderness which is constant   Palliating Factors: injections   Provoking Factors: compression stockings which end right at the tender area on her knee   Associated Symptoms: swelling   Medications:  meloxicam and voltaren gel   Able to take NSAIDs? If not, why: yes  Physical Therapy or Home Exercises: some home exercises   Injections: received bilateral pes anserine bursa CSI at last visit on  and reports good relief for almost 2 months   Bracing: none but has been wearing knee high compression stockings        ROS:    See HPI for musculoskeletal review.   All other systems reviewed are negative     Historical Information   Past Medical History:   Diagnosis Date   • Asthma    • Breathing difficulty    • Bronchitis    • Depression    • Fracture of arm    • Fracture of carpal bone    • Hyperlipidemia    • Hypertension    • 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HCC) 2016   • Shortness of breath    • Thyroid nodule    • Thyroid nodule    • Thyroid nodule      Past Surgical History:   Procedure Laterality Date   • EYE SURGERY  2021   • HEMORRHOID SURGERY     • ME THYROIDECTOMY RMVL REMAINING TISS FLWG PRTL RMVL Left 2016    Procedure:  COMPLETION THYROIDECTOMY, FLEXIBLE LARYNGOSCOPY;  Surgeon: David Rivera MD;  Location: [facility];  Service: Surgical Oncology   • ME TOTAL THYROID LOBECTOMY UNI W/WO ISTHMUSECTOMY Right 2016    Procedure: HEMITHYROIDECTOMY;  Surgeon: David Rivera MD;  Location:  [facility] OR;  Service: Surgical Oncology   • THYROIDECTOMY, PARTIAL Left      Social History   Social History     Substance and Sexual Activity   Alcohol Use No    Comment: occasional glass of wine     Social History     Substance and Sexual Activity   Drug Use No     Social History     Tobacco Use   Smoking Status Former   • Current packs/day: 0.00   • Average packs/day: 0.3 packs/day for 4.0 years (1.0 ttl pk-yrs)   • Types: Cigarettes   • Start date: 6/15/1977   • Quit date: 6/15/1981   • Years since quittin.6   Smokeless Tobacco Former   Tobacco Comments    quit 40 yrs ago (Never a smoker per [employer])     Family History:   Family History   Adopted: Yes   Problem Relation Age of Onset   •  Hypertension Family    • Kidney disease Family    • Hypertension Mother        Current Outpatient Medications on File Prior to Visit   Medication Sig Dispense Refill   • albuterol (Ventolin HFA) 90 mcg/act inhaler Inhale 2 puffs every 4 (four) hours as needed for wheezing or shortness of breath 54 g 0   • amLODIPine (NORVASC) 5 mg tablet Take 1 tablet (5 mg total) by mouth 2 (two) times a day 180 tablet 1   • atorvastatin (LIPITOR) 20 mg tablet Take 1 tablet (20 mg total) by mouth daily 90 tablet 1   • Diclofenac Sodium (VOLTAREN) 1 % Apply 2 g topically 4 (four) times a day 300 g 0   • Fluticasone Furoate-Vilanterol 100-25 mcg/actuation inhaler Inhale 1 puff daily Rinse mouth after use. 180 blister 1   • fluticasone-vilanterol 200-25 mcg/actuation inhaler Inhale 1 puff daily Rinse mouth after use. 180 blister 1   • furosemide (LASIX) 40 mg tablet Take 1 tablet (40 mg total) by mouth daily 90 tablet 0   • levothyroxine 175 mcg tablet Take 1 tablet (175 mcg total) by mouth daily 30 tablet 0   • lisinopril-hydrochlorothiazide (PRINZIDE,ZESTORETIC) 20-12.5 MG per tablet TAKE 1 TABLET BY MOUTH TWICE DAILY 180 tablet 1   • Melatonin 10 MG TABS Take by mouth     • meloxicam (MOBIC) 7.5 mg tablet Take 1 tablet (7.5 mg total) by mouth 2 (two) times a day as needed for moderate pain Take with food. 60 tablet 2   • mometasone (ELOCON) 0.1 % cream Apply 1 Application topically daily 45 g 3   • Multiple Vitamins-Minerals (PreserVision AREDS 2) CAPS      • nebivolol (BYSTOLIC) 10 mg tablet TAKE 1 TABLET BY MOUTH DAILY GENERIC EQUIVALENT FOR BYSTOLIC 90 tablet 1   • nystatin-triamcinolone (MYCOLOG-II) cream Apply topically 2 (two) times a day 30 g 3   • omeprazole (PriLOSEC) 40 MG capsule Take 1 capsule (40 mg total) by mouth daily 90 capsule 1   • potassium chloride (Klor-Con M20) 20 mEq tablet Take 1 tablet (20 mEq total) by mouth 2 (two) times a day 60 tablet 5   • venlafaxine (EFFEXOR) 37.5 mg tablet TAKE 1 TABLET BY MOUTH  DAILY 90 tablet 1     No current facility-administered medications on file prior to visit.     Allergies   Allergen Reactions   • Adhesive [Medical Tape] Rash       Current Outpatient Medications on File Prior to Visit   Medication Sig Dispense Refill   • albuterol (Ventolin HFA) 90 mcg/act inhaler Inhale 2 puffs every 4 (four) hours as needed for wheezing or shortness of breath 54 g 0   • amLODIPine (NORVASC) 5 mg tablet Take 1 tablet (5 mg total) by mouth 2 (two) times a day 180 tablet 1   • atorvastatin (LIPITOR) 20 mg tablet Take 1 tablet (20 mg total) by mouth daily 90 tablet 1   • Diclofenac Sodium (VOLTAREN) 1 % Apply 2 g topically 4 (four) times a day 300 g 0   • Fluticasone Furoate-Vilanterol 100-25 mcg/actuation inhaler Inhale 1 puff daily Rinse mouth after use. 180 blister 1   • fluticasone-vilanterol 200-25 mcg/actuation inhaler Inhale 1 puff daily Rinse mouth after use. 180 blister 1   • furosemide (LASIX) 40 mg tablet Take 1 tablet (40 mg total) by mouth daily 90 tablet 0   • levothyroxine 175 mcg tablet Take 1 tablet (175 mcg total) by mouth daily 30 tablet 0   • lisinopril-hydrochlorothiazide (PRINZIDE,ZESTORETIC) 20-12.5 MG per tablet TAKE 1 TABLET BY MOUTH TWICE DAILY 180 tablet 1   • Melatonin 10 MG TABS Take by mouth     • meloxicam (MOBIC) 7.5 mg tablet Take 1 tablet (7.5 mg total) by mouth 2 (two) times a day as needed for moderate pain Take with food. 60 tablet 2   • mometasone (ELOCON) 0.1 % cream Apply 1 Application topically daily 45 g 3   • Multiple Vitamins-Minerals (PreserVision AREDS 2) CAPS      • nebivolol (BYSTOLIC) 10 mg tablet TAKE 1 TABLET BY MOUTH DAILY GENERIC EQUIVALENT FOR BYSTOLIC 90 tablet 1   • nystatin-triamcinolone (MYCOLOG-II) cream Apply topically 2 (two) times a day 30 g 3   • omeprazole (PriLOSEC) 40 MG capsule Take 1 capsule (40 mg total) by mouth daily 90 capsule 1   • potassium chloride (Klor-Con M20) 20 mEq tablet Take 1 tablet (20 mEq total) by mouth 2 (two)  "times a day 60 tablet 5   • venlafaxine (EFFEXOR) 37.5 mg tablet TAKE 1 TABLET BY MOUTH DAILY 90 tablet 1     No current facility-administered medications on file prior to visit.       Objective   Vitals: Height 5' 1\" (1.549 m), weight 94.1 kg (207 lb 6.4 oz), not currently breastfeeding.,Body mass index is 39.19 kg/m².    PE:  AAOx 3  WDWN  Hearing intact, no drainage from eyes  Regular rate  no audible wheezing  no abdominal distension  LE compartments soft, skin intact    bilateralknee:    Appearance:  Yes  swelling   No  ecchymosis  Yes  obvious joint deformity   No  effusion   Palpation/Tenderness:  Yes  TTP over medial joint line  No  TTP over lateral joint line   No  TTP over patella  No  TTP over patellar tendon  Yes  TTP over pes anserine bursa  Active Range of Motion:  AROM: 3- 110  Special Tests:  Valgus Stress Test: negative  Varus Stress Test: negative        Procedures      Scribe Attestation    I,:   am acting as a scribe while in the presence of the attending physician.:       I,:   personally performed the services described in this documentation    as scribed in my presence.:              "

## 2025-01-24 DIAGNOSIS — I10 PRIMARY HYPERTENSION: ICD-10-CM

## 2025-01-24 DIAGNOSIS — K21.00 GASTROESOPHAGEAL REFLUX DISEASE WITH ESOPHAGITIS WITHOUT HEMORRHAGE: ICD-10-CM

## 2025-01-24 RX ORDER — NEBIVOLOL 10 MG/1
TABLET ORAL
Qty: 90 TABLET | Refills: 1 | Status: SHIPPED | OUTPATIENT
Start: 2025-01-24

## 2025-01-24 RX ORDER — OMEPRAZOLE 40 MG/1
40 CAPSULE, DELAYED RELEASE ORAL DAILY
Qty: 90 CAPSULE | Refills: 1 | Status: SHIPPED | OUTPATIENT
Start: 2025-01-24

## 2025-01-29 ENCOUNTER — PROCEDURE VISIT (OUTPATIENT)
Dept: OBGYN CLINIC | Facility: MEDICAL CENTER | Age: 80
End: 2025-01-29
Payer: COMMERCIAL

## 2025-01-29 VITALS — BODY MASS INDEX: 39.08 KG/M2 | WEIGHT: 207 LBS | HEIGHT: 61 IN

## 2025-01-29 DIAGNOSIS — M17.12 PRIMARY OSTEOARTHRITIS OF LEFT KNEE: ICD-10-CM

## 2025-01-29 DIAGNOSIS — M17.11 PRIMARY OSTEOARTHRITIS OF RIGHT KNEE: Primary | ICD-10-CM

## 2025-01-29 PROCEDURE — 20610 DRAIN/INJ JOINT/BURSA W/O US: CPT | Performed by: ORTHOPAEDIC SURGERY

## 2025-01-29 NOTE — PROGRESS NOTES
"No diagnosis found.  Patient is here for her 2nd injection of Synvisc 3 into the bilateral knee.     Patient rates their pain as 3/10 today    Physical exam of the knee shows no effusion no ecchymosis.      Large joint arthrocentesis: bilateral knee  Universal Protocol:  procedure performed by consultantConsent: Verbal consent obtained. Written consent not obtained.  Risks and benefits: risks, benefits and alternatives were discussed  Consent given by: patient  Time out: Immediately prior to procedure a \"time out\" was called to verify the correct patient, procedure, equipment, support staff and site/side marked as required.  Timeout called at: 1/29/2025 11:57 AM.  Patient understanding: patient states understanding of the procedure being performed  Site marked: the operative site was marked  Patient identity confirmed: verbally with patient  Supporting Documentation  Indications: pain   Procedure Details  Location: knee - bilateral knee  Needle size: 22 G    Medications (Right): 16 mg hylan 16 MG/2MLMedications (Left): 16 mg hylan 16 MG/2ML   Patient tolerance: patient tolerated the procedure well with no immediate complications  Dressing:  Sterile dressing applied          Patient tolerated procedure follow up one week for her 3rd Synvisc 3 injection  and bilateral pes bursa injections   "

## 2025-02-05 ENCOUNTER — PROCEDURE VISIT (OUTPATIENT)
Dept: OBGYN CLINIC | Facility: MEDICAL CENTER | Age: 80
End: 2025-02-05
Payer: COMMERCIAL

## 2025-02-05 VITALS — RESPIRATION RATE: 4 BRPM | BODY MASS INDEX: 38.78 KG/M2 | WEIGHT: 205.4 LBS | HEIGHT: 61 IN

## 2025-02-05 DIAGNOSIS — M17.0 BILATERAL PRIMARY OSTEOARTHRITIS OF KNEE: Primary | ICD-10-CM

## 2025-02-05 PROCEDURE — 20610 DRAIN/INJ JOINT/BURSA W/O US: CPT | Performed by: ORTHOPAEDIC SURGERY

## 2025-02-05 NOTE — PROGRESS NOTES
1. Bilateral primary osteoarthritis of knee          Patient is here for her third injection of Synvisc into the bilateral knee.     Patient rates their pain as 3/10 today    Physical exam of the knee shows no effusion no ecchymosis.      Large joint arthrocentesis: bilateral knee  Universal Protocol:  procedure performed by consultantConsent: Verbal consent obtained. Written consent not obtained.  Risks and benefits: risks, benefits and alternatives were discussed  Consent given by: patient  Patient understanding: patient states understanding of the procedure being performed  Patient consent: the patient's understanding of the procedure matches consent given  Patient identity confirmed: verbally with patient  Supporting Documentation  Indications: pain and joint swelling   Procedure Details  Location: knee - bilateral knee  Needle size: 22 G  Ultrasound guidance: no  Approach: lateral    Medications (Right): 16 mg hylan 16 MG/2MLMedications (Left): 16 mg hylan 16 MG/2ML   Patient tolerance: patient tolerated the procedure well with no immediate complications  Dressing:  Sterile dressing applied          Patient tolerated procedure follow up 2 months to assess response from injections.

## 2025-02-05 NOTE — PROGRESS NOTES
1. Bilateral primary osteoarthritis of knee  Large joint arthrocentesis: bilateral knee    CANCELED: Large joint arthrocentesis: bilateral knee      Has severe bilateral knee osteoarthritis.  Patient has right TKA reserved for 6/2/2025 and left TKA date reserved for 10/6/2025  Patient is here for her third injection of Synvisc into the bilateral knee.   Physical exam of the knee shows no effusion no ecchymosis.    Patient has bilateral pes anserine bursitis.  Patient will follow-up in 2-3 weeks to receive bilateral pes anserine bursa steroid injection.  Patient will then follow-up on 4/5/2025 to have left knee CSI and right TKA consent      Large joint arthrocentesis: bilateral knee  Universal Protocol:  procedure performed by consultantConsent: Verbal consent obtained.  Risks and benefits: risks, benefits and alternatives were discussed  Consent given by: patient  Patient understanding: patient states understanding of the procedure being performed  Site marked: the operative site was marked  Patient identity confirmed: verbally with patient  Supporting Documentation  Indications: pain   Procedure Details  Location: knee - bilateral knee  Needle size: 22 G  Ultrasound guidance: no  Approach: anterolateral    Medications (Right): 16 mg hylan 16 MG/2MLMedications (Left): 16 mg hylan 16 MG/2ML   Patient tolerance: patient tolerated the procedure well with no immediate complications  Dressing:  Sterile dressing applied

## 2025-02-07 ENCOUNTER — RA CDI HCC (OUTPATIENT)
Dept: OTHER | Facility: HOSPITAL | Age: 80
End: 2025-02-07

## 2025-02-10 ENCOUNTER — OFFICE VISIT (OUTPATIENT)
Age: 80
End: 2025-02-10
Payer: COMMERCIAL

## 2025-02-10 VITALS
HEART RATE: 76 BPM | WEIGHT: 206.8 LBS | DIASTOLIC BLOOD PRESSURE: 82 MMHG | OXYGEN SATURATION: 96 % | SYSTOLIC BLOOD PRESSURE: 138 MMHG | BODY MASS INDEX: 39.04 KG/M2 | HEIGHT: 61 IN | TEMPERATURE: 98.4 F

## 2025-02-10 DIAGNOSIS — F32.A DEPRESSION, UNSPECIFIED DEPRESSION TYPE: Primary | ICD-10-CM

## 2025-02-10 DIAGNOSIS — Z00.00 MEDICARE ANNUAL WELLNESS VISIT, SUBSEQUENT: ICD-10-CM

## 2025-02-10 DIAGNOSIS — Z23 ENCOUNTER FOR IMMUNIZATION: ICD-10-CM

## 2025-02-10 DIAGNOSIS — L30.9 DERMATITIS: ICD-10-CM

## 2025-02-10 PROCEDURE — G0439 PPPS, SUBSEQ VISIT: HCPCS

## 2025-02-10 PROCEDURE — 99214 OFFICE O/P EST MOD 30 MIN: CPT | Performed by: FAMILY MEDICINE

## 2025-02-10 PROCEDURE — G2211 COMPLEX E/M VISIT ADD ON: HCPCS | Performed by: FAMILY MEDICINE

## 2025-02-10 PROCEDURE — 90677 PCV20 VACCINE IM: CPT

## 2025-02-10 PROCEDURE — G0009 ADMIN PNEUMOCOCCAL VACCINE: HCPCS

## 2025-02-10 RX ORDER — NYSTATIN AND TRIAMCINOLONE ACETONIDE 100000; 1 [USP'U]/G; MG/G
CREAM TOPICAL 2 TIMES DAILY
Qty: 30 G | Refills: 3 | Status: SHIPPED | OUTPATIENT
Start: 2025-02-10

## 2025-02-10 RX ORDER — MOMETASONE FUROATE 1 MG/G
1 CREAM TOPICAL DAILY
Qty: 45 G | Refills: 3 | Status: SHIPPED | OUTPATIENT
Start: 2025-02-10

## 2025-02-10 RX ORDER — VENLAFAXINE 37.5 MG/1
37.5 TABLET ORAL DAILY
Qty: 90 TABLET | Refills: 1 | Status: SHIPPED | OUTPATIENT
Start: 2025-02-10

## 2025-02-10 NOTE — PROGRESS NOTES
Name: Jeny Collazo      : 1945      MRN: [id number]  Encounter Provider: Dylon Morris DO  Encounter Date: 2/10/2025   Encounter department: [facility] PRIMARY CARE    Assessment & Plan  Depression, unspecified depression type      Orders:    venlafaxine (EFFEXOR) 37.5 mg tablet; Take 1 tablet (37.5 mg total) by mouth daily    Dermatitis    Orders:    nystatin-triamcinolone (MYCOLOG-II) cream; Apply topically 2 (two) times a day    mometasone (ELOCON) 0.1 % cream; Apply 1 Application topically daily    Medicare annual wellness visit, subsequent         Encounter for immunization    Orders:    Pneumococcal Conjugate Vaccine 20-valent (Pcv20)      Depression Screening and Follow-up Plan: Patient was screened for depression during today's encounter. They screened negative with a PHQ-2 score of 0.      Preventive health issues were discussed with patient, and age appropriate screening tests were ordered as noted in patient's After Visit Summary. Personalized health advice and appropriate referrals for health education or preventive services given if needed, as noted in patient's After Visit Summary.    History of Present Illness     Patient is here to follow up on chronic conditions as well as Medicare wellness exam.  Urination defecation stable at this time.  Shortness of breath unchanged.  No chest pain.  No headache or visual changes.  Patient will need medications refilled.  Dermatitis stable at this time.  Patient does notice bruising on arms.       Patient Care Team:  Cortez Morris DO as PCP - General  Audrey Sandoval MD as PCP - Endocrinology (Endocrinology)  Cortez Morris DO as PCP - PCP-[facility]  DO Lucas Kaur MD Roderick Quiros, MD as Surgeon (Surgical Oncology)    Review of Systems   Constitutional: Negative.    HENT: Negative.     Eyes: Negative.    Respiratory:  Positive for shortness of breath.    Cardiovascular: Negative.     Gastrointestinal: Negative.    Endocrine: Negative.    Genitourinary: Negative.    Musculoskeletal:  Positive for arthralgias.   Skin:  Positive for rash.   Allergic/Immunologic: Negative.    Neurological: Negative.    Hematological: Negative.    Psychiatric/Behavioral: Negative.       Medical History Reviewed by provider this encounter:  Tobacco  Allergies  Meds  Problems  Med Hx  Surg Hx  Fam Hx       Annual Wellness Visit Questionnaire   Jeny is here for her Subsequent Wellness visit.     Health Risk Assessment:   Patient rates overall health as fair. Patient feels that their physical health rating is same. Patient is satisfied with their life. Eyesight was rated as same. Hearing was rated as same. Patient feels that their emotional and mental health rating is same. Patients states they are never, rarely angry. Patient states they are sometimes unusually tired/fatigued. Pain experienced in the last 7 days has been some. Patient's pain rating has been 4/10. Patient states that she has experienced no weight loss or gain in last 6 months.     Depression Screening:   PHQ-2 Score: 0      Fall Risk Screening:   In the past year, patient has experienced: history of falling in past year      Urinary Incontinence Screening:   Patient has not leaked urine accidently in the last six months.     Home Safety:  Patient has trouble with stairs inside or outside of their home. Patient has working smoke alarms and has no working carbon monoxide detector. Home safety hazards include: none.     Nutrition:   Current diet is Regular.     Medications:   Patient is currently taking over-the-counter supplements. OTC medications include: see medication list. Patient is able to manage medications.     Activities of Daily Living (ADLs)/Instrumental Activities of Daily Living (IADLs):   Walk and transfer into and out of bed and chair?: Yes  Dress and groom yourself?: Yes    Bathe or shower yourself?: Yes    Feed yourself? Yes  Do  your laundry/housekeeping?: Yes  Manage your money, pay your bills and track your expenses?: Yes  Make your own meals?: Yes    Do your own shopping?: Yes    Previous Hospitalizations:   Any hospitalizations or ED visits within the last 12 months?: No      Advance Care Planning:   Living will: Yes    Durable POA for healthcare: Yes    Advanced directive: Yes      Cognitive Screening:   Provider or family/friend/caregiver concerned regarding cognition?: No    PREVENTIVE SCREENINGS      Cardiovascular Screening:    General: Screening Not Indicated, History Lipid Disorder, Risks and Benefits Discussed and Screening Current    Due for: Lipid Panel      Diabetes Screening:     General: Risks and Benefits Discussed and Screening Current      Colorectal Cancer Screening:     General: Risks and Benefits Discussed and Screening Not Indicated      Breast Cancer Screening:     General: Risks and Benefits Discussed and Screening Not Indicated      Cervical Cancer Screening:    General: Screening Not Indicated and Risks and Benefits Discussed      Osteoporosis Screening:    General: Risks and Benefits Discussed and Patient Declines      Abdominal Aortic Aneurysm (AAA) Screening:        General: Risks and Benefits Discussed and Screening Not Indicated      Lung Cancer Screening:     General: Screening Not Indicated and Risks and Benefits Discussed      Hepatitis C Screening:    General: Screening Current and Risks and Benefits Discussed    Screening, Brief Intervention, and Referral to Treatment (SBIRT)    Screening  Typical number of drinks in a day: 0  Typical number of drinks in a week: 0  Interpretation: Low risk drinking behavior.    AUDIT-C Screenin) How often did you have a drink containing alcohol in the past year? monthly or less  2) How many drinks did you have on a typical day when you were drinking in the past year? 0  3) How often did you have 6 or more drinks on one occasion in the past year? never    AUDIT-C  "Score: 1  Interpretation: Score 0-2 (female): Negative screen for alcohol misuse    Single Item Drug Screening:  How often have you used an illegal drug (including marijuana) or a prescription medication for non-medical reasons in the past year? never    Single Item Drug Screen Score: 0  Interpretation: Negative screen for possible drug use disorder    Other Counseling Topics:   Regular weightbearing exercise and calcium and vitamin D intake.     Social Drivers of Health     Financial Resource Strain: Low Risk  (1/12/2024)    Overall Financial Resource Strain (CARDIA)     Difficulty of Paying Living Expenses: Not hard at all   Food Insecurity: No Food Insecurity (2/7/2025)    Hunger Vital Sign     Worried About Running Out of Food in the Last Year: Never true     Ran Out of Food in the Last Year: Never true   Transportation Needs: No Transportation Needs (2/7/2025)    PRAPARE - Transportation     Lack of Transportation (Medical): No     Lack of Transportation (Non-Medical): No   Housing Stability: Unknown (2/7/2025)    Housing Stability Vital Sign     Unable to Pay for Housing in the Last Year: No     Homeless in the Last Year: No   Utilities: Not At Risk (2/7/2025)    [facility] Utilities     Threatened with loss of utilities: No     No results found.    Objective   /82 (BP Location: Left arm, Patient Position: Sitting, Cuff Size: Large)   Pulse 76   Temp 98.4 °F (36.9 °C) (Temporal)   Ht 5' 1\" (1.549 m)   Wt 93.8 kg (206 lb 12.8 oz)   LMP  (LMP Unknown)   SpO2 96%   BMI 39.07 kg/m²     Physical Exam  Vitals and nursing note reviewed.   Constitutional:       General: She is not in acute distress.     Appearance: Normal appearance. She is well-developed. She is not ill-appearing, toxic-appearing or diaphoretic.   HENT:      Head: Normocephalic and atraumatic.      Right Ear: Tympanic membrane, ear canal and external ear normal. There is no impacted cerumen.      Left Ear: Tympanic membrane, ear canal and " external ear normal. There is no impacted cerumen.      Nose: Nose normal. No congestion or rhinorrhea.      Mouth/Throat:      Mouth: Mucous membranes are moist.      Pharynx: No oropharyngeal exudate or posterior oropharyngeal erythema.   Eyes:      General:         Right eye: No discharge.         Left eye: No discharge.   Neck:      Thyroid: No thyromegaly.      Vascular: No carotid bruit.      Trachea: No tracheal deviation.   Cardiovascular:      Rate and Rhythm: Normal rate and regular rhythm.      Heart sounds: Normal heart sounds. No murmur heard.     No gallop.   Pulmonary:      Effort: Pulmonary effort is normal. No respiratory distress.      Breath sounds: Normal breath sounds. No stridor. No wheezing or rales.   Chest:      Chest wall: No tenderness.   Musculoskeletal:         General: Tenderness present. No deformity.      Cervical back: Normal range of motion and neck supple.   Lymphadenopathy:      Cervical: No cervical adenopathy.   Skin:     General: Skin is warm and dry.      Coloration: Skin is not pale.      Findings: Bruising present. No erythema or rash.   Neurological:      Mental Status: She is alert and oriented to person, place, and time. Mental status is at baseline.      Cranial Nerves: No cranial nerve deficit.      Motor: No abnormal muscle tone.      Coordination: Coordination normal.      Gait: Gait normal.      Deep Tendon Reflexes: Reflexes normal.   Psychiatric:         Mood and Affect: Mood normal.         Behavior: Behavior normal.         Thought Content: Thought content normal.         Judgment: Judgment normal.

## 2025-02-10 NOTE — ASSESSMENT & PLAN NOTE
Orders:    nystatin-triamcinolone (MYCOLOG-II) cream; Apply topically 2 (two) times a day    mometasone (ELOCON) 0.1 % cream; Apply 1 Application topically daily

## 2025-02-10 NOTE — PATIENT INSTRUCTIONS
Medicare Preventive Visit Patient Instructions  Thank you for completing your Welcome to Medicare Visit or Medicare Annual Wellness Visit today. Your next wellness visit will be due in one year (2/11/2026).  The screening/preventive services that you may require over the next 5-10 years are detailed below. Some tests may not apply to you based off risk factors and/or age. Screening tests ordered at today's visit but not completed yet may show as past due. Also, please note that scanned in results may not display below.  Preventive Screenings:  Service Recommendations Previous Testing/Comments   Colorectal Cancer Screening  * Colonoscopy    * Fecal Occult Blood Test (FOBT)/Fecal Immunochemical Test (FIT)  * Fecal DNA/Cologuard Test  * Flexible Sigmoidoscopy Age: 45-75 years old   Colonoscopy: every 10 years (may be performed more frequently if at higher risk)  OR  FOBT/FIT: every 1 year  OR  Cologuard: every 3 years  OR  Sigmoidoscopy: every 5 years  Screening may be recommended earlier than age 45 if at higher risk for colorectal cancer. Also, an individualized decision between you and your healthcare provider will decide whether screening between the ages of 76-85 would be appropriate. Colonoscopy: Not on file  FOBT/FIT: Not on file  Cologuard: Not on file  Sigmoidoscopy: Not on file          Breast Cancer Screening Age: 40+ years old  Frequency: every 1-2 years  Not required if history of left and right mastectomy Mammogram: Not on file        Cervical Cancer Screening Between the ages of 21-29, pap smear recommended once every 3 years.   Between the ages of 30-65, can perform pap smear with HPV co-testing every 5 years.   Recommendations may differ for women with a history of total hysterectomy, cervical cancer, or abnormal pap smears in past. Pap Smear: Not on file    Screening Not Indicated   Hepatitis C Screening Once for adults born between 1945 and 1965  More frequently in patients at high risk for Hepatitis  C Hep C Antibody: 03/11/2019    Screening Current   Diabetes Screening 1-2 times per year if you're at risk for diabetes or have pre-diabetes Fasting glucose: 145 mg/dL (7/1/2021)  A1C: 6.3 % (10/15/2020)      Cholesterol Screening Once every 5 years if you don't have a lipid disorder. May order more often based on risk factors. Lipid panel: 07/01/2021    Screening Not Indicated  History Lipid Disorder     Other Preventive Screenings Covered by Medicare:  Abdominal Aortic Aneurysm (AAA) Screening: covered once if your at risk. You're considered to be at risk if you have a family history of AAA.  Lung Cancer Screening: covers low dose CT scan once per year if you meet all of the following conditions: (1) Age 55-77; (2) No signs or symptoms of lung cancer; (3) Current smoker or have quit smoking within the last 15 years; (4) You have a tobacco smoking history of at least 20 pack years (packs per day multiplied by number of years you smoked); (5) You get a written order from a healthcare provider.  Glaucoma Screening: covered annually if you're considered high risk: (1) You have diabetes OR (2) Family history of glaucoma OR (3)  aged 50 and older OR (4)  American aged 65 and older  Osteoporosis Screening: covered every 2 years if you meet one of the following conditions: (1) You're estrogen deficient and at risk for osteoporosis based off medical history and other findings; (2) Have a vertebral abnormality; (3) On glucocorticoid therapy for more than 3 months; (4) Have primary hyperparathyroidism; (5) On osteoporosis medications and need to assess response to drug therapy.   Last bone density test (DXA Scan): 09/03/2013.  HIV Screening: covered annually if you're between the age of 15-65. Also covered annually if you are younger than 15 and older than 65 with risk factors for HIV infection. For pregnant patients, it is covered up to 3 times per pregnancy.    Immunizations:  Immunization  Recommendations   Influenza Vaccine Annual influenza vaccination during flu season is recommended for all persons aged >= 6 months who do not have contraindications   Pneumococcal Vaccine   * Pneumococcal conjugate vaccine = PCV13 (Prevnar 13), PCV15 (Vaxneuvance), PCV20 (Prevnar 20)  * Pneumococcal polysaccharide vaccine = PPSV23 (Pneumovax) Adults 19-63 yo with certain risk factors or if 65+ yo  If never received any pneumonia vaccine: recommend Prevnar 20 (PCV20)  Give PCV20 if previously received 1 dose of PCV13 or PPSV23   Hepatitis B Vaccine 3 dose series if at intermediate or high risk (ex: diabetes, end stage renal disease, liver disease)   Respiratory syncytial virus (RSV) Vaccine - COVERED BY MEDICARE PART D  * RSVPreF3 (Arexvy) CDC recommends that adults 60 years of age and older may receive a single dose of RSV vaccine using shared clinical decision-making (SCDM)   Tetanus (Td) Vaccine - COST NOT COVERED BY MEDICARE PART B Following completion of primary series, a booster dose should be given every 10 years to maintain immunity against tetanus. Td may also be given as tetanus wound prophylaxis.   Tdap Vaccine - COST NOT COVERED BY MEDICARE PART B Recommended at least once for all adults. For pregnant patients, recommended with each pregnancy.   Shingles Vaccine (Shingrix) - COST NOT COVERED BY MEDICARE PART B  2 shot series recommended in those 19 years and older who have or will have weakened immune systems or those 50 years and older     Health Maintenance Due:      Topic Date Due   • Breast Cancer Screening: Mammogram  Never done   • Hepatitis C Screening  Completed     Immunizations Due:  There are no preventive care reminders to display for this patient.  Advance Directives   What are advance directives?  Advance directives are legal documents that state your wishes and plans for medical care. These plans are made ahead of time in case you lose your ability to make decisions for yourself. Advance  directives can apply to any medical decision, such as the treatments you want, and if you want to donate organs.   What are the types of advance directives?  There are many types of advance directives, and each state has rules about how to use them. You may choose a combination of any of the following:  Living will:  This is a written record of the treatment you want. You can also choose which treatments you do not want, which to limit, and which to stop at a certain time. This includes surgery, medicine, IV fluid, and tube feedings.   Durable power of  for healthcare (DPAHC):  This is a written record that states who you want to make healthcare choices for you when you are unable to make them for yourself. This person, called a proxy, is usually a family member or a friend. You may choose more than 1 proxy.  Do not resuscitate (DNR) order:  A DNR order is used in case your heart stops beating or you stop breathing. It is a request not to have certain forms of treatment, such as CPR. A DNR order may be included in other types of advance directives.  Medical directive:  This covers the care that you want if you are in a coma, near death, or unable to make decisions for yourself. You can list the treatments you want for each condition. Treatment may include pain medicine, surgery, blood transfusions, dialysis, IV or tube feedings, and a ventilator (breathing machine).  Values history:  This document has questions about your views, beliefs, and how you feel and think about life. This information can help others choose the care that you would choose.  Why are advance directives important?  An advance directive helps you control your care. Although spoken wishes may be used, it is better to have your wishes written down. Spoken wishes can be misunderstood, or not followed. Treatments may be given even if you do not want them. An advance directive may make it easier for your family to make difficult choices about  your care.   Fall Prevention    Fall prevention  includes ways to make your home and other areas safer. It also includes ways you can move more carefully to prevent a fall. Health conditions that cause changes in your blood pressure, vision, or muscle strength and coordination may increase your risk for falls. Medicines may also increase your risk for falls if they make you dizzy, weak, or sleepy.   Fall prevention tips:   Stand or sit up slowly.    Use assistive devices as directed.    Wear shoes that fit well and have soles that .    Wear a personal alarm.    Stay active.    Manage your medical conditions.    Home Safety Tips:  Add items to prevent falls in the bathroom.    Keep paths clear.    Install bright lights in your home.    Keep items you use often on shelves within reach.    Paint or place reflective tape on the edges of your stairs.    Weight Management   Why it is important to manage your weight:  Being overweight increases your risk of health conditions such as heart disease, high blood pressure, type 2 diabetes, and certain types of cancer. It can also increase your risk for osteoarthritis, sleep apnea, and other respiratory problems. Aim for a slow, steady weight loss. Even a small amount of weight loss can lower your risk of health problems.  How to lose weight safely:  A safe and healthy way to lose weight is to eat fewer calories and get regular exercise. You can lose up about 1 pound a week by decreasing the number of calories you eat by 500 calories each day.   Healthy meal plan for weight management:  A healthy meal plan includes a variety of foods, contains fewer calories, and helps you stay healthy. A healthy meal plan includes the following:  Eat whole-grain foods more often.  A healthy meal plan should contain fiber. Fiber is the part of grains, fruits, and vegetables that is not broken down by your body. Whole-grain foods are healthy and provide extra fiber in your diet. Some examples  of whole-grain foods are whole-wheat breads and pastas, oatmeal, brown rice, and bulgur.  Eat a variety of vegetables every day.  Include dark, leafy greens such as spinach, kale, laura greens, and mustard greens. Eat yellow and orange vegetables such as carrots, sweet potatoes, and winter squash.   Eat a variety of fruits every day.  Choose fresh or canned fruit (canned in its own juice or light syrup) instead of juice. Fruit juice has very little or no fiber.  Eat low-fat dairy foods.  Drink fat-free (skim) milk or 1% milk. Eat fat-free yogurt and low-fat cottage cheese. Try low-fat cheeses such as mozzarella and other reduced-fat cheeses.  Choose meat and other protein foods that are low in fat.  Choose beans or other legumes such as split peas or lentils. Choose fish, skinless poultry (chicken or turkey), or lean cuts of red meat (beef or pork). Before you cook meat or poultry, cut off any visible fat.   Use less fat and oil.  Try baking foods instead of frying them. Add less fat, such as margarine, sour cream, regular salad dressing and mayonnaise to foods. Eat fewer high-fat foods. Some examples of high-fat foods include french fries, doughnuts, ice cream, and cakes.  Eat fewer sweets.  Limit foods and drinks that are high in sugar. This includes candy, cookies, regular soda, and sweetened drinks.  Exercise:  Exercise at least 30 minutes per day on most days of the week. Some examples of exercise include walking, biking, dancing, and swimming. You can also fit in more physical activity by taking the stairs instead of the elevator or parking farther away from stores. Ask your healthcare provider about the best exercise plan for you.      © Copyright [employer] 2018 Information is for End User's use only and may not be sold, redistributed or otherwise used for commercial purposes. All illustrations and images included in CareNotes® are the copyrighted property of [employer] or [employer]  [facility]      Medicare Preventive Visit Patient Instructions  Thank you for completing your Welcome to Medicare Visit or Medicare Annual Wellness Visit today. Your next wellness visit will be due in one year (2/11/2026).  The screening/preventive services that you may require over the next 5-10 years are detailed below. Some tests may not apply to you based off risk factors and/or age. Screening tests ordered at today's visit but not completed yet may show as past due. Also, please note that scanned in results may not display below.  Preventive Screenings:  Service Recommendations Previous Testing/Comments   Colorectal Cancer Screening  * Colonoscopy    * Fecal Occult Blood Test (FOBT)/Fecal Immunochemical Test (FIT)  * Fecal DNA/Cologuard Test  * Flexible Sigmoidoscopy Age: 45-75 years old   Colonoscopy: every 10 years (may be performed more frequently if at higher risk)  OR  FOBT/FIT: every 1 year  OR  Cologuard: every 3 years  OR  Sigmoidoscopy: every 5 years  Screening may be recommended earlier than age 45 if at higher risk for colorectal cancer. Also, an individualized decision between you and your healthcare provider will decide whether screening between the ages of 76-85 would be appropriate. Colonoscopy: Not on file  FOBT/FIT: Not on file  Cologuard: Not on file  Sigmoidoscopy: Not on file          Breast Cancer Screening Age: 40+ years old  Frequency: every 1-2 years  Not required if history of left and right mastectomy Mammogram: Not on file        Cervical Cancer Screening Between the ages of 21-29, pap smear recommended once every 3 years.   Between the ages of 30-65, can perform pap smear with HPV co-testing every 5 years.   Recommendations may differ for women with a history of total hysterectomy, cervical cancer, or abnormal pap smears in past. Pap Smear: Not on file    Screening Not Indicated   Hepatitis C Screening Once for adults born between 1945 and 1965  More frequently in patients at high risk  for Hepatitis C Hep C Antibody: 03/11/2019    Screening Current   Diabetes Screening 1-2 times per year if you're at risk for diabetes or have pre-diabetes Fasting glucose: 145 mg/dL (7/1/2021)  A1C: 6.3 % (10/15/2020)      Cholesterol Screening Once every 5 years if you don't have a lipid disorder. May order more often based on risk factors. Lipid panel: 07/01/2021    Screening Not Indicated  History Lipid Disorder     Other Preventive Screenings Covered by Medicare:  Abdominal Aortic Aneurysm (AAA) Screening: covered once if your at risk. You're considered to be at risk if you have a family history of AAA.  Lung Cancer Screening: covers low dose CT scan once per year if you meet all of the following conditions: (1) Age 55-77; (2) No signs or symptoms of lung cancer; (3) Current smoker or have quit smoking within the last 15 years; (4) You have a tobacco smoking history of at least 20 pack years (packs per day multiplied by number of years you smoked); (5) You get a written order from a healthcare provider.  Glaucoma Screening: covered annually if you're considered high risk: (1) You have diabetes OR (2) Family history of glaucoma OR (3)  aged 50 and older OR (4)  American aged 65 and older  Osteoporosis Screening: covered every 2 years if you meet one of the following conditions: (1) You're estrogen deficient and at risk for osteoporosis based off medical history and other findings; (2) Have a vertebral abnormality; (3) On glucocorticoid therapy for more than 3 months; (4) Have primary hyperparathyroidism; (5) On osteoporosis medications and need to assess response to drug therapy.   Last bone density test (DXA Scan): 09/03/2013.  HIV Screening: covered annually if you're between the age of 15-65. Also covered annually if you are younger than 15 and older than 65 with risk factors for HIV infection. For pregnant patients, it is covered up to 3 times per  pregnancy.    Immunizations:  Immunization Recommendations   Influenza Vaccine Annual influenza vaccination during flu season is recommended for all persons aged >= 6 months who do not have contraindications   Pneumococcal Vaccine   * Pneumococcal conjugate vaccine = PCV13 (Prevnar 13), PCV15 (Vaxneuvance), PCV20 (Prevnar 20)  * Pneumococcal polysaccharide vaccine = PPSV23 (Pneumovax) Adults 19-65 yo with certain risk factors or if 65+ yo  If never received any pneumonia vaccine: recommend Prevnar 20 (PCV20)  Give PCV20 if previously received 1 dose of PCV13 or PPSV23   Hepatitis B Vaccine 3 dose series if at intermediate or high risk (ex: diabetes, end stage renal disease, liver disease)   Respiratory syncytial virus (RSV) Vaccine - COVERED BY MEDICARE PART D  * RSVPreF3 (Arexvy) CDC recommends that adults 60 years of age and older may receive a single dose of RSV vaccine using shared clinical decision-making (SCDM)   Tetanus (Td) Vaccine - COST NOT COVERED BY MEDICARE PART B Following completion of primary series, a booster dose should be given every 10 years to maintain immunity against tetanus. Td may also be given as tetanus wound prophylaxis.   Tdap Vaccine - COST NOT COVERED BY MEDICARE PART B Recommended at least once for all adults. For pregnant patients, recommended with each pregnancy.   Shingles Vaccine (Shingrix) - COST NOT COVERED BY MEDICARE PART B  2 shot series recommended in those 19 years and older who have or will have weakened immune systems or those 50 years and older     Health Maintenance Due:      Topic Date Due   • Breast Cancer Screening: Mammogram  Never done   • Hepatitis C Screening  Completed     Immunizations Due:  There are no preventive care reminders to display for this patient.  Advance Directives   What are advance directives?  Advance directives are legal documents that state your wishes and plans for medical care. These plans are made ahead of time in case you lose your ability  to make decisions for yourself. Advance directives can apply to any medical decision, such as the treatments you want, and if you want to donate organs.   What are the types of advance directives?  There are many types of advance directives, and each state has rules about how to use them. You may choose a combination of any of the following:  Living will:  This is a written record of the treatment you want. You can also choose which treatments you do not want, which to limit, and which to stop at a certain time. This includes surgery, medicine, IV fluid, and tube feedings.   Durable power of  for healthcare (DPAHC):  This is a written record that states who you want to make healthcare choices for you when you are unable to make them for yourself. This person, called a proxy, is usually a family member or a friend. You may choose more than 1 proxy.  Do not resuscitate (DNR) order:  A DNR order is used in case your heart stops beating or you stop breathing. It is a request not to have certain forms of treatment, such as CPR. A DNR order may be included in other types of advance directives.  Medical directive:  This covers the care that you want if you are in a coma, near death, or unable to make decisions for yourself. You can list the treatments you want for each condition. Treatment may include pain medicine, surgery, blood transfusions, dialysis, IV or tube feedings, and a ventilator (breathing machine).  Values history:  This document has questions about your views, beliefs, and how you feel and think about life. This information can help others choose the care that you would choose.  Why are advance directives important?  An advance directive helps you control your care. Although spoken wishes may be used, it is better to have your wishes written down. Spoken wishes can be misunderstood, or not followed. Treatments may be given even if you do not want them. An advance directive may make it easier for your  family to make difficult choices about your care.   Fall Prevention    Fall prevention  includes ways to make your home and other areas safer. It also includes ways you can move more carefully to prevent a fall. Health conditions that cause changes in your blood pressure, vision, or muscle strength and coordination may increase your risk for falls. Medicines may also increase your risk for falls if they make you dizzy, weak, or sleepy.   Fall prevention tips:   Stand or sit up slowly.    Use assistive devices as directed.    Wear shoes that fit well and have soles that .    Wear a personal alarm.    Stay active.    Manage your medical conditions.    Home Safety Tips:  Add items to prevent falls in the bathroom.    Keep paths clear.    Install bright lights in your home.    Keep items you use often on shelves within reach.    Paint or place reflective tape on the edges of your stairs.    Weight Management   Why it is important to manage your weight:  Being overweight increases your risk of health conditions such as heart disease, high blood pressure, type 2 diabetes, and certain types of cancer. It can also increase your risk for osteoarthritis, sleep apnea, and other respiratory problems. Aim for a slow, steady weight loss. Even a small amount of weight loss can lower your risk of health problems.  How to lose weight safely:  A safe and healthy way to lose weight is to eat fewer calories and get regular exercise. You can lose up about 1 pound a week by decreasing the number of calories you eat by 500 calories each day.   Healthy meal plan for weight management:  A healthy meal plan includes a variety of foods, contains fewer calories, and helps you stay healthy. A healthy meal plan includes the following:  Eat whole-grain foods more often.  A healthy meal plan should contain fiber. Fiber is the part of grains, fruits, and vegetables that is not broken down by your body. Whole-grain foods are healthy and provide  extra fiber in your diet. Some examples of whole-grain foods are whole-wheat breads and pastas, oatmeal, brown rice, and bulgur.  Eat a variety of vegetables every day.  Include dark, leafy greens such as spinach, kale, laura greens, and mustard greens. Eat yellow and orange vegetables such as carrots, sweet potatoes, and winter squash.   Eat a variety of fruits every day.  Choose fresh or canned fruit (canned in its own juice or light syrup) instead of juice. Fruit juice has very little or no fiber.  Eat low-fat dairy foods.  Drink fat-free (skim) milk or 1% milk. Eat fat-free yogurt and low-fat cottage cheese. Try low-fat cheeses such as mozzarella and other reduced-fat cheeses.  Choose meat and other protein foods that are low in fat.  Choose beans or other legumes such as split peas or lentils. Choose fish, skinless poultry (chicken or turkey), or lean cuts of red meat (beef or pork). Before you cook meat or poultry, cut off any visible fat.   Use less fat and oil.  Try baking foods instead of frying them. Add less fat, such as margarine, sour cream, regular salad dressing and mayonnaise to foods. Eat fewer high-fat foods. Some examples of high-fat foods include french fries, doughnuts, ice cream, and cakes.  Eat fewer sweets.  Limit foods and drinks that are high in sugar. This includes candy, cookies, regular soda, and sweetened drinks.  Exercise:  Exercise at least 30 minutes per day on most days of the week. Some examples of exercise include walking, biking, dancing, and swimming. You can also fit in more physical activity by taking the stairs instead of the elevator or parking farther away from stores. Ask your healthcare provider about the best exercise plan for you.      © Copyright [employer] 2018 Information is for End User's use only and may not be sold, redistributed or otherwise used for commercial purposes. All illustrations and images included in CareNotes® are the copyrighted property of  [employer] or [employer]

## 2025-02-15 ENCOUNTER — PATIENT MESSAGE (OUTPATIENT)
Age: 80
End: 2025-02-15

## 2025-02-15 DIAGNOSIS — L30.9 DERMATITIS: Primary | ICD-10-CM

## 2025-02-18 RX ORDER — MOMETASONE FUROATE 1 MG/ML
SOLUTION TOPICAL DAILY
Qty: 30 ML | Refills: 5 | Status: SHIPPED | OUTPATIENT
Start: 2025-02-18

## 2025-02-24 DIAGNOSIS — E89.0 HYPOTHYROIDISM, POSTSURGICAL: ICD-10-CM

## 2025-02-24 RX ORDER — LEVOTHYROXINE SODIUM 175 UG/1
175 TABLET ORAL DAILY
Qty: 100 TABLET | Refills: 1 | Status: SHIPPED | OUTPATIENT
Start: 2025-02-24

## 2025-02-26 ENCOUNTER — OFFICE VISIT (OUTPATIENT)
Dept: OBGYN CLINIC | Facility: MEDICAL CENTER | Age: 80
End: 2025-02-26
Payer: COMMERCIAL

## 2025-02-26 VITALS — WEIGHT: 203.2 LBS | HEIGHT: 61 IN | BODY MASS INDEX: 38.36 KG/M2

## 2025-02-26 DIAGNOSIS — M70.51 PES ANSERINUS BURSITIS OF BOTH KNEES: ICD-10-CM

## 2025-02-26 DIAGNOSIS — M17.0 BILATERAL PRIMARY OSTEOARTHRITIS OF KNEE: Primary | ICD-10-CM

## 2025-02-26 DIAGNOSIS — M70.52 PES ANSERINUS BURSITIS OF BOTH KNEES: ICD-10-CM

## 2025-02-26 PROCEDURE — 99213 OFFICE O/P EST LOW 20 MIN: CPT | Performed by: ORTHOPAEDIC SURGERY

## 2025-02-26 PROCEDURE — 20610 DRAIN/INJ JOINT/BURSA W/O US: CPT | Performed by: ORTHOPAEDIC SURGERY

## 2025-02-26 RX ORDER — BUPIVACAINE HYDROCHLORIDE 2.5 MG/ML
1 INJECTION, SOLUTION INFILTRATION; PERINEURAL
Status: COMPLETED | OUTPATIENT
Start: 2025-02-26 | End: 2025-02-26

## 2025-02-26 RX ORDER — TRIAMCINOLONE ACETONIDE 40 MG/ML
40 INJECTION, SUSPENSION INTRA-ARTICULAR; INTRAMUSCULAR
Status: COMPLETED | OUTPATIENT
Start: 2025-02-26 | End: 2025-02-26

## 2025-02-26 RX ADMIN — BUPIVACAINE HYDROCHLORIDE 1 ML: 2.5 INJECTION, SOLUTION INFILTRATION; PERINEURAL at 10:15

## 2025-02-26 RX ADMIN — TRIAMCINOLONE ACETONIDE 40 MG: 40 INJECTION, SUSPENSION INTRA-ARTICULAR; INTRAMUSCULAR at 10:15

## 2025-02-26 NOTE — ASSESSMENT & PLAN NOTE
Patient has severe bilateral knee osteoarthritis and bilateral pes anserine bursitis.    After a discussion of risks and benefits the patient elected to proceed with a bilateral pes anserine bursa steroid injection today.  Patient should ice and avoid strenuous activity for 1-2 days if needed.  Patient should avoid vaccines for 2 weeks if possible.  If patient is diabetic should also monitor glucose over the next 7 to 10 days.    Can take Tylenol 1,000mg by mouth every 8 hours as needed for pain.  Do not exceed 3,000mg per day.    Continue activity as tolerated.  Patient has right TKA reserved for 6/2/2025 and left TKA reserved for 10/6/2025. Patient received 3 series Synvisc gel injections on 2/5/2025.  Patient will follow-up 4/2/2025 to consent for right TKA and have left knee CSI.

## 2025-02-26 NOTE — PROGRESS NOTES
Name: Jeny Collazo      : 1945      MRN: [id number]  Encounter Provider: Laura Quinn DO  Encounter Date: 2025   Encounter department: [facility] [place]  :  Assessment & Plan  Bilateral primary osteoarthritis of knee  Patient has severe bilateral knee osteoarthritis and bilateral pes anserine bursitis.    After a discussion of risks and benefits the patient elected to proceed with a bilateral pes anserine bursa steroid injection today.  Patient should ice and avoid strenuous activity for 1-2 days if needed.  Patient should avoid vaccines for 2 weeks if possible.  If patient is diabetic should also monitor glucose over the next 7 to 10 days.    Can take Tylenol 1,000mg by mouth every 8 hours as needed for pain.  Do not exceed 3,000mg per day.    Continue activity as tolerated.  Patient has right TKA reserved for 2025 and left TKA reserved for 10/6/2025. Patient received 3 series Synvisc gel injections on 2025.  Patient will follow-up 2025 to consent for right TKA and have left knee CSI.       Pes anserinus bursitis of both knees  After a discussion of risks and benefits the patient elected to proceed with a bilateral pes anserine bursa steroid injection today.  Patient should ice and avoid strenuous activity for 1-2 days if needed.  Patient should avoid vaccines for 2 weeks if possible.  If patient is diabetic should also monitor glucose over the next 7 to 10 days.           Return in 5 weeks (on 2025) for Right knee TKA consent and left knee CSI.    I answered all of the patient's questions during the visit and provided education of the patient's condition during the visit.  The patient verbalized understanding of the information given and agrees with the plan.  This note was dictated using [employer] software.  It may contain errors including improperly dictated words.  Please contact physician directly for any questions.    Subjective    Chief Complaint: No chief complaint on file.        History of Present Illness     [facility]    Jeny Collazo is a 79 y.o. female who presents for follow up for severe bilateral knee osteoarthritis and bilateral pes anserine bursitis.  Patient was last seen 2/5/2025 where patient received bilateral knee 3 series Synvisc gel injections.  Patient states she has been receiving symptomatic relief within her knees with gel injections.  Patient states she has been taking Tylenol as needed for pain control.  Patient states she continues to have pain within bilateral Pes anserine bursas.  Patient is interested in pes anserine bursa steroid injections.  Patient does have right TKA reserved for 6/2/2025 and left TKA reserved for 10/6/2025.  Patient states she was able to obtain dental clearance.  Patient has cardiology and SOC clearance scheduled.    History of MRSA: no  History of blood clots: no  Family history of blood clots: no  History of Hepatitis C:no  History of HIV: no  Are you a smoker:no  Are you diabetic:no  Do you see a cardiologist: yes  Have you seen a dentist in the past year: yes  Do you have a spinal cord stimulator: no  Review allergies      Review of Systems  ROS:    See HPI for musculoskeletal review.   All other systems reviewed are negative     History:  Past Medical History:   Diagnosis Date    Asthma     Breathing difficulty     Bronchitis     Depression     Fracture of arm     Fracture of carpal bone     Hyperlipidemia     Hypertension     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HCC) 12/16/2016    Shortness of breath     Thyroid nodule     Thyroid nodule     Thyroid nodule      Past Surgical History:   Procedure Laterality Date    EYE SURGERY  11/2021    HEMORRHOID SURGERY      NJ THYROIDECTOMY RMVL REMAINING TISS FLWG PRTL RMVL Left 6/6/2016    Procedure:  COMPLETION THYROIDECTOMY, FLEXIBLE LARYNGOSCOPY;  Surgeon: David Rivera MD;  Location: [facility];  Service: Surgical Oncology    NJ TOTAL THYROID  LOBECTOMY UNI W/WO ISTHMUSECTOMY Right 2016    Procedure: HEMITHYROIDECTOMY;  Surgeon: David Rivera MD;  Location: [facility];  Service: Surgical Oncology    THYROIDECTOMY, PARTIAL Left      Social History   Social History     Substance and Sexual Activity   Alcohol Use No    Comment: occasional glass of wine     Social History     Substance and Sexual Activity   Drug Use No     Social History     Tobacco Use   Smoking Status Former    Current packs/day: 0.00    Average packs/day: 0.3 packs/day for 4.0 years (1.0 ttl pk-yrs)    Types: Cigarettes    Start date: 6/15/1977    Quit date: 6/15/1981    Years since quittin.7   Smokeless Tobacco Former   Tobacco Comments    quit 40 yrs ago (Never a smoker per [employer])     Family History:   Family History   Adopted: Yes   Problem Relation Age of Onset    Hypertension Family     Kidney disease Family     Hypertension Mother        Current Outpatient Medications on File Prior to Visit   Medication Sig Dispense Refill    albuterol (Ventolin HFA) 90 mcg/act inhaler Inhale 2 puffs every 4 (four) hours as needed for wheezing or shortness of breath 54 g 0    amLODIPine (NORVASC) 5 mg tablet Take 1 tablet (5 mg total) by mouth 2 (two) times a day 180 tablet 1    atorvastatin (LIPITOR) 20 mg tablet Take 1 tablet (20 mg total) by mouth daily 90 tablet 1    Diclofenac Sodium (VOLTAREN) 1 % Apply 2 g topically 4 (four) times a day 300 g 0    Fluticasone Furoate-Vilanterol 100-25 mcg/actuation inhaler Inhale 1 puff daily Rinse mouth after use. 180 blister 1    fluticasone-vilanterol 200-25 mcg/actuation inhaler Inhale 1 puff daily Rinse mouth after use. 180 blister 1    furosemide (LASIX) 40 mg tablet Take 1 tablet (40 mg total) by mouth daily 90 tablet 0    levothyroxine 175 mcg tablet Take 1 tablet (175 mcg total) by mouth daily 100 tablet 1    lisinopril-hydrochlorothiazide (PRINZIDE,ZESTORETIC) 20-12.5 MG per tablet TAKE 1 TABLET BY MOUTH TWICE DAILY 180 tablet 1     "Melatonin 10 MG TABS Take by mouth      mometasone (ELOCON) 0.1 % cream Apply 1 Application topically daily 45 g 3    mometasone (ELOCON) 0.1 % lotion Apply topically daily 30 mL 5    Multiple Vitamins-Minerals (PreserVision AREDS 2) CAPS       nebivolol (BYSTOLIC) 10 mg tablet TAKE 1 TABLET BY MOUTH DAILY GENERIC EQUIVALENT FOR BYSTOLIC 90 tablet 1    nystatin-triamcinolone (MYCOLOG-II) cream Apply topically 2 (two) times a day 30 g 3    omeprazole (PriLOSEC) 40 MG capsule TAKE ONE CAPSULE BY MOUTH DAILY 90 capsule 1    potassium chloride (Klor-Con M20) 20 mEq tablet Take 1 tablet (20 mEq total) by mouth 2 (two) times a day 60 tablet 5    venlafaxine (EFFEXOR) 37.5 mg tablet Take 1 tablet (37.5 mg total) by mouth daily 90 tablet 1     No current facility-administered medications on file prior to visit.     Allergies   Allergen Reactions    Adhesive [Medical Tape] Rash          Objective   Ht 5' 1\" (1.549 m)   Wt 92.2 kg (203 lb 3.2 oz)   LMP  (LMP Unknown)   BMI 38.39 kg/m²          PE:  AAOx 3  WDWN  Hearing intact, no drainage from eyes  no audible wheezing  no abdominal distension  LE compartments soft, skin intact    bilateralknee:    Appearance:  Yes  swelling   No  ecchymosis  Yes  obvious joint deformity   No  effusion   Palpation/Tenderness:  Yes  TTP over medial joint line  No  TTP over lateral joint line   No  TTP over patella  No  TTP over patellar tendon  Yes  TTP over pes anserine bursa  Active Range of Motion:  AROM: 3- 110  Special Tests:  Valgus Stress Test: negative  Varus Stress Test: negative       Large joint arthrocentesis: bilateral pes anserine bursa  [place] Protocol:  procedure performed by consultantConsent: Verbal consent obtained.  Risks and benefits: risks, benefits and alternatives were discussed  Consent given by: patient  Patient understanding: patient states understanding of the procedure being performed  Site marked: the operative site was marked  Patient identity confirmed: " verbally with patient  Supporting Documentation  Indications: pain   Procedure Details  Location: knee - bilateral pes anserine bursa  Needle size: 22 G  Ultrasound guidance: no  Approach: anterolateral    Medications (Right): 40 mg triamcinolone acetonide 40 mg/mL; 1 mL bupivacaine 0.25 %Medications (Left): 40 mg triamcinolone acetonide 40 mg/mL; 1 mL bupivacaine 0.25 %   Patient tolerance: patient tolerated the procedure well with no immediate complications  Dressing:  Sterile dressing applied          Scribe Attestation      I,:  Brent Thorne am acting as a scribe while in the presence of the attending physician.:       I,:  Laura Quinn DO personally performed the services described in this documentation    as scribed in my presence.:

## 2025-04-02 ENCOUNTER — OFFICE VISIT (OUTPATIENT)
Dept: OBGYN CLINIC | Facility: MEDICAL CENTER | Age: 80
End: 2025-04-02
Payer: COMMERCIAL

## 2025-04-02 ENCOUNTER — APPOINTMENT (OUTPATIENT)
Dept: RADIOLOGY | Facility: MEDICAL CENTER | Age: 80
End: 2025-04-02
Payer: COMMERCIAL

## 2025-04-02 VITALS — HEIGHT: 61 IN | WEIGHT: 196.2 LBS | BODY MASS INDEX: 37.04 KG/M2

## 2025-04-02 DIAGNOSIS — Z01.89 ENCOUNTER FOR LOWER EXTREMITY COMPARISON IMAGING STUDY: ICD-10-CM

## 2025-04-02 DIAGNOSIS — M17.11 PRIMARY OSTEOARTHRITIS OF RIGHT KNEE: ICD-10-CM

## 2025-04-02 DIAGNOSIS — M17.12 PRIMARY OSTEOARTHRITIS OF LEFT KNEE: ICD-10-CM

## 2025-04-02 DIAGNOSIS — M25.59 PAIN IN OTHER SPECIFIED JOINT: ICD-10-CM

## 2025-04-02 DIAGNOSIS — Z01.818 PREOPERATIVE TESTING: ICD-10-CM

## 2025-04-02 DIAGNOSIS — M17.11 PRIMARY OSTEOARTHRITIS OF RIGHT KNEE: Primary | ICD-10-CM

## 2025-04-02 PROCEDURE — 99213 OFFICE O/P EST LOW 20 MIN: CPT | Performed by: ORTHOPAEDIC SURGERY

## 2025-04-02 PROCEDURE — 73560 X-RAY EXAM OF KNEE 1 OR 2: CPT

## 2025-04-02 PROCEDURE — 77073 BONE LENGTH STUDIES: CPT

## 2025-04-02 PROCEDURE — 73564 X-RAY EXAM KNEE 4 OR MORE: CPT

## 2025-04-02 RX ORDER — CHLORHEXIDINE GLUCONATE 40 MG/ML
SOLUTION TOPICAL DAILY PRN
OUTPATIENT
Start: 2025-04-02

## 2025-04-02 RX ORDER — CHLORHEXIDINE GLUCONATE ORAL RINSE 1.2 MG/ML
15 SOLUTION DENTAL ONCE
OUTPATIENT
Start: 2025-04-02 | End: 2025-04-02

## 2025-04-02 RX ORDER — ACETAMINOPHEN 325 MG/1
975 TABLET ORAL ONCE
OUTPATIENT
Start: 2025-04-02 | End: 2025-04-02

## 2025-04-02 RX ORDER — CEFAZOLIN SODIUM 2 G/50ML
2000 SOLUTION INTRAVENOUS ONCE
OUTPATIENT
Start: 2025-04-02 | End: 2025-04-02

## 2025-04-02 RX ORDER — SODIUM CHLORIDE 9 MG/ML
75 INJECTION, SOLUTION INTRAVENOUS CONTINUOUS
OUTPATIENT
Start: 2025-04-02

## 2025-04-02 RX ORDER — ASCORBIC ACID 500 MG
500 TABLET ORAL DAILY
Qty: 30 TABLET | Refills: 1 | Status: SHIPPED | OUTPATIENT
Start: 2025-04-02

## 2025-04-02 RX ORDER — FERROUS SULFATE 324(65)MG
324 TABLET, DELAYED RELEASE (ENTERIC COATED) ORAL
Qty: 30 TABLET | Refills: 1 | Status: SHIPPED | OUTPATIENT
Start: 2025-04-02

## 2025-04-02 RX ORDER — MULTIVITAMIN
1 TABLET ORAL DAILY
Qty: 30 TABLET | Refills: 1 | Status: SHIPPED | OUTPATIENT
Start: 2025-04-02

## 2025-04-02 RX ORDER — MUPIROCIN 20 MG/G
OINTMENT TOPICAL 2 TIMES DAILY
Qty: 15 G | Refills: 0 | Status: SHIPPED | OUTPATIENT
Start: 2025-04-02

## 2025-04-02 RX ORDER — TRANEXAMIC ACID 10 MG/ML
1000 INJECTION, SOLUTION INTRAVENOUS ONCE
OUTPATIENT
Start: 2025-04-02 | End: 2025-04-02

## 2025-04-02 RX ORDER — FOLIC ACID 1 MG/1
1 TABLET ORAL DAILY
Qty: 30 TABLET | Refills: 1 | Status: SHIPPED | OUTPATIENT
Start: 2025-04-02

## 2025-04-02 NOTE — ASSESSMENT & PLAN NOTE
Patient has severe bilateral knee arthritis.  she has tried conservative treatment without adequate relief.  We discussed treatment options as well as risks and benefits of treatment options.  The patient would like to proceed with a right total knee arthroplasty.  The risks of surgery include, but are not limited to infection, blood clot, wound healing problems, blood loss, damage to blood vessels and nerves, persistent pain and stiffness, fracture, need for additional surgery, need for revision surgery, failure of hardware, heart attack, stroke, death.  The patient understood and agreed to by oral and written consent.  I answered all questions regarding surgery.  Reviewed with patient to expect some numbness over the lateral aspect of the knee after surgery.  We let the patient know to avoid kneeling while the incision is healing, typically for the first 4-6 weeks.  Once incision is healed kneeling if permitted but may still be uncomfortable for some patients long term.    Reviewed no driving for 4-6 weeks for right sided surgery once off opioids.  No driving until off opioids for left sided surgery.    Preoperative vitamins were prescribed.  Patient instructed to  what they are not already taking and start 30 days before surgery.  We let the patient know that some people experience constipation while on iron.  If that occurs can take iron every other day.  If still an issue can stop the iron.  Can also add in OTC medication and/or prune juice for constipation.    We also let the patient know that some people experience constipation after surgery while on opioids.  We suggest to have OTC medications and/or prune juice available if needed.    DVT Prophylaxis: ASA  The patient will obtain clearance from: SOC, dental due to having abscess in mouth, and cardio   Patient would like her preop meds and postop meds to be sent to [facility] pharmacy on file.   Orders:    XR knee 4+ vw right injury; Future    XR bone  length (scanogram); Future    Case request operating room: ARTHROPLASTY KNEE TOTAL, cemented, potential same day discharge; Standing    ferrous sulfate 324 (65 Fe) mg; Take 1 tablet (324 mg total) by mouth daily before breakfast Begin 30 days prior to surgery    ascorbic acid (VITAMIN C) 500 MG tablet; Take 1 tablet (500 mg total) by mouth daily Begin 30 days prior to surgery    folic acid (KP Folic Acid) 1 mg tablet; Take 1 tablet (1 mg total) by mouth daily Begin 30 days prior to surgery    Multiple Vitamin (multivitamin) tablet; Take 1 tablet by mouth daily Begin 30 days prior to surgery    mupirocin (BACTROBAN) 2 % ointment; Apply topically 2 (two) times a day Apply to each nostril 2 times a day with Q-tip 5 days prior to surgery    Comprehensive metabolic panel; Future    Hemoglobin A1C W/EAG Estimation; Future    CBC and differential; Future    MRSA culture; Future    Iron Panel (Includes Iron Saturation, Iron, and TIBC); Future    C-reactive protein; Future    Anemia Panel w/Reflex; Future    Protime-INR; Future    APTT; Future    Type and screen; Future    Ambulatory Referral to [facility]; Future    Ambulatory referral to Physical Therapy; Future    Ambulatory referral to Cardiology; Future    EKG 12 lead; Future    Ambulatory referral to Dentistry; Future

## 2025-04-02 NOTE — ASSESSMENT & PLAN NOTE
Patient has severe left knee osteoarthritis.  We will hold off on left knee CSI as patient has an abscess in her mouth and is currently taking amoxicillin for this.  Patient will follow-up in 2 weeks to receive left knee CSI when she has abscess resolved.

## 2025-04-02 NOTE — PROGRESS NOTES
Name: Jeny Collazo      : 1945      MRN: [id number]  Encounter Provider: Laura Quinn DO  Encounter Date: 2025   Encounter department: [facility]  :  Assessment & Plan  Primary osteoarthritis of right knee    Patient has severe bilateral knee arthritis.  she has tried conservative treatment without adequate relief.  We discussed treatment options as well as risks and benefits of treatment options.  The patient would like to proceed with a right total knee arthroplasty.  The risks of surgery include, but are not limited to infection, blood clot, wound healing problems, blood loss, damage to blood vessels and nerves, persistent pain and stiffness, fracture, need for additional surgery, need for revision surgery, failure of hardware, heart attack, stroke, death.  The patient understood and agreed to by oral and written consent.  I answered all questions regarding surgery.  Reviewed with patient to expect some numbness over the lateral aspect of the knee after surgery.  We let the patient know to avoid kneeling while the incision is healing, typically for the first 4-6 weeks.  Once incision is healed kneeling if permitted but may still be uncomfortable for some patients long term.    Reviewed no driving for 4-6 weeks for right sided surgery once off opioids.  No driving until off opioids for left sided surgery.    Preoperative vitamins were prescribed.  Patient instructed to  what they are not already taking and start 30 days before surgery.  We let the patient know that some people experience constipation while on iron.  If that occurs can take iron every other day.  If still an issue can stop the iron.  Can also add in OTC medication and/or prune juice for constipation.    We also let the patient know that some people experience constipation after surgery while on opioids.  We suggest to have OTC medications and/or prune juice available if  needed.    DVT Prophylaxis: ASA  The patient will obtain clearance from: SOC, dental due to having abscess in mouth, and cardio   Patient would like her preop meds and postop meds to be sent to [facility] pharmacy on file.   Orders:    XR knee 4+ vw right injury; Future    XR bone length (scanogram); Future    Case request operating room: ARTHROPLASTY KNEE TOTAL, cemented, potential same day discharge; Standing    ferrous sulfate 324 (65 Fe) mg; Take 1 tablet (324 mg total) by mouth daily before breakfast Begin 30 days prior to surgery    ascorbic acid (VITAMIN C) 500 MG tablet; Take 1 tablet (500 mg total) by mouth daily Begin 30 days prior to surgery    folic acid (KP Folic Acid) 1 mg tablet; Take 1 tablet (1 mg total) by mouth daily Begin 30 days prior to surgery    Multiple Vitamin (multivitamin) tablet; Take 1 tablet by mouth daily Begin 30 days prior to surgery    mupirocin (BACTROBAN) 2 % ointment; Apply topically 2 (two) times a day Apply to each nostril 2 times a day with Q-tip 5 days prior to surgery    Comprehensive metabolic panel; Future    Hemoglobin A1C W/EAG Estimation; Future    CBC and differential; Future    MRSA culture; Future    Iron Panel (Includes Iron Saturation, Iron, and TIBC); Future    C-reactive protein; Future    Anemia Panel w/Reflex; Future    Protime-INR; Future    APTT; Future    Type and screen; Future    Ambulatory Referral to [facility]; Future    Ambulatory referral to Physical Therapy; Future    Ambulatory referral to Cardiology; Future    EKG 12 lead; Future    Ambulatory referral to Dentistry; Future    Encounter for lower extremity comparison imaging study    Orders:    XR knee 1 or 2 vw left; Future    Primary osteoarthritis of left knee    Patient has severe left knee osteoarthritis.  We will hold off on left knee CSI as patient has an abscess in her mouth and is currently taking amoxicillin for this.  Patient will follow-up in 2 weeks to receive left  knee CSI when she has abscess resolved.       Preoperative testing    Orders:    Comprehensive metabolic panel; Future    Hemoglobin A1C W/EAG Estimation; Future    CBC and differential; Future    MRSA culture; Future    Iron Panel (Includes Iron Saturation, Iron, and TIBC); Future    C-reactive protein; Future    Anemia Panel w/Reflex; Future    Protime-INR; Future    APTT; Future    Type and screen; Future    Ambulatory Referral to [facility]; Future    Ambulatory referral to Physical Therapy; Future    Ambulatory referral to Cardiology; Future    EKG 12 lead; Future    Ambulatory referral to Dentistry; Future    Pain in other specified joint    Orders:    Iron Panel (Includes Iron Saturation, Iron, and TIBC); Future    Protime-INR; Future    APTT; Future      Return in about 2 weeks (around 4/16/2025) for Left knee CSI .    I answered all of the patient's questions during the visit and provided education of the patient's condition during the visit.  The patient verbalized understanding of the information given and agrees with the plan.  This note was dictated using Laserlike software.  It may contain errors including improperly dictated words.  Please contact physician directly for any questions.      History of Present Illness       HPI: Jeny Collazo is a 79 y.o. female that c/o bilateral knee pain.  Patient is severe bilateral knee osteoarthritis and bilateral pes anserine bursitis.  Patient was last seen 2/26/2025 where patient received bilateral pes anserine bursitis steroid injection and patient reserved right TKA date for 6/2/2025 and resumed left TKA date for 10/6/2025.  Patient states today that she has been receiving good symptomatic relief from bilateral pes anserine bursa steroid injection.  Patient states that she would like to proceed with consenting for right TKA for 6/2/2025.  Patient states she currently has an abscess within her mouth and is currently taking  amoxicillin given by her dentist.  Patient would like left knee CSI.      History of MRSA: no  History of blood clots: no  Family history of blood clots: no  History of Hepatitis C:no  History of HIV: no  Are you a smoker:no  Are you diabetic:no  Do you see a cardiologist: yes  Have you seen a dentist in the past year: yes  Do you have a spinal cord stimulator: no  Review allergies  Are you on medication for autoimmune disease (rheumatoid arthritis, psoriatic arthritis, lupus, etc.): no    ROS:    See [facility] for musculoskeletal review.   All other systems reviewed are negative     Historical Information   Past Medical History:   Diagnosis Date    Asthma     Breathing difficulty     Bronchitis     Depression     Fracture of arm     Fracture of carpal bone     Hyperlipidemia     Hypertension     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HCC) 2016    Shortness of breath     Thyroid nodule     Thyroid nodule     Thyroid nodule      Past Surgical History:   Procedure Laterality Date    EYE SURGERY  2021    HEMORRHOID SURGERY      SD THYROIDECTOMY RMVL REMAINING TISS FLWG PRTL RMVL Left 2016    Procedure:  COMPLETION THYROIDECTOMY, FLEXIBLE LARYNGOSCOPY;  Surgeon: David Rivera MD;  Location: [facility];  Service: Surgical Oncology    SD TOTAL THYROID LOBECTOMY UNI W/WO ISTHMUSECTOMY Right 2016    Procedure: HEMITHYROIDECTOMY;  Surgeon: David Rivera MD;  Location: [facility];  Service: Surgical Oncology    THYROIDECTOMY, PARTIAL Left      Social History   Social History     Substance and Sexual Activity   Alcohol Use No    Comment: occasional glass of wine     Social History     Substance and Sexual Activity   Drug Use No     Social History     Tobacco Use   Smoking Status Former    Current packs/day: 0.00    Average packs/day: 0.3 packs/day for 4.0 years (1.0 ttl pk-yrs)    Types: Cigarettes    Start date: 6/15/1977    Quit date: 6/15/1981    Years since quittin.8   Smokeless Tobacco Former   Tobacco  Comments    quit 40 yrs ago (Never a smoker per [employer])     Family History:   Family History   Adopted: Yes   Problem Relation Age of Onset    Hypertension Family     Kidney disease Family     Hypertension Mother        Current Outpatient Medications on File Prior to Visit   Medication Sig Dispense Refill    albuterol (Ventolin HFA) 90 mcg/act inhaler Inhale 2 puffs every 4 (four) hours as needed for wheezing or shortness of breath 54 g 0    amLODIPine (NORVASC) 5 mg tablet Take 1 tablet (5 mg total) by mouth 2 (two) times a day 180 tablet 1    atorvastatin (LIPITOR) 20 mg tablet Take 1 tablet (20 mg total) by mouth daily 90 tablet 1    Diclofenac Sodium (VOLTAREN) 1 % Apply 2 g topically 4 (four) times a day 300 g 0    Fluticasone Furoate-Vilanterol 100-25 mcg/actuation inhaler Inhale 1 puff daily Rinse mouth after use. 180 blister 1    fluticasone-vilanterol 200-25 mcg/actuation inhaler Inhale 1 puff daily Rinse mouth after use. 180 blister 1    furosemide (LASIX) 40 mg tablet Take 1 tablet (40 mg total) by mouth daily 90 tablet 0    levothyroxine 175 mcg tablet Take 1 tablet (175 mcg total) by mouth daily 100 tablet 1    lisinopril-hydrochlorothiazide (PRINZIDE,ZESTORETIC) 20-12.5 MG per tablet TAKE 1 TABLET BY MOUTH TWICE DAILY 180 tablet 1    Melatonin 10 MG TABS Take by mouth      mometasone (ELOCON) 0.1 % cream Apply 1 Application topically daily 45 g 3    mometasone (ELOCON) 0.1 % lotion Apply topically daily 30 mL 5    Multiple Vitamins-Minerals (PreserVision AREDS 2) CAPS       nebivolol (BYSTOLIC) 10 mg tablet TAKE 1 TABLET BY MOUTH DAILY GENERIC EQUIVALENT FOR BYSTOLIC 90 tablet 1    nystatin-triamcinolone (MYCOLOG-II) cream Apply topically 2 (two) times a day 30 g 3    omeprazole (PriLOSEC) 40 MG capsule TAKE ONE CAPSULE BY MOUTH DAILY 90 capsule 1    potassium chloride (Klor-Con M20) 20 mEq tablet Take 1 tablet (20 mEq total) by mouth 2 (two) times a day 60 tablet 5    venlafaxine (EFFEXOR) 37.5 mg  tablet Take 1 tablet (37.5 mg total) by mouth daily 90 tablet 1     No current facility-administered medications on file prior to visit.     Allergies   Allergen Reactions    Adhesive [Medical Tape] Rash       Current Outpatient Medications on File Prior to Visit   Medication Sig Dispense Refill    albuterol (Ventolin HFA) 90 mcg/act inhaler Inhale 2 puffs every 4 (four) hours as needed for wheezing or shortness of breath 54 g 0    amLODIPine (NORVASC) 5 mg tablet Take 1 tablet (5 mg total) by mouth 2 (two) times a day 180 tablet 1    atorvastatin (LIPITOR) 20 mg tablet Take 1 tablet (20 mg total) by mouth daily 90 tablet 1    Diclofenac Sodium (VOLTAREN) 1 % Apply 2 g topically 4 (four) times a day 300 g 0    Fluticasone Furoate-Vilanterol 100-25 mcg/actuation inhaler Inhale 1 puff daily Rinse mouth after use. 180 blister 1    fluticasone-vilanterol 200-25 mcg/actuation inhaler Inhale 1 puff daily Rinse mouth after use. 180 blister 1    furosemide (LASIX) 40 mg tablet Take 1 tablet (40 mg total) by mouth daily 90 tablet 0    levothyroxine 175 mcg tablet Take 1 tablet (175 mcg total) by mouth daily 100 tablet 1    lisinopril-hydrochlorothiazide (PRINZIDE,ZESTORETIC) 20-12.5 MG per tablet TAKE 1 TABLET BY MOUTH TWICE DAILY 180 tablet 1    Melatonin 10 MG TABS Take by mouth      mometasone (ELOCON) 0.1 % cream Apply 1 Application topically daily 45 g 3    mometasone (ELOCON) 0.1 % lotion Apply topically daily 30 mL 5    Multiple Vitamins-Minerals (PreserVision AREDS 2) CAPS       nebivolol (BYSTOLIC) 10 mg tablet TAKE 1 TABLET BY MOUTH DAILY GENERIC EQUIVALENT FOR BYSTOLIC 90 tablet 1    nystatin-triamcinolone (MYCOLOG-II) cream Apply topically 2 (two) times a day 30 g 3    omeprazole (PriLOSEC) 40 MG capsule TAKE ONE CAPSULE BY MOUTH DAILY 90 capsule 1    potassium chloride (Klor-Con M20) 20 mEq tablet Take 1 tablet (20 mEq total) by mouth 2 (two) times a day 60 tablet 5    venlafaxine (EFFEXOR) 37.5 mg tablet Take 1  "tablet (37.5 mg total) by mouth daily 90 tablet 1     No current facility-administered medications on file prior to visit.       Objective   Ht 5' 1\" (1.549 m)   Wt 89 kg (196 lb 3.2 oz)   LMP  (LMP Unknown)   BMI 37.07 kg/m²       PE:  AAOx 3  WDWN  Hearing intact, no drainage from eyes  Regular rate  no audible wheezing  no abdominal distension  LE compartments soft, skin intact    bilateralknee:    Appearance:  Yes  swelling   No  ecchymosis  Yes  obvious joint deformity   No  effusion   Palpation/Tenderness:  Yes  TTP over medial joint line  No  TTP over lateral joint line   No  TTP over patella  No  TTP over patellar tendon  Yes  TTP over pes anserine bursa  Active Range of Motion:  AROM: 3- 110  Special Tests:  Valgus Stress Test: negative  Varus Stress Test: negative      Imaging Studies:  Results Review Statement: I personally reviewed the following image studies in PACS and associated radiology reports: xray(s). My interpretation of the radiology images/reports is: severe bilateral knee osteoarthritis.      Procedures      Scribe Attestation      I,:  Bretn Thorne am acting as a scribe while in the presence of the attending physician.:       I,:  Laura Quinn DO personally performed the services described in this documentation    as scribed in my presence.:                       "

## 2025-04-07 DIAGNOSIS — I10 ESSENTIAL HYPERTENSION: ICD-10-CM

## 2025-04-08 RX ORDER — LISINOPRIL AND HYDROCHLOROTHIAZIDE 12.5; 2 MG/1; MG/1
TABLET ORAL
Qty: 180 TABLET | Refills: 0 | Status: SHIPPED | OUTPATIENT
Start: 2025-04-08

## 2025-04-14 DIAGNOSIS — E78.2 MIXED HYPERLIPIDEMIA: ICD-10-CM

## 2025-04-14 DIAGNOSIS — I10 ESSENTIAL HYPERTENSION: ICD-10-CM

## 2025-04-16 ENCOUNTER — APPOINTMENT (OUTPATIENT)
Dept: LAB | Facility: MEDICAL CENTER | Age: 80
End: 2025-04-16
Payer: COMMERCIAL

## 2025-04-16 ENCOUNTER — OFFICE VISIT (OUTPATIENT)
Dept: OBGYN CLINIC | Facility: MEDICAL CENTER | Age: 80
End: 2025-04-16
Payer: COMMERCIAL

## 2025-04-16 VITALS — HEIGHT: 61 IN | WEIGHT: 194.2 LBS | BODY MASS INDEX: 36.67 KG/M2

## 2025-04-16 DIAGNOSIS — I10 ESSENTIAL HYPERTENSION: ICD-10-CM

## 2025-04-16 DIAGNOSIS — M17.12 PRIMARY OSTEOARTHRITIS OF LEFT KNEE: Primary | ICD-10-CM

## 2025-04-16 DIAGNOSIS — E78.2 MIXED HYPERLIPIDEMIA: ICD-10-CM

## 2025-04-16 DIAGNOSIS — R23.3 EASY BRUISING: ICD-10-CM

## 2025-04-16 DIAGNOSIS — E89.0 HYPOTHYROIDISM, POSTSURGICAL: ICD-10-CM

## 2025-04-16 LAB
ALBUMIN SERPL BCG-MCNC: 4.4 G/DL (ref 3.5–5)
ALP SERPL-CCNC: 65 U/L (ref 34–104)
ALT SERPL W P-5'-P-CCNC: 10 U/L (ref 7–52)
ANION GAP SERPL CALCULATED.3IONS-SCNC: 12 MMOL/L (ref 4–13)
APTT PPP: 30 SECONDS (ref 23–34)
AST SERPL W P-5'-P-CCNC: 12 U/L (ref 13–39)
BASOPHILS # BLD AUTO: 0.01 THOUSANDS/ÂΜL (ref 0–0.1)
BASOPHILS NFR BLD AUTO: 0 % (ref 0–1)
BILIRUB SERPL-MCNC: 0.62 MG/DL (ref 0.2–1)
BUN SERPL-MCNC: 31 MG/DL (ref 5–25)
CALCIUM SERPL-MCNC: 9.3 MG/DL (ref 8.4–10.2)
CHLORIDE SERPL-SCNC: 104 MMOL/L (ref 96–108)
CHOLEST SERPL-MCNC: 174 MG/DL (ref ?–200)
CO2 SERPL-SCNC: 28 MMOL/L (ref 21–32)
CREAT SERPL-MCNC: 1.08 MG/DL (ref 0.6–1.3)
EOSINOPHIL # BLD AUTO: 0.02 THOUSAND/ÂΜL (ref 0–0.61)
EOSINOPHIL NFR BLD AUTO: 0 % (ref 0–6)
ERYTHROCYTE [DISTWIDTH] IN BLOOD BY AUTOMATED COUNT: 14.6 % (ref 11.6–15.1)
GFR SERPL CREATININE-BSD FRML MDRD: 49 ML/MIN/1.73SQ M
GLUCOSE P FAST SERPL-MCNC: 136 MG/DL (ref 65–99)
HCT VFR BLD AUTO: 39.6 % (ref 34.8–46.1)
HDLC SERPL-MCNC: 42 MG/DL
HGB BLD-MCNC: 13.2 G/DL (ref 11.5–15.4)
IMM GRANULOCYTES # BLD AUTO: 0.04 THOUSAND/UL (ref 0–0.2)
IMM GRANULOCYTES NFR BLD AUTO: 1 % (ref 0–2)
INR PPP: 0.89 (ref 0.85–1.19)
LDLC SERPL CALC-MCNC: 88 MG/DL (ref 0–100)
LYMPHOCYTES # BLD AUTO: 1.95 THOUSANDS/ÂΜL (ref 0.6–4.47)
LYMPHOCYTES NFR BLD AUTO: 37 % (ref 14–44)
MCH RBC QN AUTO: 32.3 PG (ref 26.8–34.3)
MCHC RBC AUTO-ENTMCNC: 33.3 G/DL (ref 31.4–37.4)
MCV RBC AUTO: 97 FL (ref 82–98)
MONOCYTES # BLD AUTO: 0.89 THOUSAND/ÂΜL (ref 0.17–1.22)
MONOCYTES NFR BLD AUTO: 17 % (ref 4–12)
NEUTROPHILS # BLD AUTO: 2.43 THOUSANDS/ÂΜL (ref 1.85–7.62)
NEUTS SEG NFR BLD AUTO: 45 % (ref 43–75)
NRBC BLD AUTO-RTO: 0 /100 WBCS
PLATELET # BLD AUTO: 147 THOUSANDS/UL (ref 149–390)
PMV BLD AUTO: 11.9 FL (ref 8.9–12.7)
POTASSIUM SERPL-SCNC: 4.1 MMOL/L (ref 3.5–5.3)
PROT SERPL-MCNC: 7.4 G/DL (ref 6.4–8.4)
PROTHROMBIN TIME: 12.4 SECONDS (ref 12.3–15)
RBC # BLD AUTO: 4.09 MILLION/UL (ref 3.81–5.12)
SODIUM SERPL-SCNC: 144 MMOL/L (ref 135–147)
T4 FREE SERPL-MCNC: 1.47 NG/DL (ref 0.61–1.12)
TRIGL SERPL-MCNC: 220 MG/DL (ref ?–150)
TSH SERPL DL<=0.05 MIU/L-ACNC: 0.08 UIU/ML (ref 0.45–4.5)
WBC # BLD AUTO: 5.34 THOUSAND/UL (ref 4.31–10.16)

## 2025-04-16 PROCEDURE — 85730 THROMBOPLASTIN TIME PARTIAL: CPT

## 2025-04-16 PROCEDURE — 85610 PROTHROMBIN TIME: CPT

## 2025-04-16 PROCEDURE — 85025 COMPLETE CBC W/AUTO DIFF WBC: CPT

## 2025-04-16 PROCEDURE — 80053 COMPREHEN METABOLIC PANEL: CPT

## 2025-04-16 PROCEDURE — 84439 ASSAY OF FREE THYROXINE: CPT

## 2025-04-16 PROCEDURE — 20610 DRAIN/INJ JOINT/BURSA W/O US: CPT | Performed by: PHYSICIAN ASSISTANT

## 2025-04-16 PROCEDURE — 36415 COLL VENOUS BLD VENIPUNCTURE: CPT

## 2025-04-16 PROCEDURE — 80061 LIPID PANEL: CPT

## 2025-04-16 PROCEDURE — 84443 ASSAY THYROID STIM HORMONE: CPT

## 2025-04-16 RX ORDER — POTASSIUM CHLORIDE 1500 MG/1
20 TABLET, EXTENDED RELEASE ORAL 2 TIMES DAILY
Qty: 60 TABLET | Refills: 5 | Status: SHIPPED | OUTPATIENT
Start: 2025-04-16

## 2025-04-16 RX ORDER — BUPIVACAINE HYDROCHLORIDE 2.5 MG/ML
2 INJECTION, SOLUTION INFILTRATION; PERINEURAL
Status: COMPLETED | OUTPATIENT
Start: 2025-04-16 | End: 2025-04-16

## 2025-04-16 RX ORDER — ATORVASTATIN CALCIUM 20 MG/1
20 TABLET, FILM COATED ORAL DAILY
Qty: 90 TABLET | Refills: 1 | Status: SHIPPED | OUTPATIENT
Start: 2025-04-16

## 2025-04-16 RX ORDER — TRIAMCINOLONE ACETONIDE 40 MG/ML
40 INJECTION, SUSPENSION INTRA-ARTICULAR; INTRAMUSCULAR
Status: COMPLETED | OUTPATIENT
Start: 2025-04-16 | End: 2025-04-16

## 2025-04-16 RX ADMIN — TRIAMCINOLONE ACETONIDE 40 MG: 40 INJECTION, SUSPENSION INTRA-ARTICULAR; INTRAMUSCULAR at 09:15

## 2025-04-16 RX ADMIN — BUPIVACAINE HYDROCHLORIDE 2 ML: 2.5 INJECTION, SOLUTION INFILTRATION; PERINEURAL at 09:15

## 2025-04-16 NOTE — PROGRESS NOTES
1. Primary osteoarthritis of left knee            Patient has severe left knee osteoarthritis.  Patient is here for her cortisone injection into the left knee.  Last office visit she had an abscess and we did not do intra-articular cortisone injection.  Physical exam of the knee shows no effusion no ecchymosis.  Patient tolerated procedure follow up for her preop visit regarding her right total knee arthroplasty.      Large joint arthrocentesis: L knee  Universal Protocol:  procedure performed by consultantConsent: Verbal consent obtained. Written consent not obtained.  Risks and benefits: risks, benefits and alternatives were discussed  Consent given by: patient  Patient understanding: patient states understanding of the procedure being performed  Patient consent: the patient's understanding of the procedure matches consent given  Patient identity confirmed: verbally with patient  Supporting Documentation  Indications: pain and joint swelling   Procedure Details  Location: knee - L knee  Needle size: 22 G  Ultrasound guidance: no  Approach: lateral  Medications administered: 2 mL bupivacaine 0.25 %; 40 mg triamcinolone acetonide 40 mg/mL    Patient tolerance: patient tolerated the procedure well with no immediate complications  Dressing:  Sterile dressing applied

## 2025-04-17 ENCOUNTER — RESULTS FOLLOW-UP (OUTPATIENT)
Age: 80
End: 2025-04-17

## 2025-04-17 ENCOUNTER — OFFICE VISIT (OUTPATIENT)
Dept: CARDIOLOGY CLINIC | Facility: CLINIC | Age: 80
End: 2025-04-17
Payer: COMMERCIAL

## 2025-04-17 VITALS
DIASTOLIC BLOOD PRESSURE: 75 MMHG | SYSTOLIC BLOOD PRESSURE: 110 MMHG | HEART RATE: 61 BPM | HEIGHT: 61 IN | WEIGHT: 196 LBS | BODY MASS INDEX: 37 KG/M2

## 2025-04-17 DIAGNOSIS — Z01.818 PREOPERATIVE TESTING: ICD-10-CM

## 2025-04-17 DIAGNOSIS — Z85.850 HISTORY OF THYROID CANCER: Primary | ICD-10-CM

## 2025-04-17 DIAGNOSIS — E89.0 HYPOTHYROIDISM, POSTSURGICAL: ICD-10-CM

## 2025-04-17 DIAGNOSIS — I10 PRIMARY HYPERTENSION: ICD-10-CM

## 2025-04-17 DIAGNOSIS — M17.11 PRIMARY OSTEOARTHRITIS OF RIGHT KNEE: ICD-10-CM

## 2025-04-17 DIAGNOSIS — E66.01 MORBID OBESITY WITH BMI OF 40.0-44.9, ADULT (HCC): ICD-10-CM

## 2025-04-17 DIAGNOSIS — Z01.810 PRE-OPERATIVE CARDIOVASCULAR EXAMINATION: Primary | ICD-10-CM

## 2025-04-17 DIAGNOSIS — E78.2 MIXED HYPERLIPIDEMIA: ICD-10-CM

## 2025-04-17 PROCEDURE — 99204 OFFICE O/P NEW MOD 45 MIN: CPT | Performed by: INTERNAL MEDICINE

## 2025-04-17 RX ORDER — LEVOTHYROXINE SODIUM 150 UG/1
150 TABLET ORAL DAILY
Qty: 90 TABLET | Refills: 1 | Status: SHIPPED | OUTPATIENT
Start: 2025-04-17

## 2025-04-17 NOTE — PROGRESS NOTES
Outpatient Consultation - General Cardiology   Jeny Collazo 79 y.o. female   MRN: [id number]  Encounter: [id number]      PCP: Dylon Morris DO      History of Present Illness   Physician Requesting Consult: Consults   Reason for Consult / Principal Problem: Preoperative cardiac risk assessment    HPI: Jeny Collazo is a 79 y.o. year old female, who was referred to [facility] outpatient Cardiology by Orthopedic surgery Dr. Quinn. 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  She previously followed with Dr. Carmen however has not been seen in a few years since 2021.  He was present is following her for hypertension, hyperlipidemia and lower extremity edema.  She denies any issues with lower extremity edema and is continued on her Lasix 40 mg daily.  Her blood pressure is well-controlled today at 110/75.  Her cholesterol was reviewed as well and is stable.  She denies any acute complaints today other than her longstanding issues of shortness of breath related to her restrictive lung disease and emphysema and her knee pain when walking.  She is not very active which is limited due to her knees and COPD.  She does not walk upstairs the longest walk she has done in the past year has been [place] which she has been able to do slowly and with her cane.  She denies orthopnea, PND, lower extremity swelling, palpitations, chest pain.    Tobacco use: not currently smoking, quit over 40 years ago.  Alcohol use: no EtOH use  Drug use: none  METS: limited activity with knee pain and SOB with COPD. Longest she has walked is from [employer] parking lot to her seat and she gets SOB with this and has totake her inhaler.  Diet: varied diet  Social: lives at home with her ,   History of anesthesia: no prior histories with  anesthesia or bleeding    Reviewed Prior Cardiac studies:  Echocardiogram 3/15/2016: Normal LV size and function, mildly dilated left atrium, mild to moderate MAC, mild MR, mild TR.    ECG 2025: Normal sinus rhythm with premature atrial complexes otherwise normal ECG.    Review of Systems  Review of system was conducted and was negative except for as stated in the HPI.      Historical Information   Past Medical History:   Diagnosis Date    Asthma     Breathing difficulty     Bronchitis     Depression     Fracture of arm     Fracture of carpal bone     Hyperlipidemia     Hypertension     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HCC) 2016    Shortness of breath     Thyroid nodule     Thyroid nodule     Thyroid nodule      Past Surgical History:   Procedure Laterality Date    EYE SURGERY  2021    HEMORRHOID SURGERY      NH THYROIDECTOMY RMVL REMAINING TISS FLWG PRTL RMVL Left 2016    Procedure:  COMPLETION THYROIDECTOMY, FLEXIBLE LARYNGOSCOPY;  Surgeon: David Rivera MD;  Location: [facility];  Service: Surgical Oncology    NH TOTAL THYROID LOBECTOMY UNI W/WO ISTHMUSECTOMY Right 2016    Procedure: HEMITHYROIDECTOMY;  Surgeon: David Rivera MD;  Location: [facility];  Service: Surgical Oncology    THYROIDECTOMY, PARTIAL Left      Social History     Substance and Sexual Activity   Alcohol Use No    Comment: occasional glass of wine     Social History     Substance and Sexual Activity   Drug Use No     Social History     Tobacco Use   Smoking Status Former    Current packs/day: 0.00    Average packs/day: 0.3 packs/day for 4.0 years (1.0 ttl pk-yrs)    Types: Cigarettes    Start date: 6/15/1977    Quit date: 6/15/1981    Years since quittin.8   Smokeless Tobacco Former   Tobacco Comments    quit 40 yrs ago (Never a smoker per Allscripts)     Family History: Family history non-contributory    Meds/Allergies   Home Medications:   Current Outpatient Medications:     albuterol (Ventolin HFA) 90 mcg/act  inhaler, Inhale 2 puffs every 4 (four) hours as needed for wheezing or shortness of breath, Disp: 54 g, Rfl: 0    amLODIPine (NORVASC) 5 mg tablet, Take 1 tablet (5 mg total) by mouth 2 (two) times a day, Disp: 180 tablet, Rfl: 1    ascorbic acid (VITAMIN C) 500 MG tablet, Take 1 tablet (500 mg total) by mouth daily Begin 30 days prior to surgery, Disp: 30 tablet, Rfl: 1    atorvastatin (LIPITOR) 20 mg tablet, Take 1 tablet (20 mg total) by mouth daily, Disp: 90 tablet, Rfl: 1    Diclofenac Sodium (VOLTAREN) 1 %, Apply 2 g topically 4 (four) times a day, Disp: 300 g, Rfl: 0    ferrous sulfate 324 (65 Fe) mg, Take 1 tablet (324 mg total) by mouth daily before breakfast Begin 30 days prior to surgery, Disp: 30 tablet, Rfl: 1    Fluticasone Furoate-Vilanterol 100-25 mcg/actuation inhaler, Inhale 1 puff daily Rinse mouth after use., Disp: 180 blister, Rfl: 1    folic acid (KP Folic Acid) 1 mg tablet, Take 1 tablet (1 mg total) by mouth daily Begin 30 days prior to surgery, Disp: 30 tablet, Rfl: 1    furosemide (LASIX) 40 mg tablet, Take 1 tablet (40 mg total) by mouth daily, Disp: 90 tablet, Rfl: 0    levothyroxine 175 mcg tablet, Take 1 tablet (175 mcg total) by mouth daily, Disp: 100 tablet, Rfl: 1    lisinopril-hydrochlorothiazide (PRINZIDE,ZESTORETIC) 20-12.5 MG per tablet, TAKE 1 TABLET BY MOUTH TWICE DAILY, Disp: 180 tablet, Rfl: 0    Melatonin 10 MG TABS, Take by mouth, Disp: , Rfl:     mometasone (ELOCON) 0.1 % cream, Apply 1 Application topically daily, Disp: 45 g, Rfl: 3    mometasone (ELOCON) 0.1 % lotion, Apply topically daily, Disp: 30 mL, Rfl: 5    Multiple Vitamin (multivitamin) tablet, Take 1 tablet by mouth daily Begin 30 days prior to surgery, Disp: 30 tablet, Rfl: 1    Multiple Vitamins-Minerals (PreserVision AREDS 2) CAPS, , Disp: , Rfl:     mupirocin (BACTROBAN) 2 % ointment, Apply topically 2 (two) times a day Apply to each nostril 2 times a day with Q-tip 5 days prior to surgery, Disp: 15 g, Rfl:  "0    nebivolol (BYSTOLIC) 10 mg tablet, TAKE 1 TABLET BY MOUTH DAILY GENERIC EQUIVALENT FOR BYSTOLIC, Disp: 90 tablet, Rfl: 1    nystatin-triamcinolone (MYCOLOG-II) cream, Apply topically 2 (two) times a day, Disp: 30 g, Rfl: 3    omeprazole (PriLOSEC) 40 MG capsule, TAKE ONE CAPSULE BY MOUTH DAILY, Disp: 90 capsule, Rfl: 1    potassium chloride (Klor-Con M20) 20 mEq tablet, Take 1 tablet (20 mEq total) by mouth 2 (two) times a day, Disp: 60 tablet, Rfl: 5    venlafaxine (EFFEXOR) 37.5 mg tablet, Take 1 tablet (37.5 mg total) by mouth daily, Disp: 90 tablet, Rfl: 1    Allergies   Allergen Reactions    Adhesive [Medical Tape] Rash         Objective   Vitals: Blood pressure 140/72, pulse 61, height 5' 1\" (1.549 m), weight 88.9 kg (196 lb), not currently breastfeeding.      Physical Exam  GEN: Jeny Collazo appears well, alert and oriented x 3, pleasant and cooperative   HEENT:  Normocephalic, atraumatic, anicteric, moist mucous membranes  NECK: No JVD or carotid bruits   HEART: Regular rhythm, regular rate, normal S1 and S2, no murmurs, clicks, gallops or rubs   LUNGS: Clear to auscultation bilaterally; no wheezes, rales, or rhonchi; respiration nonlabored   ABDOMEN:  Normoactive bowel sounds, soft, no tenderness, no distention  EXTREMITIES: peripheral pulses palpable; no edema  NEURO: no gross focal findings; cranial nerves grossly intact   SKIN:  Dry, intact, warm to touch    Lab Results: I have personally reviewed pertinent lab results.    No results found for: \"HSTNI0\", \"HSTNI2\", \"HSTNI4\", \"HSTNI\"  No results found for: \"NTBNP\"  Lab Results   Component Value Date    CHOL 214 (H) 08/26/2013    TRIG 220 (H) 04/16/2025    HDL 42 (L) 04/16/2025    LDLCALC 88 04/16/2025    LDLDIRECT 79 10/22/2021     Lab Results   Component Value Date    K 4.1 04/16/2025    CO2 28 04/16/2025     04/16/2025    BUN 31 (H) 04/16/2025    CREATININE 1.08 04/16/2025    ALT 10 04/16/2025    AST 12 (L) 04/16/2025     Lab Results "   Component Value Date    WBC 5.34 2025    HGB 13.2 2025    HCT 39.6 2025    MCV 97 2025     (L) 2025     Lab Results   Component Value Date    INR 0.89 2025    INR 0.91 2016    INR 0.90 2016     Lab Results   Component Value Date    HGBA1C 6.3 (H) 10/15/2020        Imaging: Results Review Statement: I reviewed radiology reports from this admission including: Echocardiogram.      EKG:   Date: 25  Interpretation: normal sinus rhythm, PACs, normal ECG.      ECHO:  Results for orders placed during the hospital encounter of 03/15/16    Echo complete with contrast if indicated    Narrative  [address] [address]  [phone]    Transthoracic Echocardiogram  2D, M-mode, Doppler, and Color Doppler    Study date:  15-Mar-2016    Patient: YING MAKI  MR number: [id number]  Account number: [id number]  : 1945  Age: 70 years  Gender: Female  Status: Outpatient  Location: [address] [place] [address]  Height: 62 in  Weight: 243.5 lb  BP: 144/ 82 mmHg    Indications: Shortness of breath    Diagnoses: R06.02 - Shortness of breath    Sonographer:  BHAVANA Gama  Primary Physician:  Cortez Morris DO  Referring Physician:  Lucas Carmen MD  Group:  [facility]  Interpreting Physician:  Lucas Carmen MD    SUMMARY    LEFT VENTRICLE:  Systolic function was normal.  There were no regional wall motion abnormalities.  Wall thickness was mildly to moderately increased.    LEFT ATRIUM:  The atrium was mildly dilated.    MITRAL VALVE:  There was mild to moderate annular calcification.  There was mild regurgitation.    TRICUSPID VALVE:  There was mild regurgitation.    HISTORY: PRIOR HISTORY: Hypertension, hypercholesterolemia, GERD, obesity,  restrictive lung disease, depression, thyroidectomy    PROCEDURE: The study was performed in the [address] [place] [address].  This  was a routine study. The transthoracic approach was used. The study included  complete 2D imaging, M-mode, complete spectral Doppler, and color Doppler. The  heart rate was 55 bpm, at the start of the study. Images were obtained from the  parasternal, apical, subcostal, and suprasternal notch acoustic windows.  Echocardiographic views were limited due to poor acoustic window availability,  decreased penetration, and lung interference. This was a technically difficult  study.    LEFT VENTRICLE: Size was normal. Systolic function was normal. There were no  regional wall motion abnormalities. Wall thickness was mildly to moderately  increased. DOPPLER: There was an increased relative contribution of atrial  contraction to ventricular filling. The deceleration time of the early  transmitral flow velocity was increased.    RIGHT VENTRICLE: The size was normal. Systolic function was normal. Wall  thickness was normal.    LEFT ATRIUM: The atrium was mildly dilated.    RIGHT ATRIUM: Size was at the upper limits of normal.    MITRAL VALVE: There was mild to moderate annular calcification. DOPPLER: There  was no evidence for stenosis. There was mild regurgitation.    AORTIC VALVE: The valve was trileaflet. Leaflets exhibited mildly increased  thickness. DOPPLER: There was no evidence for stenosis. There was no  regurgitation.    TRICUSPID VALVE: The valve structure was normal. There was normal leaflet  separation. DOPPLER: The transtricuspid velocity was within the normal range.  There was no evidence for stenosis. There was mild regurgitation.    PULMONIC VALVE: Leaflets exhibited normal thickness, no calcification, and  normal cuspal separation. DOPPLER: The transpulmonic velocity was within the  normal range. There was no regurgitation.    PERICARDIUM: There was no pericardial effusion. The pericardium was normal in  appearance.    AORTA: The root exhibited normal size.    SYSTEMIC VEINS: IVC: The inferior vena cava  was not well visualized.    SYSTEM MEASUREMENT TABLES    2D  AV Diam: 3.1 cm  IVSd: 1.3 cm  LA Diam: 3.9 cm  LVIDd: 4.7 cm  LVIDs: 3 cm  LVPWd: 1.3 cm    PW  E': 0.1 m/s  E/E': 13.1  MV A Shabbir: 1 m/s  MV Dec Sabine: 2.1 m/s2  MV DecT: 396.9 ms  MV E Shabbir: 0.8 m/s  MV E/A Ratio: 0.8    [facility] Accredited Echocardiography Laboratory    Prepared and electronically signed by    Lucas Carmen MD  Signed 15-Mar-2016 16:40:52    No results found for this or any previous visit.      The 10-year ASCVD risk score (Gavi GORE, et al., 2019) is: 29.2%    Values used to calculate the score:      Age: 79 years      Sex: Female      Is Non- : No      Diabetic: No      Tobacco smoker: No      Systolic Blood Pressure: 140 mmHg      Is BP treated: Yes      HDL Cholesterol: 42 mg/dL      Total Cholesterol: 174 mg/dL      Assessment & Plan     Assessment/Plan:    Preoperative cardiac risk assessment  For total right knee arthoplasty in June.  No issues with prior thyroidectomy surgery regarding the anesthesia or bleeding.  Limited METS capability due to knee pain as well as COPD and shortness of breath however denies chest pain or lower extremity swelling.  RCRI is 0 with a low risk surgery. It has been almost 10 years since her last echocardiogram.  Due to limited exercise capacity and shortness of breath we will repeat echocardiogram and if LV function is normal then she would be okay to move forward with surgery.  - Repeat complete echocardiogram prior to surgery.  - If normal echocardiogram then there is no contraindication to moving forward with surgery at this time.    HTN  Blood pressure remains well-controlled today at 110/75 which is where it runs at home.  - Continue on amlodipine 5 mg daily, lisinopril-hydrochlorothiazide 20-12.5 mg daily, Nebivolol 10 mg daily.  - Continue Lasix 40 mg daily for lower extremity swelling.    NIKKO  -Compliant with CPAP    COPD  .  This is limiting to her in  terms of her activity.  Is controlled with inhalers    GERD  -On omeprazole 40 mg daily    Papillary thyroid carcinoma s/p thyroidectomy  Resected 2016.  Stable since then.  Recent TSH was low and dose was adjusted by primary.  - Continue on levothyroxine    CKD3  Creatinine is stable with last on 4/16/2025 creatinine was 1.08.  - Continue Lasix as above    HLD  Cholesterol you today with elevated triglycerides.  Discussed her diet which appears to be healthy diet however she is not actively exercised.  - Continue atorvastatin 20 mg daily    PreDM  Hemoglobin A1c 10/15/2020 6.3.  -Managed with diet    Case discussed and reviewed with Dr. Trujillo who agrees with my assessment and plan.    Thank you for involving us in the care of your patient.      Clint Walton, DO  Cardiology Fellow   PGY-5      ==========================================================================================    Epic/ Allscripts/Care Everywhere records reviewed    ** Please Note: Fluency DirectDictation voice to text software may have been used in the creation of this document. **

## 2025-04-21 DIAGNOSIS — M17.11 PRIMARY OSTEOARTHRITIS OF RIGHT KNEE: Primary | ICD-10-CM

## 2025-04-21 RX ORDER — FERROUS SULFATE 324(65)MG
324 TABLET, DELAYED RELEASE (ENTERIC COATED) ORAL DAILY
Qty: 30 TABLET | Refills: 0 | Status: SHIPPED | OUTPATIENT
Start: 2025-04-21

## 2025-04-21 RX ORDER — FOLIC ACID 1 MG/1
1 TABLET ORAL DAILY
Qty: 30 TABLET | Refills: 0 | Status: SHIPPED | OUTPATIENT
Start: 2025-04-21

## 2025-04-21 RX ORDER — MULTIVITAMIN
1 TABLET ORAL DAILY
Qty: 30 TABLET | Refills: 0 | Status: SHIPPED | OUTPATIENT
Start: 2025-04-21

## 2025-04-21 RX ORDER — MUPIROCIN 20 MG/G
OINTMENT TOPICAL
Qty: 15 G | Refills: 0 | Status: SHIPPED | OUTPATIENT
Start: 2025-04-21

## 2025-04-24 DIAGNOSIS — I10 ESSENTIAL HYPERTENSION: ICD-10-CM

## 2025-04-25 RX ORDER — AMLODIPINE BESYLATE 5 MG/1
5 TABLET ORAL 2 TIMES DAILY
Qty: 180 TABLET | Refills: 1 | Status: SHIPPED | OUTPATIENT
Start: 2025-04-25

## 2025-04-26 DIAGNOSIS — E89.0 HYPOTHYROIDISM, POSTSURGICAL: ICD-10-CM

## 2025-04-26 DIAGNOSIS — I10 ESSENTIAL HYPERTENSION: ICD-10-CM

## 2025-04-26 DIAGNOSIS — E78.2 MIXED HYPERLIPIDEMIA: ICD-10-CM

## 2025-04-26 DIAGNOSIS — I10 PRIMARY HYPERTENSION: ICD-10-CM

## 2025-04-28 ENCOUNTER — EVALUATION (OUTPATIENT)
Dept: PHYSICAL THERAPY | Facility: CLINIC | Age: 80
End: 2025-04-28
Attending: ORTHOPAEDIC SURGERY
Payer: COMMERCIAL

## 2025-04-28 DIAGNOSIS — Z01.818 PREOPERATIVE TESTING: ICD-10-CM

## 2025-04-28 DIAGNOSIS — M17.11 PRIMARY OSTEOARTHRITIS OF RIGHT KNEE: ICD-10-CM

## 2025-04-28 PROCEDURE — 97161 PT EVAL LOW COMPLEX 20 MIN: CPT

## 2025-04-28 PROCEDURE — 97110 THERAPEUTIC EXERCISES: CPT

## 2025-04-28 RX ORDER — FUROSEMIDE 40 MG/1
40 TABLET ORAL DAILY
Qty: 90 TABLET | Refills: 1 | Status: SHIPPED | OUTPATIENT
Start: 2025-04-28

## 2025-04-28 NOTE — HOME EXERCISE EDUCATION
Program_ID:[id number]   Access Code: [email or web address]  URL: [email or web address]  Date: 04-  Prepared By: Zack Denny    Program Notes      Exercises      - Supine Quad Set - 1 x daily - 7 x weekly - 2 sets - 10 reps - 3 hold      - Supine Heel Slide with Strap - 1 x daily - 7 x weekly - 2 sets - 10 reps - 5 hold      - Active Straight Leg Raise with Quad Set - 1 x daily - 7 x weekly - 2 sets - 10 reps      - Standing Hip Abduction with Counter Support - 1 x daily - 7 x weekly - 2 sets - 10 reps      - Seated Hamstring Stretch - 1 x daily - 7 x weekly -  sets - 5 reps - 20 hold

## 2025-04-28 NOTE — PROGRESS NOTES
PT Evaluation     Today's date: 2025  Patient name: Jeny Collazo  : 1945  MRN: [id number]  Referring provider: Laura Quinn,*  Dx:   Encounter Diagnosis     ICD-10-CM    1. Primary osteoarthritis of right knee  M17.11 Ambulatory referral to Physical Therapy      2. Preoperative testing  Z01.818 Ambulatory referral to Physical Therapy          Start Time: 1100  Stop Time: 1145  Total time in clinic (min): 45 minutes    Assessment  Impairments: abnormal muscle firing, abnormal or restricted ROM, activity intolerance, impaired physical strength, lacks appropriate home exercise program, pain with function and poor body mechanics  Symptom irritability: moderate    Assessment details: Pt is a 79 y.o. year old female presenting to physical therapy for Primary osteoarthritis of right knee and Preoperative testing.  She presents with the following impairments: significantly decreased b/l knee extension AROM, decreased b/l quad activation, decreased BLE strength with more deficits noted on the RLE, fair squat mechanics, inability to perform SLS b/l, and antalgic gait affecting her function with gait, stair navigation, ADLs, bending, extending, and functional ability.  Pt will benefit from skilled physical therapy to address functional limitations noted in evaluation and meet patient goals.   Barriers to therapy: S/P R TKA on 25.  L TKA scheduled for October.  Understanding of Dx/Px/POC: good     Prognosis: good    Goals  ST. Pt will be independent with HEP.  2. Pt will improve L knee extension AROM to neutral.  3. Pt will improve quad activation to good in 4-6 weeks.  LT. Pt will improve pain at rest to 2/10.  2. Pt will improve BLE strength grossly by 2 grades or more to improve functional ability.  3. Pt will improve R knee flexion and extension AROM to WNL.  4. Pt will have resolution of R knee swelling by time of discharge.     Plan  Patient would benefit from: PT eval and  skilled physical therapy  Planned modality interventions: biofeedback, manual electrical stimulation, microcurrent electrical stimulation, TENS, electrical stimulation/Russian stimulation, thermotherapy: hydrocollator packs, cryotherapy and unattended electrical stimulation    Planned therapy interventions: abdominal trunk stabilization, joint mobilization, manual therapy, massage, ADL retraining, neuromuscular re-education, body mechanics training, patient education, postural training, strengthening, stretching, therapeutic activities, therapeutic exercise, flexibility, functional ROM exercises and home exercise program    Frequency: 2x week  Duration in weeks: 12  Treatment plan discussed with: patient        Subjective Evaluation    History of Present Illness  Mechanism of injury: Pt presents to the clinic with history of severe b/l knee osteoarthritis and is scheduled for R TKA on 25 and is scheduled for her L TKA in October. Pt stated that her knee pain has been going on for a long time and has been increasing over the past 3 years gradually. Pt stated that her pain is very achey and will sometimes go up on the posterior aspect of her R thigh towards her hip. Pt stated that stairs are very difficult for her but she is able to perform two small steps to get into her house when holding onto things for support. Pt ambulates with quad cane in the community but does not use in the home.   Patient Goals  Patient goals for therapy: increased motion, decreased pain, improved balance, increased strength, independence with ADLs/IADLs, decreased edema and return to sport/leisure activities    Pain  Current pain ratin  At best pain ratin  At worst pain ratin  Quality: dull ache, discomfort and sharp          Objective     Active Range of Motion   Left Knee   Flexion: 124 degrees   Extension: 10 degrees     Right Knee   Flexion: 110 degrees   Extension: 10 degrees     Mobility   Patellar Mobility:   Left  "Knee   Hypomobile: left medial, left lateral, left superior and left inferior    Right Knee   Hypomobile: medial, lateral, superior and inferior     Strength/Myotome Testing     Left Knee   Flexion: 4-  Extension: 4+  Quadriceps contraction: fair    Right Knee   Flexion: 3+  Extension: 4-  Quadriceps contraction: poor    Tests     Additional Tests Details  5xSTS: 16 seconds.    General Comments:      Knee Comments  Fair squat mechanics.  Inability to perform SLS on either limb.  Antalgic gait noted.              Precautions: s/p R TKA scheduled for 6/02/25    Date 4/28            Visit # 1            FOTO IE             Re-eval IE              Manuals 4/28            R knee PROM                                                    Neuro Re-Ed 4/28            Quad sets 10x3\" ea            LAQ             SAQ             clamshells                                                    Ther Ex 4/28            bike             Heel slides 5x10\" R sup strap            HS str 3x15\" R sit            SLR 5x w QS R            Standing hip abd/ext 5x ea abd            marches             Leg press             sidestepping                                       Ther Activity 4/28            squats             Step ups             Gait Training 4/28            Gait LRAD                          Modalities 4/28            ice prn                              "

## 2025-04-29 ENCOUNTER — TELEPHONE (OUTPATIENT)
Dept: OBGYN CLINIC | Facility: HOSPITAL | Age: 80
End: 2025-04-29

## 2025-04-30 NOTE — TELEPHONE ENCOUNTER
Preoperative Elective Admission Assessment    EKG/LAB/MRSA SWAB/CXR date: Going 5/7 5/5 PAT Call  5/12  ECHO  5/14 David      Living Situation:    Who does pt live with: spouse  What kind of home: ranch  How do they enter the home: garage  How many levels in home: 1 level home   # of steps to enter home: 2 steps to enter with railing to hold onto.   Sleeping arrangement: first/entry floor  Where is Bathroom: First floor bath, with walk in shower with grab bars   Toilet height? Concerns for low toilets: Low toilet- discussed RTS  Dogs or ther pets: NO      First Floor Setup:   Is there a bathroom: Yes  Where would pt sleep: bed     DME: cane- Ordering RW and RTS.     We discussed clearing pathways in the home and making sure there is accessibly to use the walker, for example, removing throw rugs.      Patient's Current Level of Function: Ambulates with cane and ADLs: Independent (only cane use outside the home for safety).     Post-op Caregiver: spouse  Currently receive any HHC/aides/community supports: No     Post-op Transport: spouse  To/from hospital: spouse  To/from PT 2-3x/week: spouse  Uses community transport now: No     Outpatient Physical Therapy Site:  Site: [place]   pre and post-op appts scheduled? Yes     Medication Management: self  Preferred Pharmacy for Post-op Medications: [id number] - [place] - [address]   [address], [place] [address]   Blood Management Vitamin Regimen: Pt confirms taking as prescribed  Post-op anticoagulant: to be determined by surgical team postoperatively  Has Bactroban for 5 days preop: Yes  Educated on Preoperative Bathing Instructions, and use of Soap for 5 days before surgery.      DC Plan: Pt plans to be discharged home    Barriers to DC identified preoperatively: none identified    BMI: 37.03    Patient Education:  Pt educated on post-op pain, early mobilization (POD0), LOS goals, OP PT goals, and preoperative bathing. Patient  educated that our goal is to appropriately discharge patient based off their post-op function while striving to maintain maximal independence. The goal is to discharge patient to home and for them to attend outpatient physical therapy.    Assigned to care team? Yes

## 2025-05-01 NOTE — TELEPHONE ENCOUNTER
"Patient called me directly, expressing DOS again changed back to 6/2 and \"frustrated\" with change. She states she got injection in February causing the change. She is aware I will follow up with team      Chart review shows Injection 2/5- so believe 5/19 would work. Will check with team.   "

## 2025-05-03 LAB
DME PARACHUTE DELIVERY DATE EXPECTED: NORMAL
DME PARACHUTE DELIVERY DATE REQUESTED: NORMAL
DME PARACHUTE ITEM DESCRIPTION: NORMAL
DME PARACHUTE ITEM DESCRIPTION: NORMAL
DME PARACHUTE ORDER STATUS: NORMAL
DME PARACHUTE SUPPLIER NAME: NORMAL
DME PARACHUTE SUPPLIER PHONE: NORMAL

## 2025-05-05 LAB
DME PARACHUTE DELIVERY DATE ACTUAL: NORMAL
DME PARACHUTE DELIVERY DATE EXPECTED: NORMAL
DME PARACHUTE DELIVERY DATE REQUESTED: NORMAL
DME PARACHUTE ITEM DESCRIPTION: NORMAL
DME PARACHUTE ITEM DESCRIPTION: NORMAL
DME PARACHUTE ORDER STATUS: NORMAL
DME PARACHUTE SUPPLIER NAME: NORMAL
DME PARACHUTE SUPPLIER PHONE: NORMAL
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
## 2025-05-07 ENCOUNTER — APPOINTMENT (OUTPATIENT)
Dept: LAB | Facility: HOSPITAL | Age: 80
End: 2025-05-07
Payer: COMMERCIAL

## 2025-05-07 ENCOUNTER — RA CDI HCC (OUTPATIENT)
Dept: OTHER | Facility: HOSPITAL | Age: 80
End: 2025-05-07

## 2025-05-07 ENCOUNTER — LAB REQUISITION (OUTPATIENT)
Dept: LAB | Facility: HOSPITAL | Age: 80
End: 2025-05-07
Payer: COMMERCIAL

## 2025-05-07 DIAGNOSIS — Z01.818 PRE-OP EXAMINATION: ICD-10-CM

## 2025-05-07 DIAGNOSIS — Z01.818 PREOPERATIVE TESTING: ICD-10-CM

## 2025-05-07 DIAGNOSIS — Z01.818 ENCOUNTER FOR OTHER PREPROCEDURAL EXAMINATION: ICD-10-CM

## 2025-05-07 DIAGNOSIS — M25.59 PAIN IN OTHER SPECIFIED JOINT: ICD-10-CM

## 2025-05-07 DIAGNOSIS — M17.11 PRIMARY OSTEOARTHRITIS OF RIGHT KNEE: ICD-10-CM

## 2025-05-07 LAB
ABO GROUP BLD: NORMAL
ALBUMIN SERPL BCG-MCNC: 4 G/DL (ref 3.5–5)
ALP SERPL-CCNC: 58 U/L (ref 34–104)
ALT SERPL W P-5'-P-CCNC: 10 U/L (ref 7–52)
ANION GAP SERPL CALCULATED.3IONS-SCNC: 10 MMOL/L (ref 4–13)
APTT PPP: 29 SECONDS (ref 23–34)
AST SERPL W P-5'-P-CCNC: 11 U/L (ref 13–39)
BASOPHILS # BLD AUTO: 0.01 THOUSANDS/ÂΜL (ref 0–0.1)
BASOPHILS NFR BLD AUTO: 0 % (ref 0–1)
BILIRUB SERPL-MCNC: 0.43 MG/DL (ref 0.2–1)
BLD GP AB SCN SERPL QL: NEGATIVE
BUN SERPL-MCNC: 36 MG/DL (ref 5–25)
CALCIUM SERPL-MCNC: 8.7 MG/DL (ref 8.4–10.2)
CHLORIDE SERPL-SCNC: 105 MMOL/L (ref 96–108)
CO2 SERPL-SCNC: 24 MMOL/L (ref 21–32)
CREAT SERPL-MCNC: 1.17 MG/DL (ref 0.6–1.3)
CRP SERPL QL: 9.3 MG/L
EOSINOPHIL # BLD AUTO: 0.02 THOUSAND/ÂΜL (ref 0–0.61)
EOSINOPHIL NFR BLD AUTO: 0 % (ref 0–6)
ERYTHROCYTE [DISTWIDTH] IN BLOOD BY AUTOMATED COUNT: 14.6 % (ref 11.6–15.1)
EST. AVERAGE GLUCOSE BLD GHB EST-MCNC: 146 MG/DL
FERRITIN SERPL-MCNC: 291 NG/ML (ref 30–307)
FERRITIN SERPL-MCNC: 312 NG/ML (ref 30–307)
GFR SERPL CREATININE-BSD FRML MDRD: 45 ML/MIN/1.73SQ M
GLUCOSE SERPL-MCNC: 199 MG/DL (ref 65–140)
HBA1C MFR BLD: 6.7 %
HCT VFR BLD AUTO: 36.8 % (ref 34.8–46.1)
HGB BLD-MCNC: 12.2 G/DL (ref 11.5–15.4)
IMM GRANULOCYTES # BLD AUTO: 0.04 THOUSAND/UL (ref 0–0.2)
IMM GRANULOCYTES NFR BLD AUTO: 1 % (ref 0–2)
INR PPP: 0.98 (ref 0.85–1.19)
IRON SATN MFR SERPL: 39 % (ref 15–50)
IRON SATN MFR SERPL: 40 % (ref 15–50)
IRON SERPL-MCNC: 86 UG/DL (ref 50–212)
IRON SERPL-MCNC: 90 UG/DL (ref 50–212)
LYMPHOCYTES # BLD AUTO: 1.61 THOUSANDS/ÂΜL (ref 0.6–4.47)
LYMPHOCYTES NFR BLD AUTO: 31 % (ref 14–44)
MCH RBC QN AUTO: 32.2 PG (ref 26.8–34.3)
MCHC RBC AUTO-ENTMCNC: 33.2 G/DL (ref 31.4–37.4)
MCV RBC AUTO: 97 FL (ref 82–98)
MONOCYTES # BLD AUTO: 0.9 THOUSAND/ÂΜL (ref 0.17–1.22)
MONOCYTES NFR BLD AUTO: 17 % (ref 4–12)
NEUTROPHILS # BLD AUTO: 2.62 THOUSANDS/ÂΜL (ref 1.85–7.62)
NEUTS SEG NFR BLD AUTO: 51 % (ref 43–75)
NRBC BLD AUTO-RTO: 0 /100 WBCS
PLATELET # BLD AUTO: 142 THOUSANDS/UL (ref 149–390)
PMV BLD AUTO: 11.9 FL (ref 8.9–12.7)
POTASSIUM SERPL-SCNC: 3.6 MMOL/L (ref 3.5–5.3)
PROT SERPL-MCNC: 7.1 G/DL (ref 6.4–8.4)
PROTHROMBIN TIME: 13.2 SECONDS (ref 12.3–15)
RBC # BLD AUTO: 3.79 MILLION/UL (ref 3.81–5.12)
RETICS # AUTO: ABNORMAL 10*3/UL (ref 14097–95744)
RETICS # CALC: 2.09 % (ref 0.37–1.87)
RH BLD: POSITIVE
SODIUM SERPL-SCNC: 139 MMOL/L (ref 135–147)
SPECIMEN EXPIRATION DATE: NORMAL
TIBC SERPL-MCNC: 221.2 UG/DL (ref 250–450)
TIBC SERPL-MCNC: 224 UG/DL (ref 250–450)
TRANSFERRIN SERPL-MCNC: 158 MG/DL (ref 203–362)
TRANSFERRIN SERPL-MCNC: 160 MG/DL (ref 203–362)
UIBC SERPL-MCNC: 134 UG/DL (ref 155–355)
UIBC SERPL-MCNC: 135 UG/DL (ref 155–355)
WBC # BLD AUTO: 5.2 THOUSAND/UL (ref 4.31–10.16)

## 2025-05-07 PROCEDURE — 83036 HEMOGLOBIN GLYCOSYLATED A1C: CPT

## 2025-05-07 PROCEDURE — 83540 ASSAY OF IRON: CPT

## 2025-05-07 PROCEDURE — 80053 COMPREHEN METABOLIC PANEL: CPT

## 2025-05-07 PROCEDURE — 85610 PROTHROMBIN TIME: CPT

## 2025-05-07 PROCEDURE — 85045 AUTOMATED RETICULOCYTE COUNT: CPT

## 2025-05-07 PROCEDURE — 86901 BLOOD TYPING SEROLOGIC RH(D): CPT | Performed by: ORTHOPAEDIC SURGERY

## 2025-05-07 PROCEDURE — 85025 COMPLETE CBC W/AUTO DIFF WBC: CPT

## 2025-05-07 PROCEDURE — 85730 THROMBOPLASTIN TIME PARTIAL: CPT

## 2025-05-07 PROCEDURE — 36415 COLL VENOUS BLD VENIPUNCTURE: CPT

## 2025-05-07 PROCEDURE — 83550 IRON BINDING TEST: CPT

## 2025-05-07 PROCEDURE — 86900 BLOOD TYPING SEROLOGIC ABO: CPT | Performed by: ORTHOPAEDIC SURGERY

## 2025-05-07 PROCEDURE — 87081 CULTURE SCREEN ONLY: CPT

## 2025-05-07 PROCEDURE — 82728 ASSAY OF FERRITIN: CPT

## 2025-05-07 PROCEDURE — 86850 RBC ANTIBODY SCREEN: CPT | Performed by: ORTHOPAEDIC SURGERY

## 2025-05-07 PROCEDURE — 86140 C-REACTIVE PROTEIN: CPT

## 2025-05-07 NOTE — ASSESSMENT & PLAN NOTE
Lab Results   Component Value Date    EGFR 45 05/07/2025    EGFR 49 04/16/2025    EGFR 56 09/21/2022    CREATININE 1.17 05/07/2025    CREATININE 1.08 04/16/2025    CREATININE 0.98 09/21/2022

## 2025-05-07 NOTE — PROGRESS NOTES
HCC coding opportunities       Chart reviewed, no opportunity found: CHART REVIEWED, NO OPPORTUNITY FOUND        Patients Insurance        Commercial Insurance: [employer]       
Vital signs stable. No anesthesia complications. Patient recovered from anesthesia and ready for PACU discharge.

## 2025-05-07 NOTE — H&P (VIEW-ONLY)
Internal Medicine Pre-Operative Evaluation:     Reason for Visit: Pre-operative Evaluation for Risk Stratification and Optimization    Patient ID: Jeny Collazo is a 79 y.o. female.     Future cases for Jeny Collazo [[id number]]       Case ID Status Date Time Carlos Procedure Provider Location    [id number] [facility] 6/2/2025 10:45  ARTHROPLASTY KNEE TOTAL, cemented, potential same day discharge Laura Quinn DO [[id number]]  MAIN OR               Recommendations to Proceed withSurgery    Patient is considered to be Low risk for Medium risk procedure.     After evaluation and discussion with patient with emphasis that all surgery has some degree of inherent risk it is acknowledged by patient this risk is Acceptable.    Patient is optimized and may proceed with planned procedure.     Assessment    Pre-operative Medical Evaluation for planned surgery  Recommendations as listed in PLAN section below  Contact surgical nurse  navigator with any questions regarding preoperative plan or schedule.      Assessment & Plan  Primary osteoarthritis of right knee  Failed outpatient conservative measures  Elected to undergo surgical intervention  Cleared by cardiology 4/17/2025    Stage 3 chronic kidney disease, unspecified whether stage 3a or 3b CKD ([facility])  Lab Results   Component Value Date    EGFR 45 05/07/2025    EGFR 49 04/16/2025    EGFR 56 09/21/2022    CREATININE 1.17 05/07/2025    CREATININE 1.08 04/16/2025    CREATININE 0.98 09/21/2022     Obstructive sleep apnea  CPAP at home -   Hypothyroidism, postsurgical  Continue current thyroid supplement    Impaired fasting glucose  Follow CCD  Primary hypertension  Continue amlodipine  Hold ACEI/ARB/diuretics to decrease risk of post operative HITESH; post op add hydralazine prn for elevated blood pressures. May resume these meds upon discharge    Mixed hyperlipidemia  Continue low cholesterol diet and statin therapy    GERD without esophagitis  Continue  PPI    History of thyroid cancer  Sx removed  Pre-op examination             Plan:     1. Further preoperative workup as follows:   - none no further testing required may proceed with surgery    2. Preoperative Medication Management Review performed by PAT nursing  YES    3. Patient requires further consultation with:   No Consults Required    4. Discharge Planning / Barriers to Discharge  none identified - patients has post discharge therapy plan in place, transportation arranged for discharge day, adequate family support at home to assist with discharge to home.        Subjective:           History of Present Illness:     Jeny Collazo is a 79 y.o. female who presents to the office today for a preoperative consultation at the request of surgeon. The patient understands this is an elective procedure and not emergent. They are electing to undergo planned procedure with an understanding that all surgery has inherent risk. They have worked with their surgeon and failed conservative treatment measures. Today they present for preoperative risk assessment and recommendations for optimization in preparation for surgery.    Pt seen in center for perioperative medicine for upcoming proposed surgery. They have failed previous conservative measures and have elected surgical intervention.     Pt meets presurgical lab and BMI optimization goals.      Jeny Collazo has an IN HOSPITAL cardiac risk of RCI RISK CLASS I (0 risk factors, risk of major cardiac compl. appr. 0.5%) based on RCRI calculator    Cardiac Risk Estimation: per the Revised Cardiac Risk Index (Circ. 100:1043, 1999),         Pre-op Exam    Previous history of bleeding disorders or clots?: No  Previous Anesthesia reaction?: No  Prolonged steroid use in the last 6 months?: No    Assessment of Cardiac Risk:   - Unstable or severe angina or MI in the last 6 weeks or history of stent placement in the last year?: No   - Decompensated heart failure (e.g.  "New onset heart failure, NYHA  Class IV heart failure, or worsening existing heart failure)?: No  - Significant arrhythmias such as high grade AV block, symptomatic ventricular arrhythmia, newly recognized ventricular tachycardia, supraventricular tachycardia with resting heart rate >100, or symptomatic bradycardia?: No  - Severe heart valve disease including aortic stenosis or symptomatic mitral stenosis?: No      Pre-operative Risk Factors:  Elevated-risk surgery: No    History of cerebrovascular disease: No    History of ischemic heart disease: No  Pre-operative treatment with insulin: No  Pre-operative creatinine >2 mg/dL: No    History of congestive heart failure: No    Duke Activity Status Index (DASI):   DASI Total Score: 23.45  METs: 5.6    Medications of Perioperative Concern:   ACE inhibitors/ARBs        ROS: No TIA's or unusual headaches, no dysphagia.  No prolonged cough. No dyspnea or chest pain on exertion.  No abdominal pain, change in bowel habits, black or bloody stools.  No urinary tract or BPH symptoms.  Positive reported pain in arthritic joint. Positive difficulty with gait. No skin rashes or issues.      Objective:    /80   Pulse 62   Ht 5' 1\" (1.549 m)   Wt 88.9 kg (196 lb)   LMP  (LMP Unknown)   BMI 37.03 kg/m²       General Appearance: no distress, conversive  HEENT: PERRLA, conjuctiva normal; oropharynx clear; mucous membranes moist;   Neck:  Supple, no lymphadenopathy or thyromegaly  Lungs: breath sounds normal, normal respiratory effort, no retractions, expiratory effort normal  CV: normal heart sounds S1/S2, PMI normal   ABD: soft non tender, no masses , no hepatic or splenomegaly  EXT: DP pulses intact, no lymphadenopathy, no edema  Skin: normal turgor, normal texture, no rash  Psych: affect normal, mood normal  Neuro: AAOx3        The following portions of the patient's history were reviewed and updated as appropriate: allergies, current medications, past family history, " past medical history, past social history, past surgical history and problem list.     Past History:       Past Medical History:   Diagnosis Date    Asthma     Breathing difficulty     Bronchitis     Cancer (HCC)     thyroid    COVID-19     CPAP (continuous positive airway pressure) dependence     Depression     Fracture of arm     Fracture of carpal bone     Hyperlipidemia     Hypertension     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HCC) 2016    Shortness of breath     Sleep apnea     Thyroid nodule     Past Surgical History:   Procedure Laterality Date    EYE SURGERY  2021    HEMORRHOID SURGERY      FL THYROIDECTOMY RMVL REMAINING TISS FLWG PRTL RMVL Left 2016    Procedure:  COMPLETION THYROIDECTOMY, FLEXIBLE LARYNGOSCOPY;  Surgeon: David Rivera MD;  Location: [facility];  Service: Surgical Oncology    FL TOTAL THYROID LOBECTOMY UNI W/WO ISTHMUSECTOMY Right 2016    Procedure: HEMITHYROIDECTOMY;  Surgeon: David Rivera MD;  Location:  MAIN OR;  Service: Surgical Oncology    THYROIDECTOMY, PARTIAL Left           Social History     Tobacco Use    Smoking status: Former     Current packs/day: 0.00     Average packs/day: 0.3 packs/day for 4.0 years (1.0 ttl pk-yrs)     Types: Cigarettes     Start date: 6/15/1977     Quit date: 6/15/1981     Years since quittin.9    Smokeless tobacco: Former    Tobacco comments:     quit 40 yrs ago (Never a smoker per [employer])   Vaping Use    Vaping status: Never Used   Substance Use Topics    Alcohol use: Not Currently     Comment: occasional glass of wine    Drug use: No     Family History   Adopted: Yes   Problem Relation Age of Onset    Hypertension Family     Kidney disease Family     Hypertension Mother           Allergies:     Allergies   Allergen Reactions    Adhesive [Medical Tape] Rash        Current Medications:     Current Outpatient Medications   Medication Instructions    albuterol (Ventolin HFA) 90 mcg/act inhaler 2 puffs, Inhalation, Every 4  hours PRN    amLODIPine (NORVASC) 5 mg, Oral, 2 times daily    ascorbic acid (VITAMIN C) 500 mg, Oral, Daily, Begin 30 days prior to surgery    ascorbic acid (VITAMIN C) 500 mg, Oral, Daily, Begin 30 days prior to surgery    atorvastatin (LIPITOR) 20 mg, Oral, Daily    Diclofenac Sodium (VOLTAREN) 2 g, Topical, 4 times daily    diphenhydrAMINE-acetaminophen (TYLENOL PM)  MG TABS 1 tablet, Daily at bedtime PRN    ferrous sulfate 324 mg, Oral, Daily before breakfast, Begin 30 days prior to surgery    ferrous sulfate 324 mg, Oral, Daily, Begin 30 days prior to surgery    Fluticasone Furoate-Vilanterol 100-25 mcg/actuation inhaler 1 puff, Inhalation, Daily, Rinse mouth after use.    folic acid (KP FOLIC ACID) 1 mg, Oral, Daily, Begin 30 days prior to surgery    folic acid (KP FOLIC ACID) 1 mg, Oral, Daily, Begin 30 days prior to surgery    furosemide (LASIX) 40 mg, Oral, Daily    levothyroxine (SYNTHROID) 150 mcg, Oral, Daily    levothyroxine 175 mcg, Oral, Daily    lisinopril-hydrochlorothiazide (PRINZIDE,ZESTORETIC) 20-12.5 MG per tablet TAKE 1 TABLET BY MOUTH TWICE DAILY    Melatonin 10 MG TABS Take by mouth    mometasone (ELOCON) 0.1 % cream 1 Application, Topical, Daily    mometasone (ELOCON) 0.1 % lotion Topical, Daily    Multiple Vitamin (multivitamin) tablet 1 tablet, Oral, Daily, Begin 30 days prior to surgery    Multiple Vitamin (multivitamin) tablet 1 tablet, Oral, Daily, Begin 30d prior to surgery    Multiple Vitamins-Minerals (PreserVision AREDS 2) CAPS No dose, route, or frequency recorded.    mupirocin (BACTROBAN) 2 % ointment Topical, 2 times daily, Apply to each nostril 2 times a day with Q-tip 5 days prior to surgery    mupirocin (BACTROBAN) 2 % ointment Apply to each nostril daily with Q-tip 5 days prior to procedure    nebivolol (BYSTOLIC) 10 mg tablet TAKE 1 TABLET BY MOUTH DAILY GENERIC EQUIVALENT FOR BYSTOLIC    nystatin-triamcinolone (MYCOLOG-II) cream Topical, 2 times daily    omeprazole  "(PRILOSEC) 40 mg, Oral, Daily    potassium chloride (Klor-Con M20) 20 mEq tablet 20 mEq, Oral, 2 times daily    venlafaxine (EFFEXOR) 37.5 mg, Oral, Daily           PRE-OP WORKSHEET DATA    Assessment of Pre-Operative Risks     MLJ Quality Hard Stops:    BMI (<40) : Estimated body mass index is 37.03 kg/m² as calculated from the following:    Height as of this encounter: 5' 1\" (1.549 m).    Weight as of this encounter: 88.9 kg (196 lb).    Hgb ( >11):   Lab Results   Component Value Date    HGB 12.2 2025    HGB 13.2 2025    HGB 14.0 2022       HbA1c (<7.5) :   Lab Results   Component Value Date    HGBA1C 6.7 (H) 2025       GFR (>60) (Less then 45 = Nephrology consult):    Lab Results   Component Value Date    EGFR 45 2025    EGFR 49 2025    EGFR 56 2022            Pre-Op Data Reviewed:       Laboratory Results: I have personally reviewed the pertinent reports    EKG: I personally reviewed and interpreted available tracings in the electronic medical record    No results found. However, due to the size of the patient record, not all encounters were searched. Please check Results Review for a complete set of results.    OLD RECORDS: reviewed old records in the chart review section if EHR on day of visit.    Previous cardiopulmonary studies within the past year:      ECHO:  Results for orders placed during the hospital encounter of 03/15/16    Echo complete with contrast if indicated    Narrative  [address] [address] [phone]    Transthoracic Echocardiogram  2D, M-mode, Doppler, and Color Doppler    Study date:  15-Mar-2016    Patient: YING MAKI  MR number: [id number]  Account number: [id number]  : 1945  Age: 70 years  Gender: Female  Status: Outpatient  Location: [facility]  Height: 62 in  Weight: 243.5 lb  BP: 144/ 82 mmHg    Indications: Shortness of " breath    Diagnoses: R06.02 - Shortness of breath    Sonographer:  BHAVANA Gama  Primary Physician:  Cortez Morris DO  Referring Physician:  Lucas Carmen MD  Group:  [facility]  Interpreting Physician:  Lucas Carmen MD    SUMMARY    LEFT VENTRICLE:  Systolic function was normal.  There were no regional wall motion abnormalities.  Wall thickness was mildly to moderately increased.    LEFT ATRIUM:  The atrium was mildly dilated.    MITRAL VALVE:  There was mild to moderate annular calcification.  There was mild regurgitation.    TRICUSPID VALVE:  There was mild regurgitation.    HISTORY: PRIOR HISTORY: Hypertension, hypercholesterolemia, GERD, obesity,  restrictive lung disease, depression, thyroidectomy    PROCEDURE: The study was performed in the [facility]. This  was a routine study. The transthoracic approach was used. The study included  complete 2D imaging, M-mode, complete spectral Doppler, and color Doppler. The  heart rate was 55 bpm, at the start of the study. Images were obtained from the  parasternal, apical, subcostal, and suprasternal notch acoustic windows.  Echocardiographic views were limited due to poor acoustic window availability,  decreased penetration, and lung interference. This was a technically difficult  study.    LEFT VENTRICLE: Size was normal. Systolic function was normal. There were no  regional wall motion abnormalities. Wall thickness was mildly to moderately  increased. DOPPLER: There was an increased relative contribution of atrial  contraction to ventricular filling. The deceleration time of the early  transmitral flow velocity was increased.    RIGHT VENTRICLE: The size was normal. Systolic function was normal. Wall  thickness was normal.    LEFT ATRIUM: The atrium was mildly dilated.    RIGHT ATRIUM: Size was at the upper limits of normal.    MITRAL VALVE: There was mild to moderate annular calcification. DOPPLER: There  was no  evidence for stenosis. There was mild regurgitation.    AORTIC VALVE: The valve was trileaflet. Leaflets exhibited mildly increased  thickness. DOPPLER: There was no evidence for stenosis. There was no  regurgitation.    TRICUSPID VALVE: The valve structure was normal. There was normal leaflet  separation. DOPPLER: The transtricuspid velocity was within the normal range.  There was no evidence for stenosis. There was mild regurgitation.    PULMONIC VALVE: Leaflets exhibited normal thickness, no calcification, and  normal cuspal separation. DOPPLER: The transpulmonic velocity was within the  normal range. There was no regurgitation.    PERICARDIUM: There was no pericardial effusion. The pericardium was normal in  appearance.    AORTA: The root exhibited normal size.    SYSTEMIC VEINS: IVC: The inferior vena cava was not well visualized.    SYSTEM MEASUREMENT TABLES    2D  AV Diam: 3.1 cm  IVSd: 1.3 cm  LA Diam: 3.9 cm  LVIDd: 4.7 cm  LVIDs: 3 cm  LVPWd: 1.3 cm    PW  E': 0.1 m/s  E/E': 13.1  MV A Shabbir: 1 m/s  MV Dec [place]: 2.1 m/s2  MV DecT: 396.9 ms  MV E Shabbir: 0.8 m/s  MV E/A Ratio: 0.8    [employer] Accredited Echocardiography Laboratory    Prepared and electronically signed by    Lucas Carmen MD  Signed 15-Mar-2016 16:40:52          Time of visit including pre-visit chart review, visit and post-visit coordination of plan and care , review of pre-surgical lab work, preparation and time spent documenting note in electronic medical record, time spent face-to-face in physical examination answering patient questions by care team 45 minutes             [facility]

## 2025-05-07 NOTE — ASSESSMENT & PLAN NOTE
Continue amlodipine  Hold ACEI/ARB/diuretics to decrease risk of post operative HITESH; post op add hydralazine prn for elevated blood pressures. May resume these meds upon discharge

## 2025-05-07 NOTE — PROGRESS NOTES
Internal Medicine Pre-Operative Evaluation:     Reason for Visit: Pre-operative Evaluation for Risk Stratification and Optimization    Patient ID: Jeny Collazo is a 79 y.o. female.     Future cases for Jeny Collazo [[id number]]       Case ID Status Date Time Carlos Procedure Provider Location    [id number] [facility] 6/2/2025 10:45  ARTHROPLASTY KNEE TOTAL, cemented, potential same day discharge Laura Quinn DO [[id number]]  MAIN OR               Recommendations to Proceed withSurgery    Patient is considered to be Low risk for Medium risk procedure.     After evaluation and discussion with patient with emphasis that all surgery has some degree of inherent risk it is acknowledged by patient this risk is Acceptable.    Patient is optimized and may proceed with planned procedure.     Assessment    Pre-operative Medical Evaluation for planned surgery  Recommendations as listed in PLAN section below  Contact surgical nurse  navigator with any questions regarding preoperative plan or schedule.      Assessment & Plan  Primary osteoarthritis of right knee  Failed outpatient conservative measures  Elected to undergo surgical intervention  Cleared by cardiology 4/17/2025    Stage 3 chronic kidney disease, unspecified whether stage 3a or 3b CKD ([facility])  Lab Results   Component Value Date    EGFR 45 05/07/2025    EGFR 49 04/16/2025    EGFR 56 09/21/2022    CREATININE 1.17 05/07/2025    CREATININE 1.08 04/16/2025    CREATININE 0.98 09/21/2022     Obstructive sleep apnea  CPAP at home -   Hypothyroidism, postsurgical  Continue current thyroid supplement    Impaired fasting glucose  Follow CCD  Primary hypertension  Continue amlodipine  Hold ACEI/ARB/diuretics to decrease risk of post operative HITESH; post op add hydralazine prn for elevated blood pressures. May resume these meds upon discharge    Mixed hyperlipidemia  Continue low cholesterol diet and statin therapy    GERD without esophagitis  Continue  PPI    History of thyroid cancer  Sx removed  Pre-op examination             Plan:     1. Further preoperative workup as follows:   - none no further testing required may proceed with surgery    2. Preoperative Medication Management Review performed by PAT nursing  YES    3. Patient requires further consultation with:   No Consults Required    4. Discharge Planning / Barriers to Discharge  none identified - patients has post discharge therapy plan in place, transportation arranged for discharge day, adequate family support at home to assist with discharge to home.        Subjective:           History of Present Illness:     Jeny Collazo is a 79 y.o. female who presents to the office today for a preoperative consultation at the request of surgeon. The patient understands this is an elective procedure and not emergent. They are electing to undergo planned procedure with an understanding that all surgery has inherent risk. They have worked with their surgeon and failed conservative treatment measures. Today they present for preoperative risk assessment and recommendations for optimization in preparation for surgery.    Pt seen in center for perioperative medicine for upcoming proposed surgery. They have failed previous conservative measures and have elected surgical intervention.     Pt meets presurgical lab and BMI optimization goals.      Jeny Collazo has an IN HOSPITAL cardiac risk of RCI RISK CLASS I (0 risk factors, risk of major cardiac compl. appr. 0.5%) based on RCRI calculator    Cardiac Risk Estimation: per the Revised Cardiac Risk Index (Circ. 100:1043, 1999),         Pre-op Exam    Previous history of bleeding disorders or clots?: No  Previous Anesthesia reaction?: No  Prolonged steroid use in the last 6 months?: No    Assessment of Cardiac Risk:   - Unstable or severe angina or MI in the last 6 weeks or history of stent placement in the last year?: No   - Decompensated heart failure (e.g.  "New onset heart failure, NYHA  Class IV heart failure, or worsening existing heart failure)?: No  - Significant arrhythmias such as high grade AV block, symptomatic ventricular arrhythmia, newly recognized ventricular tachycardia, supraventricular tachycardia with resting heart rate >100, or symptomatic bradycardia?: No  - Severe heart valve disease including aortic stenosis or symptomatic mitral stenosis?: No      Pre-operative Risk Factors:  Elevated-risk surgery: No    History of cerebrovascular disease: No    History of ischemic heart disease: No  Pre-operative treatment with insulin: No  Pre-operative creatinine >2 mg/dL: No    History of congestive heart failure: No    Duke Activity Status Index (DASI):   DASI Total Score: 23.45  METs: 5.6    Medications of Perioperative Concern:   ACE inhibitors/ARBs        ROS: No TIA's or unusual headaches, no dysphagia.  No prolonged cough. No dyspnea or chest pain on exertion.  No abdominal pain, change in bowel habits, black or bloody stools.  No urinary tract or BPH symptoms.  Positive reported pain in arthritic joint. Positive difficulty with gait. No skin rashes or issues.      Objective:    /80   Pulse 62   Ht 5' 1\" (1.549 m)   Wt 88.9 kg (196 lb)   LMP  (LMP Unknown)   BMI 37.03 kg/m²       General Appearance: no distress, conversive  HEENT: PERRLA, conjuctiva normal; oropharynx clear; mucous membranes moist;   Neck:  Supple, no lymphadenopathy or thyromegaly  Lungs: breath sounds normal, normal respiratory effort, no retractions, expiratory effort normal  CV: normal heart sounds S1/S2, PMI normal   ABD: soft non tender, no masses , no hepatic or splenomegaly  EXT: DP pulses intact, no lymphadenopathy, no edema  Skin: normal turgor, normal texture, no rash  Psych: affect normal, mood normal  Neuro: AAOx3        The following portions of the patient's history were reviewed and updated as appropriate: allergies, current medications, past family history, " past medical history, past social history, past surgical history and problem list.     Past History:       Past Medical History:   Diagnosis Date    Asthma     Breathing difficulty     Bronchitis     Cancer (HCC)     thyroid    COVID-19     CPAP (continuous positive airway pressure) dependence     Depression     Fracture of arm     Fracture of carpal bone     Hyperlipidemia     Hypertension     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HCC) 2016    Shortness of breath     Sleep apnea     Thyroid nodule     Past Surgical History:   Procedure Laterality Date    EYE SURGERY  2021    HEMORRHOID SURGERY      NC THYROIDECTOMY RMVL REMAINING TISS FLWG PRTL RMVL Left 2016    Procedure:  COMPLETION THYROIDECTOMY, FLEXIBLE LARYNGOSCOPY;  Surgeon: David Rivera MD;  Location: [facility];  Service: Surgical Oncology    NC TOTAL THYROID LOBECTOMY UNI W/WO ISTHMUSECTOMY Right 2016    Procedure: HEMITHYROIDECTOMY;  Surgeon: David Rivera MD;  Location:  MAIN OR;  Service: Surgical Oncology    THYROIDECTOMY, PARTIAL Left           Social History     Tobacco Use    Smoking status: Former     Current packs/day: 0.00     Average packs/day: 0.3 packs/day for 4.0 years (1.0 ttl pk-yrs)     Types: Cigarettes     Start date: 6/15/1977     Quit date: 6/15/1981     Years since quittin.9    Smokeless tobacco: Former    Tobacco comments:     quit 40 yrs ago (Never a smoker per [employer])   Vaping Use    Vaping status: Never Used   Substance Use Topics    Alcohol use: Not Currently     Comment: occasional glass of wine    Drug use: No     Family History   Adopted: Yes   Problem Relation Age of Onset    Hypertension Family     Kidney disease Family     Hypertension Mother           Allergies:     Allergies   Allergen Reactions    Adhesive [Medical Tape] Rash        Current Medications:     Current Outpatient Medications   Medication Instructions    albuterol (Ventolin HFA) 90 mcg/act inhaler 2 puffs, Inhalation, Every 4  hours PRN    amLODIPine (NORVASC) 5 mg, Oral, 2 times daily    ascorbic acid (VITAMIN C) 500 mg, Oral, Daily, Begin 30 days prior to surgery    ascorbic acid (VITAMIN C) 500 mg, Oral, Daily, Begin 30 days prior to surgery    atorvastatin (LIPITOR) 20 mg, Oral, Daily    Diclofenac Sodium (VOLTAREN) 2 g, Topical, 4 times daily    diphenhydrAMINE-acetaminophen (TYLENOL PM)  MG TABS 1 tablet, Daily at bedtime PRN    ferrous sulfate 324 mg, Oral, Daily before breakfast, Begin 30 days prior to surgery    ferrous sulfate 324 mg, Oral, Daily, Begin 30 days prior to surgery    Fluticasone Furoate-Vilanterol 100-25 mcg/actuation inhaler 1 puff, Inhalation, Daily, Rinse mouth after use.    folic acid (KP FOLIC ACID) 1 mg, Oral, Daily, Begin 30 days prior to surgery    folic acid (KP FOLIC ACID) 1 mg, Oral, Daily, Begin 30 days prior to surgery    furosemide (LASIX) 40 mg, Oral, Daily    levothyroxine (SYNTHROID) 150 mcg, Oral, Daily    levothyroxine 175 mcg, Oral, Daily    lisinopril-hydrochlorothiazide (PRINZIDE,ZESTORETIC) 20-12.5 MG per tablet TAKE 1 TABLET BY MOUTH TWICE DAILY    Melatonin 10 MG TABS Take by mouth    mometasone (ELOCON) 0.1 % cream 1 Application, Topical, Daily    mometasone (ELOCON) 0.1 % lotion Topical, Daily    Multiple Vitamin (multivitamin) tablet 1 tablet, Oral, Daily, Begin 30 days prior to surgery    Multiple Vitamin (multivitamin) tablet 1 tablet, Oral, Daily, Begin 30d prior to surgery    Multiple Vitamins-Minerals (PreserVision AREDS 2) CAPS No dose, route, or frequency recorded.    mupirocin (BACTROBAN) 2 % ointment Topical, 2 times daily, Apply to each nostril 2 times a day with Q-tip 5 days prior to surgery    mupirocin (BACTROBAN) 2 % ointment Apply to each nostril daily with Q-tip 5 days prior to procedure    nebivolol (BYSTOLIC) 10 mg tablet TAKE 1 TABLET BY MOUTH DAILY GENERIC EQUIVALENT FOR BYSTOLIC    nystatin-triamcinolone (MYCOLOG-II) cream Topical, 2 times daily    omeprazole  "(PRILOSEC) 40 mg, Oral, Daily    potassium chloride (Klor-Con M20) 20 mEq tablet 20 mEq, Oral, 2 times daily    venlafaxine (EFFEXOR) 37.5 mg, Oral, Daily           PRE-OP WORKSHEET DATA    Assessment of Pre-Operative Risks     MLJ Quality Hard Stops:    BMI (<40) : Estimated body mass index is 37.03 kg/m² as calculated from the following:    Height as of this encounter: 5' 1\" (1.549 m).    Weight as of this encounter: 88.9 kg (196 lb).    Hgb ( >11):   Lab Results   Component Value Date    HGB 12.2 2025    HGB 13.2 2025    HGB 14.0 2022       HbA1c (<7.5) :   Lab Results   Component Value Date    HGBA1C 6.7 (H) 2025       GFR (>60) (Less then 45 = Nephrology consult):    Lab Results   Component Value Date    EGFR 45 2025    EGFR 49 2025    EGFR 56 2022            Pre-Op Data Reviewed:       Laboratory Results: I have personally reviewed the pertinent reports    EKG: I personally reviewed and interpreted available tracings in the electronic medical record    No results found. However, due to the size of the patient record, not all encounters were searched. Please check Results Review for a complete set of results.    OLD RECORDS: reviewed old records in the chart review section if EHR on day of visit.    Previous cardiopulmonary studies within the past year:      ECHO:  Results for orders placed during the hospital encounter of 03/15/16    Echo complete with contrast if indicated    Narrative  [address] [address] [phone]    Transthoracic Echocardiogram  2D, M-mode, Doppler, and Color Doppler    Study date:  15-Mar-2016    Patient: YING MAKI  MR number: [id number]  Account number: [id number]  : 1945  Age: 70 years  Gender: Female  Status: Outpatient  Location: [facility] [place]  Height: 62 in  Weight: 243.5 lb  BP: 144/ 82 mmHg    Indications: Shortness of " breath    Diagnoses: R06.02 - Shortness of breath    Sonographer:  BHAVANA Gama  Primary Physician:  Cortez Morris DO  Referring Physician:  Lucas Carmen MD  Group:  [facility]  Interpreting Physician:  Lucas Carmen MD    SUMMARY    LEFT VENTRICLE:  Systolic function was normal.  There were no regional wall motion abnormalities.  Wall thickness was mildly to moderately increased.    LEFT ATRIUM:  The atrium was mildly dilated.    MITRAL VALVE:  There was mild to moderate annular calcification.  There was mild regurgitation.    TRICUSPID VALVE:  There was mild regurgitation.    HISTORY: PRIOR HISTORY: Hypertension, hypercholesterolemia, GERD, obesity,  restrictive lung disease, depression, thyroidectomy    PROCEDURE: The study was performed in the [facility] [place]. This  was a routine study. The transthoracic approach was used. The study included  complete 2D imaging, M-mode, complete spectral Doppler, and color Doppler. The  heart rate was 55 bpm, at the start of the study. Images were obtained from the  parasternal, apical, subcostal, and suprasternal notch acoustic windows.  Echocardiographic views were limited due to poor acoustic window availability,  decreased penetration, and lung interference. This was a technically difficult  study.    LEFT VENTRICLE: Size was normal. Systolic function was normal. There were no  regional wall motion abnormalities. Wall thickness was mildly to moderately  increased. DOPPLER: There was an increased relative contribution of atrial  contraction to ventricular filling. The deceleration time of the early  transmitral flow velocity was increased.    RIGHT VENTRICLE: The size was normal. Systolic function was normal. Wall  thickness was normal.    LEFT ATRIUM: The atrium was mildly dilated.    RIGHT ATRIUM: Size was at the upper limits of normal.    MITRAL VALVE: There was mild to moderate annular calcification. DOPPLER: There  was no  evidence for stenosis. There was mild regurgitation.    AORTIC VALVE: The valve was trileaflet. Leaflets exhibited mildly increased  thickness. DOPPLER: There was no evidence for stenosis. There was no  regurgitation.    TRICUSPID VALVE: The valve structure was normal. There was normal leaflet  separation. DOPPLER: The transtricuspid velocity was within the normal range.  There was no evidence for stenosis. There was mild regurgitation.    PULMONIC VALVE: Leaflets exhibited normal thickness, no calcification, and  normal cuspal separation. DOPPLER: The transpulmonic velocity was within the  normal range. There was no regurgitation.    PERICARDIUM: There was no pericardial effusion. The pericardium was normal in  appearance.    AORTA: The root exhibited normal size.    SYSTEMIC VEINS: IVC: The inferior vena cava was not well visualized.    SYSTEM MEASUREMENT TABLES    2D  AV Diam: 3.1 cm  IVSd: 1.3 cm  LA Diam: 3.9 cm  LVIDd: 4.7 cm  LVIDs: 3 cm  LVPWd: 1.3 cm    PW  E': 0.1 m/s  E/E': 13.1  MV A Shabbir: 1 m/s  MV Dec Metcalfe: 2.1 m/s2  MV DecT: 396.9 ms  MV E Shabbir: 0.8 m/s  MV E/A Ratio: 0.8    [employer] Accredited Echocardiography Laboratory    Prepared and electronically signed by    Lucas Carmen MD  Signed 15-Mar-2016 16:40:52          Time of visit including pre-visit chart review, visit and post-visit coordination of plan and care , review of pre-surgical lab work, preparation and time spent documenting note in electronic medical record, time spent face-to-face in physical examination answering patient questions by care team 45 minutes             [facility]

## 2025-05-07 NOTE — ASSESSMENT & PLAN NOTE
Failed outpatient conservative measures  Elected to undergo surgical intervention  Cleared by cardiology 4/17/2025

## 2025-05-08 LAB — MRSA NOSE QL CULT: NORMAL

## 2025-05-12 ENCOUNTER — HOSPITAL ENCOUNTER (OUTPATIENT)
Dept: NON INVASIVE DIAGNOSTICS | Facility: HOSPITAL | Age: 80
Discharge: HOME | End: 2025-05-12
Attending: INTERNAL MEDICINE
Payer: COMMERCIAL

## 2025-05-12 VITALS
HEART RATE: 81 BPM | BODY MASS INDEX: 37 KG/M2 | HEIGHT: 61 IN | SYSTOLIC BLOOD PRESSURE: 110 MMHG | WEIGHT: 196 LBS | DIASTOLIC BLOOD PRESSURE: 75 MMHG

## 2025-05-12 DIAGNOSIS — I10 PRIMARY HYPERTENSION: ICD-10-CM

## 2025-05-12 DIAGNOSIS — Z01.810 PRE-OPERATIVE CARDIOVASCULAR EXAMINATION: ICD-10-CM

## 2025-05-12 LAB
AORTIC ROOT: 2.7 CM
AORTIC VALVE MEAN VELOCITY: 11.1 M/S
AV LVOT MEAN GRADIENT: 2 MMHG
AV LVOT PEAK GRADIENT: 5 MMHG
AV MEAN PRESS GRAD SYS DOP V1V2: 6 MMHG
AV PEAK GRADIENT: 14 MMHG
AV VELOCITY RATIO: 0.67
AV VMAX SYS DOP: 1.85 M/S
BSA FOR ECHO PROCEDURE: 1.87 M2
DOP CALC AO VTI: 41.01 CM
DOP CALC LVOT PEAK VEL VTI: 27.3 CM
DOP CALC LVOT PEAK VEL: 1.17 M/S
E WAVE DECELERATION TIME: 207 MS
E/A RATIO: 1.28
FRACTIONAL SHORTENING: 45 (ref 28–44)
INTERVENTRICULAR SEPTUM IN DIASTOLE (PARASTERNAL SHORT AXIS VIEW): 1.4 CM
INTERVENTRICULAR SEPTUM: 1.4 CM (ref 0.6–1.1)
LAAS-AP2: 24.1 CM2
LAAS-AP4: 27.7 CM2
LEFT ATRIUM SIZE: 5 CM
LEFT ATRIUM VOLUME (MOD BIPLANE): 90 ML
LEFT ATRIUM VOLUME INDEX (MOD BIPLANE): 48.1 ML/M2
LEFT INTERNAL DIMENSION IN SYSTOLE: 3.1 CM (ref 2.1–4)
LEFT VENTRICULAR INTERNAL DIMENSION IN DIASTOLE: 5.6 CM (ref 3.5–6)
LEFT VENTRICULAR POSTERIOR WALL IN END DIASTOLE: 1.3 CM
LEFT VENTRICULAR STROKE VOLUME: 118 ML
LVSV (TEICH): 118 ML
MV E'TISSUE VEL-LAT: 11 CM/S
MV E'TISSUE VEL-SEP: 6 CM/S
MV PEAK A VEL: 0.9 M/S
MV PEAK E VEL: 115 CM/S
MV STENOSIS PRESSURE HALF TIME: 60 MS
MV VALVE AREA P 1/2 METHOD: 3.67
RA PRESSURE ESTIMATED: 8 MMHG
RIGHT ATRIAL 2D VOLUME: 33 ML
RIGHT ATRIUM AREA SYSTOLE A4C: 13.5 CM2
RIGHT VENTRICLE ID DIMENSION: 4 CM
RV PSP: 43 MMHG
SL CV LEFT ATRIUM LENGTH A2C: 6.1 CM
SL CV LV EF: 65
SL CV PED ECHO LEFT VENTRICLE DIASTOLIC VOLUME (MOD BIPLANE) 2D: 156 ML
SL CV PED ECHO LEFT VENTRICLE SYSTOLIC VOLUME (MOD BIPLANE) 2D: 38 ML
TR MAX PG: 35 MMHG
TR PEAK VELOCITY: 3 M/S
TRICUSPID ANNULAR PLANE SYSTOLIC EXCURSION: 2.1 CM
TRICUSPID VALVE PEAK REGURGITATION VELOCITY: 2.97 M/S

## 2025-05-12 PROCEDURE — 93306 TTE W/DOPPLER COMPLETE: CPT | Performed by: STUDENT IN AN ORGANIZED HEALTH CARE EDUCATION/TRAINING PROGRAM

## 2025-05-12 PROCEDURE — 93306 TTE W/DOPPLER COMPLETE: CPT

## 2025-05-13 ENCOUNTER — TELEPHONE (OUTPATIENT)
Dept: OBGYN CLINIC | Facility: MEDICAL CENTER | Age: 80
End: 2025-05-13

## 2025-05-13 ENCOUNTER — RESULTS FOLLOW-UP (OUTPATIENT)
Dept: CARDIOLOGY CLINIC | Facility: CLINIC | Age: 80
End: 2025-05-13

## 2025-05-13 NOTE — TELEPHONE ENCOUNTER
Dr. Quinn will not be in on Wed 05/28/2025. I called & LVM for patient to R/S appt to next day on Thurs 05/29/2025.

## 2025-05-14 ENCOUNTER — OFFICE VISIT (OUTPATIENT)
Age: 80
End: 2025-05-14
Payer: COMMERCIAL

## 2025-05-14 VITALS
HEIGHT: 61 IN | WEIGHT: 196 LBS | HEART RATE: 62 BPM | DIASTOLIC BLOOD PRESSURE: 80 MMHG | SYSTOLIC BLOOD PRESSURE: 141 MMHG | BODY MASS INDEX: 37 KG/M2

## 2025-05-14 DIAGNOSIS — K21.9 GERD WITHOUT ESOPHAGITIS: ICD-10-CM

## 2025-05-14 DIAGNOSIS — E89.0 HYPOTHYROIDISM, POSTSURGICAL: ICD-10-CM

## 2025-05-14 DIAGNOSIS — N18.30 STAGE 3 CHRONIC KIDNEY DISEASE, UNSPECIFIED WHETHER STAGE 3A OR 3B CKD (HCC): ICD-10-CM

## 2025-05-14 DIAGNOSIS — I10 PRIMARY HYPERTENSION: ICD-10-CM

## 2025-05-14 DIAGNOSIS — Z01.818 PRE-OP EXAMINATION: ICD-10-CM

## 2025-05-14 DIAGNOSIS — M17.11 PRIMARY OSTEOARTHRITIS OF RIGHT KNEE: Primary | ICD-10-CM

## 2025-05-14 DIAGNOSIS — E78.2 MIXED HYPERLIPIDEMIA: ICD-10-CM

## 2025-05-14 DIAGNOSIS — R73.01 IMPAIRED FASTING GLUCOSE: ICD-10-CM

## 2025-05-14 DIAGNOSIS — G47.33 OBSTRUCTIVE SLEEP APNEA: ICD-10-CM

## 2025-05-14 DIAGNOSIS — Z85.850 HISTORY OF THYROID CANCER: ICD-10-CM

## 2025-05-14 PROCEDURE — 99214 OFFICE O/P EST MOD 30 MIN: CPT | Performed by: INTERNAL MEDICINE

## 2025-05-14 NOTE — PATIENT INSTRUCTIONS
BEFORE SURGERY    Contact your surgical nurse navigator or surgical provider with any questions regarding preoperative plan or schedule.  Stop all over the counter supplements, herbal, naturopathic  medications for 1 week prior to surgery UNLESS prescribed by your surgeon  Hold NSAIDS (i.e. advil, alleve, motrin, ibuprofen, celebrex) minimum 5 days prior to surgery  Follow presurgical medication instructions provided by preadmission nursing team reviewed during your presurgery phone call  Strategies for optimizing your surgery through breathing exercises, nutrition and physical activity can be found at [email or web address]/best  Call [phone] with any presurgical concerns or medications questions or use the messaging feature in your [employer] jack to contact your provider    AFTER SURGERY    Recommend using Tylenol ( acetaminophen ) 1000 mg every eight hours during the first week post discharge along with icing the area for 20 mins every 3-4 hours while awake can be helpful in reducing your need for post operative opioid use. This opioid sparing plan can be used along side your surgeons pain plan.  Use stool softener over the counter (colace) daily after surgery during the first 1-2 weeks to avoid post operative constipation issues  If no bowel movement within 3 days after surgery then use over the counter Miralax in addition to your stool softener   If cleared by your surgical team for activity then early and often walking is encouraged and can be important in prevention of post surgical blood clots. Additionally spend as much time out of bed as possible and allowed by your surgical team  Use your incentive spirometer twice per hour in the first seven days after surgery to help prevent post surgery lung complications and infections  It is very important you follow the instructions from your surgeon regarding any medications for after surgery blood clot prevention. Compliance with these medications or interventions is very  important.  Call [phone] with any post discharge concerns or medical issues or use the messaging feature in your [employer] jack to contact your provider

## 2025-05-15 ENCOUNTER — ANESTHESIA EVENT (OUTPATIENT)
Dept: PERIOP | Facility: HOSPITAL | Age: 80
End: 2025-05-15
Payer: COMMERCIAL

## 2025-05-29 ENCOUNTER — OFFICE VISIT (OUTPATIENT)
Dept: OBGYN CLINIC | Facility: MEDICAL CENTER | Age: 80
End: 2025-05-29
Payer: COMMERCIAL

## 2025-05-29 VITALS — BODY MASS INDEX: 36.55 KG/M2 | WEIGHT: 193.6 LBS | HEIGHT: 61 IN

## 2025-05-29 DIAGNOSIS — M17.11 PRIMARY OSTEOARTHRITIS OF RIGHT KNEE: Primary | ICD-10-CM

## 2025-05-29 PROCEDURE — 99213 OFFICE O/P EST LOW 20 MIN: CPT | Performed by: ORTHOPAEDIC SURGERY

## 2025-05-29 NOTE — PROGRESS NOTES
Name: Jeny Collazo      : 1945      MRN: [id number]  Encounter Provider: Laura Quinn DO  Encounter Date: 2025   Encounter department[facility]  :  Assessment & Plan  Primary osteoarthritis of right knee    Patient has severe bilateral knee arthritis.  Patient is scheduled for right total knee arthroplasty on 25.   DVT Prophylaxis: ASA  The patient has obtained clearance from: SOC, cardio, dental  Consent was obtained for surgery  Patient would like her preop meds and postop meds to be sent to [facility] pharmacy on file.   Has small skin lesion posterior R calf.  No signs of infection.  Ok to proceed with surgery.  Recommend f/u with derm.    All of patient's questions and concerns were addressed at today's visit.  Patient will follow-up for first postop appointment.             Return for First postop appointment..    I answered all of the patient's questions during the visit and provided education of the patient's condition during the visit.  The patient verbalized understanding of the information given and agrees with the plan.  This note was dictated using [employer] software.  It may contain errors including improperly dictated words.  Please contact physician directly for any questions.    History of Present Illness   HPI  Chief complaint: No chief complaint on file.      HPI: Jeny Collazo is a 79 y.o. female that have severe bilateral knee osteoarthritis.  Patient is scheduled for right total knee arthroplasty on 2025.   Has small skin lesion posterior lower leg. Scratched it recently which led to some bleeding.  No currently bleeding or issues.  Has not been growing or changing.      Patient will be on ASA for DVT prophylaxis.  Patient has obtained clearances from: SOC, Dental, cardio  Has patient obtained updated x-rays/scanogram within the past 6 months: yes  Has patient signed inform consent: yes  Is patient's BMI at  "acceptable range: yes  Is patient a smoker: no  Is this patient a diabetic: no , Last A1c:   Is this patient on medication for autoimmune disease (rheumatoid arthritis, psoriatic arthritis, lupus, etc.): no  Miscellaneous:       History of MRSA: no  History of blood clots: no  Family history of blood clots: no  History of Hepatitis C:no  History of HIV: no  Are you a smoker:no  Are you diabetic:no  Do you see a cardiologist: yes  Have you seen a dentist in the past year: yes  Do you have a spinal cord stimulator: no  Review allergies  Are you on medication for autoimmune disease (rheumatoid arthritis, psoriatic arthritis, lupus, etc.): no       ROS:    See \[facility] [place] [facility]\"" for musculoskeletal review.   All other systems reviewed are negative     Historical Information   Past Medical History[1]  Past Surgical History[2]  Social History   Social History     Substance and Sexual Activity   Alcohol Use Not Currently    Comment: occasional glass of wine     Social History     Substance and Sexual Activity   Drug Use No     Tobacco Use History[3]  Family History: Family History[4]    Medications Ordered Prior to Encounter[5]  Allergies[6]    Objective   Ht 5' 1\" (1.549 m)   Wt 87.8 kg (193 lb 9.6 oz)   LMP  (LMP Unknown)   BMI 36.58 kg/m²        PE:  AAOx 3  WDWN  Hearing intact, no drainage from eyes  Regular rate  no audible wheezing  no abdominal distension  LE compartments soft, skin intact      rightknee:    Appearance:  no swelling   No bruising  no obvious joint deformity   No effusion  Palpation/Tenderness:  +TTP over medial joint line, no TTP over lateral joint line or over patella/patellar tendon  Active Range of Motion:  AROM: 0-115  Special Tests:  Valgus Stress Test:  negative  Varus Stress Test:  negative         Imaging Studies:   Results Review Statement: I personally reviewed the following image studies in PACS and associated radiology reports: xray(s). My interpretation of the radiology images/reports is: severe " right knee osteoarthritis.        Procedures      Scribe Attestation      I,:  Brent Thorne am acting as a scribe while in the presence of the attending physician.:       I,:  Laura Quinn DO personally performed the services described in this documentation    as scribed in my presence.:                    [1]   Past Medical History:  Diagnosis Date    Asthma     Breathing difficulty     Bronchitis     Cancer (HCC)     thyroid    COVID-2020    CPAP (continuous positive airway pressure) dependence     Depression     Fracture of arm     Fracture of carpal bone     Hyperlipidemia     Hypertension     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HCC) 2016    Shortness of breath     Sleep apnea     Thyroid nodule    [2]   Past Surgical History:  Procedure Laterality Date    EYE SURGERY  2021    HEMORRHOID SURGERY      NJ THYROIDECTOMY RMVL REMAINING TISS FLWG PRTL RMVL Left 2016    Procedure:  COMPLETION THYROIDECTOMY, FLEXIBLE LARYNGOSCOPY;  Surgeon: David Rivera MD;  Location: [facility];  Service: Surgical Oncology    NJ TOTAL THYROID LOBECTOMY UNI W/WO ISTHMUSECTOMY Right 2016    Procedure: HEMITHYROIDECTOMY;  Surgeon: David Rivera MD;  Location: [facility];  Service: Surgical Oncology    THYROIDECTOMY, PARTIAL Left    [3]   Social History  Tobacco Use   Smoking Status Former    Current packs/day: 0.00    Average packs/day: 0.3 packs/day for 4.0 years (1.0 ttl pk-yrs)    Types: Cigarettes    Start date: 6/15/1977    Quit date: 6/15/1981    Years since quittin.9   Smokeless Tobacco Former   Tobacco Comments    quit 40 yrs ago (Never a smoker per [employer])   [4]   Family History  Adopted: Yes   Problem Relation Name Age of Onset    Hypertension Family      Kidney disease Family      Hypertension Mother     [5]   Current Outpatient Medications on File Prior to Visit   Medication Sig Dispense Refill    albuterol (Ventolin HFA) 90 mcg/act inhaler Inhale 2 puffs every 4 (four) hours as  needed for wheezing or shortness of breath 54 g 0    amLODIPine (NORVASC) 5 mg tablet Take 1 tablet (5 mg total) by mouth 2 (two) times a day 180 tablet 1    ascorbic acid (VITAMIN C) 500 MG tablet Take 1 tablet (500 mg total) by mouth daily Begin 30 days prior to surgery (Patient not taking: Reported on 5/5/2025) 30 tablet 1    ascorbic acid (VITAMIN C) 500 MG TBCR Take 1 tablet (500 mg total) by mouth daily Begin 30 days prior to surgery 30 tablet 0    atorvastatin (LIPITOR) 20 mg tablet Take 1 tablet (20 mg total) by mouth daily 90 tablet 1    Diclofenac Sodium (VOLTAREN) 1 % Apply 2 g topically 4 (four) times a day 300 g 0    diphenhydrAMINE-acetaminophen (TYLENOL PM)  MG TABS Take 1 tablet by mouth daily at bedtime as needed for sleep      ferrous sulfate 324 (65 Fe) mg Take 1 tablet (324 mg total) by mouth daily before breakfast Begin 30 days prior to surgery (Patient not taking: Reported on 5/5/2025) 30 tablet 1    ferrous sulfate 324 (65 Fe) mg Take 1 tablet (324 mg total) by mouth daily Begin 30 days prior to surgery 30 tablet 0    Fluticasone Furoate-Vilanterol 100-25 mcg/actuation inhaler Inhale 1 puff daily Rinse mouth after use. 180 blister 1    folic acid (KP Folic Acid) 1 mg tablet Take 1 tablet (1 mg total) by mouth daily Begin 30 days prior to surgery 30 tablet 1    folic acid (KP Folic Acid) 1 mg tablet Take 1 tablet (1 mg total) by mouth daily Begin 30 days prior to surgery (Patient not taking: Reported on 5/5/2025) 30 tablet 0    furosemide (LASIX) 40 mg tablet TAKE 1 TABLET DAILY 90 tablet 1    levothyroxine (Synthroid) 150 mcg tablet Take 1 tablet (150 mcg total) by mouth daily 90 tablet 1    levothyroxine 175 mcg tablet Take 1 tablet (175 mcg total) by mouth daily 100 tablet 1    lisinopril-hydrochlorothiazide (PRINZIDE,ZESTORETIC) 20-12.5 MG per tablet TAKE 1 TABLET BY MOUTH TWICE DAILY 180 tablet 0    Melatonin 10 MG TABS Take by mouth (Patient not taking: Reported on 5/5/2025)       mometasone (ELOCON) 0.1 % cream Apply 1 Application topically daily 45 g 3    mometasone (ELOCON) 0.1 % lotion Apply topically daily 30 mL 5    Multiple Vitamin (multivitamin) tablet Take 1 tablet by mouth daily Begin 30 days prior to surgery (Patient not taking: Reported on 5/5/2025) 30 tablet 1    Multiple Vitamin (multivitamin) tablet Take 1 tablet by mouth daily Begin 30d prior to surgery 30 tablet 0    Multiple Vitamins-Minerals (PreserVision AREDS 2) CAPS  (Patient not taking: Reported on 5/5/2025)      mupirocin (BACTROBAN) 2 % ointment Apply topically 2 (two) times a day Apply to each nostril 2 times a day with Q-tip 5 days prior to surgery 15 g 0    mupirocin (BACTROBAN) 2 % ointment Apply to each nostril daily with Q-tip 5 days prior to procedure 15 g 0    nebivolol (BYSTOLIC) 10 mg tablet TAKE 1 TABLET BY MOUTH DAILY GENERIC EQUIVALENT FOR BYSTOLIC 90 tablet 1    nystatin-triamcinolone (MYCOLOG-II) cream Apply topically 2 (two) times a day 30 g 3    omeprazole (PriLOSEC) 40 MG capsule TAKE ONE CAPSULE BY MOUTH DAILY 90 capsule 1    potassium chloride (Klor-Con M20) 20 mEq tablet Take 1 tablet (20 mEq total) by mouth 2 (two) times a day 60 tablet 5    venlafaxine (EFFEXOR) 37.5 mg tablet Take 1 tablet (37.5 mg total) by mouth daily 90 tablet 1     No current facility-administered medications on file prior to visit.   [6]   Allergies  Allergen Reactions    Adhesive [Medical Tape] Rash

## 2025-05-29 NOTE — ASSESSMENT & PLAN NOTE
Patient has severe bilateral knee arthritis.  Patient is scheduled for right total knee arthroplasty on 6/2/25.   DVT Prophylaxis: ASA  The patient has obtained clearance from: SOC, cardio, dental  Consent was obtained for surgery  Patient would like her preop meds and postop meds to be sent to [facility] pharmacy on file.   Has small skin lesion posterior R calf.  No signs of infection.  Ok to proceed with surgery.  Recommend f/u with derm.    All of patient's questions and concerns were addressed at today's visit.  Patient will follow-up for first postop appointment.

## 2025-06-02 ENCOUNTER — APPOINTMENT (OUTPATIENT)
Dept: PHYSICAL THERAPY | Facility: CLINIC | Age: 80
End: 2025-06-02
Attending: ORTHOPAEDIC SURGERY
Payer: COMMERCIAL

## 2025-06-02 ENCOUNTER — ANESTHESIA (OUTPATIENT)
Dept: PERIOP | Facility: HOSPITAL | Age: 80
End: 2025-06-02
Payer: COMMERCIAL

## 2025-06-02 ENCOUNTER — HOSPITAL ENCOUNTER (OUTPATIENT)
Facility: HOSPITAL | Age: 80
Setting detail: OUTPATIENT SURGERY
Discharge: HOME | End: 2025-06-02
Attending: ORTHOPAEDIC SURGERY | Admitting: ORTHOPAEDIC SURGERY
Payer: COMMERCIAL

## 2025-06-02 VITALS
SYSTOLIC BLOOD PRESSURE: 140 MMHG | WEIGHT: 194.8 LBS | TEMPERATURE: 97.1 F | DIASTOLIC BLOOD PRESSURE: 77 MMHG | OXYGEN SATURATION: 91 % | HEART RATE: 60 BPM | BODY MASS INDEX: 36.81 KG/M2 | RESPIRATION RATE: 18 BRPM

## 2025-06-02 DIAGNOSIS — M17.11 PRIMARY OSTEOARTHRITIS OF RIGHT KNEE: Primary | ICD-10-CM

## 2025-06-02 DIAGNOSIS — R73.03 PREDIABETES: ICD-10-CM

## 2025-06-02 DIAGNOSIS — N18.31 STAGE 3A CHRONIC KIDNEY DISEASE (HCC): ICD-10-CM

## 2025-06-02 PROBLEM — J45.909 ASTHMA: Status: ACTIVE | Noted: 2025-06-02

## 2025-06-02 PROBLEM — B35.3 ATHLETE'S FOOT: Status: ACTIVE | Noted: 2022-09-15

## 2025-06-02 PROBLEM — I87.2 CHRONIC VENOUS INSUFFICIENCY: Status: ACTIVE | Noted: 2022-09-15

## 2025-06-02 PROBLEM — R06.02 SHORTNESS OF BREATH: Status: ACTIVE | Noted: 2025-06-02

## 2025-06-02 PROBLEM — L03.031 CELLULITIS OF RIGHT TOE: Status: ACTIVE | Noted: 2024-08-05

## 2025-06-02 LAB
ABO GROUP BLD: NORMAL
RH BLD: POSITIVE

## 2025-06-02 PROCEDURE — 86920 COMPATIBILITY TEST SPIN: CPT

## 2025-06-02 PROCEDURE — C1776 JOINT DEVICE (IMPLANTABLE): HCPCS | Performed by: ORTHOPAEDIC SURGERY

## 2025-06-02 PROCEDURE — A6250 SKIN SEAL PROTECT MOISTURIZR: HCPCS | Performed by: ORTHOPAEDIC SURGERY

## 2025-06-02 PROCEDURE — 97530 THERAPEUTIC ACTIVITIES: CPT

## 2025-06-02 PROCEDURE — C1713 ANCHOR/SCREW BN/BN,TIS/BN: HCPCS | Performed by: ORTHOPAEDIC SURGERY

## 2025-06-02 PROCEDURE — 97163 PT EVAL HIGH COMPLEX 45 MIN: CPT

## 2025-06-02 PROCEDURE — 97116 GAIT TRAINING THERAPY: CPT

## 2025-06-02 PROCEDURE — 27447 TOTAL KNEE ARTHROPLASTY: CPT | Performed by: ORTHOPAEDIC SURGERY

## 2025-06-02 PROCEDURE — 27447 TOTAL KNEE ARTHROPLASTY: CPT | Performed by: PHYSICIAN ASSISTANT

## 2025-06-02 DEVICE — ATTUNE PATELLA MEDIALIZED DOME 35MM CEMENTED AOX
Type: IMPLANTABLE DEVICE | Site: KNEE | Status: FUNCTIONAL
Brand: ATTUNE

## 2025-06-02 DEVICE — ATTUNE KNEE SYSTEM FEMORAL CRUCIATE RETAINING NARROW SIZE 6N RIGHT CEMENTED
Type: IMPLANTABLE DEVICE | Site: KNEE | Status: FUNCTIONAL
Brand: ATTUNE

## 2025-06-02 DEVICE — ATTUNE KNEE SYSTEM TIBIAL BASE FIXED BEARING SIZE 5 CEMENTED
Type: IMPLANTABLE DEVICE | Site: KNEE | Status: FUNCTIONAL
Brand: ATTUNE

## 2025-06-02 DEVICE — ATTUNE KNEE SYSTEM TIBIAL INSERT FIXED BEARING MEDIAL STABILIZED RIGHT AOX 6, 8MM
Type: IMPLANTABLE DEVICE | Site: KNEE | Status: FUNCTIONAL
Brand: ATTUNE

## 2025-06-02 DEVICE — SMARTSET HIGH PERFORMANCE MV MEDIUM VISCOSITY BONE CEMENT 40G
Type: IMPLANTABLE DEVICE | Site: KNEE | Status: FUNCTIONAL
Brand: SMARTSET

## 2025-06-02 RX ORDER — BUPIVACAINE HYDROCHLORIDE 5 MG/ML
INJECTION, SOLUTION EPIDURAL; INTRACAUDAL; PERINEURAL
Status: COMPLETED | OUTPATIENT
Start: 2025-06-02 | End: 2025-06-02

## 2025-06-02 RX ORDER — HYDROMORPHONE HCL IN WATER/PF 6 MG/30 ML
0.2 PATIENT CONTROLLED ANALGESIA SYRINGE INTRAVENOUS
Status: DISCONTINUED | OUTPATIENT
Start: 2025-06-02 | End: 2025-06-02 | Stop reason: HOSPADM

## 2025-06-02 RX ORDER — DOCUSATE SODIUM 100 MG/1
100 CAPSULE, LIQUID FILLED ORAL 2 TIMES DAILY
Qty: 30 CAPSULE | Refills: 0 | Status: SHIPPED | OUTPATIENT
Start: 2025-06-02

## 2025-06-02 RX ORDER — GABAPENTIN 100 MG/1
100 CAPSULE ORAL EVERY 8 HOURS
Status: DISCONTINUED | OUTPATIENT
Start: 2025-06-02 | End: 2025-06-02 | Stop reason: HOSPADM

## 2025-06-02 RX ORDER — SENNOSIDES 8.8 MG/5ML
8.8 LIQUID ORAL
Status: DISCONTINUED | OUTPATIENT
Start: 2025-06-02 | End: 2025-06-02 | Stop reason: HOSPADM

## 2025-06-02 RX ORDER — DOCUSATE SODIUM 100 MG/1
100 CAPSULE, LIQUID FILLED ORAL 2 TIMES DAILY
Status: DISCONTINUED | OUTPATIENT
Start: 2025-06-02 | End: 2025-06-02 | Stop reason: HOSPADM

## 2025-06-02 RX ORDER — ONDANSETRON 2 MG/ML
4 INJECTION INTRAMUSCULAR; INTRAVENOUS ONCE AS NEEDED
Status: DISCONTINUED | OUTPATIENT
Start: 2025-06-02 | End: 2025-06-02 | Stop reason: HOSPADM

## 2025-06-02 RX ORDER — FUROSEMIDE 10 MG/ML
40 INJECTION INTRAMUSCULAR; INTRAVENOUS ONCE
Status: COMPLETED | OUTPATIENT
Start: 2025-06-02 | End: 2025-06-02

## 2025-06-02 RX ORDER — VENLAFAXINE 37.5 MG/1
37.5 TABLET ORAL DAILY
Status: CANCELLED | OUTPATIENT
Start: 2025-06-03

## 2025-06-02 RX ORDER — OXYCODONE HYDROCHLORIDE 10 MG/1
10 TABLET ORAL EVERY 4 HOURS PRN
Status: DISCONTINUED | OUTPATIENT
Start: 2025-06-02 | End: 2025-06-02 | Stop reason: HOSPADM

## 2025-06-02 RX ORDER — ONDANSETRON 2 MG/ML
INJECTION INTRAMUSCULAR; INTRAVENOUS AS NEEDED
Status: DISCONTINUED | OUTPATIENT
Start: 2025-06-02 | End: 2025-06-02

## 2025-06-02 RX ORDER — FUROSEMIDE 40 MG/1
40 TABLET ORAL DAILY
Status: CANCELLED | OUTPATIENT
Start: 2025-06-02

## 2025-06-02 RX ORDER — GABAPENTIN 100 MG/1
100 CAPSULE ORAL 3 TIMES DAILY
Qty: 90 CAPSULE | Refills: 0 | Status: SHIPPED | OUTPATIENT
Start: 2025-06-02

## 2025-06-02 RX ORDER — CALCIUM CARBONATE 500 MG/1
1000 TABLET, CHEWABLE ORAL DAILY PRN
Status: DISCONTINUED | OUTPATIENT
Start: 2025-06-02 | End: 2025-06-02 | Stop reason: HOSPADM

## 2025-06-02 RX ORDER — SODIUM CHLORIDE 9 MG/ML
75 INJECTION, SOLUTION INTRAVENOUS CONTINUOUS
Status: DISCONTINUED | OUTPATIENT
Start: 2025-06-02 | End: 2025-06-02 | Stop reason: HOSPADM

## 2025-06-02 RX ORDER — TRANEXAMIC ACID 10 MG/ML
1000 INJECTION, SOLUTION INTRAVENOUS ONCE
Status: COMPLETED | OUTPATIENT
Start: 2025-06-02 | End: 2025-06-02

## 2025-06-02 RX ORDER — FENTANYL CITRATE/PF 50 MCG/ML
25 SYRINGE (ML) INJECTION
Status: DISCONTINUED | OUTPATIENT
Start: 2025-06-02 | End: 2025-06-02 | Stop reason: HOSPADM

## 2025-06-02 RX ORDER — FERROUS SULFATE 325(65) MG
325 TABLET ORAL 2 TIMES DAILY WITH MEALS
Status: DISCONTINUED | OUTPATIENT
Start: 2025-06-02 | End: 2025-06-02 | Stop reason: HOSPADM

## 2025-06-02 RX ORDER — FLUTICASONE FUROATE AND VILANTEROL 100; 25 UG/1; UG/1
1 POWDER RESPIRATORY (INHALATION) DAILY
Status: CANCELLED | OUTPATIENT
Start: 2025-06-03

## 2025-06-02 RX ORDER — ENOXAPARIN SODIUM 100 MG/ML
40 INJECTION SUBCUTANEOUS DAILY
Status: DISCONTINUED | OUTPATIENT
Start: 2025-06-02 | End: 2025-06-02 | Stop reason: HOSPADM

## 2025-06-02 RX ORDER — EPHEDRINE SULFATE 50 MG/ML
INJECTION INTRAVENOUS AS NEEDED
Status: DISCONTINUED | OUTPATIENT
Start: 2025-06-02 | End: 2025-06-02

## 2025-06-02 RX ORDER — ACETAMINOPHEN 325 MG/1
975 TABLET ORAL EVERY 8 HOURS
Status: DISCONTINUED | OUTPATIENT
Start: 2025-06-02 | End: 2025-06-02 | Stop reason: HOSPADM

## 2025-06-02 RX ORDER — MIDAZOLAM HYDROCHLORIDE 2 MG/2ML
INJECTION, SOLUTION INTRAMUSCULAR; INTRAVENOUS AS NEEDED
Status: DISCONTINUED | OUTPATIENT
Start: 2025-06-02 | End: 2025-06-02

## 2025-06-02 RX ORDER — SODIUM CHLORIDE, SODIUM LACTATE, POTASSIUM CHLORIDE, CALCIUM CHLORIDE 600; 310; 30; 20 MG/100ML; MG/100ML; MG/100ML; MG/100ML
INJECTION, SOLUTION INTRAVENOUS CONTINUOUS PRN
Status: DISCONTINUED | OUTPATIENT
Start: 2025-06-02 | End: 2025-06-02

## 2025-06-02 RX ORDER — POTASSIUM CHLORIDE 1500 MG/1
20 TABLET, EXTENDED RELEASE ORAL 2 TIMES DAILY
Status: CANCELLED | OUTPATIENT
Start: 2025-06-02

## 2025-06-02 RX ORDER — NYSTATIN AND TRIAMCINOLONE ACETONIDE 100000; 1 [USP'U]/G; MG/G
CREAM TOPICAL 2 TIMES DAILY
Status: CANCELLED | OUTPATIENT
Start: 2025-06-02

## 2025-06-02 RX ORDER — ACETAMINOPHEN 325 MG/1
975 TABLET ORAL ONCE
Status: COMPLETED | OUTPATIENT
Start: 2025-06-02 | End: 2025-06-02

## 2025-06-02 RX ORDER — KETAMINE HCL IN NACL, ISO-OSM 100MG/10ML
SYRINGE (ML) INJECTION AS NEEDED
Status: DISCONTINUED | OUTPATIENT
Start: 2025-06-02 | End: 2025-06-02

## 2025-06-02 RX ORDER — AMLODIPINE BESYLATE 5 MG/1
5 TABLET ORAL 2 TIMES DAILY
Status: CANCELLED | OUTPATIENT
Start: 2025-06-02

## 2025-06-02 RX ORDER — SENNOSIDES 8.6 MG
1 TABLET ORAL DAILY
Status: DISCONTINUED | OUTPATIENT
Start: 2025-06-02 | End: 2025-06-02 | Stop reason: HOSPADM

## 2025-06-02 RX ORDER — CEFAZOLIN SODIUM 2 G/50ML
2000 SOLUTION INTRAVENOUS EVERY 8 HOURS
Status: DISCONTINUED | OUTPATIENT
Start: 2025-06-02 | End: 2025-06-02 | Stop reason: HOSPADM

## 2025-06-02 RX ORDER — LEVOTHYROXINE SODIUM 150 UG/1
150 TABLET ORAL DAILY
Status: CANCELLED | OUTPATIENT
Start: 2025-06-03

## 2025-06-02 RX ORDER — ASPIRIN 81 MG/1
81 TABLET ORAL 2 TIMES DAILY
Qty: 84 TABLET | Refills: 0 | Status: SHIPPED | OUTPATIENT
Start: 2025-06-02 | End: 2025-07-14

## 2025-06-02 RX ORDER — HYDROMORPHONE HCL/PF 1 MG/ML
0.5 SYRINGE (ML) INJECTION EVERY 2 HOUR PRN
Status: DISCONTINUED | OUTPATIENT
Start: 2025-06-02 | End: 2025-06-02 | Stop reason: HOSPADM

## 2025-06-02 RX ORDER — OXYCODONE HYDROCHLORIDE 5 MG/1
5 TABLET ORAL EVERY 4 HOURS PRN
Status: DISCONTINUED | OUTPATIENT
Start: 2025-06-02 | End: 2025-06-02 | Stop reason: HOSPADM

## 2025-06-02 RX ORDER — PANTOPRAZOLE SODIUM 40 MG/1
40 TABLET, DELAYED RELEASE ORAL DAILY
Status: DISCONTINUED | OUTPATIENT
Start: 2025-06-02 | End: 2025-06-02 | Stop reason: HOSPADM

## 2025-06-02 RX ORDER — NEBIVOLOL 10 MG/1
10 TABLET ORAL DAILY
Status: CANCELLED | OUTPATIENT
Start: 2025-06-02

## 2025-06-02 RX ORDER — BISACODYL 10 MG
10 SUPPOSITORY, RECTAL RECTAL DAILY PRN
Status: DISCONTINUED | OUTPATIENT
Start: 2025-06-02 | End: 2025-06-02 | Stop reason: HOSPADM

## 2025-06-02 RX ORDER — MAGNESIUM HYDROXIDE 1200 MG/15ML
LIQUID ORAL AS NEEDED
Status: DISCONTINUED | OUTPATIENT
Start: 2025-06-02 | End: 2025-06-02 | Stop reason: HOSPADM

## 2025-06-02 RX ORDER — FENTANYL CITRATE 50 UG/ML
INJECTION, SOLUTION INTRAMUSCULAR; INTRAVENOUS AS NEEDED
Status: DISCONTINUED | OUTPATIENT
Start: 2025-06-02 | End: 2025-06-02

## 2025-06-02 RX ORDER — CHLORHEXIDINE GLUCONATE ORAL RINSE 1.2 MG/ML
15 SOLUTION DENTAL ONCE
Status: COMPLETED | OUTPATIENT
Start: 2025-06-02 | End: 2025-06-02

## 2025-06-02 RX ORDER — PROPOFOL 10 MG/ML
INJECTION, EMULSION INTRAVENOUS CONTINUOUS PRN
Status: DISCONTINUED | OUTPATIENT
Start: 2025-06-02 | End: 2025-06-02

## 2025-06-02 RX ORDER — ONDANSETRON 2 MG/ML
4 INJECTION INTRAMUSCULAR; INTRAVENOUS EVERY 6 HOURS PRN
Status: DISCONTINUED | OUTPATIENT
Start: 2025-06-02 | End: 2025-06-02 | Stop reason: HOSPADM

## 2025-06-02 RX ORDER — CEFADROXIL 500 MG/1
500 CAPSULE ORAL EVERY 12 HOURS SCHEDULED
Qty: 14 CAPSULE | Refills: 0 | Status: SHIPPED | OUTPATIENT
Start: 2025-06-02 | End: 2025-06-09

## 2025-06-02 RX ORDER — ATORVASTATIN CALCIUM 20 MG/1
20 TABLET, FILM COATED ORAL DAILY
Status: CANCELLED | OUTPATIENT
Start: 2025-06-02

## 2025-06-02 RX ORDER — CEFAZOLIN SODIUM 2 G/50ML
2000 SOLUTION INTRAVENOUS ONCE
Status: COMPLETED | OUTPATIENT
Start: 2025-06-02 | End: 2025-06-02

## 2025-06-02 RX ORDER — SIMETHICONE 80 MG
80 TABLET,CHEWABLE ORAL 4 TIMES DAILY PRN
Status: DISCONTINUED | OUTPATIENT
Start: 2025-06-02 | End: 2025-06-02 | Stop reason: HOSPADM

## 2025-06-02 RX ORDER — CHLORHEXIDINE GLUCONATE 40 MG/ML
SOLUTION TOPICAL DAILY PRN
Status: DISCONTINUED | OUTPATIENT
Start: 2025-06-02 | End: 2025-06-02 | Stop reason: HOSPADM

## 2025-06-02 RX ORDER — MAGNESIUM HYDROXIDE/ALUMINUM HYDROXICE/SIMETHICONE 120; 1200; 1200 MG/30ML; MG/30ML; MG/30ML
30 SUSPENSION ORAL EVERY 6 HOURS PRN
Status: DISCONTINUED | OUTPATIENT
Start: 2025-06-02 | End: 2025-06-02 | Stop reason: HOSPADM

## 2025-06-02 RX ORDER — FOLIC ACID 1 MG/1
1 TABLET ORAL DAILY
Status: DISCONTINUED | OUTPATIENT
Start: 2025-06-02 | End: 2025-06-02 | Stop reason: HOSPADM

## 2025-06-02 RX ORDER — PHENYLEPHRINE HCL IN 0.9% NACL 1 MG/10 ML
SYRINGE (ML) INTRAVENOUS AS NEEDED
Status: DISCONTINUED | OUTPATIENT
Start: 2025-06-02 | End: 2025-06-02

## 2025-06-02 RX ORDER — DEXAMETHASONE SODIUM PHOSPHATE 10 MG/ML
INJECTION, SOLUTION INTRAMUSCULAR; INTRAVENOUS AS NEEDED
Status: DISCONTINUED | OUTPATIENT
Start: 2025-06-02 | End: 2025-06-02

## 2025-06-02 RX ORDER — ACETAMINOPHEN 500 MG
1000 TABLET ORAL EVERY 8 HOURS
Qty: 90 TABLET | Refills: 0 | Status: SHIPPED | OUTPATIENT
Start: 2025-06-02

## 2025-06-02 RX ORDER — BUPIVACAINE HYDROCHLORIDE 7.5 MG/ML
INJECTION, SOLUTION INTRASPINAL AS NEEDED
Status: DISCONTINUED | OUTPATIENT
Start: 2025-06-02 | End: 2025-06-02

## 2025-06-02 RX ORDER — OXYCODONE HYDROCHLORIDE 5 MG/1
5 TABLET ORAL EVERY 4 HOURS PRN
Qty: 30 TABLET | Refills: 0 | Status: SHIPPED | OUTPATIENT
Start: 2025-06-02 | End: 2025-06-07

## 2025-06-02 RX ORDER — ASCORBIC ACID 500 MG
500 TABLET ORAL 2 TIMES DAILY
Status: DISCONTINUED | OUTPATIENT
Start: 2025-06-02 | End: 2025-06-02 | Stop reason: HOSPADM

## 2025-06-02 RX ORDER — PROMETHAZINE HYDROCHLORIDE 12.5 MG/1
12.5 TABLET ORAL EVERY 6 HOURS PRN
Qty: 12 TABLET | Refills: 0 | Status: SHIPPED | OUTPATIENT
Start: 2025-06-02

## 2025-06-02 RX ORDER — SODIUM CHLORIDE, SODIUM LACTATE, POTASSIUM CHLORIDE, CALCIUM CHLORIDE 600; 310; 30; 20 MG/100ML; MG/100ML; MG/100ML; MG/100ML
100 INJECTION, SOLUTION INTRAVENOUS CONTINUOUS
Status: DISCONTINUED | OUTPATIENT
Start: 2025-06-02 | End: 2025-06-02 | Stop reason: HOSPADM

## 2025-06-02 RX ADMIN — MIDAZOLAM 1 MG: 1 INJECTION INTRAMUSCULAR; INTRAVENOUS at 08:04

## 2025-06-02 RX ADMIN — EPHEDRINE SULFATE 10 MG: 50 INJECTION INTRAVENOUS at 08:27

## 2025-06-02 RX ADMIN — BUPIVACAINE HYDROCHLORIDE IN DEXTROSE 2 ML: 7.5 INJECTION, SOLUTION SUBARACHNOID at 07:40

## 2025-06-02 RX ADMIN — MIDAZOLAM 1 MG: 1 INJECTION INTRAMUSCULAR; INTRAVENOUS at 08:02

## 2025-06-02 RX ADMIN — PHENYLEPHRINE HYDROCHLORIDE 50 MCG/MIN: 10 INJECTION INTRAVENOUS at 09:12

## 2025-06-02 RX ADMIN — GABAPENTIN 100 MG: 100 CAPSULE ORAL at 12:35

## 2025-06-02 RX ADMIN — CEFAZOLIN SODIUM 2000 MG: 2 SOLUTION INTRAVENOUS at 08:07

## 2025-06-02 RX ADMIN — BUPIVACAINE 20 ML: 13.3 INJECTION, SUSPENSION, LIPOSOMAL INFILTRATION at 07:40

## 2025-06-02 RX ADMIN — Medication 100 MCG: at 08:48

## 2025-06-02 RX ADMIN — FUROSEMIDE 40 MG: 10 INJECTION, SOLUTION INTRAVENOUS at 10:40

## 2025-06-02 RX ADMIN — Medication 10 MG: at 09:13

## 2025-06-02 RX ADMIN — ONDANSETRON 4 MG: 2 INJECTION INTRAMUSCULAR; INTRAVENOUS at 08:14

## 2025-06-02 RX ADMIN — CHLORHEXIDINE GLUCONATE 0.12% ORAL RINSE 15 ML: 1.2 LIQUID ORAL at 05:42

## 2025-06-02 RX ADMIN — SODIUM CHLORIDE, SODIUM LACTATE, POTASSIUM CHLORIDE, AND CALCIUM CHLORIDE: .6; .31; .03; .02 INJECTION, SOLUTION INTRAVENOUS at 08:19

## 2025-06-02 RX ADMIN — SODIUM CHLORIDE, SODIUM LACTATE, POTASSIUM CHLORIDE, AND CALCIUM CHLORIDE: .6; .31; .03; .02 INJECTION, SOLUTION INTRAVENOUS at 07:30

## 2025-06-02 RX ADMIN — Medication 10 MG: at 08:08

## 2025-06-02 RX ADMIN — SODIUM CHLORIDE, SODIUM LACTATE, POTASSIUM CHLORIDE, AND CALCIUM CHLORIDE: .6; .31; .03; .02 INJECTION, SOLUTION INTRAVENOUS at 10:19

## 2025-06-02 RX ADMIN — EPHEDRINE SULFATE 10 MG: 50 INJECTION INTRAVENOUS at 08:34

## 2025-06-02 RX ADMIN — PROPOFOL 60 MCG/KG/MIN: 10 INJECTION, EMULSION INTRAVENOUS at 08:02

## 2025-06-02 RX ADMIN — DEXAMETHASONE SODIUM PHOSPHATE 10 MG: 10 INJECTION, SOLUTION INTRAMUSCULAR; INTRAVENOUS at 08:14

## 2025-06-02 RX ADMIN — FENTANYL CITRATE 100 MCG: 50 INJECTION, SOLUTION INTRAMUSCULAR; INTRAVENOUS at 07:35

## 2025-06-02 RX ADMIN — EPHEDRINE SULFATE 10 MG: 50 INJECTION INTRAVENOUS at 09:06

## 2025-06-02 RX ADMIN — BUPIVACAINE HYDROCHLORIDE 20 ML: 5 INJECTION, SOLUTION EPIDURAL; INTRACAUDAL; PERINEURAL at 07:40

## 2025-06-02 RX ADMIN — TRANEXAMIC ACID 1000 MG: 10 INJECTION, SOLUTION INTRAVENOUS at 08:09

## 2025-06-02 RX ADMIN — Medication 100 MCG: at 09:01

## 2025-06-02 RX ADMIN — ACETAMINOPHEN 975 MG: 325 TABLET, FILM COATED ORAL at 05:41

## 2025-06-02 NOTE — OP NOTE
OPERATIVE REPORT  PATIENT NAME: Jeny Collazo  : 1945  MRN: [id number]  Pt Location:   OR ROOM 12    Surgery Date: 2025    Surgeons and Role:     * Laura Quinn, DO - Primary     * Alphonso Gallardo PA-C - Assisting      * Fidencio Dhaliwal, ATC - Assiting    Preop Diagnosis:  Primary osteoarthritis of right knee [M17.11]    Post-Op Diagnosis Codes:     * Primary osteoarthritis of right knee [M17.11]    Procedure(s):  Right - ARTHROPLASTY KNEE TOTAL. cemented. potential same day discharge    Specimens:  * No specimens in log *    Estimated Blood Loss:   Minimal    Drains:  * No LDAs found *    Anesthesia Type:   Spinal     Operative Indications:  Primary osteoarthritis of right knee [M17.11]    Operative Findings:  See below    Complications:   None      Knee Approach: Medial Parapatellar    Chronic Narcotic Use:  No      Procedure and Technique:  Implants:  Depuy Attune  CR femoral component size 6 narrow  Tibial base fixed bearing size 5  Tibial insert fixed bearing MS size 6, 8mm  Dome patella size 35mm    INDICATIONS FOR PROCEDURE:  The patients is a 79 y.o. female who presented to the office with  knee OA.  Conservative treatment was attempted for quite some time and ultimately failed.  She has severe progressive pain, stiffness, and disability and now elects to proceed with right total knee replacement surgery.  Extensive counseling in regards to the reasons for surgical intervention as well as the risks and benefits of surgery were reviewed.  The risks include, but are not limited to infection, extensive blood loss, blood clots, wound healing problems, fracture, need for additional surgery, need for revision surgery, failure of hardware, persistent pain and stiffness, heart attack, stroke, death.  The patient understood and agreed to by oral and written consent.    OPERATIVE PROCEDURE:  The patient was identified as Jeny Collazo by Her ID bracelet by the surgical staff in  the preoperative area at [facility].  The patient was wheeled back to the surgical room and placed on the operative table.  Anesthesia was administered.  Preoperative antibiotics were given.    The patient remained in the supine position and all bony prominences were carefully protected.  We checked her ROM.  She had a 8-10 degree flexion contracture and flexion to about 110 degrees.  A tourniquet was applied to the patient’s right upper thigh.  The right leg was then prepped and draped in the usual sterile fashion.  A timeout was performed where the patient’s name and surgical site were once again identified.  The incision was marked out.  The leg was elevated and the tourniquet was inflated to 250 mmHg.      An incision was made over anterior aspect of the knee.  Dissection was made through the skin and subcutaneous tissue.   A medial parapatellar arthrotomy was made and the medial tissues were elevated while protecting the MCL.  Remnants of the ACL, medial and lateral menisci were removed and retractors were placed.  Severe osteoarthritis was noted.  The femoral canal was opened with a drill and the contents were suctioned and the canal was irrigated.  We used an intramedullary guide on the femoral side and resected 9mm of distal femur in 5 degrees of valgus.  Osteophytes were removed.  We then subluxated the tibia forward and opened the tibial canal with a drill.  The contents of the canal were then suctioned and the canal was irrigated.      We placed an intramedullary guide and measured our resection of the tibial plateau keeping it perpendicular to the mechanical axis of the tibia.  Next, the flexion and extension gaps were analyzed with a spacer block and visualization.  Ravalli’s line and the epicondylar axis were then identified.  The 4 in 1 cutting block was placed in 3 degrees of external rotation and resections were made.  Posterior osteophytes and any remnants of the medial and  lateral menisci were removed.  Trials were then placed.  The knee was found to be slightly tight in flexion.  We released a portion of the PCL.  Then the knee was felt to be stable and well balanced throughout the arc of motion.  She had excellent ROM including full extension.    The patella was measured and a portion of the articular aspect of the patella was removed.  The trial for the patella was placed and patellar tracking was checked and found to be adequate with the other components in place.  We then let down the tourniquet.  We felt we had a venous tourniquet and bleeding improved with the release of the tourniquet.      Attention was directed back to the tibia.  External rotation of the tibial guide was set and the final preparations of the tibia were performed.      The components were removed and the knee was copiously irrigated with pulse lavage and then dried.  The components were then cemented in place and excess cement was removed.  Once the cement was dried the trial insert was trialed.  A size 6, 8mm tibial insert was confirmed to be the correct size.  The final polyethylene component was placed and the tourniquet was let down.  Any bleeders were cauterized and then the knee was irrigated.  An irrisept wash was performed and then the knee was once again copiously irrigated.  Marcaine, morphine, and toradol was injected periarticularly throughout the case.  The arthrotomy was closed with vicryl suture.  The skin and subcutaneous tissues were closed with vicryl and then a running monocryl stitch.  Her skin was noted to be thin and some petechiae and skin sloughing was noted at the proximal half of her incision.  A sterile dressing was placed. The patient was awakened and transferred to a hospital bed and then to the recovery room in stable condition.      I attest that I was present and performed this procedure. Alphonso Gallardo PA-C and Fidencio Dhaliwal ATC were present for the entire procedure and  provided essential assistance with limb position, patient prepping, and retraction.    A qualified resident physician was not available.    I made patient and patient's  aware of the condition of her skin and that it was important to monitor her wound closely and to call if any issues.  She will be seen this week in the office.  I also encouraged her to move up her PT appointment to the end of this week as opposed to starting next week.      Patient Disposition:  hemodynamically stable            SIGNATURE: Laura Quinn DO  DATE: June 2, 2025  TIME: 11:51 AM

## 2025-06-02 NOTE — ANESTHESIA PREPROCEDURE EVALUATION
Procedure:  ARTHROPLASTY KNEE TOTAL, cemented, potential same day discharge (Right: Knee)    Relevant Problems   CARDIO   (+) Chronic venous insufficiency   (+) Hyperlipidemia   (+) Hypertension      ENDO   (+) Hypothyroidism, postsurgical      GI/HEPATIC   (+) GERD without esophagitis      /RENAL   (+) Stage 3 chronic kidney disease, unspecified whether stage 3a or 3b CKD ([facility])      MUSCULOSKELETAL   (+) Primary osteoarthritis of left knee   (+) Primary osteoarthritis of right knee      PULMONARY   (+) Asthma   (+) Obstructive sleep apnea   (+) Simple chronic bronchitis (HCC)      Neurology/Sleep   (+) Hypersomnia   (+) Insomnia      Eye   (+) Ptosis of both eyelids   (+) Ptosis of right eyelid      Oncology   (+) History of thyroid cancer   (+) Thyroid cancer (HCC)      Rheumatology   (+) Bilateral primary osteoarthritis of knee      Orthopedic/Musculoskeletal   (+) Acute shoulder bursitis, right   (+) Athlete's foot   (+) 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[facility])   (+) Chronic pain of both knees   (+) Humerus lesion, left   (+) Pes anserine bursitis      Respiratory/Allergy   (+) Restrictive lung disease      Dermatology   (+) Dermatitis   (+) Skin neoplasm      Other   (+) Cellulitis of left lower extremity   (+) Easy bruising   (+) Morbid obesity with BMI of 40.0-44.9, adult ([facility])   (+) Prediabetes      Lab Results   Component Value Date    SODIUM 139 05/07/2025    K 3.6 05/07/2025     05/07/2025    CO2 24 05/07/2025    AGAP 10 05/07/2025    BUN 36 (H) 05/07/2025    CREATININE 1.17 05/07/2025    GLUC 199 (H) 05/07/2025    GLUF 136 (H) 04/16/2025    CALCIUM 8.7 05/07/2025    AST 11 (L) 05/07/2025    ALT 10 05/07/2025    ALKPHOS 58 05/07/2025    TP 7.1 05/07/2025    TBILI 0.43 05/07/2025    EGFR 45 05/07/2025     Lab Results   Component Value Date    WBC 5.20 05/07/2025    HGB 12.2 05/07/2025    HCT 36.8 05/07/2025    MCV 97 05/07/2025     (L) 05/07/2025              Anesthesia Plan  ASA Score- 3     Anesthesia Type-  spinal with ASA Monitors.         Additional Monitors:     Airway Plan:            Plan Factors-Exercise tolerance (METS): >4 METS.    Chart reviewed. EKG reviewed. Imaging results reviewed. Existing labs reviewed. Patient summary reviewed.                  Induction-     Postoperative Plan- .   Monitoring Plan - Monitoring plan - standard ASA monitoring  Post Operative Pain Plan - multimodal analgesia        Informed Consent- Anesthetic plan and risks discussed with patient.  I personally reviewed this patient with the CRNA. Discussed and agreed on the Anesthesia Plan with the CRNA..      NPO Status:  Vitals Value Taken Time   Date of last liquid 06/01/25 06/02/25 06:10   Time of last liquid 1600 06/02/25 06:10   Date of last solid 06/01/25 06/02/25 06:10   Time of last solid 1100 06/02/25 06:10

## 2025-06-02 NOTE — NURSING NOTE
03/09/21        Jordyn Zheng  [address] [facility] [id number]      Dear Jordyn,    Please review the enclosed prep instructions for your procedure with Monica Mccray MD     Date of Procedure: April 5th, 2021    Time of Procedure:  Someone from the hospital will call you 3-5 business days prior with your procedure and arrival times. If you have not received a call from the Pre-admission Testing nurse within 3 business days of your scheduled procedure, please call [phone] for your time of procedure and pre-procedure instructions.     Location:    [address] [id number] [phone]      Important:   · You will need someone to drive you home and remain with you or check on you up to 24 hours after you go home.  · If you take blood thinners (i.e. Warfarin, Plavix, Xarelto, Eliquis, Aggrenox, Effient, Pradaxa) please call your Primary Care Provider/Cardiologist at least 10 days before your procedure for instructions on how many days before to hold your blood thinner.   · If you are diabetic and take insulin, please call your Primary Care Provider for instructions. Usually, it is recommended that you take half of your insulin dose the evening before and half your insulin dose the morning of your procedure.   · If you are taking the diet drug Phentermine, stop taking it 14 days before your procedure.   · The Pre-admission nurse will tell you which medications to take the morning of your procedure with a small sip of water. Do not take any other medications until after your procedure.    If you have any questions regarding these instructions or need to reschedule, please contact me at the telephone number and extension listed below.     Thank you,    Open Access Scheduling Patient Line [phone]  Surgery Scheduler for Monica Mccray MD  [facility] Pre-Admit Department      colonoscopy with Nulytely Split Dose and  Patient returned from PACU in stable condition, non-skid socks to bilateral feet and DARIN stockings to BLLE. Dressing to right knee visible under DARIN stocking, clean and dry. Patient able to bend operative leg with effort, unable to lift off bed at this time. Equal strength in BLLE and pedal pulse, circulation WDL. Able to transfer from bed to commode with assist x 2, PT, and walker to attempt to void, unsuccessful at this time. Assisted back to bed. C/o pain 6/10 but declining pain medication at this time. VS baseline. Call bell in reach and  at bedside.    Dulcolax  Pre-Procedure Instructions     Please pick-up a Nulytely Prep Kit and a small box of 5 mg Dulcolax tablets within 1 week at the pharmacy your physician sent the prescription for the Nulytely Kit to. You do not need a prescription for Dulcolax. Purchase a small box as you will only need 4 tablets. Bring these instructions with you when you go to the pharmacy. Do not mix the Nulytely solution until the day before the procedure.    Transportation:  · Make arrangements for someone (over 18 years of age) to drive you home after the procedure because you will be very drowsy. Please make these arrangements in advance. A taxi is not acceptable transportation. If you do not have transportation arranged, we will not be able to do the colonoscopy.  · Make arrangements for someone to stay with you or check in on you frequently the rest of the day/evening until the drowsiness has completely worn off.     Cancellation or Rescheduling:  · Due to everyone's busy schedules, it is important that you keep your appointment. If you must reschedule or cancel your appointment, please notify your physician's office at least 48 hours in advance.  After your Colonoscopy:  You will receive after-procedure information and instructions. In addition:  · Do not plan on going to work or doing anything for the rest of the day.  · You may resume eating and drinking with no limitations following the procedure, unless otherwise stated.  10 days Before Your Procedure:  · If you take blood thinners (i.e. Warfarin, Plavix, Xarelto, Eliquis, Aggrenox, Effient, Pradaxa) please call your Primary Care Provider/Cardiologist at least 10 days before your procedure for instructions on how many days before to hold your blood thinner.     1 Week/ 7 days Before Your Procedure:  · Do not eat popcorn, seeds, nuts or whole kernel corn.   · Do not take Carafate, iron supplements, Fish Oil, or Pepto-Bismol.   · Have clear liquids available to drink at home.  See “Clear Liquid Diet Suggestion” sheet.  ·  the prep from pharmacy. Do not mix it until the day you need to start the colon prep.     5 Days Before Your Procedure:   · Do not eat products with [place] (a fat substitute found in [employer] and Pring[employer] Fat-Free chips) for 5 days before the procedure.                        Colon Preparation:  · To complete a successful colonoscopy, the bowel must be clean so that the physician can clearly view the colon. It is very important that you read all the instructions well in advance of the procedure. Without proper preparation, the colonoscopy will not be successful, and the test may have to be repeated.  1 Day Before Your Procedure:  · You may have only clear liquids to eat or drink all day long. You may not have any solid foods or dairy products, and you may not eat or drink anything that is red or purple. The more clear liquids you drink, the better off you will be. See attached “Clear Liquid Diet Suggestion” sheet.  · Do not drink any alcoholic beverages for at least 24 hours before the procedure.  · Do not take any other laxatives such as: Lomotil, Imodium, Effer-syllium, Metamucil, Citrucel, Konsyl, Hydrocil, or FiberCon.  · In the morning, mix the Nulytely prep with the flavor packet (if applicable) and one gallon of water, shake well to mix, and refrigerate to chill. If preferred, you may add some Gatorade, Powerade, or crystal light (no red, orange, or purple) with the water to add flavor, without adding more than one gallon of fluid to the prep. Do not further dilute the prep in any way.  · At 3:30 pm, take 4 Dulcolax laxative tablets. It is normal for this to cause abdominal cramping.  · At 4:00 pm, start drinking half of the Nulytely prep. Drink one large (8-10 oz.) glass every 10-15 minutes until ½ of the gallon is gone. Using a straw to finish the drink may be helpful.  · In most cases, loose, liquidy bowel movements will begin within 30  minutes to one hour. You should remain within easy access of a bathroom. Continue to drink the prep regardless of stool frequency. It is normal for this to cause chills.   · You will drink half of the Nulytely prep now.  The remaining 1/2 gallon will be used tomorrow.  Put the remaining prep solution in the refrigerator.   · Continue to drink clear liquids throughout the evening (see “Clear Liquid Diet Suggestion” sheet).  · Should you experience rectal irritation due to frequent stooling, you may apply Desitin, Balmex, Vitamin A&D ointment or other similar product to the irritated area.     Day of Your Procedure:  · Starting 4 hours before your procedure drink the remainder of Nulytely prep over 1 hour, drinking one large (8-10 oz.) glass every 10-15 minutes until it is gone.  · Do not eat anything after you finish the prep.  · You must not have anything to drink 2 hours before your arrival.   · You will be informed by the Pre-admission Testing nurse which medications to take morning of your procedure with a small sip of water only. Do not take any other medications until after your procedure.   · In the morning, your stools should have a clear to see-through cloudy yellow appearance with no formed substance. If your stools are darker or have formed substance, please call the location where your procedure is scheduled to be done and ask for the pre-op nurse, who will get further instructions from your physician.    After your Colonoscopy:  • You will receive after-procedure information and instructions. In addition:  • Do not plan on going to work or doing anything for the rest of the day.  • You may resume eating and drinking with no limitations following the procedure, unless  otherwise stated.          Clear Liquid Diet Suggestion Sheet    Preparation:  You have received instructions that explain what you need to do to prepare for your colonoscopy. Your colon must be empty for the colonoscopy to be thorough and  safe. To prepare for the procedure, you will have to follow a liquid diet for 1 to 3 days beforehand. The longer you do the clear liquid diet, the better off you will be. The liquid diet should be clear and not contain food colorings (Red, Orange, or Purple) and may include:  • Bouillon or broth  • Strained/ Pulp free fruit juices (lemonade, apple, or white grape)  • Water  • Plain Coffee (no dairy products, but you can add sugar)  • Plain Tea (no milk or creamers or dairy products)   • White Soda  • Gelatin/ Jell-O (no red, orange, purple)  • Popsicles  • Gatorade, Powerade, etc.  • Clear Hard Candy    No alcoholic beverages for at least 24 hours before your procedure.   An example of a typical menu on the Clear Liquid Diet may look like this:  Breakfast:  Snack:    · 1 glass of pulp-free fruit juice and/or water • 1 popsicle   · 1 bowl gelatin/ Jell-O • 1 cup of coffee or tea, no dairy products and/or water/ white soda   · 1 cup of coffee or tea, no dairy products  • Sugar or honey is acceptable   · Sugar or honey is acceptable    Snack: Dinner:    · 1 glass of pulp-free fruit juice and/or water · 1 glass of pulp-free fruit juice and/or water   · 1 bowl gelatin/ Jell-O · 1 cup broth    · 1 bowl gelatin/ Jell-O   Lunch:     · 1 glass of pulp-free fruit juice and/or water    · 1 cup broth    · 1 bowl gelatin/ Jell-O      Thank you for choosing [facility]                COVID TEST INSTRUCTIONS   Date of COVID testing  Bring ID   Date: Friday, 04-02-21  Time:  11:30 am   Location:   [place]  [address], [facility] [address]     Once you leave the testing area, it's important that you go straight home and remain at home until the day of your procedure to reduce your risk of any new exposure. It is very important to avoid any potential exposure to COVID-19.  Please follow these  instructions until surgery or procedure has been completed.    ·  Do NOT travel out of home      ·   Immediately report any new symptoms or suspected/known exposures to COVID-19 cases to your surgeon office.    · Maintain physical distancing (at least 6 feet) at all times     · Wash hands frequently and thoroughly with soap and water (lather at least 20 seconds) or disinfect with alcohol-based hand  before eating, before touching face/mouth/eyes, after touching shared objects or high touch surfaces.     · Regularly clean cell phones, door handles, light switches, faucet and toilet handles, bathrooms, and kitchen surfaces using household disinfectant or hot soapy water.

## 2025-06-02 NOTE — ANESTHESIA PROCEDURE NOTES
Peripheral Block    Patient location during procedure: holding area  Start time: 6/2/2025 7:40 AM  Reason for block: at surgeon's request and post-op pain management  Staffing  Performed by: Allen Diaz MD  Authorized by: Allen Diaz MD    Preanesthetic Checklist  Completed: patient identified, IV checked, site marked, risks and benefits discussed, surgical consent, monitors and equipment checked, pre-op evaluation and timeout performed  Peripheral Block  Patient position: supine  Prep: ChloraPrep  Patient monitoring: frequent blood pressure checks, continuous pulse oximetry and heart rate  Block type: Adductor Canal (+ nerve to vastus medialis + anterior femoral cutaneous nerve)  Laterality: right  Injection technique: single-shot  Procedures: ultrasound guided, Ultrasound guidance required for the procedure to increase accuracy and safety of medication placement and decrease risk of complications.  Ultrasound permanent image saved  bupivacaine (PF) (MARCAINE) 0.5 % injection 20 mL - Perineural   20 mL - 6/2/2025 7:40:00 AM  bupivacaine liposomal (EXPAREL) 1.3 % injection 20 mL - Perineural   20 mL - 6/2/2025 7:40:00 AM  Needle  Needle type: Stimuplex   Needle gauge: 20 G  Needle length: 4 in  Needle localization: anatomical landmarks and ultrasound guidance  Assessment  Injection assessment: incremental injection, frequent aspiration, injected with ease, negative aspiration, negative for heart rate change, no paresthesia on injection, no symptoms of intraneural/intravenous injection and needle tip visualized at all times  Paresthesia pain: none  Post-procedure:  site cleaned  patient tolerated the procedure well with no immediate complications  Additional Notes  20 cc ACB, 8 cc NVM, 12 cc AFCN

## 2025-06-02 NOTE — HOME EXERCISE EDUCATION
Program_ID:[id number]   Access Code: [email or web address]  URL: [email or web address]  Date: 06-  Prepared By: Estephania Epperson    Program Notes      Exercises      - Supine Quad Set - 2 x daily - 7 x weekly - 1 sets - 10 reps - 3second hold      - Seated Heel Slide - 2 x daily - 7 x weekly - 1 sets - 10 reps - 10 second hold      - Supine Gluteal Sets - 2 x daily - 7 x weekly - 1 sets - 10 reps - 3 second hold      - Seated Ankle Pumps - 2 x daily - 7 x weekly - 1 sets - 15 reps

## 2025-06-02 NOTE — DISCHARGE INSTR - AVS FIRST PAGE
TOTAL KNEE REPLACEMENT DISCHARGE INSTRUCTIONS    Surgical Dressing:  You may leave your dressing in place for 1 week.  If it starts lifting off you can change it prior to that starting 2 days after your surgery.  Once you start changing your dressing you should change your dressing daily until drainage stops.  Do not remove your steri-strips.  Do not put any lotions or creams on your incision.  If there are any signs of infection such as drainage persisting beyond 7 days, unusual looking drainage (yellow, green), increased redness around the incision, or fever/chills let your doctor know.      Do not get your incision wet.      Continue to wear your DARIN stockings 2 weeks after surgery.  You can remove at night them to wash, hang to dry, and reapply in the morning after sponge bath or shower (do not get your incision wet).  You can wear them as needed after that.  If the stockings are too tight at the top you can cut the elastic.     Medications:  Upon discharge you will be given a prescription for an anticoagulant Aspirin 81mg two times daily x 6 weeks post op to prevent blood clots. Take as prescribed.  Do not take any over the counter NSAIDs (i.e. Ibuprofen, Motrin, Advil, Naprosyn, Aleve) while on your anticoagulation medication unless otherwise specified by your surgeon.  You will also be given Gabapentin if appropriate and a narcotic pain reliever for pain.  Please be mindful of the number of pills you have left.  You can only get a refill Monday-Friday during business hours from your surgeon or the physician assistant.  You will not be given any refills on nights and weekends.  Call ahead if needed.  Please include tylenol in your pain regimen as well.  You will also be given Colace to prevent constipation that can be caused by narcotics.  Please include other over- the- counter medications for constipation if needed.     If you are on Coumadin (Warfarin) you will need your blood drawn every Monday and  Thursday once you are home.  Initially your visiting nurse will draw your blood.  Later you will go to an outpatient lab.  You will receive a phone call the next day and your Coumadin (warfarin) will be dosed based on these results.  Take this dosage daily until you hear from us next.      Walking:  Use two crutches or a walker for EVERY step.  Gradually increase your walking daily.  You can progress to a cane as tolerated once advised by your physical therapist.  Be sure to work on advancing your range of motion daily.      Bathing:  Do not get your incision wet until follow up in the office.  No tub baths.  Do not submerge your incision.  You may shower but you must cover your incision so it does NOT get wet.  Gauze and Tegaderm dressing can be used to cover your incision.  Once your original dressing comes off.  These can be found at the drug store.  Use your crutches/walker to get in and out of the shower.  Use a chair if needed.      Sexual Relations:  Resume according to your comfort.    Swimming:  No swimming or hot tubs until approved by your physician.  Swimming will be allowed once your incision is well healed.      Driving:  You may ride as a passenger now.  No driving until your follow-up appointment.  To get into the car use the front passenger seat with the seat pushed back as far as possible.  Scoot yourself back in the seat.  Use your hands to assist your legs into the car.      Physical therapy:  Continue with exercises at home.  Plan on going to outpatient physical therapy within a couple days of your surgery once you are home.  If you are doing home physical therapy please call the office for a prescription once you are ready to transition to outpatient physical therapy.      Special considerations:  To minimize swelling, stiffness, and decrease pain use cold as needed, but not heat.  Ice 20 minutes at a time with a cloth between your skin and the ice.  Ice after walking, when you have pain, or  after you have completed your exercises.  Limit your sitting to 60 minutes at one time.  Continue to wear your DARIN stockings 2 weeks after surgery.  You can remove at night them to wash, hang to dry, and reapply in the morning after sponge bath or shower (do not get your incision wet).  You can wear them as needed after that.  If the stockings are too tight at the top you can cut the elastic.     Follow up:  Call [phone] to make an appointment to see your surgeon within 2 weeks of your surgery if you haven’t done so already.  If you have any questions please call [phone] for any concerns as well.      For the future:  Antibiotics for dental appointments is controversial.  If you have multiple medical problems we would recommend for you to call the office for a prescription for antibiotics to be taken one hour prior to your dental appointment.  If you do not have multiple medical problems it is your choice if you would like to take antibiotics prior to dental appointments.  We would also recommend you talking to your dentist prior to your appointment as well.  For any invasive procedures (i.e. endoscopy, colonoscopy, etc.) inform the doctor performing the procedure that you have a total knee replacement and they will give you antibiotics just prior to the procedure.

## 2025-06-02 NOTE — ANESTHESIA POSTPROCEDURE EVALUATION
Post-Op Assessment Note    CV Status:  Stable  Pain Score: 0    Pain management: adequate       Mental Status:  Alert and awake   Hydration Status:  Euvolemic   PONV Controlled:  Controlled   Airway Patency:  Patent     Post Op Vitals Reviewed: Yes    No anethesia notable event occurred.    Staff: CRNA           Last Filed PACU Vitals:  Vitals Value Taken Time   Temp     Pulse 66 06/02/25 10:28   BP     Resp 20 06/02/25 10:28   SpO2 84 % 06/02/25 10:28   Vitals shown include unfiled device data.

## 2025-06-02 NOTE — NURSING NOTE
Pt assisted OOB to bedside commode,with Zhou from PT. Pt felt urge to void, but lost the urge and did not void. Pt resting in a Recliner, Taking po,  at bedside. Written and verbal instructions given to pt and her , who verbalize an understanding.

## 2025-06-02 NOTE — INTERVAL H&P NOTE
H&P reviewed. After examining the patient I find no changes in the patients condition since the H&P had been written.  Heart:  regular rate  Lungs:  no audible wheezing  Abd:  nondistended  RLE:  EHL/AT/GS intact, sensation grossly intact L4, L5, S1, palpable pedal pulse    Vitals:    06/02/25 0743   BP: 128/59   Pulse:    Resp:    Temp:    SpO2: 95%

## 2025-06-02 NOTE — ANESTHESIA PROCEDURE NOTES
Spinal Block    Patient location during procedure: OR  Start time: 6/2/2025 8:01 AM  Reason for block: procedure for pain and at surgeon's request  Staffing  Performed by: Clint Garcia CRNA  Authorized by: Clint Garcia CRNA    Preanesthetic Checklist  Completed: patient identified, IV checked, site marked, risks and benefits discussed, surgical consent, monitors and equipment checked, pre-op evaluation and timeout performed  Spinal Block  Patient position: sitting  Prep: ChloraPrep and site prepped and draped  Patient monitoring: frequent blood pressure checks, continuous pulse ox and heart rate  Approach: midline  Location: L3-4  Needle  Needle type: Pencan   Needle gauge: 24 G  Needle length: 4 in  Assessment  Sensory level: T4  Injection Assessment:  negative aspiration for heme, no paresthesia on injection and positive aspiration for clear CSF.  Post-procedure:  site cleaned

## 2025-06-03 ENCOUNTER — TELEPHONE (OUTPATIENT)
Dept: OBGYN CLINIC | Facility: HOSPITAL | Age: 80
End: 2025-06-03

## 2025-06-03 LAB
ABO GROUP BLD BPU: NORMAL
ABO GROUP BLD BPU: NORMAL
BPU ID: NORMAL
BPU ID: NORMAL
CROSSMATCH: NORMAL
CROSSMATCH: NORMAL
UNIT DISPENSE STATUS: NORMAL
UNIT DISPENSE STATUS: NORMAL
UNIT PRODUCT CODE: NORMAL
UNIT PRODUCT CODE: NORMAL
UNIT PRODUCT VOLUME: 350 ML
UNIT PRODUCT VOLUME: 350 ML
UNIT RH: NORMAL
UNIT RH: NORMAL

## 2025-06-03 NOTE — PHYSICAL THERAPY NOTE
Physical Therapy Evaluation    Patient's Name: Jeny Collazo    Admitting Diagnosis  Primary osteoarthritis of right knee [M17.11]    Problem List  Problem List[1]    Past Medical History  Past Medical History[2]    Past Surgical History  Past Surgical History[3]    Recent Imaging  No orders to display       Recent Vital Signs  Vitals:    06/02/25 1101 06/02/25 1109 06/02/25 1215 06/02/25 1300   BP: 106/62 114/67 110/77 140/77   BP Location:  Right arm     Pulse: 60 64 66 60   Resp: 17 16 18    Temp:  97.8 °F (36.6 °C)  (!) 97.1 °F (36.2 °C)   TempSrc:  Temporal  Temporal   SpO2: 95% 94%  91%   Weight:            06/02/25 1300   PT Last Visit   PT Visit Date 06/02/25   Note Type   Note type Evaluation   Pain Assessment   Pain Assessment Tool 0-10   Pain Score 4   Pain Location/Orientation Orientation: Right;Location: Knee   Restrictions/Precautions   Other Precautions Pain   Home Living   Type of Home House   Home Layout One level;Stairs to enter with rails;Able to live on main level with bedroom/bathroom   Bathroom Shower/Tub Tub/shower unit   Bathroom Toilet Standard   Bathroom Accessibility Accessible via walker   Home Equipment Walker;Cane   Prior Function   Level of [place] Independent with ADLs;Independent with functional mobility   Lives With Spouse   Receives Help From Family   Falls in the last 6 months 0   General   Family/Caregiver Present Yes   Cognition   Overall Cognitive Status WFL   Arousal/Participation Alert   Orientation Level Oriented X4   Memory Within functional limits   Following Commands Follows all commands and directions without difficulty   RLE Assessment   RLE Assessment   (3-/5)   LLE Assessment   LLE Assessment WFL   Coordination   Movements are Fluid and Coordinated 0   Coordination and Movement Description antlagic with decreased coordination post op   Sensation WFL   Light Touch   RLE Light Touch Grossly intact   LLE Light Touch Grossly intact   Wound 06/02/25 Surgical  Knee Right   Date First Assessed/Time First Assessed: 06/02/25 0837   Primary Wound Type: Surgical  Location: Knee  Wound Location Orientation: Right  Incision's 1st Dressing: STERI-STRIPS, MEPILEX, DARIN   Wound Description [place]   Bed Mobility   Supine to Sit 6  Modified independent   Additional items Increased time required   Sit to Supine 6  Modified independent   Additional items Increased time required   Transfers   Sit to Stand 6  Modified independent   Additional items Increased time required   Stand to Sit 6  Modified independent   Additional items Increased time required   Toilet transfer 6  Modified independent   Additional items Increased time required;Standard toilet   Ambulation/Elevation   Gait pattern Step through pattern;Decreased toe off;Decreased heel strike;Excessively slow;Short stride;Decreased foot clearance;Improper Weight shift   Gait Assistance 5  Supervision   Additional items Verbal cues   Assistive Device Rolling walker   Distance 50ft, 35ft, 55ft   Stair Management Assistance 5  Supervision   Additional items Verbal cues;Tactile cues;Increased time required   Stair Management Technique Step to pattern;Foreward;Two rails;One rail L  (start with 2 rails progress to L railing)   Number of Stairs 2  (completed 3 trials with seated rest breaks)   Balance   Static Sitting Fair +   Dynamic Sitting Fair +   Static Standing Fair   Dynamic Standing Fair -   Ambulatory Fair -   Endurance Deficit   Endurance Deficit Yes   Endurance Deficit Description post op pain and fatigue   Activity Tolerance   Activity Tolerance Patient limited by fatigue;Patient limited by pain   Medical Staff Made Aware spoke to CM   Nurse Made Aware spoke to RN   Assessment   Prognosis Good   Problem List Decreased strength;Decreased range of motion;Decreased endurance;Impaired balance;Decreased mobility;Decreased coordination;Pain;Orthopedic restrictions   Barriers to Discharge Inaccessible home environment;Decreased caregiver  support   Goals   Patient Goals to go home   Discharge Recommendation   Rehab Resource Intensity Level, PT III (Minimum Resource Intensity)  (pt reports PT starts monday, provided HEP to complete untill OPPT initiated)   Equipment Recommended Walker   Walker Package Recommended Wheeled walker   AM-PAC Basic Mobility Inpatient   Turning in Flat Bed Without Bedrails 4   Lying on Back to Sitting on Edge of Flat Bed Without Bedrails 4   Moving Bed to Chair 4   Standing Up From Chair Using Arms 4   Walk in Room 3   Climb 3-5 Stairs With Railing 3   Basic Mobility Inpatient Raw Score 22   Basic Mobility Standardized Score 47.4   [facility] [facility] Highest Level Of Mobility   -HL Goal 7: Walk 25 feet or more   -HLM Achieved 7: Walk 25 feet or more   End of Consult   Patient Position at End of Consult Bedside chair;All needs within reach         ASSESSMENT                                                                                                                     Jeny Collazo is a 79 y.o. female admitted to [facility] on 6/2/2025 for Primary osteoarthritis of right knee. 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 PT was consulted and pt was seen on 6/3/2025 for mobility assessment and d/c planning.  Impairments limiting pt at this time include decreased ROM, impaired balance, decreased endurance, decreased coordination, new onset of impairment of functional mobility, decreased IADLS, pain, decreased activity tolerance, and decreased strength. Pt is currently functioning at a modified independent assistance level for bed mobility, modified independent assistance level for transfers, supervision assistance x1 level  for ambulation with Rolling Walker, and supervision assistance x1 for elevations. The patient's AM-PAC Basic Mobility Inpatient Short Form Raw Score is 22. A Raw score of greater than 16 suggests the patient may benefit from discharge to home. Please also refer to the recommendation of the Physical Therapist for safe discharge planning.    Recommendations                                                                                                                DME: Rolling Walker    Discharge Disposition:  Home with Outpatient Physical Therapy       Zhou Best PT, DPT         [1]   Patient Active Problem List  Diagnosis    History of thyroid cancer    GERD without esophagitis    Hyperlipidemia    Hypertension    Hypothyroidism, postsurgical    Impaired fasting glucose    Insomnia    Restrictive lung disease    Obstructive sleep apnea    Hypersomnia    Morbid obesity with BMI of 40.0-44.9, adult (HCC)    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HCC)    Humerus lesion, left    Acute shoulder bursitis, right    Lower extremity edema    Encounter for follow-up surveillance of thyroid cancer    Thyroid cancer (HCC)    Primary osteoarthritis of left knee    Primary osteoarthritis of right knee    Pes anserine bursitis    Dermatitis    Skin neoplasm    Viral syndrome    COVID-19    Simple chronic bronchitis (HCC)    Preop examination    Ptosis of both eyelids    Prediabetes    Ptosis of right eyelid    Cellulitis of left lower extremity    Stage 3 chronic kidney disease, unspecified whether stage 3a or 3b CKD (HCC)    Easy bruising    Bilateral primary osteoarthritis of knee    Chronic pain of both knees    Asthma    Athlete's foot    Cellulitis of right toe    Chronic venous insufficiency    Shortness of breath   [2]   Past Medical History:  Diagnosis Date    Asthma     Breathing difficulty     Bronchitis     Cancer (HCC)     thyroid    COVID-19 2020    CPAP (continuous positive airway pressure) dependence     Depression     Fracture of  arm     Fracture of carpal bone     Hyperlipidemia     Hypertension     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HCC) 12/16/2016    Shortness of breath     Sleep apnea     Thyroid nodule    [3]   Past Surgical History:  Procedure Laterality Date    EYE SURGERY  11/2021    HEMORRHOID SURGERY      MN THYROIDECTOMY RMVL REMAINING TISS FLWG PRTL RMVL Left 6/6/2016    Procedure:  COMPLETION THYROIDECTOMY, FLEXIBLE LARYNGOSCOPY;  Surgeon: David Rivera MD;  Location: [facility];  Service: Surgical Oncology    MN TOTAL THYROID LOBECTOMY UNI W/WO ISTHMUSECTOMY Right 2/25/2016    Procedure: HEMITHYROIDECTOMY;  Surgeon: David Rivera MD;  Location: [facility];  Service: Surgical Oncology    THYROIDECTOMY, PARTIAL Left

## 2025-06-03 NOTE — TELEPHONE ENCOUNTER
"Patient contacted for a postoperative follow up assessment. Patient reports \"I'm in a lot of pain\" and states \"that's to be expected.\"     She reports the pain worsened this AM. She was able to sleep last night.     She reports \"I went down on my butt\" last night, \"slid down\" and needed assistance getting up from family. She reports \"it was from pain,\" denies dizziness or any symptoms. She is not using the oxycodone, and we discussed that (noted below)      Patient states current pain level of a  10/10, ambulating around the home with the RW.   Patient denies increase in swelling, stating \"the same\"  and dressing is clean, dry and intact. Patient is icing the site and we discussed postoperative swelling, continuing to ICE areas of pain/swelling, especially after increased activity over the next few weeks.      She has OP PT scheduled 6/9    We reviewed patients AVS medication list. Patient is taking Tylenol 1000mg every 8 hours and   Gabapentin 100mg TID (reviewed AVS stating \"DO NOT TAKE AT SAME TIME AS OXYCODONE\"), patient was not taking oxycodone at all.    We discussed starting the   Oxycodone 5mg PRN (evening BID), but getting that on board with 10/10 pain. She does NOT have the ASA 81mg BID (Didn't get from pharmacy, discussed importance, and getting OTC today, taking BID), OR Colace 100mg BID (also didn't get, will get today to start BID). We discussed she also increase fluid and fiber intake. Patient has not yet had a BM.    Patient denies nausea, vomiting, abdominal pain, chest pain, shortness of breath, fever, dizziness and calf pain.     Pt and I discussed touching base this afternoon to check in on pain.   "

## 2025-06-04 NOTE — TELEPHONE ENCOUNTER
Spoke with patient via phone, she started taking her pain medicine she feels her pain is certainly better.  I did discuss her pain regimen.  She is worried about Friday getting out to steps in her home.  I did state to use the walker and take her time but we need to see her on Friday to make sure she is doing okay.  I told her for any problems with the knee to call and we will see her in the office did not go to the emergency room short open report true emergency

## 2025-06-04 NOTE — TELEPHONE ENCOUNTER
"Pt called me directly. She reports using the oxycodone since our discussion, and reports it has \"helped\" a little, and pain down to 7/10 this AM.    She does report being limited by pain, and when walking, unable to \"straighten\" her leg and put full pressure.   We discussed using the RW, for safety, and as swelling goes down, she should see increase to ROM with PT sessions.     pt encouraged to call me with questions, concerns or issues.    "

## 2025-06-06 ENCOUNTER — APPOINTMENT (OUTPATIENT)
Dept: PHYSICAL THERAPY | Facility: CLINIC | Age: 80
End: 2025-06-06
Attending: ORTHOPAEDIC SURGERY
Payer: COMMERCIAL

## 2025-06-06 ENCOUNTER — OFFICE VISIT (OUTPATIENT)
Dept: OBGYN CLINIC | Facility: MEDICAL CENTER | Age: 80
End: 2025-06-06

## 2025-06-06 VITALS — BODY MASS INDEX: 36.63 KG/M2 | WEIGHT: 194 LBS | HEIGHT: 61 IN

## 2025-06-06 DIAGNOSIS — Z47.1 AFTERCARE FOLLOWING LEFT KNEE JOINT REPLACEMENT SURGERY: ICD-10-CM

## 2025-06-06 DIAGNOSIS — Z96.652 AFTERCARE FOLLOWING LEFT KNEE JOINT REPLACEMENT SURGERY: ICD-10-CM

## 2025-06-06 DIAGNOSIS — Z96.652 HISTORY OF TOTAL LEFT KNEE REPLACEMENT: ICD-10-CM

## 2025-06-06 PROCEDURE — 99024 POSTOP FOLLOW-UP VISIT: CPT | Performed by: ORTHOPAEDIC SURGERY

## 2025-06-06 NOTE — PROGRESS NOTES
Assessment/Plan:  1. Aftercare following left knee joint replacement surgery    2. History of total left knee replacement      Orders Placed This Encounter   Procedures    Durable Medical Equipment       Transition to outpatient PT, scheduled to start Monday she will do her exercises over the weekend  Pain control prn- oxycodone and tylenol, patient aware to wean away from narcotics  Continue with aspirin 81 mg twice daily for DVT prophylaxis x 6 weeks  DARIN stockings as needed for swelling, she states they are very tight.  She is going to use a higher compression sock.  May shower, but she is to keep the incision dry.  I did discuss with her the need for elevation as she has significant bruising of the lower extremity.  I did recommended elevating several times per day and put a pillow under her ankle to keep her knee straight and not behind her knee.  Follow-up in 2 weeks for 2-week postop visit with x-ray of the right TKA.  Did reiterate to her if for any reason she would have increasing redness about the light right extremity or left, she is to contact the office and we will see her in the office for evaluation.      Return in about 2 weeks (around 6/20/2025) for Follow-up 2 weeks as scheduled for left TKA x-ray.    I answered all of the patient's questions during the visit and provided education of the patient's condition during the visit.  The patient verbalized understanding of the information given and agrees with the plan.  This note was dictated using [employer] software.  It may contain errors including improperly dictated words.  Please contact physician directly for any questions.    Subjective   Chief Complaint:   Chief Complaint   Patient presents with    Left Knee - Follow-up, Post-op       Jeny Collazo is a 79 y.o. female who presents for 3 day follow up s/p left TKA..  She does have some bruising and swelling of the right lower extremity.  She has only been using 1-2 oxycodone per day.  She  "also takes gabapentin and Tylenol.  She does states she needs a smaller walker as she is short and it is hurting her shoulders    Review of Systems  ROS:    See HPI for musculoskeletal review.   All other systems reviewed are negative     History:  Past Medical History[1]  Past Surgical History[2]  Social History   Social History     Substance and Sexual Activity   Alcohol Use Not Currently    Comment: occasional glass of wine     Social History     Substance and Sexual Activity   Drug Use No     Tobacco Use History[3]  Family History: Family History[4]    Medications Ordered Prior to Encounter[5]  Allergies[6]     Objective     Ht 5' 1\" (1.549 m)   Wt 88 kg (194 lb)   LMP  (LMP Unknown)   BMI 36.66 kg/m²      PE:  AAOx 3  WDWN  Hearing intact, no drainage from eyes  no audible wheezing  no abdominal distension  LE compartments soft, AT/GS intact    Ortho Exam:  left Knee:   INC: C/D/I, No erythema, moderate swelling right lower extremity.  There is swelling of the ankle as well.  AROM: 7-5 degrees    Imaging Studies: Results Review Statement: No pertinent imaging studies reviewed.         [1]   Past Medical History:  Diagnosis Date    Asthma     Breathing difficulty     Bronchitis     Cancer (HCC)     thyroid    COVID-19 2020    CPAP (continuous positive airway pressure) dependence     Depression     Fracture of arm     Fracture of carpal bone     Hyperlipidemia     Hypertension     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HCC) 12/16/2016    Shortness of breath     Sleep apnea     Thyroid nodule    [2]   Past Surgical History:  Procedure Laterality Date    EYE SURGERY  11/2021    HEMORRHOID SURGERY      IA ARTHRP KNE CONDYLE&PLATU MEDIAL&LAT COMPARTMENTS Right 6/2/2025    Procedure: ARTHROPLASTY KNEE TOTAL, cemented, potential same day discharge;  Surgeon: Laura Quinn DO;  Location:  MAIN OR;  Service: Orthopedics    IA THYROIDECTOMY RMVL REMAINING TISS FLWG PRTL RMVL Left 6/6/2016    Procedure:  COMPLETION " THYROIDECTOMY, FLEXIBLE LARYNGOSCOPY;  Surgeon: David Rivera MD;  Location: [facility];  Service: Surgical Oncology    IL TOTAL THYROID LOBECTOMY UNI W/WO ISTHMUSECTOMY Right 2016    Procedure: HEMITHYROIDECTOMY;  Surgeon: David Rivera MD;  Location: [facility];  Service: Surgical Oncology    THYROIDECTOMY, PARTIAL Left    [3]   Social History  Tobacco Use   Smoking Status Former    Current packs/day: 0.00    Average packs/day: 0.3 packs/day for 4.0 years (1.0 ttl pk-yrs)    Types: Cigarettes    Start date: 6/15/1977    Quit date: 6/15/1981    Years since quittin.0   Smokeless Tobacco Former   Tobacco Comments    quit 40 yrs ago (Never a smoker per [employer])   [4]   Family History  Adopted: Yes   Problem Relation Name Age of Onset    Hypertension Family      Kidney disease Family      Hypertension Mother     [5]   Current Outpatient Medications on File Prior to Visit   Medication Sig Dispense Refill    acetaminophen (TYLENOL) 500 mg tablet Take 2 tablets (1,000 mg total) by mouth every 8 (eight) hours 90 tablet 0    albuterol (Ventolin HFA) 90 mcg/act inhaler Inhale 2 puffs every 4 (four) hours as needed for wheezing or shortness of breath 54 g 0    amLODIPine (NORVASC) 5 mg tablet Take 1 tablet (5 mg total) by mouth 2 (two) times a day 180 tablet 1    ascorbic acid (VITAMIN C) 500 MG TBCR Take 1 tablet (500 mg total) by mouth daily Begin 30 days prior to surgery 30 tablet 0    aspirin (ECOTRIN LOW STRENGTH) 81 mg EC tablet Take 1 tablet (81 mg total) by mouth in the morning and 1 tablet (81 mg total) before bedtime. Take with food. Start evening of discharge x 6 weeks post op to prevent blood clots. 84 tablet 0    atorvastatin (LIPITOR) 20 mg tablet Take 1 tablet (20 mg total) by mouth daily 90 tablet 1    cefadroxil (DURICEF) 500 mg capsule Take 1 capsule (500 mg total) by mouth every 12 (twelve) hours for 7 days . Start evening of discharge 14 capsule 0    docusate sodium (COLACE) 100 mg  capsule Take 1 capsule (100 mg total) by mouth 2 (two) times a day 30 capsule 0    ferrous sulfate 324 (65 Fe) mg Take 1 tablet (324 mg total) by mouth daily Begin 30 days prior to surgery 30 tablet 0    Fluticasone Furoate-Vilanterol 100-25 mcg/actuation inhaler Inhale 1 puff daily Rinse mouth after use. 180 blister 1    furosemide (LASIX) 40 mg tablet TAKE 1 TABLET DAILY 90 tablet 1    gabapentin (Neurontin) 100 mg capsule Take 1 capsule (100 mg total) by mouth 3 (three) times a day DO NOT TAKE AT SAME TIME AS OXYCODONE 90 capsule 0    levothyroxine (Synthroid) 150 mcg tablet Take 1 tablet (150 mcg total) by mouth daily 90 tablet 1    lisinopril-hydrochlorothiazide (PRINZIDE,ZESTORETIC) 20-12.5 MG per tablet TAKE 1 TABLET BY MOUTH TWICE DAILY 180 tablet 0    Melatonin 10 MG TABS Take by mouth      mometasone (ELOCON) 0.1 % cream Apply 1 Application topically daily 45 g 3    Multiple Vitamin (multivitamin) tablet Take 1 tablet by mouth daily Begin 30d prior to surgery 30 tablet 0    nebivolol (BYSTOLIC) 10 mg tablet TAKE 1 TABLET BY MOUTH DAILY GENERIC EQUIVALENT FOR BYSTOLIC 90 tablet 1    nystatin-triamcinolone (MYCOLOG-II) cream Apply topically 2 (two) times a day 30 g 3    omeprazole (PriLOSEC) 40 MG capsule TAKE ONE CAPSULE BY MOUTH DAILY 90 capsule 1    oxyCODONE (Roxicodone) 5 immediate release tablet Take 1 tablet (5 mg total) by mouth every 4 (four) hours as needed for severe pain for up to 5 days Max Daily Amount: 30 mg 30 tablet 0    potassium chloride (Klor-Con M20) 20 mEq tablet Take 1 tablet (20 mEq total) by mouth 2 (two) times a day 60 tablet 5    promethazine (PHENERGAN) 12.5 MG tablet Take 1 tablet (12.5 mg total) by mouth every 6 (six) hours as needed for nausea or vomiting 12 tablet 0    venlafaxine (EFFEXOR) 37.5 mg tablet Take 1 tablet (37.5 mg total) by mouth daily 90 tablet 1    folic acid (KP Folic Acid) 1 mg tablet Take 1 tablet (1 mg total) by mouth daily Begin 30 days prior to surgery  (Patient not taking: Reported on 6/6/2025) 30 tablet 1     No current facility-administered medications on file prior to visit.   [6]   Allergies  Allergen Reactions    Adhesive [Medical Tape] Rash

## 2025-06-09 ENCOUNTER — OFFICE VISIT (OUTPATIENT)
Dept: PHYSICAL THERAPY | Facility: CLINIC | Age: 80
End: 2025-06-09
Attending: ORTHOPAEDIC SURGERY
Payer: COMMERCIAL

## 2025-06-09 DIAGNOSIS — Z96.651 S/P TOTAL KNEE REPLACEMENT, RIGHT: Primary | ICD-10-CM

## 2025-06-09 PROCEDURE — 97112 NEUROMUSCULAR REEDUCATION: CPT

## 2025-06-09 PROCEDURE — 97110 THERAPEUTIC EXERCISES: CPT

## 2025-06-09 PROCEDURE — 97140 MANUAL THERAPY 1/> REGIONS: CPT

## 2025-06-09 NOTE — PROGRESS NOTES
First Post-Op Treatment     Today's date: 2025  Patient name: Jeny Collazo  : 1945  MRN: [id number]  Referring provider: Laura Quinn,*  Dx:   Encounter Diagnosis     ICD-10-CM    1. S/P total knee replacement, right  Z96.651           Start Time: 1000  Stop Time: 1100  Total time in clinic (min): 60 minutes    Subjective: Pt stated that over the past week she has been having a lot of pain in her R knee. Pt stated that she has been trying some exercises that her doctor gave her last week everyday which have been difficult for her. Pt stated that she has been icing and elevating her RLE at least three times a day which has been helpful for her. Pt stated that she has been trying to walk around but her RLE gets very tired and feels like it is going to give out.     Objective: See treatment diary below    Active Range of Motion   Left Knee   Flexion: 124 degrees   Extension: 10 degrees      Right Knee   Flexion: 84 degrees degrees (96 degrees AAROM with heel slide)  Extension: 16 degrees (10 degrees PROM w OP)     Mobility   Patellar Mobility:   Left Knee   Hypomobile: left medial, left lateral, left superior and left inferior     Right Knee   Hypomobile: medial, lateral, superior and inferior      Strength/Myotome Testing (not reassessed on )     Left Knee   Flexion: 4-  Extension: 4+  Quadriceps contraction: fair     Right Knee   Flexion: 3+   Extension: 4-   Quadriceps contraction: poor ()     Tests      Additional Tests Details  5xSTS: 16 seconds. (Not assessed)      Assessment: Pt demonstrated difficulty with gait using RW with moderate fatigue during performance and required CGA due to fatigue and weakness of RLE. Pt demonstrated poor quad activation during assessment today and had decreased R knee AROM, however is within expected limits at this point after surgery. Pt was educated on updated written HEP to perform at home and to focus on quad activation and knee ROM  "activities, pt and spouse understood education well. Pt was educated to continue icing throughout the day with elevation to resolve swelling. Pt was challenged appropriately with rocking on bike at end of visit well with mild fatigue noted. Pt would benefit from continued skilled PT to improve RLE strength, mobility, gait, balance, quad activation, ROM, and functional ability.       Plan: Continue per plan of care.  Progress treatment as tolerated.       Precautions: s/p R TKA scheduled for 6/02/25    Date 4/28 6/9           Visit # 1 2           FOTO IE             Re-eval IE              Manuals 4/28 6/9           R knee PROM  DK                                                  Neuro Re-Ed 4/28 6/9           Quad sets 10x3\" ea 15x5\" R           LAQ  10x3\" R           SAQ             clamshells                                                    Ther Ex 4/28 6/9           bike  2' rocking           Heel slides 5x10\" R sup strap 10x10\" R           HS str 3x15\" R sit            SLR 5x w QS R 5x w QS R           Standing hip abd/ext 5x ea abd            marches             Leg press             sidestepping             Knee ext prop sit chair  1' R w OP                         Ther Activity 4/28 6/9           squats  5x UE sup           Step ups             Gait Training 4/28 6/9           Gait LRAD  Around clinic                        Modalities 4/28 6/9           ice prn                              "

## 2025-06-10 NOTE — ANESTHESIA POSTPROCEDURE EVALUATION
Post-Op Assessment Note    CV Status:  Stable    Pain management: adequate       Mental Status:  Alert and awake   Hydration Status:  Euvolemic   PONV Controlled:  Controlled   Airway Patency:  Patent     Post Op Vitals Reviewed: Yes              Last Filed PACU Vitals:  Vitals Value Taken Time   Temp 97.8 °F (36.6 °C) 06/02/25 11:09   Pulse 64 06/02/25 11:09   /67 06/02/25 11:09   Resp 16 06/02/25 11:09   SpO2 94 % 06/02/25 11:09       Modified [place]:     Vitals Value Taken Time   Activity 2 06/02/25 11:01   Respiration 2 06/02/25 11:01   Circulation 2 06/02/25 11:01   Consciousness 2 06/02/25 11:01   Oxygen Saturation 2 06/02/25 11:01     Modified [place] Score: 10

## 2025-06-11 ENCOUNTER — OFFICE VISIT (OUTPATIENT)
Dept: PHYSICAL THERAPY | Facility: CLINIC | Age: 80
End: 2025-06-11
Attending: ORTHOPAEDIC SURGERY
Payer: COMMERCIAL

## 2025-06-11 DIAGNOSIS — Z96.651 S/P TOTAL KNEE REPLACEMENT, RIGHT: Primary | ICD-10-CM

## 2025-06-11 PROCEDURE — 97110 THERAPEUTIC EXERCISES: CPT

## 2025-06-11 PROCEDURE — 97140 MANUAL THERAPY 1/> REGIONS: CPT

## 2025-06-11 PROCEDURE — 97112 NEUROMUSCULAR REEDUCATION: CPT

## 2025-06-11 NOTE — PROGRESS NOTES
"Daily Note     Today's date: 2025  Patient name: Jeny Collazo  : 1945  MRN: [id number]  Referring provider: Laura Quinn,*  Dx:   Encounter Diagnosis     ICD-10-CM    1. S/P total knee replacement, right  Z96.651           Start Time: 0955  Stop Time: 1035  Total time in clinic (min): 40 minutes    Subjective: Pt stated that some of the exercises on her HEP have been difficult but has been performing them everyday. Pt stated that she continues to have swelling of the R knee as well as some discomfort especially when weight bearing.       Objective: See treatment diary below      Assessment: Pt tolerated warm up on bike well today and was able to perform full revolutions backwards with mild discomfort that decreased as activity progressed. Pt tolerated manual R knee PROM and stretching with reported decrease in stiffness post manual treatment. Pt showed good improvement in R knee flexion AROM today with AAROM supine heel slides. Pt also had slight palpable improvement in quad activation but is still very limited in activation as well as knee extension ROM. Pt performed trial of leg press activity well with mild fatigue at end of visit. Pt would benefit from continued skilled PT to improve RLE strength, mobility, ROM, and functional ability.     Plan: Continue per plan of care.  Progress treatment as tolerated.       Precautions: s/p R TKA scheduled for 25    Date           Visit # 1 2 3          FOTO IE             Re-eval IE              Manuals           R knee PROM  DK DK w ext OP                                                 Neuro Re-Ed           Quad sets 10x3\" ea 15x5\" R 20x3\" R          LAQ  10x3\" R 20x3\" R          SAQ   2x10 3\" R          clamshells                                                    Ther Ex           bike  2' rocking 3' reverse          Heel slides 5x10\" R sup strap 10x10\" R 15x5\" R sup strap (108 deg)  " "        HS str 3x15\" R sit            SLR 5x w QS R 5x w QS R 10x R w QS          Standing hip abd/ext 5x ea abd            marches             Leg press   2x10 55# BLE          sidestepping             Knee ext prop sit chair  1' R w OP  1' R w OP                       Ther Activity 4/28 6/9 6/11          squats  5x UE sup           Sit to stands             Step ups   nv          Gait Training 4/28 6/9           Gait LRAD  Around clinic                        Modalities 4/28 6/9           ice prn                                "

## 2025-06-15 DIAGNOSIS — I10 ESSENTIAL HYPERTENSION: ICD-10-CM

## 2025-06-15 DIAGNOSIS — E89.0 HYPOTHYROIDISM, POSTSURGICAL: ICD-10-CM

## 2025-06-16 ENCOUNTER — OFFICE VISIT (OUTPATIENT)
Dept: PHYSICAL THERAPY | Facility: CLINIC | Age: 80
End: 2025-06-16
Attending: ORTHOPAEDIC SURGERY
Payer: COMMERCIAL

## 2025-06-16 DIAGNOSIS — Z96.651 S/P TOTAL KNEE REPLACEMENT, RIGHT: Primary | ICD-10-CM

## 2025-06-16 PROCEDURE — 97110 THERAPEUTIC EXERCISES: CPT

## 2025-06-16 PROCEDURE — 97140 MANUAL THERAPY 1/> REGIONS: CPT

## 2025-06-16 PROCEDURE — 97112 NEUROMUSCULAR REEDUCATION: CPT

## 2025-06-16 RX ORDER — LISINOPRIL AND HYDROCHLOROTHIAZIDE 12.5; 2 MG/1; MG/1
1 TABLET ORAL 2 TIMES DAILY
Qty: 180 TABLET | Refills: 0 | Status: SHIPPED | OUTPATIENT
Start: 2025-06-16

## 2025-06-16 RX ORDER — LEVOTHYROXINE SODIUM 175 UG/1
175 TABLET ORAL DAILY
Qty: 30 TABLET | Refills: 11 | OUTPATIENT
Start: 2025-06-16

## 2025-06-16 NOTE — PROGRESS NOTES
"Daily Note     Today's date: 2025  Patient name: Jeny Collazo  : 1945  MRN: [id number]  Referring provider: Laura Quinn,*  Dx:   Encounter Diagnosis     ICD-10-CM    1. S/P total knee replacement, right  Z96.651           Start Time: 09  Stop Time: 1035  Total time in clinic (min): 40 minutes    Subjective: Pt stated that her swelling has improved over the past couple days and is able to wear normal sneakers today.      Objective: See treatment diary below      Assessment: Pt tolerated warm up on bike going backwards for full revolutions well at beginning of session without increase in discomfort during or after activity. Pt tolerated manual R knee PROM, stretching, and gentle OP into extension fairly with reported decrease in stiffness post manual treatment, but did have some discomfort during manual treatment today. Pt had slight discomfort and fatigue with performance of quad sets today and was unable to complete last two repetitions of seconds set of quad sets, pt also required mild verbal cueing to perform with proper form. Pt had difficulty with mini squats today due to fatigue and requested to be done with session early today. Educated pt on importance of daily performance of activities on HEP to improve functional strength, ROM, and endurance, pt verbally acknowledged education.     Plan: Continue per plan of care.  Progress treatment as tolerated.       Precautions: s/p R TKA scheduled for 25    Date          Visit # 1 2 3 4         FOTO IE             Re-eval IE              Manuals          R knee PROM  DK DK w ext OP DK w ext OP                                                Neuro Re-Ed          Quad sets 10x3\" ea 15x5\" R 20x3\" R 20x3\" R*         LAQ  10x3\" R 20x3\" R 20x3\" R         SAQ   2x10 3\" R 2x10 3\" R         clamshells                                                    Ther Ex        " "  bike  2' rocking 3' reverse 5' reverse full         Heel slides 5x10\" R sup strap 10x10\" R 15x5\" R sup strap (108 deg) 15x5\" R sup strap          HS str 3x15\" R sit            SLR 5x w QS R 5x w QS R 10x R w QS 10x R w QS         Standing hip abd/ext 5x ea abd            marches             Leg press   2x10 55# BLE          sidestepping             Knee ext prop sit chair  1' R w OP  1' R w OP                       Ther Activity 4/28 6/9 6/11 6/16         squats  5x UE sup  5x UE sup         Sit to stands             Step ups   nv          Gait Training 4/28 6/9           Gait LRAD  Around clinic                        Modalities 4/28 6/9           ice prn                                  "

## 2025-06-18 ENCOUNTER — APPOINTMENT (OUTPATIENT)
Dept: PHYSICAL THERAPY | Facility: CLINIC | Age: 80
End: 2025-06-18
Attending: ORTHOPAEDIC SURGERY
Payer: COMMERCIAL

## 2025-06-20 ENCOUNTER — OFFICE VISIT (OUTPATIENT)
Dept: OBGYN CLINIC | Facility: MEDICAL CENTER | Age: 80
End: 2025-06-20

## 2025-06-20 ENCOUNTER — APPOINTMENT (OUTPATIENT)
Dept: LAB | Facility: MEDICAL CENTER | Age: 80
End: 2025-06-20
Attending: PHYSICIAN ASSISTANT
Payer: COMMERCIAL

## 2025-06-20 ENCOUNTER — APPOINTMENT (OUTPATIENT)
Dept: RADIOLOGY | Facility: MEDICAL CENTER | Age: 80
End: 2025-06-20
Attending: ORTHOPAEDIC SURGERY
Payer: COMMERCIAL

## 2025-06-20 VITALS — HEIGHT: 61 IN | BODY MASS INDEX: 36.66 KG/M2

## 2025-06-20 DIAGNOSIS — Z47.1 AFTERCARE FOLLOWING LEFT KNEE JOINT REPLACEMENT SURGERY: ICD-10-CM

## 2025-06-20 DIAGNOSIS — R30.0 BURNING WITH URINATION: ICD-10-CM

## 2025-06-20 DIAGNOSIS — Z96.652 AFTERCARE FOLLOWING LEFT KNEE JOINT REPLACEMENT SURGERY: ICD-10-CM

## 2025-06-20 DIAGNOSIS — Z96.651 AFTERCARE FOLLOWING RIGHT KNEE JOINT REPLACEMENT SURGERY: ICD-10-CM

## 2025-06-20 DIAGNOSIS — M17.12 PRIMARY OSTEOARTHRITIS OF LEFT KNEE: ICD-10-CM

## 2025-06-20 DIAGNOSIS — M17.12 PRIMARY OSTEOARTHRITIS OF LEFT KNEE: Primary | ICD-10-CM

## 2025-06-20 DIAGNOSIS — M25.59 PAIN IN OTHER SPECIFIED JOINT: ICD-10-CM

## 2025-06-20 DIAGNOSIS — Z01.818 PREOPERATIVE TESTING: ICD-10-CM

## 2025-06-20 DIAGNOSIS — Z47.1 AFTERCARE FOLLOWING RIGHT KNEE JOINT REPLACEMENT SURGERY: ICD-10-CM

## 2025-06-20 LAB
BACTERIA UR QL AUTO: ABNORMAL /HPF
BILIRUB UR QL STRIP: NEGATIVE
CLARITY UR: CLEAR
COLOR UR: YELLOW
GLUCOSE UR STRIP-MCNC: NEGATIVE MG/DL
HGB UR QL STRIP.AUTO: NEGATIVE
HYALINE CASTS #/AREA URNS LPF: ABNORMAL /LPF
KETONES UR STRIP-MCNC: NEGATIVE MG/DL
LEUKOCYTE ESTERASE UR QL STRIP: ABNORMAL
MUCOUS THREADS UR QL AUTO: ABNORMAL
NITRITE UR QL STRIP: NEGATIVE
NON-SQ EPI CELLS URNS QL MICRO: ABNORMAL /HPF
PH UR STRIP.AUTO: 5.5 [PH]
PROT UR STRIP-MCNC: ABNORMAL MG/DL
RBC #/AREA URNS AUTO: ABNORMAL /HPF
SP GR UR STRIP.AUTO: 1.02 (ref 1–1.03)
TRANS CELLS #/AREA URNS HPF: PRESENT /[HPF]
UROBILINOGEN UR STRIP-ACNC: <2 MG/DL
WBC #/AREA URNS AUTO: ABNORMAL /HPF

## 2025-06-20 PROCEDURE — 73564 X-RAY EXAM KNEE 4 OR MORE: CPT

## 2025-06-20 PROCEDURE — 77073 BONE LENGTH STUDIES: CPT

## 2025-06-20 PROCEDURE — 99024 POSTOP FOLLOW-UP VISIT: CPT | Performed by: ORTHOPAEDIC SURGERY

## 2025-06-20 PROCEDURE — 73562 X-RAY EXAM OF KNEE 3: CPT

## 2025-06-20 PROCEDURE — 81001 URINALYSIS AUTO W/SCOPE: CPT

## 2025-06-20 RX ORDER — MULTIVITAMIN
1 TABLET ORAL DAILY
Qty: 30 TABLET | Refills: 1 | Status: SHIPPED | OUTPATIENT
Start: 2025-06-20

## 2025-06-20 RX ORDER — CEFAZOLIN SODIUM 2 G/50ML
2000 SOLUTION INTRAVENOUS ONCE
OUTPATIENT
Start: 2025-06-20 | End: 2025-06-20

## 2025-06-20 RX ORDER — FERROUS SULFATE 324(65)MG
324 TABLET, DELAYED RELEASE (ENTERIC COATED) ORAL DAILY
Qty: 30 TABLET | Refills: 1 | Status: SHIPPED | OUTPATIENT
Start: 2025-06-20

## 2025-06-20 RX ORDER — MUPIROCIN 2 %
OINTMENT (GRAM) TOPICAL
Qty: 15 G | Refills: 0 | Status: SHIPPED | OUTPATIENT
Start: 2025-06-20

## 2025-06-20 RX ORDER — TRANEXAMIC ACID 10 MG/ML
1000 INJECTION, SOLUTION INTRAVENOUS ONCE
OUTPATIENT
Start: 2025-06-20 | End: 2025-06-20

## 2025-06-20 RX ORDER — SODIUM CHLORIDE, SODIUM LACTATE, POTASSIUM CHLORIDE, CALCIUM CHLORIDE 600; 310; 30; 20 MG/100ML; MG/100ML; MG/100ML; MG/100ML
20 INJECTION, SOLUTION INTRAVENOUS CONTINUOUS
OUTPATIENT
Start: 2025-06-20

## 2025-06-20 RX ORDER — CHLORHEXIDINE GLUCONATE 40 MG/ML
SOLUTION TOPICAL DAILY PRN
OUTPATIENT
Start: 2025-06-20

## 2025-06-20 RX ORDER — ACETAMINOPHEN 325 MG/1
975 TABLET ORAL ONCE
OUTPATIENT
Start: 2025-06-20 | End: 2025-06-20

## 2025-06-20 RX ORDER — FOLIC ACID 1 MG/1
1 TABLET ORAL DAILY
Qty: 30 TABLET | Refills: 1 | Status: SHIPPED | OUTPATIENT
Start: 2025-06-20

## 2025-06-20 RX ORDER — CHLORHEXIDINE GLUCONATE ORAL RINSE 1.2 MG/ML
15 SOLUTION DENTAL ONCE
OUTPATIENT
Start: 2025-06-20 | End: 2025-06-20

## 2025-06-20 RX ORDER — SODIUM CHLORIDE 9 MG/ML
75 INJECTION, SOLUTION INTRAVENOUS CONTINUOUS
OUTPATIENT
Start: 2025-06-20

## 2025-06-20 NOTE — ASSESSMENT & PLAN NOTE
Orders:    XR knee 4+ vw left injury; Future    XR bone length (scanogram); Future    Case request operating room: ARTHROPLASTY KNEE TOTAL, cemented, potential same day discharge; Standing    Comprehensive metabolic panel; Future    Hemoglobin A1C W/EAG Estimation; Future    CBC and differential; Future    MRSA culture; Future    Iron Panel (Includes Iron Saturation, Iron, and TIBC); Future    C-reactive protein; Future    Anemia Panel w/Reflex; Future    Protime-INR; Future    APTT; Future    Type and screen; Future    Ambulatory Referral to [facility]; Future    Ambulatory referral to Physical Therapy; Future    Ambulatory referral to Cardiology; Future    EKG 12 lead; Future    ascorbic acid (VITAMIN C) 500 MG TBCR; Take 1 tablet (500 mg total) by mouth daily Begin 30 days prior to surgery    folic acid ( Folic Acid) 1 mg tablet; Take 1 tablet (1 mg total) by mouth daily Begin 30 days prior to surgery    Multiple Vitamin (multivitamin) tablet; Take 1 tablet by mouth daily Begin 30d prior to surgery    ferrous sulfate 324 (65 Fe) mg; Take 1 tablet (324 mg total) by mouth daily Begin 30 days prior to surgery    mupirocin (BACTROBAN) 2 % ointment; Apply to each nostril daily with Q-tip 5 days prior to procedure

## 2025-06-20 NOTE — PROGRESS NOTES
Assessment/Plan:  1. Primary osteoarthritis of left knee    2. Aftercare following right knee joint replacement surgery    3. Aftercare following left knee joint replacement surgery    4. Burning with urination    5. Preoperative testing    6. Pain in other specified joint      Orders Placed This Encounter   Procedures    MRSA culture    XR knee 3 vw right non injury    XR knee 4+ vw left injury    XR bone length (scanogram)    UA (URINE) with reflex to Scope    Comprehensive metabolic panel    Hemoglobin A1C W/EAG Estimation    CBC and differential    C-reactive protein    Anemia Panel w/Reflex    Protime-INR    APTT    Ambulatory Referral to [facility]    Ambulatory referral to Physical Therapy    Ambulatory referral to Cardiology    EKG 12 lead    Type and screen       Continue outpatient physical therapy for the right TKA  Pain control prn-gabapentin Tylenol only continue with ASA 81 mg twice daily x 6 weeks for DVT prophylaxis  DARIN stockings as needed for swelling  May shower and let water run over incision, do not submerge incision  Follow-up 7 to 10 days to recheck blister on right anterior knee  Patient should call ahead for abx prior to dental appts.   Patient experiencing burning with urination.  UA with reflex was ordered.  Was discussed if positive would forward results to PCP for antibiotic and management    Patient  has severe left knee arthritis.  she has tried conservative treatment without adequate relief.  We discussed treatment options as well as risks and benefits of treatment options.  The patient would like to proceed with a left total knee arthroplasty.  The risks of surgery include, but are not limited to infection, blood clot, wound healing problems, blood loss, damage to blood vessels and nerves, persistent pain and stiffness, fracture, need for additional surgery, need for revision surgery, failure of hardware, heart attack, stroke, death.  The patient understood and  agreed to by oral and written consent.  I answered all questions regarding surgery.  Reviewed with patient to expect some numbness over the lateral aspect of the knee after surgery.  We let the patient know to avoid kneeling while the incision is healing, typically for the first 4-6 weeks.  Once incision is healed kneeling if permitted but may still be uncomfortable for some patients long term.    Reviewed no driving for 4-6 weeks for right sided surgery once off opioids.  No driving until off opioids for left sided surgery.    Preoperative vitamins were prescribed.  Patient instructed to  what they are not already taking and start 30 days before surgery.  We let the patient know that some people experience constipation while on iron.  If that occurs can take iron every other day.  If still an issue can stop the iron.  Can also add in OTC medication and/or prune juice for constipation.    We also let the patient know that some people experience constipation after surgery while on opioids.  We suggest to have OTC medications and/or prune juice available if needed.    DVT Prophylaxis: ASA 81 bid x 6 weeks  The patient will obtain clearance from: SOC, cardio  Consent was obtained for surgery today  Patient would like her preop meds and postop meds to be sent to [facility] pharmacy on file.           Name: Jeny Collazo      : 1945      MRN: [id number]  Encounter Provider: Laura Quinn DO  Encounter Date: 2025   Encounter department: [facility] [place]  :  Assessment & Plan  Primary osteoarthritis of left knee    Orders:    XR knee 4+ vw left injury; Future    XR bone length (scanogram); Future    Case request operating room: ARTHROPLASTY KNEE TOTAL, cemented, potential same day discharge; Standing    Comprehensive metabolic panel; Future    Hemoglobin A1C W/EAG Estimation; Future    CBC and differential; Future    MRSA culture; Future    Iron Panel (Includes  Iron Saturation, Iron, and TIBC); Future    C-reactive protein; Future    Anemia Panel w/Reflex; Future    Protime-INR; Future    APTT; Future    Type and screen; Future    Ambulatory Referral to [facility]; Future    Ambulatory referral to Physical Therapy; Future    Ambulatory referral to Cardiology; Future    EKG 12 lead; Future    ascorbic acid (VITAMIN C) 500 MG TBCR; Take 1 tablet (500 mg total) by mouth daily Begin 30 days prior to surgery    folic acid (KP Folic Acid) 1 mg tablet; Take 1 tablet (1 mg total) by mouth daily Begin 30 days prior to surgery    Multiple Vitamin (multivitamin) tablet; Take 1 tablet by mouth daily Begin 30d prior to surgery    ferrous sulfate 324 (65 Fe) mg; Take 1 tablet (324 mg total) by mouth daily Begin 30 days prior to surgery    mupirocin (BACTROBAN) 2 % ointment; Apply to each nostril daily with Q-tip 5 days prior to procedure    Aftercare following right knee joint replacement surgery         Aftercare following left knee joint replacement surgery    Orders:    XR knee 3 vw right non injury; Future    Burning with urination    Orders:    UA (URINE) with reflex to Scope; Future    Preoperative testing    Orders:    Comprehensive metabolic panel; Future    Hemoglobin A1C W/EAG Estimation; Future    CBC and differential; Future    MRSA culture; Future    Iron Panel (Includes Iron Saturation, Iron, and TIBC); Future    C-reactive protein; Future    Anemia Panel w/Reflex; Future    Protime-INR; Future    APTT; Future    Type and screen; Future    Ambulatory Referral to [facility]; Future    Ambulatory referral to Physical Therapy; Future    Ambulatory referral to Cardiology; Future    EKG 12 lead; Future    Pain in other specified joint    Orders:    Iron Panel (Includes Iron Saturation, Iron, and TIBC); Future    Protime-INR; Future    APTT; Future                Return in about 1 week (around 6/27/2025) for Recheck blister right  "anterior knee.    I answered all of the patient's questions during the visit and provided education of the patient's condition during the visit.  The patient verbalized understanding of the information given and agrees with the plan.  This note was dictated using [employer] software.  It may contain errors including improperly dictated words.  Please contact physician directly for any questions.      History of Present Illness       HPI: Jeny Collazo is a 79 y.o. female 6 weeks postop right TKA, overall doing well.  She is potentially interested in left knee replacement for the fall 2025.        History of MRSA: no  History of blood clots: no  Family history of blood clots: no  History of Hepatitis C:no  History of HIV: no  Are you a smoker:no  Are you diabetic:no  Do you see a cardiologist: yes  Have you seen a dentist in the past year: yes  Do you have a spinal cord stimulator: no  Review allergies  Are you on medication for autoimmune disease (rheumatoid arthritis, psoriatic arthritis, lupus, etc.): no       ROS:    See HPI for musculoskeletal review.   All other systems reviewed are negative     Historical Information   Past Medical History[1]  Past Surgical History[2]  Social History   Social History     Substance and Sexual Activity   Alcohol Use Not Currently    Comment: occasional glass of wine     Social History     Substance and Sexual Activity   Drug Use No     Tobacco Use History[3]  Family History: Family History[4]    Medications Ordered Prior to Encounter[5]  Allergies[6]    Medications Ordered Prior to Encounter[7]    Objective   Ht 5' 1\" (1.549 m)   LMP  (LMP Unknown)   BMI 36.66 kg/m²     Physical exam:  Right knee decompressed blister right anterior incision.  No evidence of drainage or infection.  Steri-Strips were removed.  Range of motion 5-95.  Resolving bruising noted.    Left knee tenderness palpation medial joint line.  Crepitus on range of motion.  Range of motion 4-1 " 15.      Procedures      Scribe Attestation      I,:   am acting as a scribe while in the presence of the attending physician.:       I,:   personally performed the services described in this documentation    as scribed in my presence.:                                     [1]   Past Medical History:  Diagnosis Date    Asthma     Breathing difficulty     Bronchitis     Cancer (HCC)     thyroid    COVID-2020    CPAP (continuous positive airway pressure) dependence     Depression     Fracture of arm     Fracture of carpal bone     Hyperlipidemia     Hypertension     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HCC) 2016    Shortness of breath     Sleep apnea     Thyroid nodule    [2]   Past Surgical History:  Procedure Laterality Date    EYE SURGERY  2021    HEMORRHOID SURGERY      MT ARTHRP KNE CONDYLE&PLATU MEDIAL&LAT COMPARTMENTS Right 2025    Procedure: ARTHROPLASTY KNEE TOTAL, cemented, potential same day discharge;  Surgeon: Laura Quinn DO;  Location:  MAIN OR;  Service: Orthopedics    MT THYROIDECTOMY RMVL REMAINING TISS FLWG PRTL RMVL Left 2016    Procedure:  COMPLETION THYROIDECTOMY, FLEXIBLE LARYNGOSCOPY;  Surgeon: David Rivera MD;  Location: [facility];  Service: Surgical Oncology    MT TOTAL THYROID LOBECTOMY UNI W/WO ISTHMUSECTOMY Right 2016    Procedure: HEMITHYROIDECTOMY;  Surgeon: David Rivera MD;  Location: [facility];  Service: Surgical Oncology    THYROIDECTOMY, PARTIAL Left    [3]   Social History  Tobacco Use   Smoking Status Former    Current packs/day: 0.00    Average packs/day: 0.3 packs/day for 4.0 years (1.0 ttl pk-yrs)    Types: Cigarettes    Start date: 6/15/1977    Quit date: 6/15/1981    Years since quittin.0   Smokeless Tobacco Former   Tobacco Comments    quit 40 yrs ago (Never a smoker per [employer])   [4]   Family History  Adopted: Yes   Problem Relation Name Age of Onset    Hypertension Family      Kidney disease Family      Hypertension Mother      [5]   Current Outpatient Medications on File Prior to Visit   Medication Sig Dispense Refill    acetaminophen (TYLENOL) 500 mg tablet Take 2 tablets (1,000 mg total) by mouth every 8 (eight) hours 90 tablet 0    albuterol (Ventolin HFA) 90 mcg/act inhaler Inhale 2 puffs every 4 (four) hours as needed for wheezing or shortness of breath 54 g 0    amLODIPine (NORVASC) 5 mg tablet Take 1 tablet (5 mg total) by mouth 2 (two) times a day 180 tablet 1    ascorbic acid (VITAMIN C) 500 MG TBCR Take 1 tablet (500 mg total) by mouth daily Begin 30 days prior to surgery 30 tablet 0    aspirin (ECOTRIN LOW STRENGTH) 81 mg EC tablet Take 1 tablet (81 mg total) by mouth in the morning and 1 tablet (81 mg total) before bedtime. Take with food. Start evening of discharge x 6 weeks post op to prevent blood clots. 84 tablet 0    atorvastatin (LIPITOR) 20 mg tablet Take 1 tablet (20 mg total) by mouth daily 90 tablet 1    docusate sodium (COLACE) 100 mg capsule Take 1 capsule (100 mg total) by mouth 2 (two) times a day 30 capsule 0    ferrous sulfate 324 (65 Fe) mg Take 1 tablet (324 mg total) by mouth daily Begin 30 days prior to surgery 30 tablet 0    Fluticasone Furoate-Vilanterol 100-25 mcg/actuation inhaler Inhale 1 puff daily Rinse mouth after use. 180 blister 1    folic acid ( Folic Acid) 1 mg tablet Take 1 tablet (1 mg total) by mouth daily Begin 30 days prior to surgery (Patient not taking: Reported on 6/6/2025) 30 tablet 1    furosemide (LASIX) 40 mg tablet TAKE 1 TABLET DAILY 90 tablet 1    gabapentin (Neurontin) 100 mg capsule Take 1 capsule (100 mg total) by mouth 3 (three) times a day DO NOT TAKE AT SAME TIME AS OXYCODONE 90 capsule 0    levothyroxine (Synthroid) 150 mcg tablet Take 1 tablet (150 mcg total) by mouth daily 90 tablet 1    lisinopril-hydrochlorothiazide (PRINZIDE,ZESTORETIC) 20-12.5 MG per tablet TAKE 1 TABLET TWICE A  tablet 0    Melatonin 10 MG TABS Take by mouth      mometasone (ELOCON) 0.1 %  cream Apply 1 Application topically daily 45 g 3    Multiple Vitamin (multivitamin) tablet Take 1 tablet by mouth daily Begin 30d prior to surgery 30 tablet 0    nebivolol (BYSTOLIC) 10 mg tablet TAKE 1 TABLET BY MOUTH DAILY GENERIC EQUIVALENT FOR BYSTOLIC 90 tablet 1    nystatin-triamcinolone (MYCOLOG-II) cream Apply topically 2 (two) times a day 30 g 3    omeprazole (PriLOSEC) 40 MG capsule TAKE ONE CAPSULE BY MOUTH DAILY 90 capsule 1    potassium chloride (Klor-Con M20) 20 mEq tablet Take 1 tablet (20 mEq total) by mouth 2 (two) times a day 60 tablet 5    promethazine (PHENERGAN) 12.5 MG tablet Take 1 tablet (12.5 mg total) by mouth every 6 (six) hours as needed for nausea or vomiting 12 tablet 0    venlafaxine (EFFEXOR) 37.5 mg tablet Take 1 tablet (37.5 mg total) by mouth daily 90 tablet 1     No current facility-administered medications on file prior to visit.   [6]   Allergies  Allergen Reactions    Adhesive [Medical Tape] Rash   [7]   Current Outpatient Medications on File Prior to Visit   Medication Sig Dispense Refill    acetaminophen (TYLENOL) 500 mg tablet Take 2 tablets (1,000 mg total) by mouth every 8 (eight) hours 90 tablet 0    albuterol (Ventolin HFA) 90 mcg/act inhaler Inhale 2 puffs every 4 (four) hours as needed for wheezing or shortness of breath 54 g 0    amLODIPine (NORVASC) 5 mg tablet Take 1 tablet (5 mg total) by mouth 2 (two) times a day 180 tablet 1    ascorbic acid (VITAMIN C) 500 MG TBCR Take 1 tablet (500 mg total) by mouth daily Begin 30 days prior to surgery 30 tablet 0    aspirin (ECOTRIN LOW STRENGTH) 81 mg EC tablet Take 1 tablet (81 mg total) by mouth in the morning and 1 tablet (81 mg total) before bedtime. Take with food. Start evening of discharge x 6 weeks post op to prevent blood clots. 84 tablet 0    atorvastatin (LIPITOR) 20 mg tablet Take 1 tablet (20 mg total) by mouth daily 90 tablet 1    docusate sodium (COLACE) 100 mg capsule Take 1 capsule (100 mg total) by mouth 2  (two) times a day 30 capsule 0    ferrous sulfate 324 (65 Fe) mg Take 1 tablet (324 mg total) by mouth daily Begin 30 days prior to surgery 30 tablet 0    Fluticasone Furoate-Vilanterol 100-25 mcg/actuation inhaler Inhale 1 puff daily Rinse mouth after use. 180 blister 1    folic acid (KP Folic Acid) 1 mg tablet Take 1 tablet (1 mg total) by mouth daily Begin 30 days prior to surgery (Patient not taking: Reported on 6/6/2025) 30 tablet 1    furosemide (LASIX) 40 mg tablet TAKE 1 TABLET DAILY 90 tablet 1    gabapentin (Neurontin) 100 mg capsule Take 1 capsule (100 mg total) by mouth 3 (three) times a day DO NOT TAKE AT SAME TIME AS OXYCODONE 90 capsule 0    levothyroxine (Synthroid) 150 mcg tablet Take 1 tablet (150 mcg total) by mouth daily 90 tablet 1    lisinopril-hydrochlorothiazide (PRINZIDE,ZESTORETIC) 20-12.5 MG per tablet TAKE 1 TABLET TWICE A  tablet 0    Melatonin 10 MG TABS Take by mouth      mometasone (ELOCON) 0.1 % cream Apply 1 Application topically daily 45 g 3    Multiple Vitamin (multivitamin) tablet Take 1 tablet by mouth daily Begin 30d prior to surgery 30 tablet 0    nebivolol (BYSTOLIC) 10 mg tablet TAKE 1 TABLET BY MOUTH DAILY GENERIC EQUIVALENT FOR BYSTOLIC 90 tablet 1    nystatin-triamcinolone (MYCOLOG-II) cream Apply topically 2 (two) times a day 30 g 3    omeprazole (PriLOSEC) 40 MG capsule TAKE ONE CAPSULE BY MOUTH DAILY 90 capsule 1    potassium chloride (Klor-Con M20) 20 mEq tablet Take 1 tablet (20 mEq total) by mouth 2 (two) times a day 60 tablet 5    promethazine (PHENERGAN) 12.5 MG tablet Take 1 tablet (12.5 mg total) by mouth every 6 (six) hours as needed for nausea or vomiting 12 tablet 0    venlafaxine (EFFEXOR) 37.5 mg tablet Take 1 tablet (37.5 mg total) by mouth daily 90 tablet 1     No current facility-administered medications on file prior to visit.

## 2025-06-23 ENCOUNTER — OFFICE VISIT (OUTPATIENT)
Dept: PHYSICAL THERAPY | Facility: CLINIC | Age: 80
End: 2025-06-23
Attending: ORTHOPAEDIC SURGERY
Payer: COMMERCIAL

## 2025-06-23 ENCOUNTER — RESULTS FOLLOW-UP (OUTPATIENT)
Dept: OBGYN CLINIC | Facility: MEDICAL CENTER | Age: 80
End: 2025-06-23

## 2025-06-23 DIAGNOSIS — Z96.651 S/P TOTAL KNEE REPLACEMENT, RIGHT: Primary | ICD-10-CM

## 2025-06-23 PROCEDURE — 97110 THERAPEUTIC EXERCISES: CPT

## 2025-06-23 PROCEDURE — 97140 MANUAL THERAPY 1/> REGIONS: CPT

## 2025-06-23 NOTE — PROGRESS NOTES
"Daily Note     Today's date: 2025  Patient name: Jeny Collazo  : 1945  MRN: [id number]  Referring provider: Laura Quinn,*  Dx:   Encounter Diagnosis     ICD-10-CM    1. S/P total knee replacement, right  Z96.651           Start Time: 1000  Stop Time: 1050  Total time in clinic (min): 50 minutes    Subjective: Pt stated that she had her bandage removed at her MD appointment last week and has a scab on one part of her incision, but is otherwise doing well and was able to perform activities on HEP everyday since last visit.     Objective: See treatment diary below      Assessment: Pt tolerated warm up on bike with full forwards revolutions well at beginning of session without increase in discomfort during or after activity. Pt tolerated manual R knee PROM, stretching, and OP with reported decrease in stiffness post manual treatment. Pt continues to have deficits into R knee extension ROM but shows good improvement in knee flexion ROM with activities during session. Pt also shows slight improvement in quad activation but still has poor activation overall as well as having quad lag with SLR flexion. Pt would benefit from continued skilled PT to improve RLE strength, mobility, flexibility, quad activation, gait, balance, and functional ability.     Plan: Continue per plan of care.  Progress treatment as tolerated.       Precautions: s/p R TKA scheduled for 25    Date         Visit # 1 2 3 4 5        FOTO IE             Re-eval IE              Manuals         R knee PROM  DK DK w ext OP DK w ext OP DK w ext OP                                               Neuro Re-Ed         Quad sets 10x3\" ea 15x5\" R 20x3\" R 20x3\" R* 20x3\" R        LAQ  10x3\" R 20x3\" R 20x3\" R 20x3\" R        SAQ   2x10 3\" R 2x10 3\" R 2x10 3\" R        clamshells                                                    Ther Ex       " "  bike  2' rocking 3' reverse 5' reverse full 6' fwd full        Heel slides 5x10\" R sup strap 10x10\" R 15x5\" R sup strap (108 deg) 15x5\" R sup strap  15x5\" R strap sup        HS str 3x15\" R sit            SLR 5x w QS R 5x w QS R 10x R w QS 10x R w QS 2x10 R w QS R        Standing hip abd/ext 5x ea abd            marches             Leg press   2x10 55# BLE  2x15 65# BLE        sidestepping             Knee ext prop sit chair  1' R w OP  1' R w OP  2x1' R w OP                     Ther Activity 4/28 6/9 6/11 6/16 6/23        squats  5x UE sup  5x UE sup 10x UE sup        Sit to stands             Step ups   nv  10x R up 0R        Gait Training 4/28 6/9           Gait LRAD  Around clinic                        Modalities 4/28 6/9           ice prn                                    "

## 2025-06-23 NOTE — TELEPHONE ENCOUNTER
Left message on voicemail for patient.  Does not appear that she has a UTI.  No bacteria seen.  I did tell her to contact the family doctor for an office visit regarding her running symptoms.  I did tell her to please do not go to the emergency room for this issue.  Will attempt to call back 1 more time tomorrow.

## 2025-06-25 ENCOUNTER — OFFICE VISIT (OUTPATIENT)
Dept: PHYSICAL THERAPY | Facility: CLINIC | Age: 80
End: 2025-06-25
Attending: ORTHOPAEDIC SURGERY
Payer: COMMERCIAL

## 2025-06-25 DIAGNOSIS — Z96.651 S/P TOTAL KNEE REPLACEMENT, RIGHT: Primary | ICD-10-CM

## 2025-06-25 PROCEDURE — 97112 NEUROMUSCULAR REEDUCATION: CPT

## 2025-06-25 PROCEDURE — 97140 MANUAL THERAPY 1/> REGIONS: CPT

## 2025-06-25 PROCEDURE — 97110 THERAPEUTIC EXERCISES: CPT

## 2025-06-25 NOTE — PROGRESS NOTES
"Daily Note     Today's date: 2025  Patient name: Jeny Collazo  : 1945  MRN: [id number]  Referring provider: Laura Quinn,*  Dx:   Encounter Diagnosis     ICD-10-CM    1. S/P total knee replacement, right  Z96.651           Start Time: 1000  Stop Time: 1040  Total time in clinic (min): 40 minutes    Subjective: Pt stated that her scab is healing nicely and did not have a bandage on the site this morning. Pt stated that she feels like her motion is getting better in the R knee but still has some tightness and discomfort.       Objective: See treatment diary below      Assessment: Pt tolerated warm up on bike well at beginning of session without increase in discomfort during or after activity. Pt was challenged appropriately with progression of resistance with b/l leg press activity without increase in discomfort post performance. Pt was educated on proper form with ambulating with SPC, pt demonstrated fair form but decreased confidence and speed with ambulation, instructed pt to bring in quad cane nv and can start implementing quad cane with small distances in the home to try to progress towards LRAD. Pt had slightly improved tolerance to strengthening, ROM, and mobility activities today but continues to have decreased R knee extension ROM with all activities.     Plan: Continue per plan of care.  Progress treatment as tolerated.       Precautions: s/p R TKA scheduled for 25    Date        Visit # 1 2 3 4 5 6       FOTO IE             Re-eval IE              Manuals        R knee PROM  DK DK w ext OP DK w ext OP DK w ext OP DK w ext OP                                              Neuro Re-Ed        Quad sets 10x3\" ea 15x5\" R 20x3\" R 20x3\" R* 20x3\" R 20x3\" R       LAQ  10x3\" R 20x3\" R 20x3\" R 20x3\" R 20x3\" R       SAQ   2x10 3\" R 2x10 3\" R 2x10 3\" R        clamshells                                    " "                Ther Ex 4/28 6/9 6/11 6/16 6/23 6/25       bike  2' rocking 3' reverse 5' reverse full 6' fwd full 6' full fwd       Heel slides 5x10\" R sup strap 10x10\" R 15x5\" R sup strap (108 deg) 15x5\" R sup strap  15x5\" R strap sup        HS str 3x15\" R sit            SLR 5x w QS R 5x w QS R 10x R w QS 10x R w QS 2x10 R w QS R        Standing hip abd/ext 5x ea abd            marches             Leg press   2x10 55# BLE  2x15 65# BLE 2x15 75# BLE       sidestepping             Knee ext prop sit chair  1' R w OP  1' R w OP  2x1' R w OP 2x1' R self OP                    Ther Activity 4/28 6/9 6/11 6/16 6/23 6/25       squats  5x UE sup  5x UE sup 10x UE sup        Sit to stands             Step ups   nv  10x R up 0R 10x R 0R       Gait Training 4/28 6/9 6/25       Gait LRAD  Around clinic    1 lap SPC                    Modalities 4/28 6/9           ice prn                                      "

## 2025-06-30 ENCOUNTER — APPOINTMENT (OUTPATIENT)
Dept: PHYSICAL THERAPY | Facility: CLINIC | Age: 80
End: 2025-06-30
Attending: ORTHOPAEDIC SURGERY
Payer: COMMERCIAL

## 2025-06-30 DIAGNOSIS — J98.4 RESTRICTIVE LUNG DISEASE: ICD-10-CM

## 2025-06-30 RX ORDER — FLUTICASONE FUROATE AND VILANTEROL TRIFENATATE 100; 25 UG/1; UG/1
POWDER RESPIRATORY (INHALATION)
Qty: 180 BLISTER | Refills: 3 | Status: SHIPPED | OUTPATIENT
Start: 2025-06-30

## 2025-07-02 ENCOUNTER — OFFICE VISIT (OUTPATIENT)
Dept: PHYSICAL THERAPY | Facility: CLINIC | Age: 80
End: 2025-07-02
Attending: ORTHOPAEDIC SURGERY
Payer: COMMERCIAL

## 2025-07-02 DIAGNOSIS — Z96.651 S/P TOTAL KNEE REPLACEMENT, RIGHT: Primary | ICD-10-CM

## 2025-07-02 PROCEDURE — 97140 MANUAL THERAPY 1/> REGIONS: CPT

## 2025-07-02 PROCEDURE — 97110 THERAPEUTIC EXERCISES: CPT

## 2025-07-02 PROCEDURE — 97112 NEUROMUSCULAR REEDUCATION: CPT

## 2025-07-02 NOTE — PROGRESS NOTES
Daily Note     Today's date: 2025  Patient name: Jeny Collazo  : 1945  MRN: [id number]  Referring provider: Laura Quinn,*  Dx:   Encounter Diagnosis     ICD-10-CM    1. S/P total knee replacement, right  Z96.651           Start Time: 1025  Stop Time: 1115  Total time in clinic (min): 50 minutes    Subjective: Pt stated that over the weekend her L knee felt unstable and buckled on her, causing her to fall in her home where she banged her L knee, which she has pain with at start of visit today. Pt stated that she has not walked with her cane since the fall due to fear. Pt stated that one of the scabs on her R knee at the top of her incision pulled off when getting in the car today and has some blood on the base of her shorts, reported does not have pain at site at start of visit.      Objective: See treatment diary below      Assessment: Pt tolerated warm up on bike well at beginning of session without increase in discomfort during or after activity. Pt tolerated manual R knee PROM and stretching with reported decrease in stiffness post manual treatment. Pt demonstrated fair activation of L quad today, educated pt to begin working on LLE functional strength and mobility as able at home as well as LLE has observable deficits that, if addressed, will improve pt's overall weight bearing ability. Pt showed good improvement in knee flexion ROM with performance of AAROM heel slides in supine today with decreased discomfort. Pt had moderate fatigue at end of visit and could not complete full set of repetitions with step up activity. Pt would benefit from continued skilled PT to improve BLE strength, mobility, flexibility, quad activation, gait, balance, and functional ability.     Plan: Continue per plan of care.  Progress treatment as tolerated.       Precautions: s/p R TKA scheduled for 25    Date       Visit # 1 2 3 4 5 6 7      FOTO IE            "  Re-eval IE              Manuals 4/28 6/9 6/11 6/16 6/23 6/25 7/2      R knee PROM  DK DK w ext OP DK w ext OP DK w ext OP DK w ext OP DK w ext OP                                             Neuro Re-Ed 4/28 6/9 6/11 6/16 6/23 6/25 7/2      Quad sets 10x3\" ea 15x5\" R 20x3\" R 20x3\" R* 20x3\" R 20x3\" R 20x3\" ea      LAQ  10x3\" R 20x3\" R 20x3\" R 20x3\" R 20x3\" R 20x3\" 1# ea      SAQ   2x10 3\" R 2x10 3\" R 2x10 3\" R  2x10 3\" ea      clamshells                                                    Ther Ex 4/28 6/9 6/11 6/16 6/23 6/25 7/2      bike  2' rocking 3' reverse 5' reverse full 6' fwd full 6' full fwd 6' ful fwd      Heel slides 5x10\" R sup strap 10x10\" R 15x5\" R sup strap (108 deg) 15x5\" R sup strap  15x5\" R strap sup  20x5\" R strap sup      HS str 3x15\" R sit            SLR 5x w QS R 5x w QS R 10x R w QS 10x R w QS 2x10 R w QS R  2x10 ea w QS      Standing hip abd/ext 5x ea abd            marches             Leg press   2x10 55# BLE  2x15 65# BLE 2x15 75# BLE 2x15 85# BLE       sidestepping             Knee ext prop sit chair  1' R w OP  1' R w OP  2x1' R w OP 2x1' R self OP 2x1' R shelf                   Ther Activity 4/28 6/9 6/11 6/16 6/23 6/25 7/2      squats  5x UE sup  5x UE sup 10x UE sup        Sit to stands             Step ups   nv  10x R up 0R 10x R 0R 5x R 0R      Gait Training 4/28 6/9 6/25 7/2      Gait LRAD  Around clinic    1 lap SPC                    Modalities 4/28 6/9           ice prn                                        "

## 2025-07-07 ENCOUNTER — OFFICE VISIT (OUTPATIENT)
Dept: PHYSICAL THERAPY | Facility: CLINIC | Age: 80
End: 2025-07-07
Attending: ORTHOPAEDIC SURGERY
Payer: COMMERCIAL

## 2025-07-07 DIAGNOSIS — Z96.651 S/P TOTAL KNEE REPLACEMENT, RIGHT: Primary | ICD-10-CM

## 2025-07-07 PROCEDURE — 97140 MANUAL THERAPY 1/> REGIONS: CPT

## 2025-07-07 PROCEDURE — 97110 THERAPEUTIC EXERCISES: CPT

## 2025-07-07 PROCEDURE — 97112 NEUROMUSCULAR REEDUCATION: CPT

## 2025-07-07 NOTE — PROGRESS NOTES
"Daily Note     Today's date: 2025  Patient name: Jeny Collazo  : 1945  MRN: [id number]  Referring provider: Laura Quinn,*  Dx:   Encounter Diagnosis     ICD-10-CM    1. S/P total knee replacement, right  Z96.651                      Subjective: Pt reports that she is feeling good overall with only 1.10 pain reported in her right knee.       Objective: See treatment diary below      Assessment: Pt tolerated treatment well. After stationary bike for warm up, performed 3 laps around the counter with the quad cane rather than walker, to attempt to improve overall ambulation mechanics, gait speed, and tolerance to full weight barring ADLs. Pt was challenged with ambulation with quad cane, only being able to walk half of a lap before needing to rest. She noted that she was feeling very unstable overall with ambulation and was having increased symptom intensity in her right knee with ambulation. Due to noted instability, slowed gait speed, and decreased overall confidence with ambulation with quad cane, returned to using walker. Attempt cane trail in future sessions when overall strength returns. Continued remainder of PT session at same intensity as last visit due to progression made and pt was appropriately challenged.  Patient exhibited good technique with therapeutic exercises and would benefit from continued PT      Plan: Continue per plan of care.  Progress treatment as tolerated.       Precautions: s/p R TKA scheduled for 25    Date      Visit # 1 2 3 4 5 6 7 8     FOTO IE             Re-eval IE              Manuals /     R knee PROM  DK DK w ext OP DK w ext OP DK w ext OP DK w ext OP DK w ext OP PWK                                            Neuro Re-Ed      Quad sets 10x3\" ea 15x5\" R 20x3\" R 20x3\" R* 20x3\" R 20x3\" R 20x3\" ea 20x3\" ea     LAQ  10x3\" R 20x3\" R 20x3\" R 20x3\" " "R 20x3\" R 20x3\" 1# ea 20x3\" ea 1# ea     SAQ   2x10 3\" R 2x10 3\" R 2x10 3\" R  2x10 3\" ea      clamshells                                                    Ther Ex 4/28 6/9 6/11 6/16 6/23 6/25 7/2 7/7     bike  2' rocking 3' reverse 5' reverse full 6' fwd full 6' full fwd 6' ful fwd 6'     Heel slides 5x10\" R sup strap 10x10\" R 15x5\" R sup strap (108 deg) 15x5\" R sup strap  15x5\" R strap sup  20x5\" R strap sup 20x5\"      HS str 3x15\" R sit            SLR 5x w QS R 5x w QS R 10x R w QS 10x R w QS 2x10 R w QS R  2x10 ea w QS 3x10 3\"  1#     Standing hip abd/ext 5x ea abd            marches             Leg press   2x10 55# BLE  2x15 65# BLE 2x15 75# BLE 2x15 85# BLE  2x15 85#     sidestepping             Knee ext prop sit chair  1' R w OP  1' R w OP  2x1' R w OP 2x1' R self OP 2x1' R shelf                   Ther Activity 4/28 6/9 6/11 6/16 6/23 6/25 7/2 7/7     squats  5x UE sup  5x UE sup 10x UE sup        Sit to stands             Step ups   nv  10x R up 0R 10x R 0R 5x R 0R      Gait Training 4/28 6/9 6/25 7/2 7/7      Gait LRAD  Around clinic    1 lap SPC  1/2 lap w/ quad cane                  Modalities 4/28 6/9           ice prn                                          "

## 2025-07-09 ENCOUNTER — OFFICE VISIT (OUTPATIENT)
Dept: OBGYN CLINIC | Facility: MEDICAL CENTER | Age: 80
End: 2025-07-09

## 2025-07-09 VITALS — WEIGHT: 195 LBS | HEIGHT: 61 IN | BODY MASS INDEX: 36.82 KG/M2

## 2025-07-09 DIAGNOSIS — Z96.651 AFTERCARE FOLLOWING RIGHT KNEE JOINT REPLACEMENT SURGERY: Primary | ICD-10-CM

## 2025-07-09 DIAGNOSIS — Z47.1 AFTERCARE FOLLOWING RIGHT KNEE JOINT REPLACEMENT SURGERY: Primary | ICD-10-CM

## 2025-07-09 PROCEDURE — 99024 POSTOP FOLLOW-UP VISIT: CPT | Performed by: ORTHOPAEDIC SURGERY

## 2025-07-09 NOTE — PROGRESS NOTES
Name: Jeny Collazo      : 1945      MRN: [id number]  Encounter Provider: Laura Quinn DO  Encounter Date: 2025   Encounter [facility]  :  Assessment & Plan  Aftercare following right knee joint replacement surgery  Patient is 5 weeks status post right TKA  Continue PT  Pain control prn-Tylenol and finish gabapentin.  Continue aspirin twice daily for total of 6 weeks postop.  Area today with the patient and the previous blister is healing.  There is no evidence of drainage or infection.  She may shower and let water run over the incision but keep dry.  Do not submerge in water yet.  Patient understands if for any redness or drainage increases until next visit she is to call the office and we will see the patient in the office.  Patient is tentatively scheduled for left TKA on 10 - 6 - 25.  We did do consents case requested paperwork last office visit.  She is going to get SOC clearance and cardiac clearance once again.  She has an appointment scheduled come back 1 week prior to her surgery date to review clearances and examine the patient.                   No follow-ups on file.    I answered all of the patient's questions during the visit and provided education of the patient's condition during the visit.  The patient verbalized understanding of the information given and agrees with the plan.  This note was dictated using DAXKO software.  It may contain errors including improperly dictated words.  Please contact physician directly for any questions.    History of Present Illness   HPI  Subjective     Jeny Collazo is a 79 y.o. female who presents for 5 week follow up s/p right TKA..  We brought her back for incision check today.  She states using Tylenol and gabapentin.  She is in physical therapy.  She is using a walker.    Review of Systems  ROS:    See HPI for musculoskeletal review.   All other systems reviewed are  "negative     History:  Past Medical History[1]  Past Surgical History[2]  Social History   Social History     Substance and Sexual Activity   Alcohol Use Not Currently    Comment: occasional glass of wine     Social History     Substance and Sexual Activity   Drug Use No     Tobacco Use History[3]  Family History: Family History[4]    Medications Ordered Prior to Encounter[5]  Allergies[6]      Objective   Ht 5' 0.98\" (1.549 m)   Wt 88.5 kg (195 lb)   LMP  (LMP Unknown)   BMI 36.86 kg/m²           PE:  AAOx 3  WDWN  Hearing intact, no drainage from eyes  no audible wheezing  no abdominal distension  LE compartments soft, AT/GS intact    Ortho Exam:  right Knee:   INC: healed, No erythema, mild swelling  AROM:4 - 1-05 degrees                [1]   Past Medical History:  Diagnosis Date    Asthma     Breathing difficulty     Bronchitis     Cancer (HCC)     thyroid    COVID-19 2020    CPAP (continuous positive airway pressure) dependence     Depression     Fracture of arm     Fracture of carpal bone     Hyperlipidemia     Hypertension     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HCC) 12/16/2016    Shortness of breath     Sleep apnea     Thyroid nodule    [2]   Past Surgical History:  Procedure Laterality Date    EYE SURGERY  11/2021    HEMORRHOID SURGERY      NY ARTHRP KNE CONDYLE&PLATU MEDIAL&LAT COMPARTMENTS Right 6/2/2025    Procedure: ARTHROPLASTY KNEE TOTAL, cemented, potential same day discharge;  Surgeon: Laura Quinn DO;  Location:  MAIN OR;  Service: Orthopedics    NY THYROIDECTOMY RMVL REMAINING TISS FLWG PRTL RMVL Left 6/6/2016    Procedure:  COMPLETION THYROIDECTOMY, FLEXIBLE LARYNGOSCOPY;  Surgeon: David Rivera MD;  Location: [facility];  Service: Surgical Oncology    NY TOTAL THYROID LOBECTOMY UNI W/WO ISTHMUSECTOMY Right 2/25/2016    Procedure: HEMITHYROIDECTOMY;  Surgeon: David Rivera MD;  Location: [facility];  Service: Surgical Oncology    THYROIDECTOMY, PARTIAL Left    [3]   Social " History  Tobacco Use   Smoking Status Former    Current packs/day: 0.00    Average packs/day: 0.3 packs/day for 4.0 years (1.0 ttl pk-yrs)    Types: Cigarettes    Start date: 6/15/1977    Quit date: 6/15/1981    Years since quittin.0   Smokeless Tobacco Former   Tobacco Comments    quit 40 yrs ago (Never a smoker per [employer])   [4]   Family History  Adopted: Yes   Problem Relation Name Age of Onset    Hypertension Family      Kidney disease Family      Hypertension Mother     [5]   Current Outpatient Medications on File Prior to Visit   Medication Sig Dispense Refill    acetaminophen (TYLENOL) 500 mg tablet Take 2 tablets (1,000 mg total) by mouth every 8 (eight) hours 90 tablet 0    albuterol (Ventolin HFA) 90 mcg/act inhaler Inhale 2 puffs every 4 (four) hours as needed for wheezing or shortness of breath 54 g 0    amLODIPine (NORVASC) 5 mg tablet Take 1 tablet (5 mg total) by mouth 2 (two) times a day 180 tablet 1    ascorbic acid (VITAMIN C) 500 MG TBCR Take 1 tablet (500 mg total) by mouth daily Begin 30 days prior to surgery 30 tablet 1    aspirin (ECOTRIN LOW STRENGTH) 81 mg EC tablet Take 1 tablet (81 mg total) by mouth in the morning and 1 tablet (81 mg total) before bedtime. Take with food. Start evening of discharge x 6 weeks post op to prevent blood clots. 84 tablet 0    atorvastatin (LIPITOR) 20 mg tablet Take 1 tablet (20 mg total) by mouth daily 90 tablet 1    Breo Ellipta 100-25 MCG/ACT inhaler USE 1 INHALATION DAILY (RINSE MOUTH AFTER USE) 180 blister 3    docusate sodium (COLACE) 100 mg capsule Take 1 capsule (100 mg total) by mouth 2 (two) times a day 30 capsule 0    ferrous sulfate 324 (65 Fe) mg Take 1 tablet (324 mg total) by mouth daily Begin 30 days prior to surgery 30 tablet 1    folic acid (KP Folic Acid) 1 mg tablet Take 1 tablet (1 mg total) by mouth daily Begin 30 days prior to surgery 30 tablet 1    furosemide (LASIX) 40 mg tablet TAKE 1 TABLET DAILY 90 tablet 1    gabapentin  (Neurontin) 100 mg capsule Take 1 capsule (100 mg total) by mouth 3 (three) times a day DO NOT TAKE AT SAME TIME AS OXYCODONE 90 capsule 0    levothyroxine (Synthroid) 150 mcg tablet Take 1 tablet (150 mcg total) by mouth daily 90 tablet 1    lisinopril-hydrochlorothiazide (PRINZIDE,ZESTORETIC) 20-12.5 MG per tablet TAKE 1 TABLET TWICE A  tablet 0    Melatonin 10 MG TABS Take by mouth      mometasone (ELOCON) 0.1 % cream Apply 1 Application topically daily 45 g 3    Multiple Vitamin (multivitamin) tablet Take 1 tablet by mouth daily Begin 30d prior to surgery 30 tablet 0    Multiple Vitamin (multivitamin) tablet Take 1 tablet by mouth daily Begin 30d prior to surgery 30 tablet 1    mupirocin (BACTROBAN) 2 % ointment Apply to each nostril daily with Q-tip 5 days prior to procedure 15 g 0    nebivolol (BYSTOLIC) 10 mg tablet TAKE 1 TABLET BY MOUTH DAILY GENERIC EQUIVALENT FOR BYSTOLIC 90 tablet 1    nystatin-triamcinolone (MYCOLOG-II) cream Apply topically 2 (two) times a day 30 g 3    omeprazole (PriLOSEC) 40 MG capsule TAKE ONE CAPSULE BY MOUTH DAILY 90 capsule 1    potassium chloride (Klor-Con M20) 20 mEq tablet Take 1 tablet (20 mEq total) by mouth 2 (two) times a day 60 tablet 5    promethazine (PHENERGAN) 12.5 MG tablet Take 1 tablet (12.5 mg total) by mouth every 6 (six) hours as needed for nausea or vomiting 12 tablet 0    venlafaxine (EFFEXOR) 37.5 mg tablet Take 1 tablet (37.5 mg total) by mouth daily 90 tablet 1    ascorbic acid (VITAMIN C) 500 MG TBCR Take 1 tablet (500 mg total) by mouth daily Begin 30 days prior to surgery 30 tablet 0    ferrous sulfate 324 (65 Fe) mg Take 1 tablet (324 mg total) by mouth daily Begin 30 days prior to surgery 30 tablet 0    folic acid (KP Folic Acid) 1 mg tablet Take 1 tablet (1 mg total) by mouth daily Begin 30 days prior to surgery (Patient not taking: Reported on 6/6/2025) 30 tablet 1     No current facility-administered medications on file prior to visit.   [6]    Allergies  Allergen Reactions    Adhesive [Medical Tape] Rash

## 2025-07-11 ENCOUNTER — OFFICE VISIT (OUTPATIENT)
Dept: PHYSICAL THERAPY | Facility: CLINIC | Age: 80
End: 2025-07-11
Attending: ORTHOPAEDIC SURGERY
Payer: COMMERCIAL

## 2025-07-11 DIAGNOSIS — Z96.651 S/P TOTAL KNEE REPLACEMENT, RIGHT: Primary | ICD-10-CM

## 2025-07-11 PROCEDURE — 97140 MANUAL THERAPY 1/> REGIONS: CPT

## 2025-07-11 PROCEDURE — 97110 THERAPEUTIC EXERCISES: CPT

## 2025-07-11 PROCEDURE — 97112 NEUROMUSCULAR REEDUCATION: CPT

## 2025-07-11 NOTE — PROGRESS NOTES
Daily Note     Today's date: 2025  Patient name: Jeny Collazo  : 1945  MRN: [id number]  Referring provider: Laura Quinn,*  Dx:   Encounter Diagnosis     ICD-10-CM    1. S/P total knee replacement, right  Z96.651           Start Time: 1100  Stop Time: 1140  Total time in clinic (min): 40 minutes    Subjective: Pt stated that she continues to have some discomfort in her R knee, but her L knee has been bothering her more than the R, stating that she feels like her L is limiting her more than the R.       Objective: See treatment diary below      Assessment: Pt tolerated warm up on bike well at beginning of session without increase in discomfort during or after activity. Pt tolerated manual R knee PROM and stretching with overpressure into extension with reported decrease in stiffness post manual treatment. Pt demonstrated slightly improved R quad activation compared to LLE, educated pt to perform all quad activation and strengthening activities on the LLE as well the RLE at home to improve functional ability overall. Pt tolerated addition of light weight with b/l hip flexion and knee extension activities throughout session with mild fatigue at end of visit. Pt continues to have some difficulty with step up activity with mild to moderate fatigue. Instructed pt to bring quad cane back in next visit to work on confidence with LRAD. Pt would benefit from continued skilled PT to improve BLE strength, quad activation, ROM, gait, balance, endurance, and functional ability.       Plan: Continue per plan of care.  Progress treatment as tolerated.       Precautions: s/p R TKA scheduled for 25    Date     Visit # 1 2 3 4 5 6 7 8 9    FOTO IE         done    Re-eval IE              Manuals     R knee PROM  DK DK w ext OP DK w ext OP DK w ext OP DK w ext OP DK w ext OP PWK DK                                          "  Neuro Re-Ed 4/28 6/9 6/11 6/16 6/23 6/25 7/2 7/7 7/11    Quad sets 10x3\" ea 15x5\" R 20x3\" R 20x3\" R* 20x3\" R 20x3\" R 20x3\" ea 20x3\" ea 20x3\" ea    LAQ  10x3\" R 20x3\" R 20x3\" R 20x3\" R 20x3\" R 20x3\" 1# ea 20x3\" ea 1# ea 20x3\" ea 1#    SAQ   2x10 3\" R 2x10 3\" R 2x10 3\" R  2x10 3\" ea  2x10 3\" 1# isrrael    clamshelkermit                                                    Ther Ex 4/28 6/9 6/11 6/16 6/23 6/25 7/2 7/7 7/11    bike  2' rocking 3' reverse 5' reverse full 6' fwd full 6' full fwd 6' ful fwd 6' 6'    Heel slides 5x10\" R sup strap 10x10\" R 15x5\" R sup strap (108 deg) 15x5\" R sup strap  15x5\" R strap sup  20x5\" R strap sup 20x5\"      HS str 3x15\" R sit            SLR 5x w QS R 5x w QS R 10x R w QS 10x R w QS 2x10 R w QS R  2x10 ea w QS 3x10 3\"  1# 2x10 3\" 1# ea    Standing hip abd/ext 5x ea abd            marches             Leg press   2x10 55# BLE  2x15 65# BLE 2x15 75# BLE 2x15 85# BLE  2x15 85# 2x15 95# BLE    sidestepping             Knee ext prop sit chair  1' R w OP  1' R w OP  2x1' R w OP 2x1' R self OP 2x1' R shelf  np                 Ther Activity 4/28 6/9 6/11 6/16 6/23 6/25 7/2 7/7 7/11    squats  5x UE sup  5x UE sup 10x UE sup        Sit to stands             Step ups   nv  10x R up 0R 10x R 0R 5x R 0R  10x R 0R     Gait Training 4/28 6/9 6/25 7/2 7/7      Gait LRAD  Around clinic    1 lap SPC  1/2 lap w/ quad cane                  Modalities 4/28 6/9           ice prn                                            "

## 2025-07-14 ENCOUNTER — OFFICE VISIT (OUTPATIENT)
Dept: PHYSICAL THERAPY | Facility: CLINIC | Age: 80
End: 2025-07-14
Attending: ORTHOPAEDIC SURGERY
Payer: COMMERCIAL

## 2025-07-14 DIAGNOSIS — Z96.651 S/P TOTAL KNEE REPLACEMENT, RIGHT: Primary | ICD-10-CM

## 2025-07-14 PROCEDURE — 97110 THERAPEUTIC EXERCISES: CPT

## 2025-07-14 PROCEDURE — 97140 MANUAL THERAPY 1/> REGIONS: CPT

## 2025-07-14 PROCEDURE — 97112 NEUROMUSCULAR REEDUCATION: CPT

## 2025-07-14 NOTE — PROGRESS NOTES
"Daily Note     Today's date: 2025  Patient name: Jeny Collazo  : 1945  MRN: [id number]  Referring provider: Laura Quinn,*  Dx:   Encounter Diagnosis     ICD-10-CM    1. S/P total knee replacement, right  Z96.651           Start Time: 1100  Stop Time: 1145  Total time in clinic (min): 45 minutes    Subjective: Pt stated that she is doing ok this morning but has no new complaints of pain or discomfort at start of visit.       Objective: See treatment diary below      Assessment: Pt tolerated warm up on bike well at beginning of session without increase in discomfort during or after activity. Pt performed standing TE and step up activities with better form and tolerance today when performed at start of visit compared to when performed at the end over past couple visits as pt did not have as much fatigue, pt would benefit from continued performance of activities during session in similar fashion. Pt tolerated manual R knee PROM and stretching with reported decrease in stiffness post manual treatment. Pt demonstrated good improvement in R knee flexion ROM as well as R quad activation and is just about WNL in both measurements. Pt would benefit from continued skilled PT to improve RLE strength, mobility, flexibility, gait, balance, endurance, and functional ability.       Plan: Continue per plan of care.  Progress treatment as tolerated.       Precautions: s/p R TKA scheduled for 25    Date    Visit # 1 2 3 4 5 6 7 8 9 10   FOTO IE         done    Re-eval IE              Manuals    R knee PROM  DK DK w ext OP DK w ext OP DK w ext OP DK w ext OP DK w ext OP PWK DK DK                                          Neuro Re-Ed    Quad sets 10x3\" ea 15x5\" R 20x3\" R 20x3\" R* 20x3\" R 20x3\" R 20x3\" ea 20x3\" ea 20x3\" ea 20x3\" ea   LAQ  10x3\" R 20x3\" R 20x3\" R " "20x3\" R 20x3\" R 20x3\" 1# ea 20x3\" ea 1# ea 20x3\" ea 1# 20x3\" ea 1#   SAQ   2x10 3\" R 2x10 3\" R 2x10 3\" R  2x10 3\" ea  2x10 3\" 1# ea 2x10 3\" 1# isrrael belcher             TKE          20x3\" 8# ea                             Ther Ex 4/28 6/9 6/11 6/16 6/23 6/25 7/2 7/7 7/11 7/14   bike  2' rocking 3' reverse 5' reverse full 6' fwd full 6' full fwd 6' ful fwd 6' 6' 6'    Heel slides 5x10\" R sup strap 10x10\" R 15x5\" R sup strap (108 deg) 15x5\" R sup strap  15x5\" R strap sup  20x5\" R strap sup 20x5\"   15x3\" R   HS str 3x15\" R sit            SLR 5x w QS R 5x w QS R 10x R w QS 10x R w QS 2x10 R w QS R  2x10 ea w QS 3x10 3\"  1# 2x10 3\" 1# ea 2x10 1# ea   Standing hip abd/ext 5x ea abd         2x10 OTB abd ea   marches          10x ea OTB stand   Leg press   2x10 55# BLE  2x15 65# BLE 2x15 75# BLE 2x15 85# BLE  2x15 85# 2x15 95# BLE 2x15 105# BLE   sidestepping             Knee ext prop sit chair  1' R w OP  1' R w OP  2x1' R w OP 2x1' R self OP 2x1' R shelf  np                 Ther Activity 4/28 6/9 6/11 6/16 6/23 6/25 7/2 7/7 7/11 7/14   squats  5x UE sup  5x UE sup 10x UE sup     2x10 UE sup   Sit to stands             Step ups   nv  10x R up 0R 10x R 0R 5x R 0R  10x R 0R  15x R 0R   Gait Training 4/28 6/9 6/25 7/2 7/7      Gait LRAD  Around clinic    1 lap SPC  1/2 lap w/ quad cane                  Modalities 4/28 6/9           ice prn                                              "

## 2025-07-17 DIAGNOSIS — F32.A DEPRESSION, UNSPECIFIED DEPRESSION TYPE: ICD-10-CM

## 2025-07-18 ENCOUNTER — OFFICE VISIT (OUTPATIENT)
Dept: PHYSICAL THERAPY | Facility: CLINIC | Age: 80
End: 2025-07-18
Attending: ORTHOPAEDIC SURGERY
Payer: COMMERCIAL

## 2025-07-18 DIAGNOSIS — Z96.651 S/P TOTAL KNEE REPLACEMENT, RIGHT: Primary | ICD-10-CM

## 2025-07-18 PROCEDURE — 97110 THERAPEUTIC EXERCISES: CPT

## 2025-07-18 PROCEDURE — 97112 NEUROMUSCULAR REEDUCATION: CPT

## 2025-07-18 PROCEDURE — 97140 MANUAL THERAPY 1/> REGIONS: CPT

## 2025-07-18 RX ORDER — VENLAFAXINE 37.5 MG/1
37.5 TABLET ORAL DAILY
Qty: 90 TABLET | Refills: 1 | Status: SHIPPED | OUTPATIENT
Start: 2025-07-18

## 2025-07-18 NOTE — PROGRESS NOTES
"Daily Note     Today's date: 2025  Patient name: Jeny Collazo  : 1945  MRN: [id number]  Referring provider: Laura Quinn,*  Dx:   Encounter Diagnosis     ICD-10-CM    1. S/P total knee replacement, right  Z96.651           Start Time: 1100  Stop Time: 1145  Total time in clinic (min): 45 minutes    Subjective: Pt stated that she is doing ok today and presents with both RW and quad cane today to practice gait with quad cane.       Objective: See treatment diary below      Assessment: Pt tolerated warm up on bike well at beginning of session without increase in discomfort during or after activity. Pt tolerated manual R knee PROM, stretching, and overpressure into flexion and extension with reported decrease in stiffness post manual treatment. Pt was encouraged during session to try using quad cane more in the home for short distances with  close by for assistance if needed to help with confidence and endurance with gait using LRAD, pt was receptive to education and verbally stated that she will try using it more in the home. Pt was able to perform lap around clinic with quad cane but had pain on L, non surgical knee during gait. Pt would benefit from continued skilled PT to improve BLE strength, mobility, gait, balance, quad activation, and functional ability.     Plan: Continue per plan of care.  Progress treatment as tolerated.       Precautions: s/p R TKA scheduled for 25    Date    Visit # 11     6 7 8 9 10   FOTO         done    Re-eval               Manuals    R knee PROM DK     DK w ext OP DK w ext OP PWK DK DK                                          Neuro Re-Ed    Quad sets 20x3\" ea     20x3\" R 20x3\" ea 20x3\" ea 20x3\" ea 20x3\" ea   LAQ 20x3\" ea 1#     20x3\" R 20x3\" 1# ea 20x3\" ea 1# ea 20x3\" ea 1# 20x3\" ea 1#   SAQ 2x10 3\" 1# ea      2x10 3\" ea  2x10 3\" 1# ea 2x10 3\" 1# ea " "  simi             TKE          20x3\" 8# ea                             Ther Ex 7/18 6/25 7/2 7/7 7/11 7/14   bike 6'     6' full fwd 6' ful fwd 6' 6' 6'    Heel slides 15x5\"      20x5\" R strap sup 20x5\"   15x3\" R   HS str             SLR 2x10 1# ea      2x10 ea w QS 3x10 3\"  1# 2x10 3\" 1# ea 2x10 1# ea   Standing hip abd/ext          2x10 OTB abd ea   marches          10x ea OTB stand   Leg press 2x15 105# BLE     2x15 75# BLE 2x15 85# BLE  2x15 85# 2x15 95# BLE 2x15 105# BLE   sidestepping             Knee ext prop sit chair      2x1' R self OP 2x1' R shelf  np                 Ther Activity 7/18 6/25 7/2 7/7 7/11 7/14   squats          2x10 UE sup   Sit to stands             Step ups 15x R 0R     10x R 0R 5x R 0R  10x R 0R  15x R 0R   Gait Training 7/18 6/25 7/2 7/7      Gait LRAD 1 lap + short dist quad cane     1 lap SPC  1/2 lap w/ quad cane                  Modalities             ice                                                 "

## 2025-07-21 ENCOUNTER — OFFICE VISIT (OUTPATIENT)
Dept: PHYSICAL THERAPY | Facility: CLINIC | Age: 80
End: 2025-07-21
Attending: ORTHOPAEDIC SURGERY
Payer: COMMERCIAL

## 2025-07-21 DIAGNOSIS — Z96.651 S/P TOTAL KNEE REPLACEMENT, RIGHT: Primary | ICD-10-CM

## 2025-07-21 PROCEDURE — 97112 NEUROMUSCULAR REEDUCATION: CPT

## 2025-07-21 PROCEDURE — 97140 MANUAL THERAPY 1/> REGIONS: CPT

## 2025-07-21 PROCEDURE — 97110 THERAPEUTIC EXERCISES: CPT

## 2025-07-21 NOTE — PROGRESS NOTES
"Daily Note     Today's date: 2025  Patient name: Jeny Collazo  : 1945  MRN: [id number]  Referring provider: Laura Quinn,*  Dx:   Encounter Diagnosis     ICD-10-CM    1. S/P total knee replacement, right  Z96.651           Start Time: 1100  Stop Time: 1140  Total time in clinic (min): 40 minutes    Subjective: Pt stated that she is doing ok today but over the weekend her L knee felt like it was giving out on her more than usual and was scared to try ambulating with quad cane in the home, so held off over the past couple days.       Objective: See treatment diary below      Assessment: Pt tolerated warm up on bike well at beginning of session without increase in discomfort during or after activity. Pt had difficulty with trial of step up with increased step height and could not complete full set of repetitions due to fatigue, continue to perform to complete full sets in next few visits. Pt showed slightly improved gait with quad cane today but continues to have mild to moderate fatigue with short distances in the clinic and some fear of L knee buckling. Pt tolerated manual b/l knee PROM and stretching with overpressure with reported decrease in stiffness post manual treatment. Pt would benefit from continued skilled PT to improve BLE strength, mobility, flexibility, gait, balance, and functional ability.     Plan: Continue per plan of care.  Progress treatment as tolerated.       Precautions: s/p R TKA scheduled for 25    Date    Visit # 11 12    6 7 8 9 10   FOTO         done    Re-eval               Manuals    R knee PROM DK DK    DK w ext OP DK w ext OP PWK DK DK   L knee PROM  DK                                     Neuro Re-Ed    Quad sets 20x3\" ea 20x3\" ea    20x3\" R 20x3\" ea 20x3\" ea 20x3\" ea 20x3\" ea   LAQ 20x3\" ea 1# 20x3\" ea 1#    20x3\" R 20x3\" 1# ea 20x3\" ea 1# ea " "20x3\" ea 1# 20x3\" ea 1#   SAQ 2x10 3\" 1# ea 2x10 3\" 1# ea     2x10 3\" ea  2x10 3\" 1# ea 2x10 3\" 1# ea   simi             TKE          20x3\" 8# ea                             Ther Ex 7/18 7/21 6/25 7/2 7/7 7/11 7/14   bike 6' 6'    6' full fwd 6' ful fwd 6' 6' 6'    Heel slides 15x5\"      20x5\" R strap sup 20x5\"   15x3\" R   HS str  Man ea           SLR 2x10 1# ea 2x10 ea 1#     2x10 ea w QS 3x10 3\"  1# 2x10 3\" 1# ea 2x10 1# ea   Standing hip abd/ext  15x OTB ea abd        2x10 OTB abd ea   marches  15x ea OTB stand        10x ea OTB stand   Leg press 2x15 105# BLE np    2x15 75# BLE 2x15 85# BLE  2x15 85# 2x15 95# BLE 2x15 105# BLE   sidestepping             Knee ext prop sit chair      2x1' R self OP 2x1' R shelf  np                 Ther Activity 7/18 7/21 6/25 7/2 7/7 7/11 7/14   squats  2x10 UE sup        2x10 UE sup   Sit to stands             Step ups 15x R 0R 6x R 1R    10x R 0R 5x R 0R  10x R 0R  15x R 0R   Gait Training 7/18 7/21 6/25 7/2 7/7      Gait LRAD 1 lap + short dist quad cane Around clinic quad cane    1 lap SPC  1/2 lap w/ quad cane                  Modalities             ice                                                   "

## 2025-07-25 ENCOUNTER — OFFICE VISIT (OUTPATIENT)
Dept: PHYSICAL THERAPY | Facility: CLINIC | Age: 80
End: 2025-07-25
Attending: ORTHOPAEDIC SURGERY
Payer: COMMERCIAL

## 2025-07-25 DIAGNOSIS — Z96.651 S/P TOTAL KNEE REPLACEMENT, RIGHT: Primary | ICD-10-CM

## 2025-07-25 PROCEDURE — 97140 MANUAL THERAPY 1/> REGIONS: CPT

## 2025-07-25 PROCEDURE — 97112 NEUROMUSCULAR REEDUCATION: CPT

## 2025-07-25 PROCEDURE — 97110 THERAPEUTIC EXERCISES: CPT

## 2025-07-25 NOTE — PROGRESS NOTES
"Daily Note     Today's date: 2025  Patient name: Jeny Collazo  : 1945  MRN: [id number]  Referring provider: Laura Quinn,*  Dx:   Encounter Diagnosis     ICD-10-CM    1. S/P total knee replacement, right  Z96.651           Start Time: 1054  Stop Time: 1133  Total time in clinic (min): 39 minutes    Subjective: Pt stated that she has been having an episode of vertigo since waking up this morning after lying on her side with her head propped up. Pt stated that her L, non surgical knee, continues to bother her more than the RLE and affect her ability to ambulate.       Objective: See treatment diary below      Assessment: Pt tolerated warm up on bike well at beginning of session without increase in discomfort during or after activity. Pt tolerated manual R knee PROM, stretching, and overpressure into extension with reported decrease in stiffness post manual treatment. Pt had palpable improvement in R quad activation during quad sets today and with more ROM towards terminal extension compared to last visit. Pt was challenged with progression of resistance with RLE strengthening activities with increased fatigue and soreness compared to last visit. Pt would benefit from continued skilled PT to improve BLE strength, mobility, flexibility, gait, balance, endurance, and functional ability.     Plan: Continue per plan of care.  Progress treatment as tolerated.       Precautions: s/p R TKA scheduled for 25    Date    Visit # 11 12 13   6 7 8 9 10   FOTO         done    Re-eval               Manuals    R knee PROM DK DK DK   DK w ext OP DK w ext OP PWK DK DK   L knee PROM  DK DK                                    Neuro Re-Ed    Quad sets 20x3\" ea 20x3\" ea 20x3\" ea   20x3\" R 20x3\" ea 20x3\" ea 20x3\" ea 20x3\" ea   LAQ 20x3\" ea 1# 20x3\" ea 1# 0x3\" ea 2#    20x3\" R 20x3\" 1# ea " "20x3\" ea 1# ea 20x3\" ea 1# 20x3\" ea 1#   SAQ 2x10 3\" 1# ea 2x10 3\" 1# ea 2x10 3\" 2# R    2x10 3\" ea  2x10 3\" 1# ea 2x10 3\" 1# ea   clamshells             TKE          20x3\" 8# ea                             Ther Ex 7/18 7/21 7/25 6/25 7/2 7/7 7/11 7/14   bike 6' 6' 6'   6' full fwd 6' ful fwd 6' 6' 6'    Heel slides 15x5\"      20x5\" R strap sup 20x5\"   15x3\" R   HS str  Man ea Man ea          SLR 2x10 1# ea 2x10 ea 1# 2x10 ea 2#    2x10 ea w QS 3x10 3\"  1# 2x10 3\" 1# ea 2x10 1# ea   Standing hip abd/ext  15x OTB ea abd        2x10 OTB abd ea   marches  15x ea OTB stand        10x ea OTB stand   Leg press 2x15 105# BLE np 2x15 105# BLE   2x15 75# BLE 2x15 85# BLE  2x15 85# 2x15 95# BLE 2x15 105# BLE   sidestepping             Knee ext prop sit chair      2x1' R self OP 2x1' R shelf  np                 Ther Activity 7/18 7/21 7/25 6/25 7/2 7/7 7/11 7/14   squats  2x10 UE sup        2x10 UE sup   Sit to stands             Step ups 15x R 0R 6x R 1R 8x R 1R   10x R 0R 5x R 0R  10x R 0R  15x R 0R   Gait Training 7/18 7/21 6/25 7/2 7/7      Gait LRAD 1 lap + short dist quad cane Around clinic quad cane np   1 lap SPC  1/2 lap w/ quad cane                  Modalities             ice                                                     "

## 2025-07-28 ENCOUNTER — OFFICE VISIT (OUTPATIENT)
Dept: PHYSICAL THERAPY | Facility: CLINIC | Age: 80
End: 2025-07-28
Attending: ORTHOPAEDIC SURGERY
Payer: COMMERCIAL

## 2025-07-28 DIAGNOSIS — Z96.651 S/P TOTAL KNEE REPLACEMENT, RIGHT: Primary | ICD-10-CM

## 2025-07-28 PROCEDURE — 97112 NEUROMUSCULAR REEDUCATION: CPT

## 2025-07-28 PROCEDURE — 97140 MANUAL THERAPY 1/> REGIONS: CPT

## 2025-07-28 PROCEDURE — 97110 THERAPEUTIC EXERCISES: CPT

## 2025-08-04 ENCOUNTER — OFFICE VISIT (OUTPATIENT)
Dept: PHYSICAL THERAPY | Facility: CLINIC | Age: 80
End: 2025-08-04
Attending: ORTHOPAEDIC SURGERY
Payer: COMMERCIAL

## 2025-08-04 DIAGNOSIS — Z96.651 S/P TOTAL KNEE REPLACEMENT, RIGHT: Primary | ICD-10-CM

## 2025-08-04 PROCEDURE — 97110 THERAPEUTIC EXERCISES: CPT

## 2025-08-04 PROCEDURE — 97140 MANUAL THERAPY 1/> REGIONS: CPT

## 2025-08-04 PROCEDURE — 97164 PT RE-EVAL EST PLAN CARE: CPT

## 2025-08-06 ENCOUNTER — OFFICE VISIT (OUTPATIENT)
Dept: OBGYN CLINIC | Facility: MEDICAL CENTER | Age: 80
End: 2025-08-06

## 2025-08-06 VITALS — BODY MASS INDEX: 37.77 KG/M2 | WEIGHT: 192.4 LBS | HEIGHT: 60 IN

## 2025-08-06 DIAGNOSIS — Z96.651 HISTORY OF TOTAL RIGHT KNEE REPLACEMENT: ICD-10-CM

## 2025-08-06 DIAGNOSIS — M17.12 PRIMARY OSTEOARTHRITIS OF LEFT KNEE: ICD-10-CM

## 2025-08-06 DIAGNOSIS — Z96.651 AFTERCARE FOLLOWING RIGHT KNEE JOINT REPLACEMENT SURGERY: Primary | ICD-10-CM

## 2025-08-06 DIAGNOSIS — Z47.1 AFTERCARE FOLLOWING RIGHT KNEE JOINT REPLACEMENT SURGERY: Primary | ICD-10-CM

## 2025-08-06 PROCEDURE — 99024 POSTOP FOLLOW-UP VISIT: CPT | Performed by: PHYSICIAN ASSISTANT

## 2025-08-06 RX ORDER — FOLIC ACID 1 MG/1
1 TABLET ORAL DAILY
Qty: 30 TABLET | Refills: 1 | Status: SHIPPED | OUTPATIENT
Start: 2025-08-06

## 2025-08-06 RX ORDER — MULTIVITAMIN
1 TABLET ORAL DAILY
Qty: 30 TABLET | Refills: 1 | Status: SHIPPED | OUTPATIENT
Start: 2025-08-06

## 2025-08-06 RX ORDER — FERROUS SULFATE 324(65)MG
324 TABLET, DELAYED RELEASE (ENTERIC COATED) ORAL DAILY
Qty: 30 TABLET | Refills: 1 | Status: SHIPPED | OUTPATIENT
Start: 2025-08-06

## 2025-08-11 ENCOUNTER — OFFICE VISIT (OUTPATIENT)
Age: 80
End: 2025-08-11
Payer: COMMERCIAL

## 2025-08-11 PROBLEM — E11.9 TYPE 2 DIABETES MELLITUS, WITHOUT LONG-TERM CURRENT USE OF INSULIN (HCC): Status: ACTIVE | Noted: 2025-08-11

## 2025-08-15 ENCOUNTER — OFFICE VISIT (OUTPATIENT)
Dept: PHYSICAL THERAPY | Facility: CLINIC | Age: 80
End: 2025-08-15
Attending: ORTHOPAEDIC SURGERY
Payer: COMMERCIAL

## 2025-08-17 ENCOUNTER — PATIENT MESSAGE (OUTPATIENT)
Age: 80
End: 2025-08-17

## 2025-08-18 ENCOUNTER — OFFICE VISIT (OUTPATIENT)
Dept: PHYSICAL THERAPY | Facility: CLINIC | Age: 80
End: 2025-08-18
Attending: ORTHOPAEDIC SURGERY
Payer: COMMERCIAL

## 2025-08-18 DIAGNOSIS — I10 PRIMARY HYPERTENSION: ICD-10-CM

## 2025-08-18 DIAGNOSIS — Z96.651 S/P TOTAL KNEE REPLACEMENT, RIGHT: Primary | ICD-10-CM

## 2025-08-18 PROCEDURE — 97110 THERAPEUTIC EXERCISES: CPT

## 2025-08-18 PROCEDURE — 97140 MANUAL THERAPY 1/> REGIONS: CPT

## 2025-08-18 PROCEDURE — 97112 NEUROMUSCULAR REEDUCATION: CPT

## 2025-08-20 RX ORDER — NEBIVOLOL 10 MG/1
TABLET ORAL
Qty: 90 TABLET | Refills: 1 | Status: SHIPPED | OUTPATIENT
Start: 2025-08-20

## (undated) DEVICE — INTENDED FOR TISSUE SEPARATION, AND OTHER PROCEDURES THAT REQUIRE A SHARP SURGICAL BLADE TO PUNCTURE OR CUT.: Brand: BARD-PARKER ® SAFETYLOCK CARBON RIB-BACK BLADES

## (undated) DEVICE — STERILE POLYISOPRENE POWDER-FREE SURGICAL GLOVES WITH EMOLLIENT COATING: Brand: PROTEXIS

## (undated) DEVICE — HOOD: Brand: T7PLUS

## (undated) DEVICE — STERILE POLYISOPRENE POWDER-FREE SURGICAL GLOVES: Brand: PROTEXIS

## (undated) DEVICE — GLOVE INDICATOR PI UNDERGLOVE SZ 6.5 BLUE

## (undated) DEVICE — COBAN 4 IN STERILE

## (undated) DEVICE — SIGMOIDOSCOPIC SUCTION INSTRUMENT 18 FR W/WINGED CAP CONTROL AND 6 FOOT (1.8M) TUBING: Brand: SIGMOIDOSCOPIC

## (undated) DEVICE — SUT VICRYL 2-0 CT-1 36 IN J945H

## (undated) DEVICE — BASIC SINGLE BASIN 2-LF: Brand: MEDLINE INDUSTRIES, INC.

## (undated) DEVICE — CAPIT KNEE ATTUNE FB W/DOM

## (undated) DEVICE — DRESSING MEPILEX AG BORDER POST-OP 4 X 12 IN

## (undated) DEVICE — CEMENT MIXING SYSTEM WITH FEMORAL BREAKWAY NOZZLE: Brand: REVOLUTION

## (undated) DEVICE — DISPOSABLE OR TOWEL: Brand: CARDINAL HEALTH

## (undated) DEVICE — GLOVE SRG BIOGEL ORTHOPEDIC 6.5

## (undated) DEVICE — SUT VICRYL 1 CTX 36 IN J977H

## (undated) DEVICE — GLOVE PI ULTRA TOUCH SZ.8.0

## (undated) DEVICE — DUAL CUT SAGITTAL BLADE

## (undated) DEVICE — SMOKE EVAC FLUID SUCTION HEPA FILTER

## (undated) DEVICE — 2108 SERIES SAGITTAL BLADE (28.9 X 0.64 X 58.7MM)

## (undated) DEVICE — 3M™ STERI-STRIP™ REINFORCED ADHESIVE SKIN CLOSURES, R1547, 1/2 IN X 4 IN (12 MM X 100 MM), 6 STRIPS/ENVELOPE: Brand: 3M™ STERI-STRIP™

## (undated) DEVICE — NEEDLE 18 G X 1 1/2

## (undated) DEVICE — SURGICAL GOWN, XL SMARTSLEEVE: Brand: CONVERTORS

## (undated) DEVICE — REM POLYHESIVE ADULT PATIENT RETURN ELECTRODE: Brand: VALLEYLAB

## (undated) DEVICE — 450 ML BOTTLE OF 0.05% CHLORHEXIDINE GLUCONATE IN 99.95% STERILE WATER FOR IRRIGATION, USP AND APPLICATOR.: Brand: IRRISEPT ANTIMICROBIAL WOUND LAVAGE

## (undated) DEVICE — GLOVE SRG BIOGEL PI ORTHOPEDIC 7

## (undated) DEVICE — HANDPIECE SET WITH HIGH FLOW TIP AND SUCTION TUBE: Brand: INTERPULSE

## (undated) DEVICE — 2108 SERIES SAGITTAL BLADE, OFFSET (12.4 X 1.24 X 73.7MM)

## (undated) DEVICE — BETHLEHEM UNIV TOTAL KNEE, KIT: Brand: CARDINAL HEALTH

## (undated) DEVICE — GLOVE INDICATOR PI UNDERGLOVE SZ 7 BLUE

## (undated) DEVICE — 3M™ STERI-DRAPE™ U-DRAPE 1015: Brand: STERI-DRAPE™

## (undated) DEVICE — NEPTUNE E-SEP SMOKE EVACUATION PENCIL, COATED, 70MM BLADE, PUSH BUTTON SWITCH: Brand: NEPTUNE E-SEP

## (undated) DEVICE — CHLORAPREP HI-LITE 26ML ORANGE

## (undated) DEVICE — STIRRUP STRAP ADULT DISP

## (undated) DEVICE — WEBRIL 6 IN UNSTERILE

## (undated) DEVICE — CUFF TOURNIQUET 30 X 4 IN QUICK CONNECT DISP 1BLA

## (undated) DEVICE — FRAZIER SUCTION INSTRUMENT 18 FR W/OBTURATOR, NO CONTROL VENT: Brand: FRAZIER

## (undated) DEVICE — SYRINGE 30ML LL

## (undated) DEVICE — GLOVE PI ULTRA TOUCH SZ.7.5

## (undated) DEVICE — DRAPE EQUIPMENT RF WAND

## (undated) DEVICE — 4-PORT MANIFOLD: Brand: NEPTUNE 2

## (undated) DEVICE — SUT MONOCRYL 4-0 PS-2 27 IN Y426H

## (undated) DEVICE — SMARTSLEEVE SURGICAL GOWN, 2XL LONG: Brand: CONVERTORS

## (undated) DEVICE — MAYO STAND COVER: Brand: CONVERTORS